# Patient Record
Sex: FEMALE | Race: WHITE | Employment: OTHER | URBAN - NONMETROPOLITAN AREA
[De-identification: names, ages, dates, MRNs, and addresses within clinical notes are randomized per-mention and may not be internally consistent; named-entity substitution may affect disease eponyms.]

---

## 2017-01-31 ENCOUNTER — RADIANT APPOINTMENT (OUTPATIENT)
Dept: GENERAL RADIOLOGY | Facility: CLINIC | Age: 67
End: 2017-01-31
Attending: NURSE PRACTITIONER
Payer: COMMERCIAL

## 2017-01-31 ENCOUNTER — OFFICE VISIT (OUTPATIENT)
Dept: FAMILY MEDICINE | Facility: CLINIC | Age: 67
End: 2017-01-31
Payer: COMMERCIAL

## 2017-01-31 VITALS
OXYGEN SATURATION: 92 % | WEIGHT: 146.8 LBS | SYSTOLIC BLOOD PRESSURE: 129 MMHG | HEART RATE: 90 BPM | BODY MASS INDEX: 28.67 KG/M2 | TEMPERATURE: 97.3 F | DIASTOLIC BLOOD PRESSURE: 72 MMHG

## 2017-01-31 DIAGNOSIS — J44.1 COPD EXACERBATION (H): ICD-10-CM

## 2017-01-31 DIAGNOSIS — R07.1 CHEST PAIN ON BREATHING: ICD-10-CM

## 2017-01-31 DIAGNOSIS — I10 HTN, GOAL BELOW 140/90: Primary | ICD-10-CM

## 2017-01-31 PROCEDURE — 99215 OFFICE O/P EST HI 40 MIN: CPT | Performed by: NURSE PRACTITIONER

## 2017-01-31 PROCEDURE — 71020 XR CHEST 2 VW: CPT

## 2017-01-31 PROCEDURE — 93000 ELECTROCARDIOGRAM COMPLETE: CPT | Performed by: NURSE PRACTITIONER

## 2017-01-31 RX ORDER — AZITHROMYCIN 500 MG/1
500 TABLET, FILM COATED ORAL DAILY
Qty: 5 TABLET | Refills: 1 | Status: SHIPPED | OUTPATIENT
Start: 2017-01-31 | End: 2017-02-05

## 2017-01-31 RX ORDER — AMLODIPINE BESYLATE 10 MG/1
10 TABLET ORAL DAILY
Qty: 90 TABLET | Refills: 4 | COMMUNITY
Start: 2017-01-31 | End: 2017-01-31

## 2017-01-31 RX ORDER — AZITHROMYCIN 250 MG/1
TABLET, FILM COATED ORAL
Qty: 6 TABLET | Refills: 0 | Status: SHIPPED | OUTPATIENT
Start: 2017-01-31 | End: 2017-01-31

## 2017-01-31 RX ORDER — AMLODIPINE BESYLATE 10 MG/1
10 TABLET ORAL DAILY
Qty: 90 TABLET | Refills: 4 | Status: SHIPPED | OUTPATIENT
Start: 2017-01-31 | End: 2018-01-30

## 2017-01-31 RX ORDER — PREDNISONE 20 MG/1
40 TABLET ORAL DAILY
Qty: 10 TABLET | Refills: 0 | Status: SHIPPED | OUTPATIENT
Start: 2017-01-31 | End: 2017-02-05

## 2017-01-31 ASSESSMENT — ANXIETY QUESTIONNAIRES
3. WORRYING TOO MUCH ABOUT DIFFERENT THINGS: SEVERAL DAYS
IF YOU CHECKED OFF ANY PROBLEMS ON THIS QUESTIONNAIRE, HOW DIFFICULT HAVE THESE PROBLEMS MADE IT FOR YOU TO DO YOUR WORK, TAKE CARE OF THINGS AT HOME, OR GET ALONG WITH OTHER PEOPLE: NOT DIFFICULT AT ALL
6. BECOMING EASILY ANNOYED OR IRRITABLE: NOT AT ALL
2. NOT BEING ABLE TO STOP OR CONTROL WORRYING: NOT AT ALL
5. BEING SO RESTLESS THAT IT IS HARD TO SIT STILL: NOT AT ALL
7. FEELING AFRAID AS IF SOMETHING AWFUL MIGHT HAPPEN: NOT AT ALL
GAD7 TOTAL SCORE: 2
1. FEELING NERVOUS, ANXIOUS, OR ON EDGE: NOT AT ALL

## 2017-01-31 ASSESSMENT — PATIENT HEALTH QUESTIONNAIRE - PHQ9: 5. POOR APPETITE OR OVEREATING: SEVERAL DAYS

## 2017-01-31 NOTE — PROGRESS NOTES
SUBJECTIVE:                                                    Aminata Gale is a 66 year old female who presents to clinic today for the following health issues:      COPD Follow-Up    Symptoms are currently: worse     Current fatigue or dyspnea with ambulation: none    Shortness of breath: slightly worsened    Increased or change in Cough/Sputum: Yes-    Fever(s): No    History   Smoking status     Current Every Day Smoker -- 0.60 packs/day for 42 years     Types: Cigarettes   Smokeless tobacco     Never Used     Comment: has not started the Chantix yet- 2016     No results found for this basename: FEV1, [id number]  Patient has a concentrator at home for oxygen. She does not use this is too noisy. Patient would like a order for a portable oxygen tank   Patient continues to smoke   Having more problems with shortness of breath this past month.   Recently in [place] to see some of her children and grandchildren   Has been ill since she got back per her report     Patient states while there in [place] she stopped her hydrochlorothiazide. She said she felt funny. She is not able to tell me today what felt funny but she stopped the medication. She had not had her follow-up blood work and this also concerned her. She does not want to go back on the diuretic. She states that she is tolerating the amlodipine fine and restarted her amlodipine     Patient states she has increased shortness of breath with exertion. Not able to even move her suitcase during her recent trip. Intermittently has left chest pain/pressure.  Seems to be worse when taking a deep breath or worse with physical activity  Increased coughing when lying down   Has been using her maintenance and rescue inhalers intermittently. No consistent use of inhalers per her report     Grieving the loss of her .  2 years ago this spring.        Amount of exercise or physical activity: None    Problems taking medications regularly: yes has been ill  in the last month     Medication side effects: none  Diet: regular (no restrictions)  URI       Duration: 1 month     Description (location/character/radiation): uri     Intensity:  moderate    Accompanying signs and symptoms: none    History (similar episodes/previous evaluation): None    Precipitating or alleviating factors: None    Therapies tried and outcome: None       -------------------------------------    Problem list and histories reviewed & adjusted, as indicated.  Additional history: as documented    Labs reviewed in [facility]  Problem list, Medication list, Allergies, and Medical/Social/Surgical histories reviewed in [facility] and updated as appropriate.    ROS:   ROS: 10 point ROS neg other than the symptoms noted above in the HPI.      OBJECTIVE:                                                    /72 mmHg  Pulse 90  Temp(Src) 97.3  F (36.3  C) (Tympanic)  Wt 146 lb 12.8 oz (66.588 kg)  SpO2 92%  Body mass index is 28.67 kg/(m^2).   GENERAL: healthy, alert, well nourished, well hydrated, no distress  HENT: ear canals- normal; TMs- normal; Nose- normal; Mouth- no ulcers, no lesions  NECK: no tenderness, no adenopathy, no asymmetry, no masses, no stiffness; thyroid- normal to palpation  RESP: lungs distant breath sounds with anterior upper expiratory  wheezes  CV: regular rates and rhythm, normal S1 S2, no S3 or S4 and no murmur, no click or rub -  ABDOMEN: soft, no tenderness, no  hepatosplenomegaly, no masses, normal bowel sounds    Diagnostic test results:  Recent Results (from the past 744 hour(s))   XR Chest 2 Views    Narrative    XR CHEST 2 VW   1/31/2017 4:40 PM     HISTORY: Chest pain on breathing, Chronic obstructive pulmonary  disease with (acute) exacerbation    COMPARISON: Film dated 6/5/2014    FINDINGS: The heart is negative.  The lungs are clear. The pulmonary  vasculature is normal.  The bones and soft tissues are unremarkable.      Impression    IMPRESSION: No acute infiltrates. No  significant change.        YEMI CARRILLO MD       Results for orders placed or performed in visit on 05/18/16   MA Screening Digital Bilateral    Narrative    SCREENING MAMMOGRAM, BILATERAL, DIGITAL w/CAD - 5/18/2016 2:25 PM.    BREAST SYMPTOMS: No current breast complaints.     COMPARISON:  02/11/2013, 10/14/2010, 10/14/2009, 10/19/2006.    BREAST DENSITY: Scattered fibroglandular densities.    COMMENTS: No findings of suspicion for malignancy.            Impression    IMPRESSION: BI-RADS CATEGORY: 1 -  NEGATIVE.    RECOMMENDED FOLLOW-UP: Annual Mammography    Exam results letter mailed to patient.    NORI STRANGE MD        ASSESSMENT/PLAN:                                                    1. HTN, goal below 140/90  Diuretic taken off her medication list  Continue  - amLODIPine (NORVASC) 10 MG tablet; Take 1 tablet (10 mg) by mouth daily For high blood pressure  Dispense: 90 tablet; Refill: 4  - EKG 12-lead complete w/read - Clinics  - CARDIOLOGY EVAL ADULT REFERRAL  - Exercise Stress Echocardiogram; Future    2. Chest pain on breathing  - EKG 12-lead complete w/read - Clinics  - CARDIOLOGY EVAL ADULT REFERRAL  - Exercise Stress Echocardiogram; Future  - PULMONARY MEDICINE REFERRAL  - XR Chest 2 Views; Future    3. COPD exacerbation (H)  Smoking cessation strongly encouraged  - Exercise Stress Echocardiogram; Future  - PULMONARY MEDICINE REFERRAL  - XR Chest 2 Views; Future  - predniSONE (DELTASONE) 20 MG tablet; Take 2 tablets (40 mg) by mouth daily for 5 days  Dispense: 10 tablet; Refill: 0  - azithromycin (ZITHROMAX) 500 MG tablet; Take 1 tablet (500 mg) by mouth daily for 5 days  Dispense: 5 tablet; Refill: 1      Follow up with Provider - Call or return to the clinic with any worsening of symptoms or no resolution. Patient/Parent verbalized understanding and is in agreement. Medication side effects reviewed.   Current Outpatient Prescriptions   Medication Sig Dispense Refill     amLODIPine (NORVASC) 10 MG  tablet Take 1 tablet (10 mg) by mouth daily For high blood pressure 90 tablet 4     predniSONE (DELTASONE) 20 MG tablet Take 2 tablets (40 mg) by mouth daily for 5 days 10 tablet 0     azithromycin (ZITHROMAX) 500 MG tablet Take 1 tablet (500 mg) by mouth daily for 5 days 5 tablet 1     FLUoxetine (PROZAC) 20 MG capsule Take 1 capsule (20 mg) by mouth daily 90 capsule 1     tiotropium (SPIRIVA RESPIMAT) 2.5 MCG/ACT inhalation aerosol Inhale 2 puffs into the lungs daily 12 g 3     omeprazole (PRILOSEC) 40 MG capsule Take 1 capsule (40 mg) by mouth daily Take 30-60 minutes before a meal. 90 capsule 1     albuterol (ALBUTEROL) 108 (90 BASE) MCG/ACT inhaler Inhale 1-2 puffs into the lungs every 4 hours To 6 hours as needed for shortness of breath 3 Inhaler 1     ipratropium - albuterol 0.5 mg/2.5 mg/3 mL (DUONEB) 0.5-2.5 (3) MG/3ML nebulization Take 1 vial (3 mLs) by nebulization every 6 hours as needed for shortness of breath / dyspnea or wheezing 30 vial 1     STATIN NOT PRESCRIBED, INTENTIONAL, 1 each daily Statin not prescribed intentionally due to Intolerance 0 each 0     Ibuprofen (IBU-200 PO) Take  by mouth.       [DISCONTINUED] amLODIPine (NORVASC) 10 MG tablet Take 1 tablet (10 mg) by mouth daily For high blood pressure 90 tablet 4     HYDROcodone-acetaminophen (NORCO) 5-325 MG per tablet Take 1 tablet by mouth every 6 hours as needed for pain 30 tablet 0     [DISCONTINUED] amLODIPine (NORVASC) 10 MG tablet Take 1 tablet (10 mg) by mouth daily For high blood pressure 90 tablet 4        See Patient Instructions    PEDRO Dominguez [facility]

## 2017-01-31 NOTE — NURSING NOTE
Initial /72 mmHg  Pulse 90  Temp(Src) 97.3  F (36.3  C) (Tympanic)  Wt 146 lb 12.8 oz (66.588 kg)  SpO2 92% Estimated body mass index is 28.67 kg/(m^2) as calculated from the following:    Height as of 7/7/15: 5' (1.524 m).    Weight as of this encounter: 146 lb 12.8 oz (66.588 kg). .

## 2017-01-31 NOTE — MR AVS SNAPSHOT
After Visit Summary   1/31/2017    Aminata Gale    MRN: [id number]           Patient Information     Date Of Birth          1950        Visit Information        Provider Department      1/31/2017 3:40 PM Sana Olivo APRN [facility]        Today's Diagnoses     HTN, goal below 140/90    -  1     Chest pain on breathing         COPD exacerbation (H)           Care Instructions      Chronic Lung Disease: Preventing Lung Infections  Chronic lung diseases include chronic obstructive pulmonary disease (COPD), which includes chronic bronchitis and emphysema. Other chronic lung diseases include pulmonary fibrosis, sarcoidosis, and other conditions. When you have chronic lung diseases, it's very important to protect yourself from respiratory infections, like colds, the flu, and lung infections. Infections may cause your lung condition to worsen. Although you can't completely avoid them, there are things you can do to lessen the chance of infections.    Take precautions  Taking the following precautions can help you avoid illness:    Remember to keep your hands away from your nose and mouth. Germs on your hands get into your respiratory system this way.    Wash your hands often. When you wash them:    Use soap and warm water.    Rub your hands together well for at least 20 seconds.    Make sure to rinse them well.    Dry your hands on clean towels or air-dry them.    Use hand  containing alcohol, if you are unable to wash your hands. Use the  after touching doorknobs, handles, and supermarket carts, for example, since lots of people touch them. Then wash your hands as soon as you can.    To help prevent the flu, get a flu vaccination every year. This may be given at your health care provider's office, a drugstore, or pharmacy, or at work. Get your flu shot as soon as the vaccines are available in your area. This is usually around September each  year.    To help prevent pneumococcal pneumonia, get pneumonia vaccinations. Talk with your health care provider about which pneumococcal vaccinations you need.    Try to stay away from people with respiratory infections, such as colds or the flu. Stay away from crowded places, like shopping centers or movie theatres during cold and flu season.    If you smoke, think about quitting. In addition to causing or worsening many lung conditions, the lung damage from smoking increases your risk of infections. Stay away from others who smoke, too. This is also harmful and increases your chance of infections.    8378-4731 [employer]. [address] [place] [address]. All rights reserved. This information is not intended as a substitute for professional medical care. Always follow your healthcare professional's instructions.        Discharge Instructions: COPD  You have been diagnosed with chronic obstructive pulmonary disease (COPD). This is a name given to a group of diseases that limit the flow of air in and out of your lungs. This makes it harder to breathe. With COPD, you are also more likely to get lung infections. COPD includes chronic bronchitis and emphysema. COPD is most often caused by heavy, long-term cigarette smoking.  Home care  Quit smoking    If you smoke, quit. It is the best thing you can do for your COPD and your overall health.    Join a stop-smoking program. There are even telephone, text message, and Internet programs to help you quit.    Ask your health care provider about medications or other methods to help you quit.    Ask family members to quit smoking as well.    Don't allow people to smoke in your home, in your car, or when they are around you.  Protect yourself from infection    Wash your hands often. Do your best to keep your hands away from your face. Most germs are spread from your hands to your mouth.    Get a flu shot every year. Also ask your provider about  pneumonia vaccines.    Avoid crowds. It's especially important to do this in the winter when more people have colds and flu.    To stay healthy, get enough sleep, exercise regularly, and eat a balanced diet. You should:    Get about 8 hours of sleep every night.    Try to exercise for at least 30 minutes on most days.    Have healthy foods including fruits and vegetables, 100% whole grains, lean meats and fish, and low-fat dairy products. Try to stay away from foods high in fats and sugar.  Take your medications  Take your medications exactly as directed. Don't skip doses.  Manage your stress  Stress can make COPD worse. Use this stress management technique:    Find a quiet place and sit or lie in a comfortable position.    Close your eyes and perform breathing exercises for several minutes. Ask your provider about the best way to breathe.  Pulmonary rehabilitation    Pulmonary rehab can help you feel better. These programs include exercise, breathing techniques, information about COPD, counseling, and help for smokers.    Ask your provider or your local hospital about programs in your area.  When to call your health care provider  Call your provider immediately if you have any of the following:    Shortness of breath, wheezing, or coughing    Increased mucus    Yellow, green, bloody, or smelly mucus    Fever or chills    Tightness in your chest that does not go away with rest or medication    An irregular heartbeat or a feeling that your heart is beating very fast    Swollen ankles     0559-8306 [employer]. [address] [place] [address]. All rights reserved. This information is not intended as a substitute for professional medical care. Always follow your healthcare professional's instructions.              Follow-ups after your visit        Additional Services     CARDIOLOGY EVAL ADULT REFERRAL       Your provider has referred you to:  [facility]: [facility] [phone]   [email or web address]    Please be aware that coverage of these services is subject to the terms and limitations of your health insurance plan.  Call member services at your health plan with any benefit or coverage questions.      Type of Referral:  New Cardiology Consult    Timeframe requested:  1 Week    Please bring the following to your appointment:  >>   Any x-rays, CTs or MRIs which have been performed.  Contact the facility where they were done to arrange for  prior to your scheduled appointment.    >>   List of current medications  >>   This referral request   >>   Any documents/labs given to you for this referral            PULMONARY MEDICINE REFERRAL       Your provider has referred you to: [facility]: [facility] [place] [phone]   [email or web address]  [facility]: [facility] - [place] [phone]   [email or web address]    Please be aware that coverage of these services is subject to the terms and limitations of your health insurance plan.  Call member services at your health plan with any benefit or coverage questions.      Please bring the following with you to your appointment:    (1) Any X-Rays, CTs or MRIs which have been performed.  Contact the facility where they were done to arrange for  prior to your scheduled appointment.    (2) List of current medications   (3) This referral request   (4) Any documents/labs given to you for this referral                  Future tests that were ordered for you today     Open Future Orders        Priority Expected Expires Ordered    Exercise Stress Echocardiogram Routine  1/31/2018 1/31/2017    XR Chest 2 Views Routine 1/31/2017 1/31/2018 1/31/2017            Who to contact     If you have questions or need follow up information about today's clinic  visit or your schedule please contact [facility] directly at [phone].  Normal or non-critical lab and imaging results will be communicated to you by MyChart, letter or phone within 4 business days after the clinic has received the results. If you do not hear from us within 7 days, please contact the clinic through [employer] or phone. If you have a critical or abnormal lab result, we will notify you by phone as soon as possible.  Submit refill requests through [employer] or call your pharmacy and they will forward the refill request to us. Please allow 3 business days for your refill to be completed.          Additional Information About Your Visit        [employer] Information     [employer] gives you secure access to your electronic health record. If you see a primary care provider, you can also send messages to your care team and make appointments. If you have questions, please call your primary care clinic.  If you do not have a primary care provider, please call [phone] and they will assist you.        Care EveryWhere ID     This is your Care EveryWhere ID. This could be used by other organizations to access your [place] medical records  [id number]        Your Vitals Were     Pulse Temperature Pulse Oximetry             90 97.3  F (36.3  C) (Tympanic) 92%          Blood Pressure from Last 3 Encounters:   01/31/17 129/72   12/02/16 128/82   05/18/16 106/68    Weight from Last 3 Encounters:   01/31/17 146 lb 12.8 oz (66.588 kg)   12/02/16 142 lb (64.411 kg)   05/18/16 146 lb (66.225 kg)              We Performed the Following     CARDIOLOGY EVAL ADULT REFERRAL     EKG 12-lead complete w/read - Clinics     PULMONARY MEDICINE REFERRAL          Today's Medication Changes          These changes are accurate as of: 1/31/17  4:16 PM.  If you have any questions, ask your nurse or doctor.               Start taking these medicines.        Dose/Directions    predniSONE 20 MG tablet   Commonly known as:   DELTASONE   Used for:  COPD exacerbation (H)   Started by:  Sana Olivo APRN CNP        Dose:  40 mg   Take 2 tablets (40 mg) by mouth daily for 5 days   Quantity:  10 tablet   Refills:  0         These medicines have changed or have updated prescriptions.        Dose/Directions    azithromycin 500 MG tablet   Commonly known as:  ZITHROMAX   This may have changed:    - medication strength  - how much to take  - how to take this  - when to take this  - additional instructions   Used for:  COPD exacerbation (H)   Changed by:  Sana Olivo APRN CNP        Dose:  500 mg   Take 1 tablet (500 mg) by mouth daily for 5 days   Quantity:  5 tablet   Refills:  1         Stop taking these medicines if you haven't already. Please contact your care team if you have questions.     cholestyramine light 4 GM powder   Commonly known as:  PREVALITE   Stopped by:  Sana Olivo APRN CNP           guaiFENesin-codeine 100-10 MG/5ML Soln solution   Commonly known as:  ROBITUSSIN AC   Stopped by:  Sana Olivo APRN CNP           hydrochlorothiazide 12.5 MG Tabs tablet   Stopped by:  Sana Olivo APRN CNP           meclizine 25 MG tablet   Commonly known as:  ANTIVERT   Stopped by:  Sana Olivo APRN CNP           varenicline 0.5 MG X 11 & 1 MG X 42 tablet   Commonly known as:  CHANTIX STARTING MONTH PAK   Stopped by:  Sana Olivo APRN CNP                Where to get your medicines      These medications were sent to [facility] [place], [place] - [address] [place] [id number]     Phone:  [phone]    - amLODIPine 10 MG tablet  - azithromycin 500 MG tablet  - predniSONE 20 MG tablet             Primary Care Provider Office Phone # Fax #    PEDRO Dominguez [phone]       [facility] [address] [facility] [address]        Thank you!     Thank you for choosing [facility]  [place]  for your care. Our goal is always to provide you with excellent care. Hearing back from our patients is one way we can continue to improve our services. Please take a few minutes to complete the written survey that you may receive in the mail after your visit with us. Thank you!             Your Updated Medication List - Protect others around you: Learn how to safely use, store and throw away your medicines at www.disposemymeds.org.          This list is accurate as of: 1/31/17  4:16 PM.  Always use your most recent med list.                   Brand Name Dispense Instructions for use    albuterol 108 (90 BASE) MCG/ACT Inhaler    albuterol    3 Inhaler    Inhale 1-2 puffs into the lungs every 4 hours To 6 hours as needed for shortness of breath       amLODIPine 10 MG tablet    NORVASC    90 tablet    Take 1 tablet (10 mg) by mouth daily For high blood pressure       azithromycin 500 MG tablet    ZITHROMAX    5 tablet    Take 1 tablet (500 mg) by mouth daily for 5 days       FLUoxetine 20 MG capsule    PROzac    90 capsule    Take 1 capsule (20 mg) by mouth daily       HYDROcodone-acetaminophen 5-325 MG per tablet    NORCO    30 tablet    Take 1 tablet by mouth every 6 hours as needed for pain       IBU-200 PO      Take  by mouth.       ipratropium - albuterol 0.5 mg/2.5 mg/3 mL 0.5-2.5 (3) MG/3ML neb solution    DUONEB    30 vial    Take 1 vial (3 mLs) by nebulization every 6 hours as needed for shortness of breath / dyspnea or wheezing       omeprazole 40 MG capsule    priLOSEC    90 capsule    Take 1 capsule (40 mg) by mouth daily Take 30-60 minutes before a meal.       predniSONE 20 MG tablet    DELTASONE    10 tablet    Take 2 tablets (40 mg) by mouth daily for 5 days       STATIN NOT PRESCRIBED (INTENTIONAL)     0 each    1 each daily Statin not prescribed intentionally due to Intolerance       tiotropium 2.5 MCG/ACT inhalation aerosol    SPIRIVA RESPIMAT    12 g    Inhale 2 puffs into the lungs daily

## 2017-01-31 NOTE — PATIENT INSTRUCTIONS
Chronic Lung Disease: Preventing Lung Infections  Chronic lung diseases include chronic obstructive pulmonary disease (COPD), which includes chronic bronchitis and emphysema. Other chronic lung diseases include pulmonary fibrosis, sarcoidosis, and other conditions. When you have chronic lung diseases, it's very important to protect yourself from respiratory infections, like colds, the flu, and lung infections. Infections may cause your lung condition to worsen. Although you can't completely avoid them, there are things you can do to lessen the chance of infections.    Take precautions  Taking the following precautions can help you avoid illness:    Remember to keep your hands away from your nose and mouth. Germs on your hands get into your respiratory system this way.    Wash your hands often. When you wash them:    Use soap and warm water.    Rub your hands together well for at least 20 seconds.    Make sure to rinse them well.    Dry your hands on clean towels or air-dry them.    Use hand  containing alcohol, if you are unable to wash your hands. Use the  after touching doorknobs, handles, and supermarket carts, for example, since lots of people touch them. Then wash your hands as soon as you can.    To help prevent the flu, get a flu vaccination every year. This may be given at your health care provider's office, a drugstore, or pharmacy, or at work. Get your flu shot as soon as the vaccines are available in your area. This is usually around September each year.    To help prevent pneumococcal pneumonia, get pneumonia vaccinations. Talk with your health care provider about which pneumococcal vaccinations you need.    Try to stay away from people with respiratory infections, such as colds or the flu. Stay away from crowded places, like shopping centers or movie theatres during cold and flu season.    If you smoke, think about quitting. In addition to causing or worsening many lung conditions,  the lung damage from smoking increases your risk of infections. Stay away from others who smoke, too. This is also harmful and increases your chance of infections.    9954-7919 [facility]. [address] [place] [address], [place] [address]. All rights reserved. This information is not intended as a substitute for professional medical care. Always follow your healthcare professional's instructions.        Discharge Instructions: COPD  You have been diagnosed with chronic obstructive pulmonary disease (COPD). This is a name given to a group of diseases that limit the flow of air in and out of your lungs. This makes it harder to breathe. With COPD, you are also more likely to get lung infections. COPD includes chronic bronchitis and emphysema. COPD is most often caused by heavy, long-term cigarette smoking.  Home care  Quit smoking    If you smoke, quit. It is the best thing you can do for your COPD and your overall health.    Join a stop-smoking program. There are even telephone, text message, and Internet programs to help you quit.    Ask your health care provider about medications or other methods to help you quit.    Ask family members to quit smoking as well.    Don't allow people to smoke in your home, in your car, or when they are around you.  Protect yourself from infection    Wash your hands often. Do your best to keep your hands away from your face. Most germs are spread from your hands to your mouth.    Get a flu shot every year. Also ask your provider about pneumonia vaccines.    Avoid crowds. It's especially important to do this in the winter when more people have colds and flu.    To stay healthy, get enough sleep, exercise regularly, and eat a balanced diet. You should:    Get about 8 hours of sleep every night.    Try to exercise for at least 30 minutes on most days.    Have healthy foods including fruits and vegetables, 100% whole grains, lean meats and fish, and low-fat dairy products. Try to  stay away from foods high in fats and sugar.  Take your medications  Take your medications exactly as directed. Don't skip doses.  Manage your stress  Stress can make COPD worse. Use this stress management technique:    Find a quiet place and sit or lie in a comfortable position.    Close your eyes and perform breathing exercises for several minutes. Ask your provider about the best way to breathe.  Pulmonary rehabilitation    Pulmonary rehab can help you feel better. These programs include exercise, breathing techniques, information about COPD, counseling, and help for smokers.    Ask your provider or your local hospital about programs in your area.  When to call your health care provider  Call your provider immediately if you have any of the following:    Shortness of breath, wheezing, or coughing    Increased mucus    Yellow, green, bloody, or smelly mucus    Fever or chills    Tightness in your chest that does not go away with rest or medication    An irregular heartbeat or a feeling that your heart is beating very fast    Swollen ankles     9081-5276 [employer]. [address] [place] [address], [place] [address]. All rights reserved. This information is not intended as a substitute for professional medical care. Always follow your healthcare professional's instructions.

## 2017-02-01 ASSESSMENT — ANXIETY QUESTIONNAIRES: GAD7 TOTAL SCORE: 2

## 2017-02-01 ASSESSMENT — PATIENT HEALTH QUESTIONNAIRE - PHQ9: SUM OF ALL RESPONSES TO PHQ QUESTIONS 1-9: 8

## 2017-02-08 ENCOUNTER — HOSPITAL ENCOUNTER (OUTPATIENT)
Dept: CT IMAGING | Facility: CLINIC | Age: 67
Discharge: HOME | End: 2017-02-08
Attending: NURSE PRACTITIONER | Admitting: NURSE PRACTITIONER
Payer: MEDICARE

## 2017-02-08 DIAGNOSIS — R07.1 CHEST PAIN ON BREATHING: ICD-10-CM

## 2017-02-08 DIAGNOSIS — J44.1 COPD EXACERBATION (H): ICD-10-CM

## 2017-02-08 PROCEDURE — 71250 CT THORAX DX C-: CPT

## 2017-02-10 ENCOUNTER — HOSPITAL ENCOUNTER (OUTPATIENT)
Dept: CARDIOLOGY | Facility: CLINIC | Age: 67
Discharge: HOME | End: 2017-02-10
Attending: NURSE PRACTITIONER | Admitting: NURSE PRACTITIONER
Payer: MEDICARE

## 2017-02-10 ENCOUNTER — TELEPHONE (OUTPATIENT)
Dept: FAMILY MEDICINE | Facility: CLINIC | Age: 67
End: 2017-02-10

## 2017-02-10 DIAGNOSIS — R06.09 DYSPNEA ON EXERTION: ICD-10-CM

## 2017-02-10 DIAGNOSIS — M32.9 SYSTEMIC LUPUS ERYTHEMATOSUS (H): ICD-10-CM

## 2017-02-10 DIAGNOSIS — I10 HTN, GOAL BELOW 140/90: ICD-10-CM

## 2017-02-10 DIAGNOSIS — J44.1 COPD EXACERBATION (H): ICD-10-CM

## 2017-02-10 DIAGNOSIS — J43.2 CENTRILOBULAR EMPHYSEMA (H): ICD-10-CM

## 2017-02-10 DIAGNOSIS — R94.39 ABNORMAL STRESS ECHO: Primary | ICD-10-CM

## 2017-02-10 DIAGNOSIS — Z72.0 TOBACCO ABUSE: ICD-10-CM

## 2017-02-10 DIAGNOSIS — D75.1 POLYCYTHEMIA, SECONDARY: ICD-10-CM

## 2017-02-10 DIAGNOSIS — R07.1 CHEST PAIN ON BREATHING: ICD-10-CM

## 2017-02-10 PROCEDURE — 93350 STRESS TTE ONLY: CPT | Mod: 26 | Performed by: INTERNAL MEDICINE

## 2017-02-10 PROCEDURE — 25500064 ZZH RX 255 OP 636: Performed by: NURSE PRACTITIONER

## 2017-02-10 PROCEDURE — 93321 DOPPLER ECHO F-UP/LMTD STD: CPT | Mod: 26 | Performed by: INTERNAL MEDICINE

## 2017-02-10 PROCEDURE — 40000264 ECHO STRESS TEST WITH LUMASON

## 2017-02-10 PROCEDURE — 93016 CV STRESS TEST SUPVJ ONLY: CPT | Performed by: FAMILY MEDICINE

## 2017-02-10 PROCEDURE — 93018 CV STRESS TEST I&R ONLY: CPT | Performed by: INTERNAL MEDICINE

## 2017-02-10 PROCEDURE — 93325 DOPPLER ECHO COLOR FLOW MAPG: CPT | Mod: 26 | Performed by: INTERNAL MEDICINE

## 2017-02-10 RX ADMIN — SULFUR HEXAFLUORIDE 5 ML: KIT at 08:06

## 2017-02-10 NOTE — TELEPHONE ENCOUNTER
Patient noted of results below. Patient told of referral placed and given  number from referral.  Patient verbalized understanding.  Kris

## 2017-02-10 NOTE — TELEPHONE ENCOUNTER
Emphysema centrilobular seen on CT.   Lymph nodes stable. No changes.   Abnormal ECHO. Cardiology consult recommended to further evaluate heart function.   Further work up needed of cardiac function.  Order for card congceire consult placed.   Thanks Sana DAVILA-BC

## 2017-03-01 NOTE — TELEPHONE ENCOUNTER
Dr Santana recommended a lexiscan nuclear stress test in [place] prior to seeing them in April.  Order placed.   Please call and let her know.  Thanks Sana Olivo FNP-BC

## 2017-03-01 NOTE — TELEPHONE ENCOUNTER
Notified of plan, she will call to schedule today. Aminata is leaving for [place] for the month of March, will try to schedule before she leaves, otherwise will do this when she gets back in April prior to seeing cardiology

## 2017-04-10 ENCOUNTER — HOSPITAL ENCOUNTER (OUTPATIENT)
Dept: NUCLEAR MEDICINE | Facility: CLINIC | Age: 67
Setting detail: NUCLEAR MEDICINE
Discharge: HOME | End: 2017-04-10
Attending: NURSE PRACTITIONER | Admitting: NURSE PRACTITIONER
Payer: MEDICARE

## 2017-04-10 DIAGNOSIS — R06.09 DYSPNEA ON EXERTION: ICD-10-CM

## 2017-04-10 DIAGNOSIS — J43.2 CENTRILOBULAR EMPHYSEMA (H): ICD-10-CM

## 2017-04-10 DIAGNOSIS — M32.9 SYSTEMIC LUPUS ERYTHEMATOSUS (H): ICD-10-CM

## 2017-04-10 DIAGNOSIS — R94.39 ABNORMAL STRESS ECHO: ICD-10-CM

## 2017-04-10 DIAGNOSIS — I10 HTN, GOAL BELOW 140/90: ICD-10-CM

## 2017-04-10 DIAGNOSIS — Z72.0 TOBACCO ABUSE: ICD-10-CM

## 2017-04-10 DIAGNOSIS — D75.1 POLYCYTHEMIA, SECONDARY: ICD-10-CM

## 2017-04-10 PROCEDURE — 78452 HT MUSCLE IMAGE SPECT MULT: CPT | Mod: 26 | Performed by: INTERNAL MEDICINE

## 2017-04-10 PROCEDURE — 93017 CV STRESS TEST TRACING ONLY: CPT

## 2017-04-10 PROCEDURE — 34300033 ZZH RX 343: Performed by: FAMILY MEDICINE

## 2017-04-10 PROCEDURE — 93018 CV STRESS TEST I&R ONLY: CPT | Performed by: INTERNAL MEDICINE

## 2017-04-10 PROCEDURE — 25000128 H RX IP 250 OP 636: Performed by: NURSE PRACTITIONER

## 2017-04-10 PROCEDURE — 78452 HT MUSCLE IMAGE SPECT MULT: CPT

## 2017-04-10 PROCEDURE — A9502 TC99M TETROFOSMIN: HCPCS | Performed by: FAMILY MEDICINE

## 2017-04-10 PROCEDURE — 93016 CV STRESS TEST SUPVJ ONLY: CPT | Performed by: FAMILY MEDICINE

## 2017-04-10 RX ORDER — REGADENOSON 0.08 MG/ML
0.4 INJECTION, SOLUTION INTRAVENOUS ONCE
Status: COMPLETED | OUTPATIENT
Start: 2017-04-10 | End: 2017-04-10

## 2017-04-10 RX ADMIN — REGADENOSON 0.4 MG: 0.08 INJECTION, SOLUTION INTRAVENOUS at 11:50

## 2017-04-10 RX ADMIN — TETROFOSMIN 25.2 MCI.: 0.23 INJECTION, POWDER, LYOPHILIZED, FOR SOLUTION INTRAVENOUS at 11:45

## 2017-04-10 RX ADMIN — TETROFOSMIN 9 MCI.: 0.23 INJECTION, POWDER, LYOPHILIZED, FOR SOLUTION INTRAVENOUS at 10:15

## 2017-04-11 ENCOUNTER — OFFICE VISIT (OUTPATIENT)
Dept: CARDIOLOGY | Facility: CLINIC | Age: 67
End: 2017-04-11
Attending: NURSE PRACTITIONER
Payer: COMMERCIAL

## 2017-04-11 VITALS
SYSTOLIC BLOOD PRESSURE: 128 MMHG | OXYGEN SATURATION: 95 % | BODY MASS INDEX: 29.02 KG/M2 | DIASTOLIC BLOOD PRESSURE: 77 MMHG | WEIGHT: 148.6 LBS | HEART RATE: 78 BPM

## 2017-04-11 DIAGNOSIS — R60.0 LOWER LEG EDEMA: ICD-10-CM

## 2017-04-11 DIAGNOSIS — R79.9 ABNORMAL FINDING OF BLOOD CHEMISTRY: ICD-10-CM

## 2017-04-11 DIAGNOSIS — R94.39 ABNORMAL CARDIOVASCULAR STRESS TEST: ICD-10-CM

## 2017-04-11 DIAGNOSIS — R07.89 ATYPICAL CHEST PAIN: ICD-10-CM

## 2017-04-11 DIAGNOSIS — E78.2 MIXED HYPERLIPIDEMIA: Primary | ICD-10-CM

## 2017-04-11 DIAGNOSIS — Z01.810 PRE-PROCEDURAL CARDIOVASCULAR EXAMINATION: ICD-10-CM

## 2017-04-11 LAB
ALBUMIN SERPL-MCNC: 3.9 G/DL (ref 3.4–5)
ALP SERPL-CCNC: 120 U/L (ref 40–150)
ALT SERPL W P-5'-P-CCNC: 59 U/L (ref 0–50)
ANION GAP SERPL CALCULATED.3IONS-SCNC: 7 MMOL/L (ref 3–14)
APTT PPP: 36 SEC (ref 22–37)
AST SERPL W P-5'-P-CCNC: 40 U/L (ref 0–45)
BILIRUB SERPL-MCNC: 0.3 MG/DL (ref 0.2–1.3)
BUN SERPL-MCNC: 16 MG/DL (ref 7–30)
CALCIUM SERPL-MCNC: 9.4 MG/DL (ref 8.5–10.1)
CHLORIDE SERPL-SCNC: 100 MMOL/L (ref 94–109)
CHOLEST SERPL-MCNC: 232 MG/DL
CO2 SERPL-SCNC: 33 MMOL/L (ref 20–32)
CREAT SERPL-MCNC: 0.7 MG/DL (ref 0.52–1.04)
ERYTHROCYTE [DISTWIDTH] IN BLOOD BY AUTOMATED COUNT: 13.1 % (ref 10–15)
GFR SERPL CREATININE-BSD FRML MDRD: 83 ML/MIN/1.7M2
GLUCOSE SERPL-MCNC: 62 MG/DL (ref 70–99)
HBA1C MFR BLD: 6.3 % (ref 4.3–6)
HCT VFR BLD AUTO: 51.9 % (ref 35–47)
HDLC SERPL-MCNC: 58 MG/DL
HGB BLD-MCNC: 17.8 G/DL (ref 11.7–15.7)
INR PPP: 0.92 (ref 0.86–1.14)
LDLC SERPL CALC-MCNC: 145 MG/DL
MCH RBC QN AUTO: 31.2 PG (ref 26.5–33)
MCHC RBC AUTO-ENTMCNC: 34.3 G/DL (ref 31.5–36.5)
MCV RBC AUTO: 91 FL (ref 78–100)
NONHDLC SERPL-MCNC: 174 MG/DL
PLATELET # BLD AUTO: 251 10E9/L (ref 150–450)
POTASSIUM SERPL-SCNC: 3.3 MMOL/L (ref 3.4–5.3)
PROT SERPL-MCNC: 7.9 G/DL (ref 6.8–8.8)
RBC # BLD AUTO: 5.71 10E12/L (ref 3.8–5.2)
SODIUM SERPL-SCNC: 140 MMOL/L (ref 133–144)
TRIGL SERPL-MCNC: 147 MG/DL
WBC # BLD AUTO: 9.6 10E9/L (ref 4–11)

## 2017-04-11 PROCEDURE — 36415 COLL VENOUS BLD VENIPUNCTURE: CPT | Performed by: INTERNAL MEDICINE

## 2017-04-11 PROCEDURE — 80053 COMPREHEN METABOLIC PANEL: CPT | Performed by: INTERNAL MEDICINE

## 2017-04-11 PROCEDURE — 85610 PROTHROMBIN TIME: CPT | Performed by: INTERNAL MEDICINE

## 2017-04-11 PROCEDURE — 99204 OFFICE O/P NEW MOD 45 MIN: CPT | Performed by: INTERNAL MEDICINE

## 2017-04-11 PROCEDURE — 80061 LIPID PANEL: CPT | Performed by: INTERNAL MEDICINE

## 2017-04-11 PROCEDURE — 83036 HEMOGLOBIN GLYCOSYLATED A1C: CPT | Performed by: INTERNAL MEDICINE

## 2017-04-11 PROCEDURE — 85730 THROMBOPLASTIN TIME PARTIAL: CPT | Performed by: INTERNAL MEDICINE

## 2017-04-11 PROCEDURE — 85027 COMPLETE CBC AUTOMATED: CPT | Performed by: INTERNAL MEDICINE

## 2017-04-11 RX ORDER — HYDROCHLOROTHIAZIDE 12.5 MG/1
12.5 CAPSULE ORAL PRN
Status: ON HOLD | COMMUNITY
End: 2017-04-13

## 2017-04-11 RX ORDER — LIDOCAINE 40 MG/G
CREAM TOPICAL
Status: CANCELLED | OUTPATIENT
Start: 2017-04-11

## 2017-04-11 RX ORDER — ASPIRIN 81 MG/1
81 TABLET, CHEWABLE ORAL DAILY
Qty: 100 TABLET | Refills: 3 | COMMUNITY
Start: 2017-04-11 | End: 2017-12-28

## 2017-04-11 RX ORDER — SODIUM CHLORIDE 9 MG/ML
INJECTION, SOLUTION INTRAVENOUS CONTINUOUS
Status: CANCELLED | OUTPATIENT
Start: 2017-04-11

## 2017-04-11 RX ORDER — EZETIMIBE 10 MG/1
10 TABLET ORAL DAILY
Qty: 30 TABLET | Refills: 2 | Status: SHIPPED | OUTPATIENT
Start: 2017-04-11 | End: 2017-06-15

## 2017-04-11 RX ORDER — ASPIRIN 81 MG/1
81 TABLET ORAL DAILY
Status: CANCELLED | OUTPATIENT
Start: 2017-04-11

## 2017-04-11 NOTE — LETTER
4/11/2017    PEDRO Dominguez CNP  [facility]   [address] [facility] [address]    RE: Aminata Gale       Dear Colleague,    I had the pleasure of seeing Aminata Gale in the [facility].    CARDIOLOGY CONSULT    REASON FOR CONSULT: abnormal stress test    PRIMARY CARE PHYSICIAN:  Sana Olivo    HISTORY OF PRESENT ILLNESS:  66-year-old female with no cardiac history is seen for evaluation of chest pain and abnormal stress test.  Her cardiac risk factors include dyslipidemia with a history of statin intolerance, tobacco abuse, and hypertension.    She has a history of COPD.  PFTs in 2013 showed FEV1 of 1.1, FVC 1.9, DLCO 47% predicted.    Over the past one year she has complained of exertional dyspnea.  This has slightly progressed in recent months.  She can walk fairly short distances, but will need to stop and rest.  In the past several months she also complains of a tightness in her chest.  This occurs on a daily basis and is fairly constant.  It doesn't worsen with exertion.  She has some mild lower extremity edema which is unchanged.  She denies any palpitations, dizziness, or syncope.    Stress echo February 2017 showed 3:30 on the Dino protocol, 83% maximum heart rate achieved, no chest pain during exercise, EKG negative for ischemia, rest echo with EF of 50-55%, stress echo shows minimal improvement of ejection fraction to 55-60%, distal anterior wall hypokinetic.     Lexiscan nuclear stress test April 2017 shows small apical infarct, no ischemia, ejection fraction 60%, apical hypokinesis.     Patient denies any bleeding history.  She has no upcoming surgeries.    PAST MEDICAL HISTORY:  Past Medical History:   Diagnosis Date     Acute pericarditis, unspecified      Chronic airway obstruction 12/13/2005    PFTs - 1/02 - mild COPD Problem list name updated by automated process. Provider to review     Dysuria      Esophageal reflux       GERD (gastroesophageal reflux disease) 9/20/2013     Hiatal hernia 5/4/2011     HTN, goal below 140/90 9/20/2013     Hyperlipidemia LDL goal <130 9/22/2010    Recent Labs  Lab Test 9/22/10 0908 10/12/06 0939    CHOL 214* 256*    HDL 31* 42*    * 186*    TRIG 149 143    CHOLHDLRATIO 7.0* 6.0*   4/8/11 - dobutamine echo showed NO infarct or ischemia LV 60-65%, normal exercise response.        Osteoporosis 10/18/2010    10/10 - severe osteopenia in femoral necks, mild osteopenia in spine  (Problem list name updated by automated process. Provider to review and confirm.)     Pure hypercholesterolemia      SECONDARY POLYCYTHEMIA 10/17/2006    Due to smoking     Smoker 6/24/2008     Systemic lupus erythematosus 12/7/2005    Diagnosed in 1980's; history of pericarditis; was previously treated with mtx; not on active treatment; no hx of renal dz from the lupus.       Tobacco use disorder      Unspecified essential hypertension        MEDICATIONS:  Current Outpatient Prescriptions   Medication     amLODIPine (NORVASC) 10 MG tablet     HYDROcodone-acetaminophen (NORCO) 5-325 MG per tablet     FLUoxetine (PROZAC) 20 MG capsule     tiotropium (SPIRIVA RESPIMAT) 2.5 MCG/ACT inhalation aerosol     albuterol (ALBUTEROL) 108 (90 BASE) MCG/ACT inhaler     ipratropium - albuterol 0.5 mg/2.5 mg/3 mL (DUONEB) 0.5-2.5 (3) MG/3ML nebulization     Ibuprofen (IBU-200 PO)     STATIN NOT PRESCRIBED, INTENTIONAL,     No current facility-administered medications for this visit.        ALLERGIES:  Allergies   Allergen Reactions     Amoxicillin Difficulty breathing and Rash     Ampicillin Difficulty breathing and Rash     Cipro [Ciprofloxacin] Difficulty breathing and Rash     Keflex [Cephalexin Monohydrate] Difficulty breathing and Rash     Penicillin [Penicillins] Difficulty breathing and Rash     Sulfa Drugs Difficulty breathing and Rash     Tetracycline Difficulty breathing and Rash     Biaxin [Clarithromycin] Rash     Ceclor  [Cefaclor] Hives and Swelling     Advair Diskus Hives     Hives on face,neck and chest     Egg White      Fish Shortness Of Breath     Mustard [Allyl Isothiocyanate]      Throat swelling       SOCIAL HISTORY:  I have reviewed this patient's social history and updated it with pertinent information if needed. Aminata Gale  reports that she has been smoking Cigarettes.  She has a 25.20 pack-year smoking history. She has never used smokeless tobacco. She reports that she drinks alcohol. She reports that she does not use illicit drugs.    FAMILY HISTORY:  I have reviewed this patient's family history and updated it with pertinent information if needed.   Family History   Problem Relation Age of Onset     DIABETES Mother      GASTROINTESTINAL DISEASE Mother      Gallbladder disease     HEART DISEASE Mother      CANCER Father      lung     Alcohol/Drug Father      Allergies Father      HEART DISEASE Father      GASTROINTESTINAL DISEASE Father      Liver disease     HEART DISEASE Maternal Grandfather      Arthritis Sister      OSTEOPOROSIS Sister      Thyroid Disease Sister      Allergies Son      Alcohol/Drug Sister      Alcohol/Drug Sister        REVIEW OF SYSTEMS:  Constitutional:  No weight loss, fever, chills, weakness or fatigue.  HEENT:  Eyes:  No visual loss, blurred vision, double vision or yellow sclerae. No hearing loss, sneezing, congestion, runny nose or sore throat.  Skin:  No rash or itching.  Cardiovascular: per HPI  Respiratory: per HPI  GI:  No anorexia, nausea, vomiting or diarrhea. No abdominal pain or blood.  :  No dysurea, hematuria  Neurologic:  No headache, dizziness, syncope, paralysis, ataxia, numbness or tingling in the extremities. No change in bowel or bladder control.  Musculoskeletal:  No muscle, back pain, joint pain or stiffness.  Hematologic:  No anemia, bleeding or bruising.  Lymphatics:  No enlarged nodes. No history of splenectomy.  Psychiatric:  No history of depression or  anxiety.  Endocrine:  No reports of sweating, cold or heat intolerance. No polyuria or polydipsia.  Allergies:  No history of asthma, hives, eczema or rhinitis.    PHYSICAL EXAM:  /77 (BP Location: Right arm, Patient Position: Chair, Cuff Size: Adult Regular)  Pulse 78  Wt 67.4 kg (148 lb 9.6 oz)  SpO2 95%  BMI 29.02 kg/m2  Constitutional: awake, alert, no distress  Eyes: PERRL, sclera nonicteric  ENT: trachea midline  Respiratory: Lungs clear  Cardiovascular: Regular rate and rhythm, no murmurs  GI: nondistended, nontender, bowel sounds present  Lymph/Hematologic: no lymphadenopathy  Skin: dry, no rash  Musculoskeletal: good muscle tone, strength 5/5 in upper and lower extremities  Neurologic: no focal deficits  Neuropsychiatric: appropriate affact    DATA:  Labs:   2015:     ASSESSMENT:  66-year-old female seen for chest tightness and abdominal stress test.  She had an anterior/apical defect on both her stress echo and nuclear stress.  Her dyspnea could be more lung related, her chest pain is fairly constant, but does have exertional worsening somewhat concerning for angina.  Overall there is an intermediate probability of obstructive coronary disease.    A coronary angiogram was recommended as the next step.  The risk and benefits of the procedure was explained in detail.  She wishes to proceed.  A drug-eluting stent could be used, she is compliant with her medications and has no bleeding history.     Unfortunately she has severe muscle cramping on multiple statins.  She is willing to try ezetimibe.  She will also start aspirin daily.  She may be a candidate for a PCSK9 inhibitor once her coronary anatomy has been defined.    RECOMMENDATIONS:  1.  Chest tightness with abnormal stress test, intermediate to intermediate-high probability of obstructive coronary disease  - Coronary angiogram in the next one to 2 weeks  - aspirin 81 mg daily  - CBC, INR, CMP, lipids, A1C    2.  Dyslipidemia  - Start  ezetimibe 10 mg daily  - May discuss PCSK9 inhibitor on follow-up    Follow-up within several weeks after angiogram.    Adriano Santana MD  Cardiology - [facility]  Pager:  [phone]  Text Page  April 11, 2017    Thank you for allowing me to participate in the care of your patient.    Sincerely,     Adriano Santana MD     [facility]

## 2017-04-11 NOTE — PATIENT INSTRUCTIONS
Thank you for your [facility] visit today. Your provider has recommended the following:  Medication Changes:  1. Start aspirin 81mg daily  2. Start ezetimibe 10mg once daily for your cholesterol  Recommendations:  1. Go downstairs to lab and have a lab draw before you leave today.  2. Coronary catheterization (angiogram) to be done at [facility] on: Thursday, April 13th. Arrive at 6:30 am. If you need to contact [facility] for any reason, please call [facility] directly at [phone].  Follow-up:  See Jessie Porter NP for cardiology follow up in [place], [place] on: Tuesday, April 25th at 1:00 pm. To discuss the result and your plan of care going forward.  We kindly ask that you call cardiology scheduling at [phone] three months prior to requested revisit date to schedule future cardiology appointments.  Reminder:  1. Please bring in your current medication list or your medication, over the counter supplements and vitamin bottles as we will review these at each office visit.               [facility]~[address]~[place], [place]~[address]  Questions about your visit today?  Call your Cardiology Clinic RN's-Kassie Lutz and/or Debra Izquierdo at [phone].  ANGIOGRAM  [facility]   [place], [place]   Contact [facility] scheduling if needed at [phone], Option#2 or [phone].      1. In preparation for your procedure, we require that you do the following:    a. Nothing to eat or drink after midnight if your procedure is before 12 noon.  b. Take your usual morning medications with a small sip of water on the day of your procedure unless you are on the following medications:    Aspirin:  o If you currently take Aspirin, continue taking your same dose.  o If you do not take Aspirin, take 325mg of Aspirin the day before and the morning of the procedure.    Coumadin or Warfarin:  o Stop Coumadin or  "Warfarin on:  __________________________    Pradaxa or Xarelto:  o Stop Pradaxa or Xarelto on:  ______________________________    Diabetic:  o If you take Glucophage (metformin) do not take the morning of the procedure or for 2 days after the procedure. Contact your Primary Care MD regarding glucose control for the days you do not take Glucophage.  o If you are on other oral diabetic medications do not take on the morning of the procedure.  o If you take Insulin contact your Primary Care MD for the recommended dosage to take the morning of the procedure.    Diuretic (Water Pill):  o If you take a diuretic (water pill), do not take the morning of the procedure.    c. If you have an allergy to dye, please contact the [facility] office 4 days before your procedure at [phone] option 2, and ask for a nurse.    2. You will be unable to drive after your procedure; please arrange to have someone drive you home the day of your procedure. You will also need to make sure that there is a responsible adult with you for 24 hours after your procedure. Your procedure will be cancelled if you do not have transportation home or someone to stay with you for 24 hrs.     3. Your procedure will be done at [facility]. Please park in the  [address] Ramp  on the west side of [facility] on [address]. Take the skyway over [facility] to the hospital. Please check in on the first floor, \"Skyway Welcome Desk\" which is one floor down from the skyway level.     4. If you have any questions about your upcoming procedure, please contact the nurse at [facility] at [phone] or the nurse at [facility] at [phone], option#2.      "

## 2017-04-11 NOTE — MR AVS SNAPSHOT
After Visit Summary   4/11/2017    Aminata Gale    MRN: [id number]           Patient Information     Date Of Birth          1950        Visit Information        Provider Department      4/11/2017 1:00 PM Adriano Santana MD [facility] PHYSICIAN HEART AT [facility]        Today's Diagnoses     Mixed hyperlipidemia    -  1    Atypical chest pain        Abnormal cardiovascular stress test        Abnormal finding of blood chemistry         Pre-procedural cardiovascular examination          Care Instructions    Thank you for your  Heart Care visit today. Your provider has recommended the following:  Medication Changes:  1. Start aspirin 81mg daily  2. Start ezetimibe 10mg once daily for your cholesterol  Recommendations:  1. Go downstairs to lab and have a lab draw before you leave today.  2. Coronary catheterization (angiogram) to be done at [facility] on: Thursday, April 13th. Arrive at 6:30 am. If you need to contact [facility] for any reason, please call [facility] directly at [phone].  Follow-up:  See Jessie Porter NP for cardiology follow up in [place], [place] on: Tuesday, April 25th at 1:00 pm. To discuss the result and your plan of care going forward.  We kindly ask that you call cardiology scheduling at [phone] three months prior to requested revisit date to schedule future cardiology appointments.  Reminder:  1. Please bring in your current medication list or your medication, over the counter supplements and vitamin bottles as we will review these at each office visit.               [facility] HEART CARE  [facility]~[address]~[place], [place]~[address]  Questions about your visit today?  Call your Cardiology Clinic RN's-Kassie Lutz and/or Debra Izquierdo at [phone].  ANGIOGRAM  [facility]   [place], [place]   Contact [facility] scheduling if needed at  [phone], Option#2 or [phone].      1. In preparation for your procedure, we require that you do the following:    a. Nothing to eat or drink after midnight if your procedure is before 12 noon.  b. Take your usual morning medications with a small sip of water on the day of your procedure unless you are on the following medications:    Aspirin:  o If you currently take Aspirin, continue taking your same dose.  o If you do not take Aspirin, take 325mg of Aspirin the day before and the morning of the procedure.    Coumadin or Warfarin:  o Stop Coumadin or Warfarin on:  __________________________    Pradaxa or Xarelto:  o Stop Pradaxa or Xarelto on:  ______________________________    Diabetic:  o If you take Glucophage (metformin) do not take the morning of the procedure or for 2 days after the procedure. Contact your Primary Care MD regarding glucose control for the days you do not take Glucophage.  o If you are on other oral diabetic medications do not take on the morning of the procedure.  o If you take Insulin contact your Primary Care MD for the recommended dosage to take the morning of the procedure.    Diuretic (Water Pill):  o If you take a diuretic (water pill), do not take the morning of the procedure.    c. If you have an allergy to dye, please contact the [facility] office 4 days before your procedure at [phone] option 2, and ask for a nurse.    2. You will be unable to drive after your procedure; please arrange to have someone drive you home the day of your procedure. You will also need to make sure that there is a responsible adult with you for 24 hours after your procedure. Your procedure will be cancelled if you do not have transportation home or someone to stay with you for 24 hrs.     3. Your procedure will be done at [facility]. Please park in the  Skyway Ramp  on the west side of [facility] on [address]. Take the skyway over [facility] to the hospital. Please  "check in on the first floor, \"Skyway Welcome Desk\" which is one floor down from the skyway level.     4. If you have any questions about your upcoming procedure, please contact the nurse at [facility] at [phone] or the nurse at [facility] at [phone], option#2.            Follow-ups after your visit        Your next 10 appointments already scheduled     Apr 13, 2017  8:30 AM CDT   Cath 90 Minute with SHCVR2   [facility]iac [facility]ter[facility] ([facility])    [address]  [place] [address] [phone]            Apr 25, 2017  1:00 PM CDT   Return Visit with PEDRO Wu CNP   [facility] PHYSICIAN HEART AT [facility] ([facility])    [address] [address] [phone]              Future tests that were ordered for you today     Open Future Orders        Priority Expected Expires Ordered    Partial thromboplastin time Routine 4/11/2017 4/11/2019 4/11/2017    INR Routine 4/11/2017 4/11/2019 4/11/2017    Hemoglobin A1c Routine 4/11/2017 4/11/2019 4/11/2017    Comprehensive metabolic panel Routine 4/11/2017 4/11/2018 4/11/2017    Lipid Profile Routine 4/11/2017 4/11/2018 4/11/2017    CBC with platelets Routine 4/11/2017 4/6/2018 4/11/2017    Cardiac Cath: Coronary Angiography Adult Order Routine 4/18/2017 4/6/2018 4/11/2017            Who to contact     If you have questions or need follow up information about today's clinic visit or your schedule please contact [facility] PHYSICIAN HEART AT [facility] directly at [phone].  Normal or non-critical lab and imaging results will be communicated to you by MyChart, letter or phone within 4 business days after the clinic has received the results. If you do not hear from us within 7 days, please contact the clinic through MyChart or phone. If you have a critical or abnormal lab result, we will notify you by phone as soon as " possible.  Submit refill requests through [employer] or call your pharmacy and they will forward the refill request to us. Please allow 3 business days for your refill to be completed.          Additional Information About Your Visit        [facility] Information     [employer] gives you secure access to your electronic health record. If you see a primary care provider, you can also send messages to your care team and make appointments. If you have questions, please call your primary care clinic.  If you do not have a primary care provider, please call [phone] and they will assist you.        Care EveryWhere ID     This is your Care EveryWhere ID. This could be used by other organizations to access your [facility] medical records  [id number]        Your Vitals Were     Pulse Pulse Oximetry BMI (Body Mass Index)             78 95% 29.02 kg/m2          Blood Pressure from Last 3 Encounters:   04/11/17 128/77   01/31/17 129/72   12/02/16 128/82    Weight from Last 3 Encounters:   04/11/17 67.4 kg (148 lb 9.6 oz)   01/31/17 66.6 kg (146 lb 12.8 oz)   12/02/16 64.4 kg (142 lb)                 Today's Medication Changes          These changes are accurate as of: 4/11/17  2:14 PM.  If you have any questions, ask your nurse or doctor.               Start taking these medicines.        Dose/Directions    ezetimibe 10 MG tablet   Commonly known as:  ZETIA   Used for:  Mixed hyperlipidemia   Started by:  Adriano Santana MD        Dose:  10 mg   Take 1 tablet (10 mg) by mouth daily   Quantity:  30 tablet   Refills:  2            Where to get your medicines      These medications were sent to [facility] [address] [address]     Phone:  [phone]     ezetimibe 10 MG tablet                Primary Care Provider Office Phone # Fax #    PEDRO Dominguez [phone]       [facility] [address] [facility]  [address]        Thank you!     Thank you for choosing [facility] PHYSICIAN HEART AT [facility]  for your care. Our goal is always to provide you with excellent care. Hearing back from our patients is one way we can continue to improve our services. Please take a few minutes to complete the written survey that you may receive in the mail after your visit with us. Thank you!             Your Updated Medication List - Protect others around you: Learn how to safely use, store and throw away your medicines at www.disposemymeds.org.          This list is accurate as of: 4/11/17  2:14 PM.  Always use your most recent med list.                   Brand Name Dispense Instructions for use    albuterol 108 (90 BASE) MCG/ACT Inhaler    albuterol    3 Inhaler    Inhale 1-2 puffs into the lungs every 4 hours To 6 hours as needed for shortness of breath       amLODIPine 10 MG tablet    NORVASC    90 tablet    Take 1 tablet (10 mg) by mouth daily For high blood pressure       ezetimibe 10 MG tablet    ZETIA    30 tablet    Take 1 tablet (10 mg) by mouth daily       FLUoxetine 20 MG capsule    PROzac    90 capsule    Take 1 capsule (20 mg) by mouth daily       HYDROcodone-acetaminophen 5-325 MG per tablet    NORCO    30 tablet    Take 1 tablet by mouth every 6 hours as needed for pain       IBU-200 PO      Take  by mouth.       ipratropium - albuterol 0.5 mg/2.5 mg/3 mL 0.5-2.5 (3) MG/3ML neb solution    DUONEB    30 vial    Take 1 vial (3 mLs) by nebulization every 6 hours as needed for shortness of breath / dyspnea or wheezing       STATIN NOT PRESCRIBED (INTENTIONAL)     0 each    1 each daily Statin not prescribed intentionally due to Intolerance       tiotropium 2.5 MCG/ACT inhalation aerosol    SPIRIVA RESPIMAT    12 g    Inhale 2 puffs into the lungs daily

## 2017-04-11 NOTE — PROGRESS NOTES
CARDIOLOGY CONSULT    REASON FOR CONSULT: abnormal stress test    PRIMARY CARE PHYSICIAN:  Sana Olivo    HISTORY OF PRESENT ILLNESS:  66-year-old female with no cardiac history is seen for evaluation of chest pain and abnormal stress test.  Her cardiac risk factors include dyslipidemia with a history of statin intolerance, tobacco abuse, and hypertension.    She has a history of COPD.  PFTs in 2013 showed FEV1 of 1.1, FVC 1.9, DLCO 47% predicted.    Over the past one year she has complained of exertional dyspnea.  This has slightly progressed in recent months.  She can walk fairly short distances, but will need to stop and rest.  In the past several months she also complains of a tightness in her chest.  This occurs on a daily basis and is fairly constant.  It doesn't worsen with exertion.  She has some mild lower extremity edema which is unchanged.  She denies any palpitations, dizziness, or syncope.    Stress echo February 2017 showed 3:30 on the Dino protocol, 83% maximum heart rate achieved, no chest pain during exercise, EKG negative for ischemia, rest echo with EF of 50-55%, stress echo shows minimal improvement of ejection fraction to 55-60%, distal anterior wall hypokinetic.     Lexiscan nuclear stress test April 2017 shows small apical infarct, no ischemia, ejection fraction 60%, apical hypokinesis.     Patient denies any bleeding history.  She has no upcoming surgeries.    PAST MEDICAL HISTORY:  Past Medical History:   Diagnosis Date     Acute pericarditis, unspecified      Chronic airway obstruction 12/13/2005    PFTs - 1/02 - mild COPD Problem list name updated by automated process. Provider to review     Dysuria      Esophageal reflux      GERD (gastroesophageal reflux disease) 9/20/2013     Hiatal hernia 5/4/2011     HTN, goal below 140/90 9/20/2013     Hyperlipidemia LDL goal <130 9/22/2010    Recent Labs  Lab Test 9/22/10 0908 10/12/06 0939    CHOL 214* 256*    HDL 31* 42*    *  186*    TRIG 149 143    CHOLHDLRATIO 7.0* 6.0*   4/8/11 - dobutamine echo showed NO infarct or ischemia LV 60-65%, normal exercise response.        Osteoporosis 10/18/2010    10/10 - severe osteopenia in femoral necks, mild osteopenia in spine  (Problem list name updated by automated process. Provider to review and confirm.)     Pure hypercholesterolemia      SECONDARY POLYCYTHEMIA 10/17/2006    Due to smoking     Smoker 6/24/2008     Systemic lupus erythematosus 12/7/2005    Diagnosed in 1980's; history of pericarditis; was previously treated with mtx; not on active treatment; no hx of renal dz from the lupus.       Tobacco use disorder      Unspecified essential hypertension        MEDICATIONS:  Current Outpatient Prescriptions   Medication     amLODIPine (NORVASC) 10 MG tablet     HYDROcodone-acetaminophen (NORCO) 5-325 MG per tablet     FLUoxetine (PROZAC) 20 MG capsule     tiotropium (SPIRIVA RESPIMAT) 2.5 MCG/ACT inhalation aerosol     albuterol (ALBUTEROL) 108 (90 BASE) MCG/ACT inhaler     ipratropium - albuterol 0.5 mg/2.5 mg/3 mL (DUONEB) 0.5-2.5 (3) MG/3ML nebulization     Ibuprofen (IBU-200 PO)     STATIN NOT PRESCRIBED, INTENTIONAL,     No current facility-administered medications for this visit.        ALLERGIES:  Allergies   Allergen Reactions     Amoxicillin Difficulty breathing and Rash     Ampicillin Difficulty breathing and Rash     Cipro [Ciprofloxacin] Difficulty breathing and Rash     Keflex [Cephalexin Monohydrate] Difficulty breathing and Rash     Penicillin [Penicillins] Difficulty breathing and Rash     Sulfa Drugs Difficulty breathing and Rash     Tetracycline Difficulty breathing and Rash     Biaxin [Clarithromycin] Rash     Ceclor [Cefaclor] Hives and Swelling     Advair Diskus Hives     Hives on face,neck and chest     Egg White      Fish Shortness Of Breath     Mustard [Allyl Isothiocyanate]      Throat swelling       SOCIAL HISTORY:  I have reviewed this patient's social history and  updated it with pertinent information if needed. Aminata Gale  reports that she has been smoking Cigarettes.  She has a 25.20 pack-year smoking history. She has never used smokeless tobacco. She reports that she drinks alcohol. She reports that she does not use illicit drugs.    FAMILY HISTORY:  I have reviewed this patient's family history and updated it with pertinent information if needed.   Family History   Problem Relation Age of Onset     DIABETES Mother      GASTROINTESTINAL DISEASE Mother      Gallbladder disease     HEART DISEASE Mother      CANCER Father      lung     Alcohol/Drug Father      Allergies Father      HEART DISEASE Father      GASTROINTESTINAL DISEASE Father      Liver disease     HEART DISEASE Maternal Grandfather      Arthritis Sister      OSTEOPOROSIS Sister      Thyroid Disease Sister      Allergies Son      Alcohol/Drug Sister      Alcohol/Drug Sister        REVIEW OF SYSTEMS:  Constitutional:  No weight loss, fever, chills, weakness or fatigue.  HEENT:  Eyes:  No visual loss, blurred vision, double vision or yellow sclerae. No hearing loss, sneezing, congestion, runny nose or sore throat.  Skin:  No rash or itching.  Cardiovascular: per HPI  Respiratory: per HPI  GI:  No anorexia, nausea, vomiting or diarrhea. No abdominal pain or blood.  :  No dysurea, hematuria  Neurologic:  No headache, dizziness, syncope, paralysis, ataxia, numbness or tingling in the extremities. No change in bowel or bladder control.  Musculoskeletal:  No muscle, back pain, joint pain or stiffness.  Hematologic:  No anemia, bleeding or bruising.  Lymphatics:  No enlarged nodes. No history of splenectomy.  Psychiatric:  No history of depression or anxiety.  Endocrine:  No reports of sweating, cold or heat intolerance. No polyuria or polydipsia.  Allergies:  No history of asthma, hives, eczema or rhinitis.    PHYSICAL EXAM:  /77 (BP Location: Right arm, Patient Position: Chair, Cuff Size: Adult Regular)   Pulse 78  Wt 67.4 kg (148 lb 9.6 oz)  SpO2 95%  BMI 29.02 kg/m2  Constitutional: awake, alert, no distress  Eyes: PERRL, sclera nonicteric  ENT: trachea midline  Respiratory: Lungs clear  Cardiovascular: Regular rate and rhythm, no murmurs  GI: nondistended, nontender, bowel sounds present  Lymph/Hematologic: no lymphadenopathy  Skin: dry, no rash  Musculoskeletal: good muscle tone, strength 5/5 in upper and lower extremities  Neurologic: no focal deficits  Neuropsychiatric: appropriate affact    DATA:  Labs:   2015:     ASSESSMENT:  66-year-old female seen for chest tightness and abdominal stress test.  She had an anterior/apical defect on both her stress echo and nuclear stress.  Her dyspnea could be more lung related, her chest pain is fairly constant, but does have exertional worsening somewhat concerning for angina.  Overall there is an intermediate probability of obstructive coronary disease.    A coronary angiogram was recommended as the next step.  The risk and benefits of the procedure was explained in detail.  She wishes to proceed.  A drug-eluting stent could be used, she is compliant with her medications and has no bleeding history.     Unfortunately she has severe muscle cramping on multiple statins.  She is willing to try ezetimibe.  She will also start aspirin daily.  She may be a candidate for a PCSK9 inhibitor once her coronary anatomy has been defined.    RECOMMENDATIONS:  1.  Chest tightness with abnormal stress test, intermediate to intermediate-high probability of obstructive coronary disease  - Coronary angiogram in the next one to 2 weeks  - aspirin 81 mg daily  - CBC, INR, CMP, lipids, A1C    2.  Dyslipidemia  - Start ezetimibe 10 mg daily  - May discuss PCSK9 inhibitor on follow-up    Follow-up within several weeks after angiogram.    Adriano Santana MD  Cardiology - [facility]  Pager:  [phone]  Text Page  April 11, 2017

## 2017-04-12 ENCOUNTER — TELEPHONE (OUTPATIENT)
Dept: CARDIOLOGY | Facility: CLINIC | Age: 67
End: 2017-04-12

## 2017-04-12 DIAGNOSIS — E87.6 HYPOKALEMIA: Primary | ICD-10-CM

## 2017-04-12 RX ORDER — POTASSIUM CHLORIDE 1500 MG/1
20 TABLET, EXTENDED RELEASE ORAL DAILY
Qty: 32 TABLET | Refills: 3 | Status: SHIPPED | OUTPATIENT
Start: 2017-04-12 | End: 2018-01-30

## 2017-04-12 NOTE — TELEPHONE ENCOUNTER
Notified pt of results and Dr Santana' recommendation to start KCl with increased dose today. Pt verbalized understanding and had further questions about her cath tomorrow and possible outcomes. States her daughter is flying in tomorrow from [place] and her son may come down but they just had a new baby. Her cousins will be with her tomorrow in the hospital.

## 2017-04-13 ENCOUNTER — APPOINTMENT (OUTPATIENT)
Dept: CARDIOLOGY | Facility: CLINIC | Age: 67
End: 2017-04-13
Attending: INTERNAL MEDICINE
Payer: MEDICARE

## 2017-04-13 ENCOUNTER — HOSPITAL ENCOUNTER (OUTPATIENT)
Facility: CLINIC | Age: 67
Discharge: HOME | End: 2017-04-13
Attending: INTERNAL MEDICINE | Admitting: INTERNAL MEDICINE
Payer: MEDICARE

## 2017-04-13 VITALS
SYSTOLIC BLOOD PRESSURE: 131 MMHG | HEIGHT: 60 IN | HEART RATE: 80 BPM | WEIGHT: 150.7 LBS | RESPIRATION RATE: 16 BRPM | DIASTOLIC BLOOD PRESSURE: 73 MMHG | TEMPERATURE: 97.8 F | OXYGEN SATURATION: 92 % | BODY MASS INDEX: 29.59 KG/M2

## 2017-04-13 DIAGNOSIS — R07.89 ATYPICAL CHEST PAIN: ICD-10-CM

## 2017-04-13 DIAGNOSIS — I10 ESSENTIAL HYPERTENSION, BENIGN: ICD-10-CM

## 2017-04-13 DIAGNOSIS — Z98.890 STATUS POST CORONARY ANGIOGRAM: Primary | ICD-10-CM

## 2017-04-13 DIAGNOSIS — E78.2 MIXED HYPERLIPIDEMIA: ICD-10-CM

## 2017-04-13 PROCEDURE — 93005 ELECTROCARDIOGRAM TRACING: CPT

## 2017-04-13 PROCEDURE — B2151ZZ FLUOROSCOPY OF LEFT HEART USING LOW OSMOLAR CONTRAST: ICD-10-PCS | Performed by: INTERNAL MEDICINE

## 2017-04-13 PROCEDURE — 27211089 ZZH KIT ACIST INJECTOR CR3

## 2017-04-13 PROCEDURE — 25000132 ZZH RX MED GY IP 250 OP 250 PS 637: Mod: GY | Performed by: INTERNAL MEDICINE

## 2017-04-13 PROCEDURE — 99153 MOD SED SAME PHYS/QHP EA: CPT

## 2017-04-13 PROCEDURE — 27210946 ZZH KIT HC TOTES DISP CR8

## 2017-04-13 PROCEDURE — 99153 MOD SED SAME PHYS/QHP EA: CPT | Performed by: INTERNAL MEDICINE

## 2017-04-13 PROCEDURE — 40000065 ZZH STATISTIC EKG NON-CHARGEABLE

## 2017-04-13 PROCEDURE — 93458 L HRT ARTERY/VENTRICLE ANGIO: CPT

## 2017-04-13 PROCEDURE — A9270 NON-COVERED ITEM OR SERVICE: HCPCS | Mod: GY | Performed by: INTERNAL MEDICINE

## 2017-04-13 PROCEDURE — B2111ZZ FLUOROSCOPY OF MULTIPLE CORONARY ARTERIES USING LOW OSMOLAR CONTRAST: ICD-10-PCS | Performed by: INTERNAL MEDICINE

## 2017-04-13 PROCEDURE — 25000125 ZZHC RX 250: Performed by: INTERNAL MEDICINE

## 2017-04-13 PROCEDURE — 99152 MOD SED SAME PHYS/QHP 5/>YRS: CPT

## 2017-04-13 PROCEDURE — 4A023N7 MEASUREMENT OF CARDIAC SAMPLING AND PRESSURE, LEFT HEART, PERCUTANEOUS APPROACH: ICD-10-PCS | Performed by: INTERNAL MEDICINE

## 2017-04-13 PROCEDURE — 99152 MOD SED SAME PHYS/QHP 5/>YRS: CPT | Performed by: INTERNAL MEDICINE

## 2017-04-13 PROCEDURE — 27210787 ZZH MANIFOLD CR2

## 2017-04-13 PROCEDURE — 40000852 ZZH STATISTIC HEART CATH LAB OR EP LAB

## 2017-04-13 PROCEDURE — 27210795 ZZH PAD DEFIB QUICK CR4

## 2017-04-13 PROCEDURE — 93458 L HRT ARTERY/VENTRICLE ANGIO: CPT | Mod: 26 | Performed by: INTERNAL MEDICINE

## 2017-04-13 PROCEDURE — 25000128 H RX IP 250 OP 636: Performed by: INTERNAL MEDICINE

## 2017-04-13 PROCEDURE — 93010 ELECTROCARDIOGRAM REPORT: CPT | Mod: 59 | Performed by: INTERNAL MEDICINE

## 2017-04-13 RX ORDER — HEPARIN SODIUM 1000 [USP'U]/ML
1000-10000 INJECTION, SOLUTION INTRAVENOUS; SUBCUTANEOUS EVERY 5 MIN PRN
Status: DISCONTINUED | OUTPATIENT
Start: 2017-04-13 | End: 2017-04-13 | Stop reason: HOSPADM

## 2017-04-13 RX ORDER — TORSEMIDE 5 MG/1
5 TABLET ORAL DAILY
Qty: 90 TABLET | Refills: 1 | Status: SHIPPED | OUTPATIENT
Start: 2017-04-13 | End: 2018-01-30

## 2017-04-13 RX ORDER — HYDRALAZINE HYDROCHLORIDE 20 MG/ML
10-20 INJECTION INTRAMUSCULAR; INTRAVENOUS
Status: DISCONTINUED | OUTPATIENT
Start: 2017-04-13 | End: 2017-04-13 | Stop reason: HOSPADM

## 2017-04-13 RX ORDER — NALOXONE HYDROCHLORIDE 0.4 MG/ML
0.4 INJECTION, SOLUTION INTRAMUSCULAR; INTRAVENOUS; SUBCUTANEOUS EVERY 5 MIN PRN
Status: DISCONTINUED | OUTPATIENT
Start: 2017-04-13 | End: 2017-04-13 | Stop reason: HOSPADM

## 2017-04-13 RX ORDER — PROTAMINE SULFATE 10 MG/ML
1-5 INJECTION, SOLUTION INTRAVENOUS
Status: DISCONTINUED | OUTPATIENT
Start: 2017-04-13 | End: 2017-04-13 | Stop reason: HOSPADM

## 2017-04-13 RX ORDER — VERAPAMIL HYDROCHLORIDE 2.5 MG/ML
1-2.5 INJECTION, SOLUTION INTRAVENOUS
Status: DISCONTINUED | OUTPATIENT
Start: 2017-04-13 | End: 2017-04-13 | Stop reason: HOSPADM

## 2017-04-13 RX ORDER — LIDOCAINE 40 MG/G
CREAM TOPICAL
Status: DISCONTINUED | OUTPATIENT
Start: 2017-04-13 | End: 2017-04-13 | Stop reason: HOSPADM

## 2017-04-13 RX ORDER — ADENOSINE 3 MG/ML
12-12000 INJECTION, SOLUTION INTRAVENOUS
Status: DISCONTINUED | OUTPATIENT
Start: 2017-04-13 | End: 2017-04-13 | Stop reason: HOSPADM

## 2017-04-13 RX ORDER — PROTAMINE SULFATE 10 MG/ML
25-100 INJECTION, SOLUTION INTRAVENOUS EVERY 5 MIN PRN
Status: DISCONTINUED | OUTPATIENT
Start: 2017-04-13 | End: 2017-04-13 | Stop reason: HOSPADM

## 2017-04-13 RX ORDER — ASPIRIN 81 MG/1
81-324 TABLET, CHEWABLE ORAL
Status: DISCONTINUED | OUTPATIENT
Start: 2017-04-13 | End: 2017-04-13 | Stop reason: HOSPADM

## 2017-04-13 RX ORDER — DOPAMINE HYDROCHLORIDE 160 MG/100ML
2-20 INJECTION, SOLUTION INTRAVENOUS CONTINUOUS PRN
Status: DISCONTINUED | OUTPATIENT
Start: 2017-04-13 | End: 2017-04-13 | Stop reason: HOSPADM

## 2017-04-13 RX ORDER — NITROGLYCERIN 5 MG/ML
100-200 VIAL (ML) INTRAVENOUS
Status: DISCONTINUED | OUTPATIENT
Start: 2017-04-13 | End: 2017-04-13 | Stop reason: HOSPADM

## 2017-04-13 RX ORDER — NICARDIPINE HYDROCHLORIDE 2.5 MG/ML
100 INJECTION INTRAVENOUS
Status: DISCONTINUED | OUTPATIENT
Start: 2017-04-13 | End: 2017-04-13 | Stop reason: HOSPADM

## 2017-04-13 RX ORDER — NITROGLYCERIN 5 MG/ML
100-500 VIAL (ML) INTRAVENOUS
Status: DISCONTINUED | OUTPATIENT
Start: 2017-04-13 | End: 2017-04-13 | Stop reason: HOSPADM

## 2017-04-13 RX ORDER — PROMETHAZINE HYDROCHLORIDE 25 MG/ML
6.25-25 INJECTION, SOLUTION INTRAMUSCULAR; INTRAVENOUS EVERY 4 HOURS PRN
Status: DISCONTINUED | OUTPATIENT
Start: 2017-04-13 | End: 2017-04-13 | Stop reason: HOSPADM

## 2017-04-13 RX ORDER — CLOPIDOGREL BISULFATE 75 MG/1
300-600 TABLET ORAL
Status: DISCONTINUED | OUTPATIENT
Start: 2017-04-13 | End: 2017-04-13 | Stop reason: HOSPADM

## 2017-04-13 RX ORDER — NIFEDIPINE 10 MG/1
10 CAPSULE ORAL
Status: DISCONTINUED | OUTPATIENT
Start: 2017-04-13 | End: 2017-04-13 | Stop reason: HOSPADM

## 2017-04-13 RX ORDER — DEXTROSE MONOHYDRATE 25 G/50ML
12.5-5 INJECTION, SOLUTION INTRAVENOUS EVERY 30 MIN PRN
Status: DISCONTINUED | OUTPATIENT
Start: 2017-04-13 | End: 2017-04-13 | Stop reason: HOSPADM

## 2017-04-13 RX ORDER — ASPIRIN 325 MG
325 TABLET ORAL
Status: DISCONTINUED | OUTPATIENT
Start: 2017-04-13 | End: 2017-04-13 | Stop reason: HOSPADM

## 2017-04-13 RX ORDER — ATROPINE SULFATE 0.1 MG/ML
.5-1 INJECTION INTRAVENOUS
Status: DISCONTINUED | OUTPATIENT
Start: 2017-04-13 | End: 2017-04-13 | Stop reason: HOSPADM

## 2017-04-13 RX ORDER — ATROPINE SULFATE 0.1 MG/ML
0.5 INJECTION INTRAVENOUS EVERY 5 MIN PRN
Status: DISCONTINUED | OUTPATIENT
Start: 2017-04-13 | End: 2017-04-13 | Stop reason: HOSPADM

## 2017-04-13 RX ORDER — SODIUM NITROPRUSSIDE 25 MG/ML
100-200 INJECTION INTRAVENOUS
Status: DISCONTINUED | OUTPATIENT
Start: 2017-04-13 | End: 2017-04-13 | Stop reason: HOSPADM

## 2017-04-13 RX ORDER — POTASSIUM CHLORIDE 1500 MG/1
40 TABLET, EXTENDED RELEASE ORAL ONCE
Status: COMPLETED | OUTPATIENT
Start: 2017-04-13 | End: 2017-04-13

## 2017-04-13 RX ORDER — CLOPIDOGREL BISULFATE 75 MG/1
75 TABLET ORAL
Status: DISCONTINUED | OUTPATIENT
Start: 2017-04-13 | End: 2017-04-13 | Stop reason: HOSPADM

## 2017-04-13 RX ORDER — LIDOCAINE HYDROCHLORIDE 10 MG/ML
1-10 INJECTION, SOLUTION EPIDURAL; INFILTRATION; INTRACAUDAL; PERINEURAL
Status: COMPLETED | OUTPATIENT
Start: 2017-04-13 | End: 2017-04-13

## 2017-04-13 RX ORDER — PRASUGREL 10 MG/1
10-60 TABLET, FILM COATED ORAL
Status: DISCONTINUED | OUTPATIENT
Start: 2017-04-13 | End: 2017-04-13 | Stop reason: HOSPADM

## 2017-04-13 RX ORDER — BUPIVACAINE HYDROCHLORIDE 2.5 MG/ML
1-10 INJECTION, SOLUTION EPIDURAL; INFILTRATION; INTRACAUDAL
Status: DISCONTINUED | OUTPATIENT
Start: 2017-04-13 | End: 2017-04-13 | Stop reason: HOSPADM

## 2017-04-13 RX ORDER — FLUMAZENIL 0.1 MG/ML
0.2 INJECTION, SOLUTION INTRAVENOUS
Status: DISCONTINUED | OUTPATIENT
Start: 2017-04-13 | End: 2017-04-13 | Stop reason: HOSPADM

## 2017-04-13 RX ORDER — METOPROLOL TARTRATE 1 MG/ML
5 INJECTION, SOLUTION INTRAVENOUS EVERY 5 MIN PRN
Status: DISCONTINUED | OUTPATIENT
Start: 2017-04-13 | End: 2017-04-13 | Stop reason: HOSPADM

## 2017-04-13 RX ORDER — IOPAMIDOL 755 MG/ML
65 INJECTION, SOLUTION INTRAVASCULAR ONCE
Status: COMPLETED | OUTPATIENT
Start: 2017-04-13 | End: 2017-04-13

## 2017-04-13 RX ORDER — LORAZEPAM 2 MG/ML
.5-2 INJECTION INTRAMUSCULAR EVERY 4 HOURS PRN
Status: DISCONTINUED | OUTPATIENT
Start: 2017-04-13 | End: 2017-04-13 | Stop reason: HOSPADM

## 2017-04-13 RX ORDER — FENTANYL CITRATE 50 UG/ML
25-50 INJECTION, SOLUTION INTRAMUSCULAR; INTRAVENOUS
Status: DISCONTINUED | OUTPATIENT
Start: 2017-04-13 | End: 2017-04-13 | Stop reason: HOSPADM

## 2017-04-13 RX ORDER — NALOXONE HYDROCHLORIDE 0.4 MG/ML
.2-.4 INJECTION, SOLUTION INTRAMUSCULAR; INTRAVENOUS; SUBCUTANEOUS
Status: DISCONTINUED | OUTPATIENT
Start: 2017-04-13 | End: 2017-04-13 | Stop reason: HOSPADM

## 2017-04-13 RX ORDER — ENALAPRILAT 1.25 MG/ML
1.25-2.5 INJECTION INTRAVENOUS
Status: DISCONTINUED | OUTPATIENT
Start: 2017-04-13 | End: 2017-04-13 | Stop reason: HOSPADM

## 2017-04-13 RX ORDER — EPTIFIBATIDE 2 MG/ML
180 INJECTION, SOLUTION INTRAVENOUS EVERY 10 MIN PRN
Status: DISCONTINUED | OUTPATIENT
Start: 2017-04-13 | End: 2017-04-13 | Stop reason: HOSPADM

## 2017-04-13 RX ORDER — POTASSIUM CHLORIDE 29.8 MG/ML
20 INJECTION INTRAVENOUS
Status: DISCONTINUED | OUTPATIENT
Start: 2017-04-13 | End: 2017-04-13 | Stop reason: HOSPADM

## 2017-04-13 RX ORDER — DOBUTAMINE HYDROCHLORIDE 200 MG/100ML
2-20 INJECTION INTRAVENOUS CONTINUOUS PRN
Status: DISCONTINUED | OUTPATIENT
Start: 2017-04-13 | End: 2017-04-13 | Stop reason: HOSPADM

## 2017-04-13 RX ORDER — ASPIRIN 81 MG/1
81 TABLET ORAL DAILY
Status: DISCONTINUED | OUTPATIENT
Start: 2017-04-13 | End: 2017-04-13 | Stop reason: HOSPADM

## 2017-04-13 RX ORDER — HYDROCODONE BITARTRATE AND ACETAMINOPHEN 5; 325 MG/1; MG/1
1-2 TABLET ORAL EVERY 4 HOURS PRN
Status: DISCONTINUED | OUTPATIENT
Start: 2017-04-13 | End: 2017-04-13 | Stop reason: HOSPADM

## 2017-04-13 RX ORDER — DIPHENHYDRAMINE HYDROCHLORIDE 50 MG/ML
25-50 INJECTION INTRAMUSCULAR; INTRAVENOUS
Status: DISCONTINUED | OUTPATIENT
Start: 2017-04-13 | End: 2017-04-13 | Stop reason: HOSPADM

## 2017-04-13 RX ORDER — NITROGLYCERIN 20 MG/100ML
.07-2 INJECTION INTRAVENOUS CONTINUOUS PRN
Status: DISCONTINUED | OUTPATIENT
Start: 2017-04-13 | End: 2017-04-13 | Stop reason: HOSPADM

## 2017-04-13 RX ORDER — LIDOCAINE HYDROCHLORIDE 10 MG/ML
30 INJECTION, SOLUTION EPIDURAL; INFILTRATION; INTRACAUDAL; PERINEURAL
Status: DISCONTINUED | OUTPATIENT
Start: 2017-04-13 | End: 2017-04-13 | Stop reason: HOSPADM

## 2017-04-13 RX ORDER — ONDANSETRON 2 MG/ML
4 INJECTION INTRAMUSCULAR; INTRAVENOUS EVERY 4 HOURS PRN
Status: DISCONTINUED | OUTPATIENT
Start: 2017-04-13 | End: 2017-04-13 | Stop reason: HOSPADM

## 2017-04-13 RX ORDER — PHENYLEPHRINE HCL IN 0.9% NACL 1 MG/10 ML
20-100 SYRINGE (ML) INTRAVENOUS
Status: DISCONTINUED | OUTPATIENT
Start: 2017-04-13 | End: 2017-04-13 | Stop reason: HOSPADM

## 2017-04-13 RX ORDER — MORPHINE SULFATE 2 MG/ML
1-2 INJECTION, SOLUTION INTRAMUSCULAR; INTRAVENOUS EVERY 5 MIN PRN
Status: DISCONTINUED | OUTPATIENT
Start: 2017-04-13 | End: 2017-04-13 | Stop reason: HOSPADM

## 2017-04-13 RX ORDER — METHYLPREDNISOLONE SODIUM SUCCINATE 125 MG/2ML
125 INJECTION, POWDER, LYOPHILIZED, FOR SOLUTION INTRAMUSCULAR; INTRAVENOUS
Status: DISCONTINUED | OUTPATIENT
Start: 2017-04-13 | End: 2017-04-13 | Stop reason: HOSPADM

## 2017-04-13 RX ORDER — ACETAMINOPHEN 325 MG/1
325-650 TABLET ORAL EVERY 4 HOURS PRN
Status: DISCONTINUED | OUTPATIENT
Start: 2017-04-13 | End: 2017-04-13 | Stop reason: HOSPADM

## 2017-04-13 RX ORDER — EPTIFIBATIDE 2 MG/ML
2 INJECTION, SOLUTION INTRAVENOUS CONTINUOUS PRN
Status: DISCONTINUED | OUTPATIENT
Start: 2017-04-13 | End: 2017-04-13 | Stop reason: HOSPADM

## 2017-04-13 RX ORDER — NITROGLYCERIN 0.4 MG/1
0.4 TABLET SUBLINGUAL EVERY 5 MIN PRN
Status: DISCONTINUED | OUTPATIENT
Start: 2017-04-13 | End: 2017-04-13 | Stop reason: HOSPADM

## 2017-04-13 RX ORDER — FUROSEMIDE 10 MG/ML
20-100 INJECTION INTRAMUSCULAR; INTRAVENOUS
Status: DISCONTINUED | OUTPATIENT
Start: 2017-04-13 | End: 2017-04-13 | Stop reason: HOSPADM

## 2017-04-13 RX ORDER — SODIUM CHLORIDE 9 MG/ML
INJECTION, SOLUTION INTRAVENOUS CONTINUOUS
Status: DISCONTINUED | OUTPATIENT
Start: 2017-04-13 | End: 2017-04-13 | Stop reason: HOSPADM

## 2017-04-13 RX ORDER — POTASSIUM CHLORIDE 7.45 MG/ML
10 INJECTION INTRAVENOUS
Status: DISCONTINUED | OUTPATIENT
Start: 2017-04-13 | End: 2017-04-13 | Stop reason: HOSPADM

## 2017-04-13 RX ORDER — NALOXONE HYDROCHLORIDE 0.4 MG/ML
.1-.4 INJECTION, SOLUTION INTRAMUSCULAR; INTRAVENOUS; SUBCUTANEOUS
Status: DISCONTINUED | OUTPATIENT
Start: 2017-04-13 | End: 2017-04-13 | Stop reason: HOSPADM

## 2017-04-13 RX ADMIN — SODIUM CHLORIDE: 9 INJECTION, SOLUTION INTRAVENOUS at 07:30

## 2017-04-13 RX ADMIN — POTASSIUM CHLORIDE 40 MEQ: 1500 TABLET, EXTENDED RELEASE ORAL at 08:11

## 2017-04-13 RX ADMIN — MIDAZOLAM HYDROCHLORIDE 1 MG: 1 INJECTION, SOLUTION INTRAMUSCULAR; INTRAVENOUS at 08:59

## 2017-04-13 RX ADMIN — FENTANYL CITRATE 50 MCG: 50 INJECTION, SOLUTION INTRAMUSCULAR; INTRAVENOUS at 08:57

## 2017-04-13 RX ADMIN — FENTANYL CITRATE 50 MCG: 50 INJECTION, SOLUTION INTRAMUSCULAR; INTRAVENOUS at 08:48

## 2017-04-13 RX ADMIN — IOPAMIDOL 65 ML: 755 INJECTION, SOLUTION INTRAVASCULAR at 09:15

## 2017-04-13 RX ADMIN — MIDAZOLAM HYDROCHLORIDE 2 MG: 1 INJECTION, SOLUTION INTRAMUSCULAR; INTRAVENOUS at 08:48

## 2017-04-13 RX ADMIN — LIDOCAINE HYDROCHLORIDE 10 ML: 10 INJECTION, SOLUTION EPIDURAL; INFILTRATION; INTRACAUDAL; PERINEURAL at 08:59

## 2017-04-13 NOTE — PROGRESS NOTES
"Returned to [facility] post heart cath at 0930. VSS, lungs clear with intermittent wheezes. Pt used own inhaler x 1 and wheezes resolved. Up out of bed at 1130, voided in BR, walked around unit without difficulty. Pt states still feels 3/10 left upper chest discomfort, as before heart cath. Good appetite and taking PO fluids well. AVS reviewed and given to pt and cousin/, along with \"Help For Smokers\" pamphlet. Very pleasant, cooperative.   "

## 2017-04-13 NOTE — IP AVS SNAPSHOT
[place] [address]    [place] [address]    Phone:  [phone]                                       After Visit Summary   4/13/2017    Aminata Gale    MRN: [id number]           After Visit Summary Signature Page     I have received my discharge instructions, and my questions have been answered. I have discussed any challenges I see with this plan with the nurse or doctor.    ..........................................................................................................................................  Patient/Patient Representative Signature      ..........................................................................................................................................  Patient Representative Print Name and Relationship to Patient    ..................................................               ................................................  Date                                            Time    ..........................................................................................................................................  Reviewed by Signature/Title    ...................................................              ..............................................  Date                                                            Time

## 2017-04-13 NOTE — DISCHARGE INSTRUCTIONS
Cardiac Angiogram Discharge Instructions - Femoral    After you go home:      Have an adult stay with you until tomorrow.    Drink extra fluids for 2 days.    You may resume your normal diet.    No smoking       For 24 hours - due to the sedation you received:    Relax and take it easy.    Do NOT make any important or legal decisions.    Do NOT drive or operate machines at home or at work.    Do NOT drink alcohol.    Care of Groin Puncture Site:      For the first 24 hrs - check the puncture site every 1-2 hours while awake.    For 2 days, when you cough, sneeze, laugh or move your bowels, hold your hand over the puncture site and press firmly.    Remove the bandaid after 24 hours. If there is minor oozing, apply another bandaid and remove it after 12 hours.    It is normal to have a small bruise or pea size lump at the site.    You may shower tomorrow. Do NOT take a bath, or use a hot tub or pool for at least 3 days. Do NOT scrub the site. Do not use lotion or powder near the puncture site.    Activity:            For 2 days:    No stooping or squatting    Do NOT do any heavy activity such as exercise, lifting, or straining.     No housework, yard work or any activity that make you sweat    Do NOT lift more than 10 pounds    Bleeding:      If you start bleeding from the site in your groin, lie down flat and press firmly on/above the site for 10 minutes.     Once bleeding stops, lay flat for 2 hours.     Call [facility] as soon as you can.       Call 911 right away if you have heavy bleeding or bleeding that does not stop.      Medicines:      If you are taking antiplatelet medications such as Plavix, Brilinta or Effient, do not stop taking it until you talk to your cardiologist.        If you are on Metformin (Glucophage), do not restart it until you have blood tests (within 2 to 3 days after discharge).  After you have your blood drawn, you may restart the Metformin.    Take your medications, including blood  thinners, unless your provider tells you not to.  If you take Coumadin (Warfarin), have your INR checked by your provider in  3-5 days. Call your clinic to schedule this.    If you have stopped any other medicines, check with your provider about when to restart them.    Follow Up Appointments:      Follow up with [facility] Nurse Practitioner at [facility] of patient preference in 7-10 days.    Call the clinic if:      You have increased pain or a large or growing hard lump around the site.    The site is red, swollen, hot or tender.    Blood or fluid is draining from the site.    You have chills or a fever greater than 101 F (38 C).    Your leg turns feels numb, cool or changes color.    You have hives, a rash or unusual itching.    New pain in the back or belly that you cannot control with Tylenol.    Any questions or concerns.          [facility] at [place]:    [phone] [facility] (7 days a week)

## 2017-04-13 NOTE — IP AVS SNAPSHOT
MRN:[id number]                      After Visit Summary   4/13/2017    Aminata Gale    MRN: [id number]           Visit Information        Department      4/13/2017  5:42 AM [facility]          Review of your medicines      START taking        Dose / Directions    torsemide 5 MG tablet   Commonly known as:  DEMADEX   Used for:  Essential hypertension, benign        Dose:  5 mg   Take 1 tablet (5 mg) by mouth daily   Quantity:  90 tablet   Refills:  1         CONTINUE these medicines which have NOT CHANGED        Dose / Directions    albuterol 108 (90 BASE) MCG/ACT Inhaler   Commonly known as:  albuterol   Used for:  COPD exacerbation (H)        Dose:  1-2 puff   Inhale 1-2 puffs into the lungs every 4 hours To 6 hours as needed for shortness of breath   Quantity:  3 Inhaler   Refills:  1       amLODIPine 10 MG tablet   Commonly known as:  NORVASC   Used for:  HTN, goal below 140/90        Dose:  10 mg   Take 1 tablet (10 mg) by mouth daily For high blood pressure   Quantity:  90 tablet   Refills:  4       aspirin 81 MG chewable tablet   Used for:  Atypical chest pain, Abnormal cardiovascular stress test        Dose:  81 mg   Take 1 tablet (81 mg) by mouth daily   Quantity:  100 tablet   Refills:  3       ezetimibe 10 MG tablet   Commonly known as:  ZETIA   Used for:  Mixed hyperlipidemia        Dose:  10 mg   Take 1 tablet (10 mg) by mouth daily   Quantity:  30 tablet   Refills:  2       FLUoxetine 20 MG capsule   Commonly known as:  PROzac   Used for:  Adjustment disorder with depressed mood, Anxiety        Dose:  20 mg   Take 1 capsule (20 mg) by mouth daily   Quantity:  90 capsule   Refills:  1       HYDROcodone-acetaminophen 5-325 MG per tablet   Commonly known as:  NORCO   Used for:  Systemic lupus erythematosus (H)        Dose:  1 tablet   Take 1 tablet by mouth every 6 hours as needed for pain   Quantity:  30 tablet   Refills:  0       IBU-200 PO        Take  by mouth.    Refills:  0       ipratropium - albuterol 0.5 mg/2.5 mg/3 mL 0.5-2.5 (3) MG/3ML neb solution   Commonly known as:  DUONEB   Used for:  Bronchial pneumonia        Dose:  1 vial   Take 1 vial (3 mLs) by nebulization every 6 hours as needed for shortness of breath / dyspnea or wheezing   Quantity:  30 vial   Refills:  1       potassium chloride SA 20 MEQ CR tablet   Commonly known as:  K-DUR/KLOR-CON M   Used for:  Hypokalemia        Dose:  20 mEq   Take 1 tablet (20 mEq) by mouth daily Take 2 tablets on day one   Quantity:  32 tablet   Refills:  3       STATIN NOT PRESCRIBED (INTENTIONAL)   Used for:  Hyperlipidemia LDL goal <130        Dose:  1 each   1 each daily Statin not prescribed intentionally due to Intolerance   Quantity:  0 each   Refills:  0       tiotropium 2.5 MCG/ACT inhalation aerosol   Commonly known as:  SPIRIVA RESPIMAT   Used for:  Moderate persistent asthma without complication, Chronic obstructive pulmonary disease, unspecified COPD type (H)        Dose:  2 puff   Inhale 2 puffs into the lungs daily   Quantity:  12 g   Refills:  3         STOP taking     hydrochlorothiazide 12.5 MG capsule   Commonly known as:  MICROZIDE                Where to get your medicines      Some of these will need a paper prescription and others can be bought over the counter. Ask your nurse if you have questions.     Bring a paper prescription for each of these medications     torsemide 5 MG tablet               Prescriptions were sent or printed at these locations (1 Prescription)                   [facility] [place], [place] - [address]   [address], [facility] [[email or web address] [address], [facility] [address]    Telephone:  [phone]   Fax:  [phone]   Hours:                  Printed at Department/Unit printer (1 of 1)         torsemide (DEMADEX) 5 MG tablet                 Protect others around you: Learn how to safely use, store and throw away your medicines at www.disposemymeds.org.         Follow-ups after your  visit        Additional Services     Follow-Up with Cardiac Advanced Practice Provider                 Your next 10 appointments already scheduled     Apr 25, 2017  1:00 PM CDT   Return Visit with PEDRO Wu CNP   [facility] PHYSICIAN HEART AT [facility] ([facility])    [address] [facility] [address]   [phone]              Future tests that were ordered for you     Basic metabolic panel           Echocardiogram       The supervising Cardiologist may change the type of echocardiogram requested to answer a specific clinical question based on existing protocols in the Echocardiography laboratory.  ASSESS APEX-USE CONTRAST IF NEEDED  Use of contrast is at the discretion of the supervising Cardiologist.                   Care Instructions        After Care Instructions     Discharge Instructions - Follow up with [facility] Heart Nurse Practitioner        Follow up with [facility] Heart Nurse Practitioner at [facility] Clinic of patient preference in 7-10 days. WITH BMP AT TIME OF OV            Discharge Instructions - IF on Metformin (Glucophage or Glucovance) or Metformin containing medications       IF on Metformin (Glucophage or Glucovance) or Metformin containing medications , schedule a Basic Metabolic Panel at [facility] Heart or Primary Clinic in 48 - 72 hours post procedure and PRIOR TO resuming the Metformin or Metformin containing medications.  Hold Metformin (Glucophage or Glucovance) or Metformin containing medications until after the Basic Metabolic Panel on the 2nd or 3rd day following the procedure.  May resume after blood draw is complete.                  Further instructions from your care team       Cardiac Angiogram Discharge Instructions - Femoral    After you go home:      Have an adult stay with you until tomorrow.    Drink extra fluids for 2 days.    You may resume your normal diet.    No smoking       For 24 hours - due to the sedation you  received:    Relax and take it easy.    Do NOT make any important or legal decisions.    Do NOT drive or operate machines at home or at work.    Do NOT drink alcohol.    Care of Groin Puncture Site:      For the first 24 hrs - check the puncture site every 1-2 hours while awake.    For 2 days, when you cough, sneeze, laugh or move your bowels, hold your hand over the puncture site and press firmly.    Remove the bandaid after 24 hours. If there is minor oozing, apply another bandaid and remove it after 12 hours.    It is normal to have a small bruise or pea size lump at the site.    You may shower tomorrow. Do NOT take a bath, or use a hot tub or pool for at least 3 days. Do NOT scrub the site. Do not use lotion or powder near the puncture site.    Activity:            For 2 days:    No stooping or squatting    Do NOT do any heavy activity such as exercise, lifting, or straining.     No housework, yard work or any activity that make you sweat    Do NOT lift more than 10 pounds    Bleeding:      If you start bleeding from the site in your groin, lie down flat and press firmly on/above the site for 10 minutes.     Once bleeding stops, lay flat for 2 hours.     Call [facility] as soon as you can.       Call 911 right away if you have heavy bleeding or bleeding that does not stop.      Medicines:      If you are taking antiplatelet medications such as Plavix, Brilinta or Effient, do not stop taking it until you talk to your cardiologist.        If you are on Metformin (Glucophage), do not restart it until you have blood tests (within 2 to 3 days after discharge).  After you have your blood drawn, you may restart the Metformin.    Take your medications, including blood thinners, unless your provider tells you not to.  If you take Coumadin (Warfarin), have your INR checked by your provider in  3-5 days. Call your clinic to schedule this.    If you have stopped any other medicines, check with your provider about when to  restart them.    Follow Up Appointments:      Follow up with [facility] Heart Nurse Practitioner at [facility] Clinic of patient preference in 7-10 days.    Call the clinic if:      You have increased pain or a large or growing hard lump around the site.    The site is red, swollen, hot or tender.    Blood or fluid is draining from the site.    You have chills or a fever greater than 101 F (38 C).    Your leg turns feels numb, cool or changes color.    You have hives, a rash or unusual itching.    New pain in the back or belly that you cannot control with Tylenol.    Any questions or concerns.          [facility] at [facility]:    [phone] [facility] (7 days a week)           Additional Information About Your Visit        emerehart Information     phorus gives you secure access to your electronic health record. If you see a primary care provider, you can also send messages to your care team and make appointments. If you have questions, please call your primary care clinic.  If you do not have a primary care provider, please call [phone] and they will assist you.        Care EveryWhere ID     This is your Care EveryWhere ID. This could be used by other organizations to access your [facility] medical records  [id number]        Your Vitals Were     Blood Pressure Pulse Temperature Respirations Height Weight    123/84 80 97.8  F (36.6  C) (Oral) 14 1.524 m (5') 68.4 kg (150 lb 11.2 oz)    Pulse Oximetry BMI (Body Mass Index)                96% 29.43 kg/m2           Primary Care Provider Office Phone # Fax #    PEDRO Dominguez [facility] [phone]      Thank you!     Thank you for choosing [facility] for your care. Our goal is always to provide you with excellent care. Hearing back from our patients is one way we can continue to improve our services. Please take a few minutes to complete the written survey that you may receive in the mail after you visit with us. Thank you!              Medication List: This is a list of all your medications and when to take them. Check marks below indicate your daily home schedule. Keep this list as a reference.      Medications           Morning Afternoon Evening Bedtime As Needed    albuterol 108 (90 BASE) MCG/ACT Inhaler   Commonly known as:  albuterol   Inhale 1-2 puffs into the lungs every 4 hours To 6 hours as needed for shortness of breath                                amLODIPine 10 MG tablet   Commonly known as:  NORVASC   Take 1 tablet (10 mg) by mouth daily For high blood pressure                                aspirin 81 MG chewable tablet   Take 1 tablet (81 mg) by mouth daily                                ezetimibe 10 MG tablet   Commonly known as:  ZETIA   Take 1 tablet (10 mg) by mouth daily                                FLUoxetine 20 MG capsule   Commonly known as:  PROzac   Take 1 capsule (20 mg) by mouth daily                                HYDROcodone-acetaminophen 5-325 MG per tablet   Commonly known as:  NORCO   Take 1 tablet by mouth every 6 hours as needed for pain                                IBU-200 PO   Take  by mouth.                                ipratropium - albuterol 0.5 mg/2.5 mg/3 mL 0.5-2.5 (3) MG/3ML neb solution   Commonly known as:  DUONEB   Take 1 vial (3 mLs) by nebulization every 6 hours as needed for shortness of breath / dyspnea or wheezing                                potassium chloride SA 20 MEQ CR tablet   Commonly known as:  K-DUR/KLOR-CON M   Take 1 tablet (20 mEq) by mouth daily Take 2 tablets on day one   Last time this was given:  40 mEq on 4/13/2017  8:11 AM                                STATIN NOT PRESCRIBED (INTENTIONAL)   1 each daily Statin not prescribed intentionally due to Intolerance                                tiotropium 2.5 MCG/ACT inhalation aerosol   Commonly known as:  SPIRIVA RESPIMAT   Inhale 2 puffs into the lungs daily                                torsemide 5 MG tablet    Commonly known as:  DEMADEX   Take 1 tablet (5 mg) by mouth daily

## 2017-04-13 NOTE — PROGRESS NOTES
New Rx filled and given to pt. Pt discharged to home per w/c with cousin driving. Right groin site remains clean, dry, soft.

## 2017-04-14 LAB — INTERPRETATION ECG - MUSE: NORMAL

## 2017-04-27 ENCOUNTER — OFFICE VISIT (OUTPATIENT)
Dept: CARDIOLOGY | Facility: CLINIC | Age: 67
End: 2017-04-27
Attending: INTERNAL MEDICINE
Payer: COMMERCIAL

## 2017-04-27 ENCOUNTER — HOSPITAL ENCOUNTER (OUTPATIENT)
Dept: CARDIOLOGY | Facility: CLINIC | Age: 67
Discharge: HOME | End: 2017-04-27
Attending: INTERNAL MEDICINE | Admitting: INTERNAL MEDICINE
Payer: MEDICARE

## 2017-04-27 VITALS
BODY MASS INDEX: 29.29 KG/M2 | SYSTOLIC BLOOD PRESSURE: 130 MMHG | WEIGHT: 150 LBS | DIASTOLIC BLOOD PRESSURE: 75 MMHG | HEART RATE: 84 BPM | OXYGEN SATURATION: 92 %

## 2017-04-27 DIAGNOSIS — R07.89 ATYPICAL CHEST PAIN: ICD-10-CM

## 2017-04-27 DIAGNOSIS — I10 ESSENTIAL HYPERTENSION, BENIGN: ICD-10-CM

## 2017-04-27 DIAGNOSIS — Z98.890 STATUS POST CORONARY ANGIOGRAM: ICD-10-CM

## 2017-04-27 LAB
ANION GAP SERPL CALCULATED.3IONS-SCNC: 7 MMOL/L (ref 3–14)
BUN SERPL-MCNC: 18 MG/DL (ref 7–30)
CALCIUM SERPL-MCNC: 9.3 MG/DL (ref 8.5–10.1)
CHLORIDE SERPL-SCNC: 101 MMOL/L (ref 94–109)
CO2 SERPL-SCNC: 31 MMOL/L (ref 20–32)
CREAT SERPL-MCNC: 0.88 MG/DL (ref 0.52–1.04)
GFR SERPL CREATININE-BSD FRML MDRD: 64 ML/MIN/1.7M2
GLUCOSE SERPL-MCNC: 111 MG/DL (ref 70–99)
POTASSIUM SERPL-SCNC: 3.4 MMOL/L (ref 3.4–5.3)
SODIUM SERPL-SCNC: 139 MMOL/L (ref 133–144)

## 2017-04-27 PROCEDURE — 93306 TTE W/DOPPLER COMPLETE: CPT | Mod: 26 | Performed by: INTERNAL MEDICINE

## 2017-04-27 PROCEDURE — 36415 COLL VENOUS BLD VENIPUNCTURE: CPT | Performed by: INTERNAL MEDICINE

## 2017-04-27 PROCEDURE — 80048 BASIC METABOLIC PNL TOTAL CA: CPT | Performed by: INTERNAL MEDICINE

## 2017-04-27 PROCEDURE — 99213 OFFICE O/P EST LOW 20 MIN: CPT | Performed by: INTERNAL MEDICINE

## 2017-04-27 PROCEDURE — 93306 TTE W/DOPPLER COMPLETE: CPT

## 2017-04-27 NOTE — PROGRESS NOTES
CARDIOLOGY VISIT    REASON FOR VISIT: f/u angiogram    SUBJECTIVE:  66-year-old female seen for follow-up after angiogram after abnormal stress test. Her cardiac risk factors include dyslipidemia with a history of statin intolerance, tobacco abuse, and hypertension.      She has a history of COPD. PFTs in 2013 showed FEV1 of 1.1, FVC 1.9, DLCO 47% predicted.      Over the past one year she has complained of exertional dyspnea. This has slightly progressed in recent months. She can walk fairly short distances, but will need to stop and rest. In the past several months she also complains of a tightness in her chest. This occurs on a daily basis and is fairly constant. It doesn't worsen with exertion. She has some mild lower extremity edema which is unchanged. She denies any palpitations, dizziness, or syncope.      Stress echo February 2017 showed 3:30 on the Dino protocol, 83% maximum heart rate achieved, no chest pain during exercise, EKG negative for ischemia, rest echo with EF of 50-55%, stress echo shows minimal improvement of ejection fraction to 55-60%, distal anterior wall hypokinetic.       Lexiscan nuclear stress test April 2017 shows small apical infarct, no ischemia, ejection fraction 60%, apical hypokinesis.       Coronary angiogram April 2017 showed mild disease of the LAD, which does not quite reach the apex, mild disease of the circumflex, dominant RCA with minimal plaque. LV gram showed ejection fraction of 55-60% with small area of apical akinesis, LVEDP 28 mmHg.      She was started on a diuretic.  She thinks she lost a few pounds and her breathing has improved.  She still has occasional episodes of the chest tightness.    Echo from today, April 27, 2017 shows ejection fraction 55%, no wall motion abnormality, mild LVH, no significant valve disease.    MEDICATIONS:  Current Outpatient Prescriptions   Medication     torsemide (DEMADEX) 5 MG tablet     potassium chloride SA (K-DUR/KLOR-CON M) 20 MEQ  CR tablet     ezetimibe (ZETIA) 10 MG tablet     aspirin 81 MG chewable tablet     amLODIPine (NORVASC) 10 MG tablet     HYDROcodone-acetaminophen (NORCO) 5-325 MG per tablet     FLUoxetine (PROZAC) 20 MG capsule     tiotropium (SPIRIVA RESPIMAT) 2.5 MCG/ACT inhalation aerosol     albuterol (ALBUTEROL) 108 (90 BASE) MCG/ACT inhaler     ipratropium - albuterol 0.5 mg/2.5 mg/3 mL (DUONEB) 0.5-2.5 (3) MG/3ML nebulization     STATIN NOT PRESCRIBED, INTENTIONAL,     Ibuprofen (IBU-200 PO)     No current facility-administered medications for this visit.        ALLERGIES:  Allergies   Allergen Reactions     Amoxicillin Difficulty breathing and Rash     Ampicillin Difficulty breathing and Rash     Cipro [Ciprofloxacin] Difficulty breathing and Rash     Keflex [Cephalexin Monohydrate] Difficulty breathing and Rash     Penicillin [Penicillins] Difficulty breathing and Rash     Sulfa Drugs Difficulty breathing and Rash     Tetracycline Difficulty breathing and Rash     Biaxin [Clarithromycin] Rash     Ceclor [Cefaclor] Hives and Swelling     Advair Diskus Hives     Hives on face,neck and chest     Egg White      Fish Shortness Of Breath     Mustard [Allyl Isothiocyanate]      Throat swelling       REVIEW OF SYSTEMS:  Constitutional:  No weight loss, fever, chills, weakness or fatigue.  HEENT:  Eyes:  No visual loss, blurred vision, double vision or yellow sclerae. No hearing loss, sneezing, congestion, runny nose or sore throat.  Skin:  No rash or itching.  Cardiovascular: per HPI  Respiratory: per HPI  GI:  No anorexia, nausea, vomiting or diarrhea. No abdominal pain or blood.  :  No dysurea, hematuria  Neurologic:  No headache, dizziness, syncope, paralysis, ataxia, numbness or tingling in the extremities. No change in bowel or bladder control.  Musculoskeletal:  No muscle, back pain, joint pain or stiffness.  Hematologic:  No anemia, bleeding or bruising.  Lymphatics:  No enlarged nodes. No history of  splenectomy.  Psychiatric:  No history of depression or anxiety.  Endocrine:  No reports of sweating, cold or heat intolerance. No polyuria or polydipsia.  Allergies:  No history of asthma, hives, eczema or rhinitis.    PHYSICAL EXAM:      BP: 130/75 Pulse: 84     SpO2: 92 %      Vital Signs with Ranges  Pulse:  [84] 84  BP: (130)/(75) 130/75  SpO2:  [92 %] 92 %  150 lbs 0 oz  Constitutional: awake, alert, no distress  Eyes: PERRL, sclera nonicteric  ENT: trachea midline  Respiratory: Lungs clear  Cardiovascular: Regular rate and rhythm, no murmur  GI: nondistended, nontender, bowel sounds present  Lymph/Hematologic: no lymphadenopathy  Skin: dry, no rash  Musculoskeletal: good muscle tone, strength 5/5 in upper and lower extremities  Neurologic: no focal deficits  Neuropsychiatric: appropriate affact    DATA:  Lab: April 2017: Cholesterol 232, triglycerides 147, HDL 58,       ASSESSMENT:  66-year-old female seen for follow-up of angiogram.  She only had minimal coronary disease.  Interestingly she had an LAD that did not quite reach the apex.  In speaking with Dr. Yeung, this is quite unusual, but would probably explain the apical defect on recent stress echo and nuclear test.  This should not explain her chest tightness.     She was found to have elevated EDP of 28 mmHg.  She seems to be better on diuretic.  She definitely has some diastolic dysfunction.  If filling pressures are high, this could explain some tightness.    RECOMMENDATIONS:  1.  Nonobstructive coronary artery disease  - Continue aggressive risk factor modification    2.  Diastolic dysfunction  - Continue chlorthalidone with potassium supplement    3.  Hypertension  - Continue current medications    Follow-up in 6 months.    Adriano Santana MD  Cardiology - [facility]  Pager:  [phone]  Text Page  April 27, 2017

## 2017-04-27 NOTE — LETTER
4/27/2017    PEDRO Dominguez CNP  [facility]   [address]  [facility] [address]    RE: Aminata Gale       Dear Colleague,    I had the pleasure of seeing Aminata Gale in the [facility].    CARDIOLOGY VISIT    REASON FOR VISIT: f/u angiogram    SUBJECTIVE:  66-year-old female seen for follow-up after angiogram after abnormal stress test. Her cardiac risk factors include dyslipidemia with a history of statin intolerance, tobacco abuse, and hypertension.      She has a history of COPD. PFTs in 2013 showed FEV1 of 1.1, FVC 1.9, DLCO 47% predicted.      Over the past one year she has complained of exertional dyspnea. This has slightly progressed in recent months. She can walk fairly short distances, but will need to stop and rest. In the past several months she also complains of a tightness in her chest. This occurs on a daily basis and is fairly constant. It doesn't worsen with exertion. She has some mild lower extremity edema which is unchanged. She denies any palpitations, dizziness, or syncope.      Stress echo February 2017 showed 3:30 on the Dino protocol, 83% maximum heart rate achieved, no chest pain during exercise, EKG negative for ischemia, rest echo with EF of 50-55%, stress echo shows minimal improvement of ejection fraction to 55-60%, distal anterior wall hypokinetic.       Lexiscan nuclear stress test April 2017 shows small apical infarct, no ischemia, ejection fraction 60%, apical hypokinesis.       Coronary angiogram April 2017 showed mild disease of the LAD, which does not quite reach the apex, mild disease of the circumflex, dominant RCA with minimal plaque. LV gram showed ejection fraction of 55-60% with small area of apical akinesis, LVEDP 28 mmHg.      She was started on a diuretic.  She thinks she lost a few pounds and her breathing has improved.  She still has occasional episodes of the chest tightness.    Echo from today, April 27,  2017 shows ejection fraction 55%, no wall motion abnormality, mild LVH, no significant valve disease.    MEDICATIONS:  Current Outpatient Prescriptions   Medication     torsemide (DEMADEX) 5 MG tablet     potassium chloride SA (K-DUR/KLOR-CON M) 20 MEQ CR tablet     ezetimibe (ZETIA) 10 MG tablet     aspirin 81 MG chewable tablet     amLODIPine (NORVASC) 10 MG tablet     HYDROcodone-acetaminophen (NORCO) 5-325 MG per tablet     FLUoxetine (PROZAC) 20 MG capsule     tiotropium (SPIRIVA RESPIMAT) 2.5 MCG/ACT inhalation aerosol     albuterol (ALBUTEROL) 108 (90 BASE) MCG/ACT inhaler     ipratropium - albuterol 0.5 mg/2.5 mg/3 mL (DUONEB) 0.5-2.5 (3) MG/3ML nebulization     STATIN NOT PRESCRIBED, INTENTIONAL,     Ibuprofen (IBU-200 PO)     No current facility-administered medications for this visit.        ALLERGIES:  Allergies   Allergen Reactions     Amoxicillin Difficulty breathing and Rash     Ampicillin Difficulty breathing and Rash     Cipro [Ciprofloxacin] Difficulty breathing and Rash     Keflex [Cephalexin Monohydrate] Difficulty breathing and Rash     Penicillin [Penicillins] Difficulty breathing and Rash     Sulfa Drugs Difficulty breathing and Rash     Tetracycline Difficulty breathing and Rash     Biaxin [Clarithromycin] Rash     Ceclor [Cefaclor] Hives and Swelling     Advair Diskus Hives     Hives on face,neck and chest     Egg White      Fish Shortness Of Breath     Mustard [Allyl Isothiocyanate]      Throat swelling       REVIEW OF SYSTEMS:  Constitutional:  No weight loss, fever, chills, weakness or fatigue.  HEENT:  Eyes:  No visual loss, blurred vision, double vision or yellow sclerae. No hearing loss, sneezing, congestion, runny nose or sore throat.  Skin:  No rash or itching.  Cardiovascular: per HPI  Respiratory: per HPI  GI:  No anorexia, nausea, vomiting or diarrhea. No abdominal pain or blood.  :  No dysurea, hematuria  Neurologic:  No headache, dizziness, syncope, paralysis, ataxia, numbness  or tingling in the extremities. No change in bowel or bladder control.  Musculoskeletal:  No muscle, back pain, joint pain or stiffness.  Hematologic:  No anemia, bleeding or bruising.  Lymphatics:  No enlarged nodes. No history of splenectomy.  Psychiatric:  No history of depression or anxiety.  Endocrine:  No reports of sweating, cold or heat intolerance. No polyuria or polydipsia.  Allergies:  No history of asthma, hives, eczema or rhinitis.    PHYSICAL EXAM:      BP: 130/75 Pulse: 84     SpO2: 92 %      Vital Signs with Ranges  Pulse:  [84] 84  BP: (130)/(75) 130/75  SpO2:  [92 %] 92 %  150 lbs 0 oz  Constitutional: awake, alert, no distress  Eyes: PERRL, sclera nonicteric  ENT: trachea midline  Respiratory: Lungs clear  Cardiovascular: Regular rate and rhythm, no murmur  GI: nondistended, nontender, bowel sounds present  Lymph/Hematologic: no lymphadenopathy  Skin: dry, no rash  Musculoskeletal: good muscle tone, strength 5/5 in upper and lower extremities  Neurologic: no focal deficits  Neuropsychiatric: appropriate affact    DATA:  Lab: April 2017: Cholesterol 232, triglycerides 147, HDL 58,       ASSESSMENT:  66-year-old female seen for follow-up of angiogram.  She only had minimal coronary disease.  Interestingly she had an LAD that did not quite reach the apex.  In speaking with Dr. Yeung, this is quite unusual, but would probably explain the apical defect on recent stress echo and nuclear test.  This should not explain her chest tightness.     She was found to have elevated EDP of 28 mmHg.  She seems to be better on diuretic.  She definitely has some diastolic dysfunction.  If filling pressures are high, this could explain some tightness.    RECOMMENDATIONS:  1.  Nonobstructive coronary artery disease  - Continue aggressive risk factor modification    2.  Diastolic dysfunction  - Continue chlorthalidone with potassium supplement    3.  Hypertension  - Continue current medications    Follow-up in  6 months.    Adriano Santana MD  Cardiology - [facility]  Pager:  [phone]  Text Page  April 27, 2017    Thank you for allowing me to participate in the care of your patient.    Sincerely,     Adriano Santana MD     [facility]

## 2017-04-27 NOTE — MR AVS SNAPSHOT
After Visit Summary   4/27/2017    Aminata Gale    MRN: [id number]           Patient Information     Date Of Birth          1950        Visit Information        Provider Department      4/27/2017 2:30 PM Adriano Santana MD [facility] PHYSICIAN HEART AT [facility]        Today's Diagnoses     Status post coronary angiogram        Essential hypertension, benign           Follow-ups after your visit        Additional Services     Follow-Up with Cardiologist                 Your next 10 appointments already scheduled     Apr 27, 2017  2:30 PM CDT   Return Visit with Adriano Santana MD   [facility] PHYSICIAN HEART AT [facility] ([facility])    [address] [facility] [address]   [phone]              Future tests that were ordered for you today     Open Future Orders        Priority Expected Expires Ordered    Follow-Up with Cardiologist Routine 10/24/2017 4/27/2018 4/27/2017            Who to contact     If you have questions or need follow up information about today's clinic visit or your schedule please contact [facility] PHYSICIAN HEART AT [facility] directly at [phone].  Normal or non-critical lab and imaging results will be communicated to you by ARPUhart, letter or phone within 4 business days after the clinic has received the results. If you do not hear from us within 7 days, please contact the clinic through ARPUhart or phone. If you have a critical or abnormal lab result, we will notify you by phone as soon as possible.  Submit refill requests through Scoutforce or call your pharmacy and they will forward the refill request to us. Please allow 3 business days for your refill to be completed.          Additional Information About Your Visit        MyChart Information     Scoutforce gives you secure access to your electronic health record. If you see a primary care provider, you can also send  messages to your care team and make appointments. If you have questions, please call your primary care clinic.  If you do not have a primary care provider, please call [phone] and they will assist you.        Care EveryWhere ID     This is your Care EveryWhere ID. This could be used by other organizations to access your [place] medical records  [id number]        Your Vitals Were     Pulse Pulse Oximetry BMI (Body Mass Index)             84 92% 29.29 kg/m2          Blood Pressure from Last 3 Encounters:   04/27/17 130/75   04/13/17 131/73   04/11/17 128/77    Weight from Last 3 Encounters:   04/27/17 68 kg (150 lb)   04/13/17 68.4 kg (150 lb 11.2 oz)   04/11/17 67.4 kg (148 lb 9.6 oz)              We Performed the Following     Follow-Up with Cardiac Advanced Practice Provider        Primary Care Provider Office Phone # Fax #    PEDRO Dominguez [facility] [phone]       [facility] [address] [place] [address]        Thank you!     Thank you for choosing [facility] PHYSICIAN HEART AT [facility]  for your care. Our goal is always to provide you with excellent care. Hearing back from our patients is one way we can continue to improve our services. Please take a few minutes to complete the written survey that you may receive in the mail after your visit with us. Thank you!             Your Updated Medication List - Protect others around you: Learn how to safely use, store and throw away your medicines at www.disposemymeds.org.          This list is accurate as of: 4/27/17  2:24 PM.  Always use your most recent med list.                   Brand Name Dispense Instructions for use    albuterol 108 (90 BASE) MCG/ACT Inhaler    albuterol    3 Inhaler    Inhale 1-2 puffs into the lungs every 4 hours To 6 hours as needed for shortness of breath       amLODIPine 10 MG tablet    NORVASC    90 tablet    Take 1 tablet (10 mg) by mouth daily For high blood pressure        aspirin 81 MG chewable tablet     100 tablet    Take 1 tablet (81 mg) by mouth daily       ezetimibe 10 MG tablet    ZETIA    30 tablet    Take 1 tablet (10 mg) by mouth daily       FLUoxetine 20 MG capsule    PROzac    90 capsule    Take 1 capsule (20 mg) by mouth daily       HYDROcodone-acetaminophen 5-325 MG per tablet    NORCO    30 tablet    Take 1 tablet by mouth every 6 hours as needed for pain       IBU-200 PO      Take  by mouth.       ipratropium - albuterol 0.5 mg/2.5 mg/3 mL 0.5-2.5 (3) MG/3ML neb solution    DUONEB    30 vial    Take 1 vial (3 mLs) by nebulization every 6 hours as needed for shortness of breath / dyspnea or wheezing       potassium chloride SA 20 MEQ CR tablet    K-DUR/KLOR-CON M    32 tablet    Take 1 tablet (20 mEq) by mouth daily Take 2 tablets on day one       STATIN NOT PRESCRIBED (INTENTIONAL)     0 each    1 each daily Statin not prescribed intentionally due to Intolerance       tiotropium 2.5 MCG/ACT inhalation aerosol    SPIRIVA RESPIMAT    12 g    Inhale 2 puffs into the lungs daily       torsemide 5 MG tablet    DEMADEX    90 tablet    Take 1 tablet (5 mg) by mouth daily

## 2017-06-14 DIAGNOSIS — F41.9 ANXIETY: ICD-10-CM

## 2017-06-14 DIAGNOSIS — F43.21 ADJUSTMENT DISORDER WITH DEPRESSED MOOD: ICD-10-CM

## 2017-06-14 NOTE — TELEPHONE ENCOUNTER
FLUoxetine (PROZAC) 20 MG capsule     Last Written Prescription Date: 10/18/2016  Last Fill Quantity: 90 capsule, # refills: 1  Last Office Visit with G primary care provider:  01/03/2017        Last PHQ-9 score on record=   PHQ-9 SCORE 1/31/2017   Total Score -   Total Score 8

## 2017-06-15 DIAGNOSIS — E78.2 MIXED HYPERLIPIDEMIA: ICD-10-CM

## 2017-06-15 RX ORDER — EZETIMIBE 10 MG/1
10 TABLET ORAL DAILY
Qty: 90 TABLET | Refills: 3 | Status: SHIPPED | OUTPATIENT
Start: 2017-06-15 | End: 2018-01-30

## 2017-06-16 ENCOUNTER — ALLIED HEALTH/NURSE VISIT (OUTPATIENT)
Dept: FAMILY MEDICINE | Facility: CLINIC | Age: 67
End: 2017-06-16
Payer: COMMERCIAL

## 2017-06-16 VITALS — HEART RATE: 80 BPM | SYSTOLIC BLOOD PRESSURE: 132 MMHG | DIASTOLIC BLOOD PRESSURE: 74 MMHG

## 2017-06-16 DIAGNOSIS — I10 HTN, GOAL BELOW 140/90: Primary | ICD-10-CM

## 2017-06-16 PROCEDURE — 99207 ZZC NO CHARGE NURSE ONLY: CPT | Performed by: NURSE PRACTITIONER

## 2017-06-16 NOTE — MR AVS SNAPSHOT
After Visit Summary   6/16/2017    Aminata Gale    MRN: [id number]           Patient Information     Date Of Birth          1950        Visit Information        Provider Department      6/16/2017 5:11 PM Sana Olivo APRN CNP [facility]        Today's Diagnoses     HTN, goal below 140/90    -  1       Follow-ups after your visit        Who to contact     If you have questions or need follow up information about today's clinic visit or your schedule please contact [facility] directly at [phone].  Normal or non-critical lab and imaging results will be communicated to you by Andelahart, letter or phone within 4 business days after the clinic has received the results. If you do not hear from us within 7 days, please contact the clinic through Globe Wirelesst or phone. If you have a critical or abnormal lab result, we will notify you by phone as soon as possible.  Submit refill requests through Phobious or call your pharmacy and they will forward the refill request to us. Please allow 3 business days for your refill to be completed.          Additional Information About Your Visit        MyChart Information     Phobious gives you secure access to your electronic health record. If you see a primary care provider, you can also send messages to your care team and make appointments. If you have questions, please call your primary care clinic.  If you do not have a primary care provider, please call [phone] and they will assist you.        Care EveryWhere ID     This is your Care EveryWhere ID. This could be used by other organizations to access your [place] medical records  [id number]        Your Vitals Were     Pulse                   80            Blood Pressure from Last 3 Encounters:   06/16/17 132/74   04/27/17 130/75   04/13/17 131/73    Weight from Last 3 Encounters:   04/27/17 150 lb (68 kg)   04/13/17 150 lb 11.2 oz (68.4 kg)   04/11/17 148 lb 9.6 oz  (67.4 kg)              Today, you had the following     No orders found for display       Primary Care Provider Office Phone # Fax #    PEDRO Dominguez [facility] [phone]       [facility] [address] [facility] [address]        Thank you!     Thank you for choosing [facility]  for your care. Our goal is always to provide you with excellent care. Hearing back from our patients is one way we can continue to improve our services. Please take a few minutes to complete the written survey that you may receive in the mail after your visit with us. Thank you!             Your Updated Medication List - Protect others around you: Learn how to safely use, store and throw away your medicines at www.disposemymeds.org.          This list is accurate as of: 6/16/17  5:13 PM.  Always use your most recent med list.                   Brand Name Dispense Instructions for use    albuterol 108 (90 BASE) MCG/ACT Inhaler    albuterol    3 Inhaler    Inhale 1-2 puffs into the lungs every 4 hours To 6 hours as needed for shortness of breath       amLODIPine 10 MG tablet    NORVASC    90 tablet    Take 1 tablet (10 mg) by mouth daily For high blood pressure       aspirin 81 MG chewable tablet     100 tablet    Take 1 tablet (81 mg) by mouth daily       ezetimibe 10 MG tablet    ZETIA    90 tablet    Take 1 tablet (10 mg) by mouth daily       FLUoxetine 20 MG capsule    PROzac    90 capsule    TAKE ONE CAPSULE BY MOUTH DAILY       HYDROcodone-acetaminophen 5-325 MG per tablet    NORCO    30 tablet    Take 1 tablet by mouth every 6 hours as needed for pain       IBU-200 PO      Take  by mouth.       ipratropium - albuterol 0.5 mg/2.5 mg/3 mL 0.5-2.5 (3) MG/3ML neb solution    DUONEB    30 vial    Take 1 vial (3 mLs) by nebulization every 6 hours as needed for shortness of breath / dyspnea or wheezing       potassium chloride SA 20 MEQ CR tablet    K-DUR/KLOR-CON M    32 tablet    Take 1  tablet (20 mEq) by mouth daily Take 2 tablets on day one       STATIN NOT PRESCRIBED (INTENTIONAL)     0 each    1 each daily Statin not prescribed intentionally due to Intolerance       tiotropium 2.5 MCG/ACT inhalation aerosol    SPIRIVA RESPIMAT    12 g    Inhale 2 puffs into the lungs daily       torsemide 5 MG tablet    DEMADEX    90 tablet    Take 1 tablet (5 mg) by mouth daily

## 2017-06-16 NOTE — NURSING NOTE
Aminata Gale is enrolled/participating in the retail pharmacy Blood Pressure Goals Achievement Program (BPGAP).  Aminata Gale was evaluated at [facility] on June 16, 2017 at which time her blood pressure was:    BP Readings from Last 3 Encounters:   06/16/17 132/74   04/27/17 130/75   04/13/17 131/73     Reviewed lifestyle modifications for blood pressure control and reduction: including making healthy food choices, managing weight, getting regular exercise, smoking cessation, reducing alcohol consumption, monitoring blood pressure regularly.     Aminata Gale is not experiencing symptoms.    Follow-Up: BP is at goal of < 140/90mmHg (patient 18+ years of age with or without diabetes).  Recommended follow-up at pharmacy in 6 months.     Recommendation to Provider: Reji Arce [facility]  [phone]      Aminata Gale was evaluated for enrollment into the PGEN study today.    Patient eligible for enrollment:  No   Patient interested in enrollment:  Unknown    Completed by: Reji Arce [facility]  [phone]

## 2017-06-30 ENCOUNTER — APPOINTMENT (OUTPATIENT)
Dept: ULTRASOUND IMAGING | Facility: CLINIC | Age: 67
End: 2017-06-30
Attending: FAMILY MEDICINE
Payer: MEDICARE

## 2017-06-30 ENCOUNTER — HOSPITAL ENCOUNTER (OUTPATIENT)
Facility: CLINIC | Age: 67
Setting detail: OBSERVATION
Discharge: HOME | End: 2017-07-02
Attending: FAMILY MEDICINE | Admitting: SURGERY
Payer: MEDICARE

## 2017-06-30 DIAGNOSIS — M32.9 SYSTEMIC LUPUS ERYTHEMATOSUS (H): ICD-10-CM

## 2017-06-30 DIAGNOSIS — K81.0 ACUTE CHOLECYSTITIS: ICD-10-CM

## 2017-06-30 PROCEDURE — 99285 EMERGENCY DEPT VISIT HI MDM: CPT | Mod: Z6 | Performed by: FAMILY MEDICINE

## 2017-06-30 PROCEDURE — 93005 ELECTROCARDIOGRAM TRACING: CPT | Performed by: FAMILY MEDICINE

## 2017-06-30 PROCEDURE — 80053 COMPREHEN METABOLIC PANEL: CPT | Performed by: FAMILY MEDICINE

## 2017-06-30 PROCEDURE — 76705 ECHO EXAM OF ABDOMEN: CPT

## 2017-06-30 PROCEDURE — 83690 ASSAY OF LIPASE: CPT | Performed by: FAMILY MEDICINE

## 2017-06-30 PROCEDURE — 85025 COMPLETE CBC W/AUTO DIFF WBC: CPT | Performed by: FAMILY MEDICINE

## 2017-06-30 PROCEDURE — 84484 ASSAY OF TROPONIN QUANT: CPT | Performed by: FAMILY MEDICINE

## 2017-06-30 PROCEDURE — 99285 EMERGENCY DEPT VISIT HI MDM: CPT | Mod: 25 | Performed by: FAMILY MEDICINE

## 2017-06-30 PROCEDURE — 86140 C-REACTIVE PROTEIN: CPT | Performed by: FAMILY MEDICINE

## 2017-06-30 RX ORDER — SODIUM CHLORIDE 9 MG/ML
1000 INJECTION, SOLUTION INTRAVENOUS CONTINUOUS
Status: DISCONTINUED | OUTPATIENT
Start: 2017-06-30 | End: 2017-07-01

## 2017-06-30 NOTE — IP AVS SNAPSHOT
[facility]    [address] [facility] [address]    Phone:  [phone]    Fax:  [phone]                                       After Visit Summary   6/30/2017    Aminata Gale    MRN: [id number]           After Visit Summary Signature Page     I have received my discharge instructions, and my questions have been answered. I have discussed any challenges I see with this plan with the nurse or doctor.    ..........................................................................................................................................  Patient/Patient Representative Signature      ..........................................................................................................................................  Patient Representative Print Name and Relationship to Patient    ..................................................               ................................................  Date                                            Time    ..........................................................................................................................................  Reviewed by Signature/Title    ...................................................              ..............................................  Date                                                            Time

## 2017-06-30 NOTE — IP AVS SNAPSHOT
MRN:[id number]                      After Visit Summary   6/30/2017    Aminata Gale    MRN: [id number]           Thank you!     Thank you for choosing [facility] for your care. Our goal is always to provide you with excellent care. Hearing back from our patients is one way we can continue to improve our services. Please take a few minutes to complete the written survey that you may receive in the mail after you visit with us. Thank you!        Patient Information     Date Of Birth          1950        About your hospital stay     You were admitted on:  July 1, 2017 You last received care in the:  [facility]    You were discharged on:  July 2, 2017       Who to Call     For medical emergencies, please call 911.  For non-urgent questions about your medical care, please call your primary care provider or clinic, [phone]  For questions related to your surgery, please call your surgery clinic        Attending Provider     Provider Bowen Lawrence MD Emergency Medicine    [facility], Nathanael Tapia MD Family Practice    Hermes Paiz MD Internal Medicine    [facility], Bowen GALLOWAY MD Family Practice       Primary Care Provider Office Phone # Fax #    PEDRO Dominguez [phone]      Your next 10 appointments already scheduled     Jul 06, 2017  9:00 AM CDT   Office Visit with PEDRO Dominguez CNP   [facility] ([facility])    [address] [facility] [address] [phone]           Bring a current list of meds and any records pertaining to this visit.  For Physicals, please bring immunization records and any forms needing to be filled out.  Please arrive 10 minutes early to complete paperwork.              Further instructions from your care team           's post op instructions:    1) Wash wound with water and antibacterial soap  2) No driving for 3 days or until off the  narcotics  3) 20 # weight restrictions for 6 weeks   4) Avoid constipation by using Colace 1 tablet twice daily   5) RETURN TO [facility] in 7-10 days  6) Use 400 mg of Ibuprofen taken along 500 mg of Tylenol orally every 6 hours for pain as an alternative to the narcotics for pain control. DO NOT EXCEED THE RECOMMENDED DAILY DOSE OF EITHER MEDICATION  7) You might experience bruising around the surgical incisions and/or abdominal wall. Do not be concerned, this will go away on its own  8) Ice packs to the surgical site every 1 hours for 15-20 min at a time  9) It is OK to go up and down the stairs and to take a bath or a shower,whichever you prefer                      Cholecystitis (Confirmed)    Your abdominal pain is due to an inflammation and possible infection in the gallbladder. This is called cholecystitis.  The gallbladder is a small sac under the liver that stores and releases bile. Bile is a fluid that helps you digest fat. Eating fatty food stimulates the gallbladder to contract, and release the bile.  A gallstone may form in this sac. Although most people do not have symptoms, when the stone moves and block the passage of bile out of the bladder, it can cause pain and even an infection. Bile sludge without a stone can also cause cholecystitis.  A number of things increase the risk of developing gallstones:    Gender. Women are more likely to get them.    Obesity    Increasing age    Losing or gaining weight quickly    High calorie diet    Pregnancy    Hormone therapy    Diabetes  The most common symptoms are:    Abdominal pain, cramping, and aching    Nausea or vomiting    Fever  Many illnesses can cause these symptoms. This pain usually starts in the upper right side of your abdomen. Sometimes it can radiate to your right shoulder, back, and arm. It usually starts suddenly, becomes more intense quickly, and then gradually decreases and disappears over a couple of hours. Older people and  people with diabetes may have difficulty showing where the pain is exactly.  Home care    Rest in bed and follow a clear liquid diet until the pain, nausea, and vomiting have gone away. Antibiotics and other medicine may be prescribed. Take this exactly as directed.    You may use ibuprofen or naproxen to control pain, unless another medicine was prescribed.    You can take acetaminophen or ibuprofen for pain, unless you were given a different pain medicine to use. Note: If you have chronic liver or kidney disease or ever had a stomach ulcer or GI bleeding or are taking blood thinner medications, talk with your healthcare provider before using these medicines.    Fat in your diet makes the gallbladder contract and may cause increased pain. So don't have any fat over the next 2 days and follow a low-fat diet after that.  Follow-up care  An infection in the gallbladder is a serious problem and must be watched carefully. See your doctor in the next 1 to 2 days, or as directed. Once cholecystitis has occurred, you usually will need to have your gallbladder removed to keep the condition from happening again. You can discuss this at your follow-up visit.  When to seek medical advice  Call your healthcare provider if any of the following occur:    Repeated vomiting    Swelling of the abdomen    Pain that lasts more than 6 hours    Fever of 100.4 F (38 C) or higher, or as directed by your healthcare provider    Weakness, dizziness, or fainting    Dark urine or light-colored stools    Yellow color of the skin or eyes    Chest, arm, back, neck, or jaw pain  Date Last Reviewed: 8/23/2015 2000-2017 [employer]. [address] [place] [address], [place] [address]. All rights reserved. This information is not intended as a substitute for professional medical care. Always follow your healthcare professional's instructions.      Rest, fluids, you may use Tylenol or ibuprofen for pain.  If pain recurs or is worse please  return to the emergency department.  Please be very careful with her diet as we have discussed for the potential to make this much worse.  Please call the number for Gen. surgery to arrange for in office consultation and to schedule surgery.    Pending Results     Date and Time Order Name Status Description    7/1/2017 0300 Urine Culture Aerobic Bacterial Preliminary             Statement of Approval     Ordered          07/02/17 0828  I have reviewed and agree with all the recommendations and orders detailed in this document.  EFFECTIVE NOW     Approved and electronically signed by:  Bowen Gagnon MD             Admission Information     Date & Time Provider Department Dept. Phone    6/30/2017 Bowen Gagnon MD [facility] [phone]      Your Vitals Were     Blood Pressure Pulse Temperature Respirations Height Weight    112/59 (BP Location: Right arm) 85 96.5  F (35.8  C) (Oral) 16 1.524 m (5') 69.5 kg (153 lb 3.5 oz)    Pulse Oximetry BMI (Body Mass Index)                91% 29.92 kg/m2          [email or web address] Information     [employer] gives you secure access to your electronic health record. If you see a primary care provider, you can also send messages to your care team and make appointments. If you have questions, please call your primary care clinic.  If you do not have a primary care provider, please call [phone] and they will assist you.        Care EveryWhere ID     This is your Care EveryWhere ID. This could be used by other organizations to access your [facility] medical records  [id number]        Equal Access to Services     CURTIS ARGUELLO : Hadii monica Liang, waaxda lusaud, qaybta kaalmanegra calvin. So [facility] [phone].    ATENCIÓN: Si habla español, tiene a marquez disposición servicios gratuitos de asistencia lingüística. Llame al [phone].    We comply with applicable federal civil rights laws and [place] laws. We do  not discriminate on the basis of race, color, national origin, age, disability sex, sexual orientation or gender identity.               Review of your medicines      CONTINUE these medicines which may have CHANGED, or have new prescriptions. If we are uncertain of the size of tablets/capsules you have at home, strength may be listed as something that might have changed.        Dose / Directions    HYDROcodone-acetaminophen 5-325 MG per tablet   Commonly known as:  NORCO   This may have changed:    - how much to take  - when to take this        Dose:  1-2 tablet   Take 1-2 tablets by mouth every 4 hours as needed for pain   Quantity:  30 tablet   Refills:  0         CONTINUE these medicines which have NOT CHANGED        Dose / Directions    albuterol 108 (90 BASE) MCG/ACT Inhaler   Commonly known as:  albuterol   Used for:  COPD exacerbation (H)        Dose:  1-2 puff   Inhale 1-2 puffs into the lungs every 4 hours To 6 hours as needed for shortness of breath   Quantity:  3 Inhaler   Refills:  1       amLODIPine 10 MG tablet   Commonly known as:  NORVASC   Used for:  HTN, goal below 140/90        Dose:  10 mg   Take 1 tablet (10 mg) by mouth daily For high blood pressure   Quantity:  90 tablet   Refills:  4       aspirin 81 MG chewable tablet   Used for:  Atypical chest pain, Abnormal cardiovascular stress test        Dose:  81 mg   Take 1 tablet (81 mg) by mouth daily   Quantity:  100 tablet   Refills:  3       ezetimibe 10 MG tablet   Commonly known as:  ZETIA   Used for:  Mixed hyperlipidemia        Dose:  10 mg   Take 1 tablet (10 mg) by mouth daily   Quantity:  90 tablet   Refills:  3       FLUoxetine 20 MG capsule   Commonly known as:  PROzac   Used for:  Adjustment disorder with depressed mood, Anxiety        TAKE ONE CAPSULE BY MOUTH DAILY   Quantity:  90 capsule   Refills:  0       IBU-200 PO        Take  by mouth.   Refills:  0       ipratropium - albuterol 0.5 mg/2.5 mg/3 mL 0.5-2.5 (3) MG/3ML neb  solution   Commonly known as:  DUONEB   Used for:  Bronchial pneumonia        Dose:  1 vial   Take 1 vial (3 mLs) by nebulization every 6 hours as needed for shortness of breath / dyspnea or wheezing   Quantity:  30 vial   Refills:  1       potassium chloride SA 20 MEQ CR tablet   Commonly known as:  K-DUR/KLOR-CON M   Used for:  Hypokalemia        Dose:  20 mEq   Take 1 tablet (20 mEq) by mouth daily Take 2 tablets on day one   Quantity:  32 tablet   Refills:  3       STATIN NOT PRESCRIBED (INTENTIONAL)   Used for:  Hyperlipidemia LDL goal <130        Dose:  1 each   1 each daily Statin not prescribed intentionally due to Intolerance   Quantity:  0 each   Refills:  0       tiotropium 2.5 MCG/ACT inhalation aerosol   Commonly known as:  SPIRIVA RESPIMAT   Used for:  Moderate persistent asthma without complication, Chronic obstructive pulmonary disease, unspecified COPD type (H)        Dose:  2 puff   Inhale 2 puffs into the lungs daily   Quantity:  12 g   Refills:  3       torsemide 5 MG tablet   Commonly known as:  DEMADEX   Used for:  Essential hypertension, benign        Dose:  5 mg   Take 1 tablet (5 mg) by mouth daily   Quantity:  90 tablet   Refills:  1            Where to get your medicines      Some of these will need a paper prescription and others can be bought over the counter. Ask your nurse if you have questions.     Bring a paper prescription for each of these medications     HYDROcodone-acetaminophen 5-325 MG per tablet                Protect others around you: Learn how to safely use, store and throw away your medicines at www.disposemymeds.org.             Medication List: This is a list of all your medications and when to take them. Check marks below indicate your daily home schedule. Keep this list as a reference.      Medications           Morning Afternoon Evening Bedtime As Needed    albuterol 108 (90 BASE) MCG/ACT Inhaler   Commonly known as:  albuterol   Inhale 1-2 puffs into the lungs every  4 hours To 6 hours as needed for shortness of breath   Last time this was given:  2 puffs on 7/2/2017  6:32 AM                                amLODIPine 10 MG tablet   Commonly known as:  NORVASC   Take 1 tablet (10 mg) by mouth daily For high blood pressure   Last time this was given:  10 mg on 7/2/2017  7:48 AM                                   aspirin 81 MG chewable tablet   Take 1 tablet (81 mg) by mouth daily   Last time this was given:  81 mg on 7/2/2017  7:48 AM                                   ezetimibe 10 MG tablet   Commonly known as:  ZETIA   Take 1 tablet (10 mg) by mouth daily   Last time this was given:  10 mg on 7/2/2017  7:48 AM                                   FLUoxetine 20 MG capsule   Commonly known as:  PROzac   TAKE ONE CAPSULE BY MOUTH DAILY   Last time this was given:  20 mg on 7/2/2017  7:49 AM                                   HYDROcodone-acetaminophen 5-325 MG per tablet   Commonly known as:  NORCO   Take 1-2 tablets by mouth every 4 hours as needed for pain   Last time this was given:  2 tablets on 7/2/2017  6:33 AM                            May take anytime after 10:30 am       IBU-200 PO   Take  by mouth.                                   ipratropium - albuterol 0.5 mg/2.5 mg/3 mL 0.5-2.5 (3) MG/3ML neb solution   Commonly known as:  DUONEB   Take 1 vial (3 mLs) by nebulization every 6 hours as needed for shortness of breath / dyspnea or wheezing                                   potassium chloride SA 20 MEQ CR tablet   Commonly known as:  K-DUR/KLOR-CON M   Take 1 tablet (20 mEq) by mouth daily Take 2 tablets on day one   Last time this was given:  20 mEq on 7/2/2017  7:48 AM                                   STATIN NOT PRESCRIBED (INTENTIONAL)   1 each daily Statin not prescribed intentionally due to Intolerance                                tiotropium 2.5 MCG/ACT inhalation aerosol   Commonly known as:  SPIRIVA RESPIMAT   Inhale 2 puffs into the lungs daily   Last time this was  given:  2 puffs on 7/2/2017  7:48 AM                                   torsemide 5 MG tablet   Commonly known as:  DEMADEX   Take 1 tablet (5 mg) by mouth daily   Last time this was given:  5 mg on 7/2/2017  7:48 AM

## 2017-07-01 ENCOUNTER — SURGERY (OUTPATIENT)
Age: 67
End: 2017-07-01

## 2017-07-01 ENCOUNTER — APPOINTMENT (OUTPATIENT)
Dept: GENERAL RADIOLOGY | Facility: CLINIC | Age: 67
End: 2017-07-01
Attending: FAMILY MEDICINE
Payer: MEDICARE

## 2017-07-01 ENCOUNTER — ANESTHESIA EVENT (OUTPATIENT)
Dept: SURGERY | Facility: CLINIC | Age: 67
End: 2017-07-01
Payer: MEDICARE

## 2017-07-01 ENCOUNTER — ANESTHESIA (OUTPATIENT)
Dept: SURGERY | Facility: CLINIC | Age: 67
End: 2017-07-01
Payer: MEDICARE

## 2017-07-01 PROBLEM — K81.0 ACUTE CHOLECYSTITIS: Status: ACTIVE | Noted: 2017-07-01

## 2017-07-01 LAB
ALBUMIN SERPL-MCNC: 3.2 G/DL (ref 3.4–5)
ALBUMIN SERPL-MCNC: 3.7 G/DL (ref 3.4–5)
ALBUMIN UR-MCNC: NEGATIVE MG/DL
ALP SERPL-CCNC: 131 U/L (ref 40–150)
ALP SERPL-CCNC: 144 U/L (ref 40–150)
ALT SERPL W P-5'-P-CCNC: 141 U/L (ref 0–50)
ALT SERPL W P-5'-P-CCNC: 180 U/L (ref 0–50)
ANION GAP SERPL CALCULATED.3IONS-SCNC: 5 MMOL/L (ref 3–14)
ANION GAP SERPL CALCULATED.3IONS-SCNC: 7 MMOL/L (ref 3–14)
APPEARANCE UR: CLEAR
AST SERPL W P-5'-P-CCNC: 121 U/L (ref 0–45)
AST SERPL W P-5'-P-CCNC: 74 U/L (ref 0–45)
BACTERIA #/AREA URNS HPF: ABNORMAL /HPF
BASOPHILS # BLD AUTO: 0.1 10E9/L (ref 0–0.2)
BASOPHILS # BLD AUTO: 0.1 10E9/L (ref 0–0.2)
BASOPHILS NFR BLD AUTO: 0.4 %
BASOPHILS NFR BLD AUTO: 0.5 %
BILIRUB SERPL-MCNC: 0.6 MG/DL (ref 0.2–1.3)
BILIRUB SERPL-MCNC: 0.6 MG/DL (ref 0.2–1.3)
BILIRUB UR QL STRIP: NEGATIVE
BUN SERPL-MCNC: 10 MG/DL (ref 7–30)
BUN SERPL-MCNC: 15 MG/DL (ref 7–30)
CALCIUM SERPL-MCNC: 7.8 MG/DL (ref 8.5–10.1)
CALCIUM SERPL-MCNC: 8.7 MG/DL (ref 8.5–10.1)
CHLORIDE SERPL-SCNC: 104 MMOL/L (ref 94–109)
CHLORIDE SERPL-SCNC: 106 MMOL/L (ref 94–109)
CO2 SERPL-SCNC: 28 MMOL/L (ref 20–32)
CO2 SERPL-SCNC: 30 MMOL/L (ref 20–32)
COLOR UR AUTO: YELLOW
CREAT SERPL-MCNC: 0.68 MG/DL (ref 0.52–1.04)
CREAT SERPL-MCNC: 0.73 MG/DL (ref 0.52–1.04)
CRP SERPL-MCNC: 6.2 MG/L (ref 0–8)
DIFFERENTIAL METHOD BLD: ABNORMAL
DIFFERENTIAL METHOD BLD: NORMAL
EOSINOPHIL # BLD AUTO: 0.4 10E9/L (ref 0–0.7)
EOSINOPHIL # BLD AUTO: 0.4 10E9/L (ref 0–0.7)
EOSINOPHIL NFR BLD AUTO: 2.7 %
EOSINOPHIL NFR BLD AUTO: 3.3 %
ERYTHROCYTE [DISTWIDTH] IN BLOOD BY AUTOMATED COUNT: 14.2 % (ref 10–15)
ERYTHROCYTE [DISTWIDTH] IN BLOOD BY AUTOMATED COUNT: 14.2 % (ref 10–15)
GFR SERPL CREATININE-BSD FRML MDRD: 80 ML/MIN/1.7M2
GFR SERPL CREATININE-BSD FRML MDRD: 86 ML/MIN/1.7M2
GLUCOSE SERPL-MCNC: 129 MG/DL (ref 70–99)
GLUCOSE SERPL-MCNC: 97 MG/DL (ref 70–99)
GLUCOSE UR STRIP-MCNC: NEGATIVE MG/DL
HCT VFR BLD AUTO: 46.5 % (ref 35–47)
HCT VFR BLD AUTO: 49.5 % (ref 35–47)
HGB BLD-MCNC: 15.3 G/DL (ref 11.7–15.7)
HGB BLD-MCNC: 16.5 G/DL (ref 11.7–15.7)
HGB UR QL STRIP: NEGATIVE
IMM GRANULOCYTES # BLD: 0 10E9/L (ref 0–0.4)
IMM GRANULOCYTES # BLD: 0 10E9/L (ref 0–0.4)
IMM GRANULOCYTES NFR BLD: 0.3 %
IMM GRANULOCYTES NFR BLD: 0.4 %
KETONES UR STRIP-MCNC: 10 MG/DL
LEUKOCYTE ESTERASE UR QL STRIP: NEGATIVE
LIPASE SERPL-CCNC: 152 U/L (ref 73–393)
LIPASE SERPL-CCNC: 164 U/L (ref 73–393)
LYMPHOCYTES # BLD AUTO: 4.1 10E9/L (ref 0.8–5.3)
LYMPHOCYTES # BLD AUTO: 4.4 10E9/L (ref 0.8–5.3)
LYMPHOCYTES NFR BLD AUTO: 31.6 %
LYMPHOCYTES NFR BLD AUTO: 39.6 %
MCH RBC QN AUTO: 29.5 PG (ref 26.5–33)
MCH RBC QN AUTO: 29.7 PG (ref 26.5–33)
MCHC RBC AUTO-ENTMCNC: 32.9 G/DL (ref 31.5–36.5)
MCHC RBC AUTO-ENTMCNC: 33.3 G/DL (ref 31.5–36.5)
MCV RBC AUTO: 89 FL (ref 78–100)
MCV RBC AUTO: 90 FL (ref 78–100)
MONOCYTES # BLD AUTO: 1 10E9/L (ref 0–1.3)
MONOCYTES # BLD AUTO: 1.2 10E9/L (ref 0–1.3)
MONOCYTES NFR BLD AUTO: 8.9 %
MONOCYTES NFR BLD AUTO: 9.3 %
MUCOUS THREADS #/AREA URNS LPF: PRESENT /LPF
NEUTROPHILS # BLD AUTO: 5.2 10E9/L (ref 1.6–8.3)
NEUTROPHILS # BLD AUTO: 7.3 10E9/L (ref 1.6–8.3)
NEUTROPHILS NFR BLD AUTO: 47.3 %
NEUTROPHILS NFR BLD AUTO: 55.7 %
NITRATE UR QL: POSITIVE
PH UR STRIP: 7 PH (ref 5–7)
PLATELET # BLD AUTO: 254 10E9/L (ref 150–450)
PLATELET # BLD AUTO: 265 10E9/L (ref 150–450)
POTASSIUM SERPL-SCNC: 3.5 MMOL/L (ref 3.4–5.3)
POTASSIUM SERPL-SCNC: 3.7 MMOL/L (ref 3.4–5.3)
PROT SERPL-MCNC: 6.7 G/DL (ref 6.8–8.8)
PROT SERPL-MCNC: 7.5 G/DL (ref 6.8–8.8)
RBC # BLD AUTO: 5.19 10E12/L (ref 3.8–5.2)
RBC # BLD AUTO: 5.55 10E12/L (ref 3.8–5.2)
RBC #/AREA URNS AUTO: 1 /HPF (ref 0–2)
SODIUM SERPL-SCNC: 139 MMOL/L (ref 133–144)
SODIUM SERPL-SCNC: 141 MMOL/L (ref 133–144)
SP GR UR STRIP: 1.01 (ref 1–1.03)
SQUAMOUS #/AREA URNS AUTO: <1 /HPF (ref 0–1)
TROPONIN I SERPL-MCNC: NORMAL UG/L (ref 0–0.04)
URN SPEC COLLECT METH UR: ABNORMAL
UROBILINOGEN UR STRIP-MCNC: NORMAL MG/DL (ref 0–2)
WBC # BLD AUTO: 11 10E9/L (ref 4–11)
WBC # BLD AUTO: 13.1 10E9/L (ref 4–11)
WBC #/AREA URNS AUTO: 2 /HPF (ref 0–2)

## 2017-07-01 PROCEDURE — 96360 HYDRATION IV INFUSION INIT: CPT

## 2017-07-01 PROCEDURE — 27210794 ZZH OR GENERAL SUPPLY STERILE: Performed by: SURGERY

## 2017-07-01 PROCEDURE — 36000058 ZZH SURGERY LEVEL 3 EA 15 ADDTL MIN: Performed by: SURGERY

## 2017-07-01 PROCEDURE — G0378 HOSPITAL OBSERVATION PER HR: HCPCS

## 2017-07-01 PROCEDURE — 25000125 ZZHC RX 250: Performed by: NURSE ANESTHETIST, CERTIFIED REGISTERED

## 2017-07-01 PROCEDURE — 87186 SC STD MICRODIL/AGAR DIL: CPT | Performed by: FAMILY MEDICINE

## 2017-07-01 PROCEDURE — 37000009 ZZH ANESTHESIA TECHNICAL FEE, EACH ADDTL 15 MIN: Performed by: SURGERY

## 2017-07-01 PROCEDURE — 83690 ASSAY OF LIPASE: CPT | Performed by: FAMILY MEDICINE

## 2017-07-01 PROCEDURE — 71000014 ZZH RECOVERY PHASE 1 LEVEL 2 FIRST HR: Performed by: SURGERY

## 2017-07-01 PROCEDURE — 36000056 ZZH SURGERY LEVEL 3 1ST 30 MIN: Performed by: SURGERY

## 2017-07-01 PROCEDURE — 25000128 H RX IP 250 OP 636: Performed by: NURSE ANESTHETIST, CERTIFIED REGISTERED

## 2017-07-01 PROCEDURE — 25000128 H RX IP 250 OP 636: Performed by: SURGERY

## 2017-07-01 PROCEDURE — 25000128 H RX IP 250 OP 636: Performed by: FAMILY MEDICINE

## 2017-07-01 PROCEDURE — 96361 HYDRATE IV INFUSION ADD-ON: CPT

## 2017-07-01 PROCEDURE — 36415 COLL VENOUS BLD VENIPUNCTURE: CPT | Performed by: FAMILY MEDICINE

## 2017-07-01 PROCEDURE — 88304 TISSUE EXAM BY PATHOLOGIST: CPT | Mod: 26 | Performed by: SURGERY

## 2017-07-01 PROCEDURE — 85025 COMPLETE CBC W/AUTO DIFF WBC: CPT | Performed by: FAMILY MEDICINE

## 2017-07-01 PROCEDURE — 87086 URINE CULTURE/COLONY COUNT: CPT | Performed by: FAMILY MEDICINE

## 2017-07-01 PROCEDURE — 25000566 ZZH SEVOFLURANE, EA 15 MIN: Performed by: SURGERY

## 2017-07-01 PROCEDURE — 99220 ZZC INITIAL OBSERVATION CARE,LEVL III: CPT | Performed by: FAMILY MEDICINE

## 2017-07-01 PROCEDURE — 99207 ZZC CDG-CODE CATEGORY CHANGED: CPT | Performed by: FAMILY MEDICINE

## 2017-07-01 PROCEDURE — 81001 URINALYSIS AUTO W/SCOPE: CPT | Performed by: FAMILY MEDICINE

## 2017-07-01 PROCEDURE — A9270 NON-COVERED ITEM OR SERVICE: HCPCS | Mod: GY | Performed by: FAMILY MEDICINE

## 2017-07-01 PROCEDURE — 88304 TISSUE EXAM BY PATHOLOGIST: CPT | Performed by: SURGERY

## 2017-07-01 PROCEDURE — 96374 THER/PROPH/DIAG INJ IV PUSH: CPT

## 2017-07-01 PROCEDURE — 80053 COMPREHEN METABOLIC PANEL: CPT | Performed by: FAMILY MEDICINE

## 2017-07-01 PROCEDURE — 25000132 ZZH RX MED GY IP 250 OP 250 PS 637: Mod: GY | Performed by: SURGERY

## 2017-07-01 PROCEDURE — 71000015 ZZH RECOVERY PHASE 1 LEVEL 2 EA ADDTL HR: Performed by: SURGERY

## 2017-07-01 PROCEDURE — 37000008 ZZH ANESTHESIA TECHNICAL FEE, 1ST 30 MIN: Performed by: SURGERY

## 2017-07-01 PROCEDURE — A9270 NON-COVERED ITEM OR SERVICE: HCPCS | Mod: GY | Performed by: SURGERY

## 2017-07-01 PROCEDURE — 25000132 ZZH RX MED GY IP 250 OP 250 PS 637: Mod: GY | Performed by: FAMILY MEDICINE

## 2017-07-01 PROCEDURE — 96375 TX/PRO/DX INJ NEW DRUG ADDON: CPT

## 2017-07-01 PROCEDURE — 25800025 ZZH RX 258: Performed by: FAMILY MEDICINE

## 2017-07-01 PROCEDURE — 87088 URINE BACTERIA CULTURE: CPT | Performed by: FAMILY MEDICINE

## 2017-07-01 PROCEDURE — 71020 XR CHEST 2 VW: CPT

## 2017-07-01 PROCEDURE — 27110028 ZZH OR GENERAL SUPPLY NON-STERILE: Performed by: SURGERY

## 2017-07-01 RX ORDER — ONDANSETRON 2 MG/ML
INJECTION INTRAMUSCULAR; INTRAVENOUS PRN
Status: DISCONTINUED | OUTPATIENT
Start: 2017-07-01 | End: 2017-07-01

## 2017-07-01 RX ORDER — PROPOFOL 10 MG/ML
INJECTION, EMULSION INTRAVENOUS PRN
Status: DISCONTINUED | OUTPATIENT
Start: 2017-07-01 | End: 2017-07-01

## 2017-07-01 RX ORDER — HYDROCODONE BITARTRATE AND ACETAMINOPHEN 5; 325 MG/1; MG/1
1 TABLET ORAL EVERY 6 HOURS PRN
Status: DISCONTINUED | OUTPATIENT
Start: 2017-07-01 | End: 2017-07-01

## 2017-07-01 RX ORDER — ERTAPENEM 1 G/1
1 INJECTION, POWDER, LYOPHILIZED, FOR SOLUTION INTRAMUSCULAR; INTRAVENOUS ONCE
Status: COMPLETED | OUTPATIENT
Start: 2017-07-01 | End: 2017-07-01

## 2017-07-01 RX ORDER — ASPIRIN 81 MG/1
81 TABLET, CHEWABLE ORAL DAILY
Status: DISCONTINUED | OUTPATIENT
Start: 2017-07-01 | End: 2017-07-02 | Stop reason: HOSPADM

## 2017-07-01 RX ORDER — AMLODIPINE BESYLATE 10 MG/1
10 TABLET ORAL DAILY
Status: DISCONTINUED | OUTPATIENT
Start: 2017-07-01 | End: 2017-07-02 | Stop reason: HOSPADM

## 2017-07-01 RX ORDER — HYDROMORPHONE HYDROCHLORIDE 1 MG/ML
.3-.5 INJECTION, SOLUTION INTRAMUSCULAR; INTRAVENOUS; SUBCUTANEOUS EVERY 5 MIN PRN
Status: DISCONTINUED | OUTPATIENT
Start: 2017-07-01 | End: 2017-07-01 | Stop reason: HOSPADM

## 2017-07-01 RX ORDER — SODIUM CHLORIDE, SODIUM LACTATE, POTASSIUM CHLORIDE, CALCIUM CHLORIDE 600; 310; 30; 20 MG/100ML; MG/100ML; MG/100ML; MG/100ML
INJECTION, SOLUTION INTRAVENOUS CONTINUOUS PRN
Status: DISCONTINUED | OUTPATIENT
Start: 2017-07-01 | End: 2017-07-01

## 2017-07-01 RX ORDER — EZETIMIBE 10 MG/1
10 TABLET ORAL DAILY
Status: DISCONTINUED | OUTPATIENT
Start: 2017-07-01 | End: 2017-07-02 | Stop reason: HOSPADM

## 2017-07-01 RX ORDER — IPRATROPIUM BROMIDE AND ALBUTEROL SULFATE 2.5; .5 MG/3ML; MG/3ML
1 SOLUTION RESPIRATORY (INHALATION) EVERY 6 HOURS PRN
Status: DISCONTINUED | OUTPATIENT
Start: 2017-07-01 | End: 2017-07-02 | Stop reason: HOSPADM

## 2017-07-01 RX ORDER — POTASSIUM CHLORIDE 1500 MG/1
20 TABLET, EXTENDED RELEASE ORAL DAILY
Status: DISCONTINUED | OUTPATIENT
Start: 2017-07-01 | End: 2017-07-02 | Stop reason: HOSPADM

## 2017-07-01 RX ORDER — HYDROCODONE BITARTRATE AND ACETAMINOPHEN 5; 325 MG/1; MG/1
1-2 TABLET ORAL EVERY 4 HOURS PRN
Status: DISCONTINUED | OUTPATIENT
Start: 2017-07-01 | End: 2017-07-02 | Stop reason: HOSPADM

## 2017-07-01 RX ORDER — FENTANYL CITRATE 50 UG/ML
25-50 INJECTION, SOLUTION INTRAMUSCULAR; INTRAVENOUS
Status: DISCONTINUED | OUTPATIENT
Start: 2017-07-01 | End: 2017-07-01 | Stop reason: HOSPADM

## 2017-07-01 RX ORDER — ONDANSETRON 4 MG/1
4 TABLET, ORALLY DISINTEGRATING ORAL EVERY 30 MIN PRN
Status: DISCONTINUED | OUTPATIENT
Start: 2017-07-01 | End: 2017-07-01 | Stop reason: HOSPADM

## 2017-07-01 RX ORDER — TORSEMIDE 5 MG/1
5 TABLET ORAL DAILY
Status: DISCONTINUED | OUTPATIENT
Start: 2017-07-01 | End: 2017-07-02 | Stop reason: HOSPADM

## 2017-07-01 RX ORDER — NALOXONE HYDROCHLORIDE 0.4 MG/ML
.1-.4 INJECTION, SOLUTION INTRAMUSCULAR; INTRAVENOUS; SUBCUTANEOUS
Status: DISCONTINUED | OUTPATIENT
Start: 2017-07-01 | End: 2017-07-02 | Stop reason: HOSPADM

## 2017-07-01 RX ORDER — ONDANSETRON 2 MG/ML
4 INJECTION INTRAMUSCULAR; INTRAVENOUS EVERY 6 HOURS PRN
Status: DISCONTINUED | OUTPATIENT
Start: 2017-07-01 | End: 2017-07-02 | Stop reason: HOSPADM

## 2017-07-01 RX ORDER — ONDANSETRON 4 MG/1
4 TABLET, ORALLY DISINTEGRATING ORAL EVERY 6 HOURS PRN
Status: DISCONTINUED | OUTPATIENT
Start: 2017-07-01 | End: 2017-07-02 | Stop reason: HOSPADM

## 2017-07-01 RX ORDER — KETOROLAC TROMETHAMINE 30 MG/ML
15 INJECTION, SOLUTION INTRAMUSCULAR; INTRAVENOUS
Status: DISCONTINUED | OUTPATIENT
Start: 2017-07-01 | End: 2017-07-01 | Stop reason: HOSPADM

## 2017-07-01 RX ORDER — ALBUTEROL SULFATE 90 UG/1
1-2 AEROSOL, METERED RESPIRATORY (INHALATION) EVERY 4 HOURS
Status: DISCONTINUED | OUTPATIENT
Start: 2017-07-01 | End: 2017-07-02 | Stop reason: HOSPADM

## 2017-07-01 RX ORDER — LIDOCAINE HYDROCHLORIDE 10 MG/ML
INJECTION, SOLUTION INFILTRATION; PERINEURAL PRN
Status: DISCONTINUED | OUTPATIENT
Start: 2017-07-01 | End: 2017-07-01

## 2017-07-01 RX ORDER — MEPERIDINE HYDROCHLORIDE 25 MG/ML
12.5 INJECTION INTRAMUSCULAR; INTRAVENOUS; SUBCUTANEOUS EVERY 5 MIN PRN
Status: DISCONTINUED | OUTPATIENT
Start: 2017-07-01 | End: 2017-07-01 | Stop reason: HOSPADM

## 2017-07-01 RX ORDER — SODIUM CHLORIDE, SODIUM LACTATE, POTASSIUM CHLORIDE, CALCIUM CHLORIDE 600; 310; 30; 20 MG/100ML; MG/100ML; MG/100ML; MG/100ML
INJECTION, SOLUTION INTRAVENOUS CONTINUOUS
Status: DISCONTINUED | OUTPATIENT
Start: 2017-07-01 | End: 2017-07-01 | Stop reason: HOSPADM

## 2017-07-01 RX ORDER — HYDROCODONE BITARTRATE AND ACETAMINOPHEN 5; 325 MG/1; MG/1
1-2 TABLET ORAL EVERY 4 HOURS PRN
Qty: 30 TABLET | Refills: 0 | Status: SHIPPED | OUTPATIENT
Start: 2017-07-01 | End: 2017-07-06

## 2017-07-01 RX ORDER — DIPHENHYDRAMINE HYDROCHLORIDE 50 MG/ML
25 INJECTION INTRAMUSCULAR; INTRAVENOUS ONCE
Status: COMPLETED | OUTPATIENT
Start: 2017-07-01 | End: 2017-07-01

## 2017-07-01 RX ORDER — ONDANSETRON 2 MG/ML
4 INJECTION INTRAMUSCULAR; INTRAVENOUS EVERY 30 MIN PRN
Status: DISCONTINUED | OUTPATIENT
Start: 2017-07-01 | End: 2017-07-01 | Stop reason: HOSPADM

## 2017-07-01 RX ORDER — ROPIVACAINE HYDROCHLORIDE 5 MG/ML
INJECTION, SOLUTION EPIDURAL; INFILTRATION; PERINEURAL PRN
Status: DISCONTINUED | OUTPATIENT
Start: 2017-07-01 | End: 2017-07-01 | Stop reason: HOSPADM

## 2017-07-01 RX ORDER — EPHEDRINE SULFATE 50 MG/ML
INJECTION, SOLUTION INTRAVENOUS PRN
Status: DISCONTINUED | OUTPATIENT
Start: 2017-07-01 | End: 2017-07-01

## 2017-07-01 RX ORDER — DEXAMETHASONE SODIUM PHOSPHATE 4 MG/ML
INJECTION, SOLUTION INTRA-ARTICULAR; INTRALESIONAL; INTRAMUSCULAR; INTRAVENOUS; SOFT TISSUE PRN
Status: DISCONTINUED | OUTPATIENT
Start: 2017-07-01 | End: 2017-07-01

## 2017-07-01 RX ORDER — DEXTROSE MONOHYDRATE, SODIUM CHLORIDE, AND POTASSIUM CHLORIDE 50; 1.49; 4.5 G/1000ML; G/1000ML; G/1000ML
INJECTION, SOLUTION INTRAVENOUS CONTINUOUS
Status: DISCONTINUED | OUTPATIENT
Start: 2017-07-01 | End: 2017-07-01

## 2017-07-01 RX ORDER — FENTANYL CITRATE 50 UG/ML
INJECTION, SOLUTION INTRAMUSCULAR; INTRAVENOUS PRN
Status: DISCONTINUED | OUTPATIENT
Start: 2017-07-01 | End: 2017-07-01

## 2017-07-01 RX ADMIN — POTASSIUM CHLORIDE, DEXTROSE MONOHYDRATE AND SODIUM CHLORIDE: 150; 5; 450 INJECTION, SOLUTION INTRAVENOUS at 02:40

## 2017-07-01 RX ADMIN — SODIUM CHLORIDE, POTASSIUM CHLORIDE, SODIUM LACTATE AND CALCIUM CHLORIDE: 600; 310; 30; 20 INJECTION, SOLUTION INTRAVENOUS at 12:55

## 2017-07-01 RX ADMIN — EPHEDRINE SULFATE 10 MG: 50 INJECTION, SOLUTION INTRAVENOUS at 11:52

## 2017-07-01 RX ADMIN — ASPIRIN 81 MG 81 MG: 81 TABLET ORAL at 09:35

## 2017-07-01 RX ADMIN — SODIUM CHLORIDE 1000 ML: 9 INJECTION, SOLUTION INTRAVENOUS at 00:09

## 2017-07-01 RX ADMIN — PROPOFOL 100 MG: 10 INJECTION, EMULSION INTRAVENOUS at 11:25

## 2017-07-01 RX ADMIN — HYDROCODONE BITARTRATE AND ACETAMINOPHEN 2 TABLET: 5; 325 TABLET ORAL at 19:16

## 2017-07-01 RX ADMIN — FENTANYL CITRATE 100 MCG: 50 INJECTION, SOLUTION INTRAMUSCULAR; INTRAVENOUS at 11:24

## 2017-07-01 RX ADMIN — ROCURONIUM BROMIDE 30 MG: 10 INJECTION INTRAVENOUS at 11:25

## 2017-07-01 RX ADMIN — LIDOCAINE HYDROCHLORIDE 50 MG: 10 INJECTION, SOLUTION INFILTRATION; PERINEURAL at 12:49

## 2017-07-01 RX ADMIN — DIPHENHYDRAMINE HYDROCHLORIDE 25 MG: 50 INJECTION, SOLUTION INTRAMUSCULAR; INTRAVENOUS at 09:35

## 2017-07-01 RX ADMIN — EPHEDRINE SULFATE 10 MG: 50 INJECTION, SOLUTION INTRAVENOUS at 11:39

## 2017-07-01 RX ADMIN — AMLODIPINE BESYLATE 10 MG: 10 TABLET ORAL at 09:35

## 2017-07-01 RX ADMIN — LIDOCAINE HYDROCHLORIDE 50 MG: 10 INJECTION, SOLUTION INFILTRATION; PERINEURAL at 11:25

## 2017-07-01 RX ADMIN — FLUOXETINE 20 MG: 20 CAPSULE ORAL at 09:35

## 2017-07-01 RX ADMIN — ALBUTEROL SULFATE 2 PUFF: 90 AEROSOL, METERED RESPIRATORY (INHALATION) at 16:05

## 2017-07-01 RX ADMIN — FENTANYL CITRATE 100 MCG: 50 INJECTION, SOLUTION INTRAMUSCULAR; INTRAVENOUS at 12:00

## 2017-07-01 RX ADMIN — TORSEMIDE 5 MG: 5 TABLET ORAL at 09:34

## 2017-07-01 RX ADMIN — MIDAZOLAM HYDROCHLORIDE 2 MG: 1 INJECTION, SOLUTION INTRAMUSCULAR; INTRAVENOUS at 11:18

## 2017-07-01 RX ADMIN — EPHEDRINE SULFATE 10 MG: 50 INJECTION, SOLUTION INTRAVENOUS at 11:36

## 2017-07-01 RX ADMIN — ALBUTEROL SULFATE 2 PUFF: 90 AEROSOL, METERED RESPIRATORY (INHALATION) at 09:34

## 2017-07-01 RX ADMIN — EZETIMIBE 10 MG: 10 TABLET ORAL at 09:36

## 2017-07-01 RX ADMIN — ONDANSETRON 4 MG: 2 INJECTION INTRAMUSCULAR; INTRAVENOUS at 12:42

## 2017-07-01 RX ADMIN — POTASSIUM CHLORIDE 20 MEQ: 1500 TABLET, EXTENDED RELEASE ORAL at 09:35

## 2017-07-01 RX ADMIN — FENTANYL CITRATE 150 MCG: 50 INJECTION, SOLUTION INTRAMUSCULAR; INTRAVENOUS at 11:45

## 2017-07-01 RX ADMIN — DEXAMETHASONE SODIUM PHOSPHATE 4 MG: 4 INJECTION, SOLUTION INTRA-ARTICULAR; INTRALESIONAL; INTRAMUSCULAR; INTRAVENOUS; SOFT TISSUE at 11:25

## 2017-07-01 RX ADMIN — SODIUM CHLORIDE, POTASSIUM CHLORIDE, SODIUM LACTATE AND CALCIUM CHLORIDE: 600; 310; 30; 20 INJECTION, SOLUTION INTRAVENOUS at 11:18

## 2017-07-01 RX ADMIN — HYDROCODONE BITARTRATE AND ACETAMINOPHEN 1 TABLET: 5; 325 TABLET ORAL at 16:03

## 2017-07-01 RX ADMIN — ALBUTEROL SULFATE 2 PUFF: 90 AEROSOL, METERED RESPIRATORY (INHALATION) at 20:34

## 2017-07-01 RX ADMIN — ERTAPENEM SODIUM 1 G: 1 INJECTION, POWDER, LYOPHILIZED, FOR SOLUTION INTRAMUSCULAR; INTRAVENOUS at 09:36

## 2017-07-01 RX ADMIN — ROPIVACAINE HYDROCHLORIDE 30 ML: 5 INJECTION, SOLUTION EPIDURAL; INFILTRATION; PERINEURAL at 12:38

## 2017-07-01 RX ADMIN — HYDROCODONE BITARTRATE AND ACETAMINOPHEN 1 TABLET: 5; 325 TABLET ORAL at 15:36

## 2017-07-01 ASSESSMENT — COPD QUESTIONNAIRES
CAT_SEVERITY: MODERATE
COPD: 1

## 2017-07-01 ASSESSMENT — LIFESTYLE VARIABLES: TOBACCO_USE: 1

## 2017-07-01 NOTE — ANESTHESIA CARE TRANSFER NOTE
Patient: Aminata Gale    Procedure(s):  Laparoscopic Cholecystectomy - Wound Class: II-Clean Contaminated    Diagnosis: abd pain  Diagnosis Additional Information: No value filed.    Anesthesia Type:   General, ETT     Note:  Airway :Face Mask and Oral Airway  Patient transferred to:PACU  Comments: Patient's VSS. Spontaneous respirations. IV patent. Report to RASTA Linn.      Vitals: (Last set prior to Anesthesia Care Transfer)    CRNA VITALS  7/1/2017 1247 - 7/1/2017 1329      7/1/2017             Pulse: 86    SpO2: 97 %    Resp Rate (observed): (!)  4                Electronically Signed By: PEDRO Alva CRNA  July 1, 2017  1:29 PM

## 2017-07-01 NOTE — DISCHARGE INSTRUCTIONS
's post op instructions:    1) Wash wound with water and antibacterial soap  2) No driving for 3 days or until off the narcotics  3) 20 # weight restrictions for 6 weeks   4) Avoid constipation by using Colace 1 tablet twice daily   5) RETURN TO [facility] in 7-10 days  6) Use 400 mg of Ibuprofen taken along 500 mg of Tylenol orally every 6 hours for pain as an alternative to the narcotics for pain control. DO NOT EXCEED THE RECOMMENDED DAILY DOSE OF EITHER MEDICATION  7) You might experience bruising around the surgical incisions and/or abdominal wall. Do not be concerned, this will go away on its own  8) Ice packs to the surgical site every 1 hours for 15-20 min at a time  9) It is OK to go up and down the stairs and to take a bath or a shower,whichever you prefer                      Cholecystitis (Confirmed)    Your abdominal pain is due to an inflammation and possible infection in the gallbladder. This is called cholecystitis.  The gallbladder is a small sac under the liver that stores and releases bile. Bile is a fluid that helps you digest fat. Eating fatty food stimulates the gallbladder to contract, and release the bile.  A gallstone may form in this sac. Although most people do not have symptoms, when the stone moves and block the passage of bile out of the bladder, it can cause pain and even an infection. Bile sludge without a stone can also cause cholecystitis.  A number of things increase the risk of developing gallstones:    Gender. Women are more likely to get them.    Obesity    Increasing age    Losing or gaining weight quickly    High calorie diet    Pregnancy    Hormone therapy    Diabetes  The most common symptoms are:    Abdominal pain, cramping, and aching    Nausea or vomiting    Fever  Many illnesses can cause these symptoms. This pain usually starts in the upper right side of your abdomen. Sometimes it can radiate to your right shoulder, back, and arm. It usually  starts suddenly, becomes more intense quickly, and then gradually decreases and disappears over a couple of hours. Older people and people with diabetes may have difficulty showing where the pain is exactly.  Home care    Rest in bed and follow a clear liquid diet until the pain, nausea, and vomiting have gone away. Antibiotics and other medicine may be prescribed. Take this exactly as directed.    You may use ibuprofen or naproxen to control pain, unless another medicine was prescribed.    You can take acetaminophen or ibuprofen for pain, unless you were given a different pain medicine to use. Note: If you have chronic liver or kidney disease or ever had a stomach ulcer or GI bleeding or are taking blood thinner medications, talk with your healthcare provider before using these medicines.    Fat in your diet makes the gallbladder contract and may cause increased pain. So don't have any fat over the next 2 days and follow a low-fat diet after that.  Follow-up care  An infection in the gallbladder is a serious problem and must be watched carefully. See your doctor in the next 1 to 2 days, or as directed. Once cholecystitis has occurred, you usually will need to have your gallbladder removed to keep the condition from happening again. You can discuss this at your follow-up visit.  When to seek medical advice  Call your healthcare provider if any of the following occur:    Repeated vomiting    Swelling of the abdomen    Pain that lasts more than 6 hours    Fever of 100.4 F (38 C) or higher, or as directed by your healthcare provider    Weakness, dizziness, or fainting    Dark urine or light-colored stools    Yellow color of the skin or eyes    Chest, arm, back, neck, or jaw pain  Date Last Reviewed: 8/23/2015 2000-2017 [employer]. [address] [place] [address], [place] [address]. All rights reserved. This information is not intended as a substitute for professional medical care. Always follow your healthcare  professional's instructions.      Rest, fluids, you may use Tylenol or ibuprofen for pain.  If pain recurs or is worse please return to the emergency department.  Please be very careful with her diet as we have discussed for the potential to make this much worse.  Please call the number for Gen. surgery to arrange for in office consultation and to schedule surgery.

## 2017-07-01 NOTE — BRIEF OP NOTE
Pre operative diagnosis : cholecystits with cholelithiasis    Post operative diagnosis : same    Procedure : laparoscopic cholecystectomy    Surgeon : Cheng                                                Assistant : Marilyn     Specimen: gallbladder and its contents    Drains: none    Anesthesia: GENERAL ENDOTRACHEAL ANESTHESIA     EBL : 10 ml    Classification : CLEAN-CONTAMINATED     Patient tolerated the procedure well, no intraoperative complications.

## 2017-07-01 NOTE — ANESTHESIA PREPROCEDURE EVALUATION
Anesthesia Evaluation     . Pt has had prior anesthetic. Type: General and MAC    No history of anesthetic complications          ROS/MED HX    ENT/Pulmonary:     (+)allergic rhinitis, tobacco use, Current use 5/day packs/day  Moderate Persistent asthma Treatment: Inhaler prn and Inhaler daily,  moderate COPD, , . .    Neurologic: Comment: 10/16/13 Brain MRI for hx SLE and memory loss:  Periventricular white matter changes that are stable from the comparison study.  2. Otherwise, normal brain MRI.      Cardiovascular: Comment: Hx pericarditis    (+) Dyslipidemia, hypertension----. : . . . :. . Previous cardiac testing Echodate:4/27/17results:Interpretation Summary     Left ventricular systolic function is normal. The visual ejection fraction is  estimated at 60-65%. There is mild concentric left ventricular hypertrophy.  There is borderline right ventricular hypertrophy but the right ventricular  systolic function is normal. Right ventricular systolic pressure could not be  approximated due to inadequate tricuspid regurgitation.  The study was technically limited.  _____________________________________________________________________________  __        Left Ventricle  The left ventricle is normal in size. There is mild concentric left  ventricular hypertrophy. Left ventricular systolic function is normal. The  visual ejection fraction is estimated at 60-65%. E by E prime ratio is between  8 and 15, which is indeterminate for assessment of left ventricular filling  pressures. No regional wall motion abnormalities noted.     Right Ventricle  The right ventricle is grossly normal size. There is borderline right  ventricular hypertrophy. The right ventricular systolic function is normal.     Atria  Normal left atrial size. Right atrial size is normal. There is no atrial shunt  seen. Interatrial septal thickening is noted.     Mitral Valve  There is moderate mitral annular calcification. There is trace  mitral  regurgitation.        Tricuspid Valve  There is trace tricuspid regurgitation. Right ventricular systolic pressure  could not be approximated due to inadequate tricuspid regurgitation. Normal  IVC (1.5-2.5cm) with >50% respiratory collapse; right atrial pressure is  estimated at 5-10mmHg.     Aortic Valve  No aortic regurgitation is present. No hemodynamically significant valvular  aortic stenosis.     Pulmonic Valve  There is no pulmonic valvular stenosis.     Vessels  The aortic root is normal size.     Pericardium  The pericardium appears normal.        Rhythm  The rhythm was normal sinus.date: results:ECG reviewed date:6/30/17 results:Sinus  Rhythm   -Right atrial enlargementCath date: 4/13/17 results:LEFT HEART CATHETERIZATION      HISTORY: This is a 66-year-old woman who has known significant COPD.  Pulmonary function tests four years ago did show FEV1 of 1.1 L and a  DLCO 47%. Unfortunately the patient has continued to smoke cigarettes  since that time. Patient presents with atypical chest pain and  increasing shortness of breath with exertion. Stress tests have been  equivocal; they showed possible apical infarct with or without  possible ischemia. Heart catheterization is requested.     RESULTS:    Left main artery: The left coronary is normal.     Left anterior descending: The LAD is an essentially normal vessel. The  mid LAD has at most 10-20% narrowing. 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    The first diagonal vessel is a small vessel with mild narrowing near  its origin of 35% or less. The other diagonals are quite small.    Left circumflex: The left circumflex is a large but nondominant  system. It gives off a very high OM1 which could also be called a  ramus. That OM1 is a large vessel; it is normal, and interestingly it  does approach the apex. The remainder of the left circumflex system is  normal.    Right coronary artery: The right coronary artery is a dominant  vessel.  It is a large caliber vessel with essentially no plaque. The PDA  vessel and one of the posterolateral branches does approach the apex  of the heart. There is no significant plaque in any portion of this  right coronary artery.    Left ventriculogram: Interesting the left ventriculogram does show  that the apex of the heart is hypokinetic with a very small area of  akinesis. The overall ejection fraction is still normal at 55-60%.  There is no mitral valve insufficiency. The ascending aorta is  borderline effaced. The left ventricular end-diastolic pressure is  elevated at 28.     CONCLUSION:   1. There is no significant coronary artery disease.   2. Interesting the apex of the heart, indeed, is hypokinetic as  identified by the previous test. There is no actual lesion anywhere,  but as I alluded to, the coronary anatomy only barely reaches the apex  from the LAD, the ramus and the PDA.  3. The ejection fraction is normal, but the LVEDP is elevated. The  patient is presumptively taking hydrochlorothiazide. The chart is a  little unclear on what her last dose was. Given the LVEDP is elevated  and there is some apical wall motion abnormality and she is short of  breath, I am going to start her on Demadex 5 mg a day plus potassium  10 mEq a day. That dose can be escalated as needed, and she can be  followed clinically, perhaps with a BMP and a BNP. The patient was  encouraged to discontinue tobacco products.            METS/Exercise Tolerance:  4 - Raking leaves, gardening   Hematologic:  - neg hematologic  ROS       Musculoskeletal: Comment: SLE vs fibromyalgia        GI/Hepatic:     (+) GERD Symptomatic, hiatal hernia,       Renal/Genitourinary:  - ROS Renal section negative       Endo:  - neg endo ROS       Psychiatric:     (+) psychiatric history anxiety      Infectious Disease:  - neg infectious disease ROS       Malignancy:      - no malignancy   Other:    (+) H/O Chronic Pain,                   Physical  Exam  Normal systems: cardiovascular and pulmonary    Airway   Mallampati: II  TM distance: >3 FB  Neck ROM: full    Dental   (+) upper dentures and missing    Cardiovascular       Pulmonary                     Anesthesia Plan      History & Physical Review  History and physical reviewed and following examination; no interval change.    ASA Status:  3 emergent.    NPO Status:  > 8 hours    Plan for General and ETT with Intravenous and Propofol induction. Maintenance will be Balanced.    PONV prophylaxis:  Ondansetron (or other 5HT-3) and Dexamethasone or Solumedrol       Postoperative Care  Postoperative pain management:  IV analgesics and Oral pain medications.      Consents  Anesthetic plan, risks, benefits and alternatives discussed with:  Patient..                          .

## 2017-07-01 NOTE — CONSULTS
65 y/o female admitted by the hospitalist service due to severe abdominal pain developed after eating meat loaf last night. The pain was associated with nausea, chest pain, and right shoulder pain. Ultrasound done revealed cholelithiasis with thickened GB wall. The patient wants to address this on this hospitalization, as she has a planned trip to [place] next week.    PAST MEDICAL HISTORY, PAST SURGICAL HISTORY, MEDICATIONS and ALLERGIES : all reviewed on EPIC chart    PHYSICAL EXAM :   Temp:  [97.5  F (36.4  C)-98.4  F (36.9  C)] 97.5  F (36.4  C)  Pulse:  [86] 86  Heart Rate:  [83-90] 87  Resp:  [16-20] 18  BP: (129-153)/(61-85) 135/61  SpO2:  [91 %-94 %] 93 %     ABDOMEN : RUQ tenderness (+). Dean's sign is (+). She has a small incisional hernia near the umbilicus. She also has a right paramedian, a midline infraumbilical, and a low c/s scar.  EYES: no jaundice    ASSESSMENT / PLAN  : today we discussed her diagnosis as well as what a laparoscopic cholecystectomy entails including potential risks and complications including but not limited to infection, bile leak, damage to the intestines and / or liver. We also discussed post op restrictions and expectations. The patient will be sent home later on today if she does well.The patient verbalized understanding the information given and she wishes to proceed for which she voluntarily signed the informed consent. PLAN: Laparoscopic cholecystectomy today.

## 2017-07-01 NOTE — OP NOTE
Pre op diagnosis : Cholecystitis with cholelithiasis    Post op diagnosis : same     Procedure : Laparoscopic cholecystectomy    Surgeon: Tami Alexander MD, F.A.C.S.       ! Assistant: Landon Vargas    Findings: 1.Intact biliary tree,liver and bowel                  2. No bile spillage                  3.The assistance of  was requested in view of his expertise with the camera work and for wound closures      Description of procedure :    Under general endotracheal anesthesia and usual prep and scrub, and after having placed the patient on the sequential compressive devices for DVT and administering Invanz 1 g iv as pre op SSI prophylaxis, we proceeded to make an incision at the upper aspect of the umbilicus. The subcutaneous tissue was transected with electrocautery until the anterior abdominal wall fascia was identified and carefully opened sharply. A 10 mm Orbert trocar was introduced under direct vision and pneumoperitoneum was instilled to 15 mm of Hg.. The camera was introduced with the above findings noted. The patient was placed in the reverse Trendelenberg with the left side down, after which we placed an 11 mm trocar in the sub xyphoid area under direct vision, followed by two 5 mm trocars on the right side of the abdomen, also under direct vision. The gallbladder was readily seen and it was held with 2 torcars for retraction. Blunt dissection was done at Calot's triangle until the cystic artery and the cystic duct were both identified and bluntly skeletonized. The cystic artery was doubly ligated with Endoclips and transected. The cystic duct was doubly ligated with Endoclips and transected. The gallbladder was removed from the liver bed using electrocautery and from the abdominal cavity in an Endocatch. The surgical site was inspected and no active bleeding or bile leak was identifed. Documentation pictures were taken.  At this time we proceeded to dessuflate the abdomen and to  approximate the umbilical and sub xyphoid fascia with Vicryl 0 figure of 8 stitches. Eric's fascia was approximated using Vicryl 3-0 interrupted sutures. The skin incisions were approximated with Dermabond.    EBL : 10 ml    Classification : CLEAN-CONTAMINATED     The patient tolerated the procedure well , no immediate intraoperative complications

## 2017-07-01 NOTE — PROGRESS NOTES
[facility] TRANSPORT NOTE  Data:   Reason for Transport:  Gallbladder surgery    Aminata Gale was transported to [facility] via cart at 1115.  Patient was accompanied by Registered Nurse. Equipment used for transport: None. Family was aware of reason for transport: yes    Action:  Report: given to SDS RN    Response:  Patient's condition when transferred off unit was stable.    Teresa Bonilla

## 2017-07-01 NOTE — DISCHARGE SUMMARY
Physician Discharge Summary     Patient ID:  Aminata Gale  [id number]  66 year old  1950    Admit date: 6/30/2017    Discharge date and time: 7/1/2017     Admitting Physician: Nathanael Alexandre MD     Discharge Physician: Tami Alexander MD, F.A.C.S.    Admission Diagnoses: Acute cholecystitis [K81.0]    Discharge Diagnoses: 1. Cholecystitis with cholelithiasis                                          2. COPD                                         3. Asthma                                          4. Emphysema                                          5. Hypertension                                          6. History of S.L.E.  Admission Condition: good    Discharged Condition: improved    Indication for Admission: pain management and remove the gallbladder    Hospital Course: uncomplicated    Consults: none    Significant Diagnostic Studies: radiology: CXR:  and cardiac graphics: ECG:     Treatments: IV hydration, antibiotics: Invanz, analgesia: Dilaudid, respiratory therapy: albuterol/atropine nebulizer and surgery: laparoscopic cholecystectomy    Discharge Exam:  Abdomen is soft and depressible with no distension. Afebrile, vital signs stable , adequate respiratory effort.    Disposition: home    Patient Instructions:   Current Discharge Medication List      CONTINUE these medications which have CHANGED    Details   HYDROcodone-acetaminophen (NORCO) 5-325 MG per tablet Take 1-2 tablets by mouth every 4 hours as needed for pain  Qty: 30 tablet, Refills: 0    Associated Diagnoses: Acute cholecystitis         CONTINUE these medications which have NOT CHANGED    Details   amLODIPine (NORVASC) 10 MG tablet Take 1 tablet (10 mg) by mouth daily For high blood pressure  Qty: 90 tablet, Refills: 4    Associated Diagnoses: HTN, goal below 140/90      tiotropium (SPIRIVA RESPIMAT) 2.5 MCG/ACT inhalation aerosol Inhale 2 puffs into the lungs daily  Qty: 12 g, Refills: 3    Associated Diagnoses:  Moderate persistent asthma without complication; Chronic obstructive pulmonary disease, unspecified COPD type (H)      albuterol (ALBUTEROL) 108 (90 BASE) MCG/ACT inhaler Inhale 1-2 puffs into the lungs every 4 hours To 6 hours as needed for shortness of breath  Qty: 3 Inhaler, Refills: 1    Associated Diagnoses: COPD exacerbation (H)      Ibuprofen (IBU-200 PO) Take  by mouth.      ezetimibe (ZETIA) 10 MG tablet Take 1 tablet (10 mg) by mouth daily  Qty: 90 tablet, Refills: 3    Associated Diagnoses: Mixed hyperlipidemia      FLUoxetine (PROZAC) 20 MG capsule TAKE ONE CAPSULE BY MOUTH DAILY  Qty: 90 capsule, Refills: 0    Associated Diagnoses: Adjustment disorder with depressed mood; Anxiety      torsemide (DEMADEX) 5 MG tablet Take 1 tablet (5 mg) by mouth daily  Qty: 90 tablet, Refills: 1    Associated Diagnoses: Essential hypertension, benign      potassium chloride SA (K-DUR/KLOR-CON M) 20 MEQ CR tablet Take 1 tablet (20 mEq) by mouth daily Take 2 tablets on day one  Qty: 32 tablet, Refills: 3    Comments: New prescription  Associated Diagnoses: Hypokalemia      aspirin 81 MG chewable tablet Take 1 tablet (81 mg) by mouth daily  Qty: 100 tablet, Refills: 3    Associated Diagnoses: Atypical chest pain; Abnormal cardiovascular stress test      ipratropium - albuterol 0.5 mg/2.5 mg/3 mL (DUONEB) 0.5-2.5 (3) MG/3ML nebulization Take 1 vial (3 mLs) by nebulization every 6 hours as needed for shortness of breath / dyspnea or wheezing  Qty: 30 vial, Refills: 1    Associated Diagnoses: Bronchial pneumonia      STATIN NOT PRESCRIBED, INTENTIONAL, 1 each daily Statin not prescribed intentionally due to Intolerance  Qty: 0 each, Refills: 0    Associated Diagnoses: Hyperlipidemia LDL goal <130           Activity: activity as tolerated  Diet: regular diet  Wound Care: as directed    [facility]  in 7-10 days.    Signed:  Tami Alexander  7/1/2017  1:12 PM

## 2017-07-01 NOTE — ED NOTES
Pt presents to the ED with complaints of upper central abdominal pain for a week now, worse last night. Pt has had pain like this before. Pain is worse (on palpation and worse with movement). Pt has been nauseated and has vomited, not currently nauseated. Pt tried aspirin with(out) any relief. Last BM this am, normal. Was off for about 3 days prior, color change and consistency changes. Pt reports increased urinary frequency. NO blood in urine or stool or vomit.

## 2017-07-01 NOTE — ED PROVIDER NOTES
History     Chief Complaint   Patient presents with     Abdominal Pain     HPI  Aminata Gale is a 66 year old female, past medical history significant for SLE, GERD, hypertension, allergic rhinitis, polycythemia, nicotine abuse, hiatal hernia, chronic pain, hypertension, anxiety, COPD, presents the emergency department with concerns of abdominal pain at the insistence of family members.  History is obtained from the patient states that she began with epigastric and right upper quadrant abdominal pain one week prior to presentation.  Fluctuating symptoms but present every day over the past week in conjunction with Alexandr appearing stools 3 days prior to presentation 3 days in a row.  No nausea or vomiting.  Definite heartburn but different than her usual heartburn up into her chest.  Not associated with shortness of breath and lightheadedness palpitations.  Diarrhea this morning ×2 episodes of watery with undigested food.  The patient states that she has had episodes like this at least once or twice per week over the past 2 years and has not seen her primary care provider or have medical evaluation for it.  In the past she has taken aspirin for this when it has occurred and did so last night but it did not help.  The patient states adamantly that she does not drink alcohol.    Active Ambulatory Problems     Diagnosis Date Noted     Systemic lupus erythematosus (H) 12/07/2005     Esophageal reflux 04/03/2006     Essential hypertension, benign 10/17/2006     Allergic rhinitis 10/17/2006     Polycythemia, secondary 10/17/2006     Female stress incontinence 10/17/2006     Other nonspecific abnormal result of function study of brain and central nervous system 11/14/2006     Smoker 06/24/2008     Hyperlipidemia LDL goal <130 09/22/2010     Osteoporosis 10/18/2010     Hiatal hernia 05/04/2011     Advanced directives, counseling/discussion 10/13/2011     Acne 02/25/2013     SK (seborrheic keratosis) 02/25/2013     Lentigo  2013     Angioma 2013     Moderate persistent asthma 2013     Chronic pain 2013     HTN, goal below 140/90 2013     Anxiety 2015     Refused influenza vaccine 2016     Grief reaction 2016     Centrilobular emphysema (H) 02/10/2017     Resolved Ambulatory Problems     Diagnosis Date Noted     Chronic airway obstruction (H) 2005     Hyperlipidemia 10/12/2006     GERD (gastroesophageal reflux disease) 2013     Past Medical History:   Diagnosis Date     Acute pericarditis, unspecified      Chronic airway obstruction 2005     Dysuria      Esophageal reflux      GERD (gastroesophageal reflux disease) 2013     Hiatal hernia 2011     HTN, goal below 140/90 2013     Hyperlipidemia LDL goal <130 2010     Lupus (H)      Osteoporosis 10/18/2010     Pure hypercholesterolemia      SECONDARY POLYCYTHEMIA 10/17/2006     Smoker 2008     Systemic lupus erythematosus 2005     Tobacco use disorder      Unspecified essential hypertension      Past Surgical History:   Procedure Laterality Date     C APPENDECTOMY       C REPAIR GUM      Cathy Dontal surgery     C/SECTION, LOW TRANSVERSE      , Low Transverse     COLONOSCOPY  2009     D & C      X 5     HC RECONSTR NOSE+DAVE SEPTAL REPAIR       HYSTERECTOMY, BRI      Hx of dysplasia     SINUS SURGERY      Reconstruction     Social History     Social History     Marital status:      Spouse name: Javier Gale     Number of children: 2     Years of education: 12     Occupational History      Disabled     Social History Main Topics     Smoking status: Current Every Day Smoker     Packs/day: 0.25     Years: 42.00     Types: Cigarettes     Smokeless tobacco: Never Used      Comment: has not started the Chantix yet- 2016     Alcohol use 0.0 oz/week     0 Standard drinks or equivalent per week      Comment: Occ. wine     Drug use: No     Sexual activity: Yes     Partners: Male      Birth control/ protection: Surgical      Comment: Hyst      Other Topics Concern     Parent/Sibling W/ Cabg, Mi Or Angioplasty Before 65f 55m? No     Social History Narrative    The patient has a 50+ pk yr tobacco hx.  She has ongoing active use of 1/2 ppd.  Alcohol use is <1 alcoholic drinks per week.  She denies use of recreational drugs.          She is on disability.  Has been on disability since approximately 1989.  Previously worked in [employer].         The patient is .  Has 2 children.        Hot Tube Exposure: NO    Recent Travel: NO     Hx of incarceration:  NO    Bird Exposure:   NO    Animal Exposure:  NO    Inhalation Exposure:  Possible, father worked with asbestos             Family History   Problem Relation Age of Onset     DIABETES Mother      GASTROINTESTINAL DISEASE Mother      Gallbladder disease     HEART DISEASE Mother      CANCER Father      lung     Alcohol/Drug Father      Allergies Father      HEART DISEASE Father      GASTROINTESTINAL DISEASE Father      Liver disease     HEART DISEASE Maternal Grandfather      Arthritis Sister      OSTEOPOROSIS Sister      Thyroid Disease Sister      Allergies Son      Alcohol/Drug Sister      Alcohol/Drug Sister      Current Facility-Administered Medications   Medication     0.9% sodium chloride BOLUS    Followed by     0.9% sodium chloride infusion     Current Outpatient Prescriptions   Medication     ezetimibe (ZETIA) 10 MG tablet     FLUoxetine (PROZAC) 20 MG capsule     torsemide (DEMADEX) 5 MG tablet     potassium chloride SA (K-DUR/KLOR-CON M) 20 MEQ CR tablet     aspirin 81 MG chewable tablet     amLODIPine (NORVASC) 10 MG tablet     HYDROcodone-acetaminophen (NORCO) 5-325 MG per tablet     tiotropium (SPIRIVA RESPIMAT) 2.5 MCG/ACT inhalation aerosol     albuterol (ALBUTEROL) 108 (90 BASE) MCG/ACT inhaler     ipratropium - albuterol 0.5 mg/2.5 mg/3 mL (DUONEB) 0.5-2.5 (3) MG/3ML nebulization     STATIN NOT PRESCRIBED,  INTENTIONAL,     Ibuprofen (IBU-200 PO)        Allergies   Allergen Reactions     Amoxicillin Difficulty breathing and Rash     Ampicillin Difficulty breathing and Rash     Cipro [Ciprofloxacin] Difficulty breathing and Rash     Keflex [Cephalexin Monohydrate] Difficulty breathing and Rash     Penicillin [Penicillins] Difficulty breathing and Rash     Sulfa Drugs Difficulty breathing and Rash     Tetracycline Difficulty breathing and Rash     Biaxin [Clarithromycin] Rash     Ceclor [Cefaclor] Hives and Swelling     Advair Diskus Hives     Hives on face,neck and chest     Egg White      Fish Shortness Of Breath     Mustard [Allyl Isothiocyanate]      Throat swelling       I have reviewed the Medications, Allergies, Past Medical and Surgical History, and Social History in the Epic system.         Review of Systems   All other systems reviewed and are negative.      Physical Exam   BP: 153/83  Heart Rate: 90  Temp: 98.4  F (36.9  C)  Resp: 16  Height: 152.4 cm (5')  Weight: 66.2 kg (146 lb)  SpO2: 94 %  Physical Exam   Constitutional: She is oriented to person, place, and time. She appears well-developed and well-nourished.   Alert, cooperative, does not appear ill or toxic.  Appears comfortable.   HENT:   Head: Normocephalic and atraumatic.   Right Ear: External ear normal.   Left Ear: External ear normal.   Nose: Nose normal.   Mouth/Throat: Oropharynx is clear and moist.   Eyes: Conjunctivae and EOM are normal. Pupils are equal, round, and reactive to light.   Neck: Normal range of motion. Neck supple.   Cardiovascular: Normal rate, regular rhythm, normal heart sounds and intact distal pulses.    Pulmonary/Chest: Effort normal and breath sounds normal.   Abdominal: Soft. Bowel sounds are normal.   Musculoskeletal: Normal range of motion.   Neurological: She is alert and oriented to person, place, and time.   Skin: Skin is warm and dry.   Psychiatric: She has a normal mood and affect. Her behavior is normal.    Nursing note and vitals reviewed.      ED Course     ED Course     Procedures           Results for orders placed or performed during the hospital encounter of 06/30/17   US Abdomen Limited    Narrative    US ABDOMEN LIMITED  7/1/2017 12:26 AM     HISTORY: Epigastric and right upper quadrant pain.    COMPARISON: CT of the abdomen and pelvis performed 10/14/2009.    FINDINGS: The liver is unremarkable. No evidence for fatty  infiltration. No focal hepatic lesions. A large shadowing nonmobile  gallstone is noted within the gallbladder and measures 2.1 cm. The  gallbladder wall is borderline thickened at 0.3 cm. Positive  sonographic Dean's sign. Findings are suspicious for acute  cholecystitis. No pericholecystic fluid is identified. Scattered  echogenic foci in the gallbladder wall suggests adenomyomatosis. No  intra or extrahepatic bile duct dilatation. Common hepatic duct is  normal in diameter. The common duct was not visualized in its  entirety. Limited evaluation of the right kidney demonstrates a  nonobstructing stone in the lower pole measuring 0.6 cm. Pancreas is  partially obscured by overlying bowel gas, but appears normal where  seen. The abdominal aorta and IVC are of normal caliber where  visualized.      Impression    IMPRESSION:  1. Findings are suspicious for acute cholecystitis.  2. Nonobstructing stone in the lower pole of the right kidney measures  0.6 cm.         Critical Care time:  none               Labs Ordered and Resulted from Time of ED Arrival Up to the Time of Departure from the ED   CBC WITH PLATELETS DIFFERENTIAL - Abnormal; Notable for the following:        Result Value    WBC 13.1 (*)     RBC Count 5.55 (*)     Hemoglobin 16.5 (*)     Hematocrit 49.5 (*)     All other components within normal limits   COMPREHENSIVE METABOLIC PANEL - Abnormal; Notable for the following:      (*)      (*)     All other components within normal limits   CRP INFLAMMATION   LIPASE    TROPONIN I   UA MACROSCOPIC WITH REFLEX TO MICRO AND CULTURE   URINE CULTURE AEROBIC BACTERIAL     1:03 AM  The patient reports her pain is quite mild and she declines pain medication.  We reviewed her ultrasound and lab diagnostic.  I recommended admission to hospital for nothing by mouth status, consultation and hospitalist with general surgery and probable laparoscopic cholecystectomy while admitted.  I think at a much less desirable option to consider home disposition.  The patient does not want to be admitted to hospital.  She states that she is tolerated symptoms for 2 years, admittedly this is the worst episode in 2 years, but she wants to go home.  We discussed at length appropriate diet and the complete avoidance of animal protein in particular.  All questions were answered and the patient is dispositioned to home at her request.  Contact information for Gen. surgery was given.  All questions were answered.    Assessments & Plan (with Medical Decision Making)   Assessment and plan with medical decision making at the time stamp above.      Disclaimer: This note consists of symbols derived from keyboarding, dictation and/or voice recognition software. As a result, there may be errors in the script that have gone undetected. Please consider this when interpreting information found in this chart.    Addendum:  Approximately 5 minutes after indicating that the patient did not want to stay in hospital she changed her mind and would like to be admitted.  I will speak with the hospitalist Dr. Nathanael Alexandre time she will be admitted and discuss her with Dr. Tami Evans for general surgery.  1:18 AM  Patient was reviewed for admission to the hospitalist service with Dr. Nathanael Alexandre who agrees to accept care of the patient upon admission.  I'm still waiting for Dr. Evans to call back.  1:26 AM  Patient was discussed with  who will see the patient in the morning.      I have reviewed the nursing notes.    I  have reviewed the findings, diagnosis, plan and need for follow up with the patient.       New Prescriptions    No medications on file       Final diagnoses:   Acute cholecystitis       6/30/2017   [facility] EMERGENCY DEPARTMENT     Bowen Melgoza MD  07/01/17 0105       Bowen Melgoza MD  07/01/17 0107       Bowen Melgoza MD  07/01/17 0126

## 2017-07-01 NOTE — PROGRESS NOTES
This nurse talk to pt regarding going home this evening. Pt states she wants to stay over night d/t pain is at a 5/10 right now.

## 2017-07-01 NOTE — ED NOTES
"Patient has  [place] to Observation  order. Patient has been given the Observation brochure -  What does Observation mean to me.\"  Patient has been given the opportunity to ask questions about observation status and their plan of care.      BLAS ORTIZ    "

## 2017-07-01 NOTE — H&P
"[facility] History and Physical and Discharge Summary     Aminata Gale MRN# [id number]   Age: 66 year old YOB: 1950     Date of Admission:  6/30/2017      Primary care provider: Sana Olivo          Assessment and Plan:   Right upper quadrant abdominal pain due to acute cholecystitis  7/1/2017 -- seen by surgery today  - plan is for cholecystectomy and then discharge home if doing well after procedure.       Preoperative evaluation   Patient has a history of dyspnea with exertion present this past year - this was worked up by cardiology at [facility] in the past few months, including with an angiogram about 2 months ago (symptoms haven't changed since that time) which showed non-obstructive coronary disease but an LAD which did not reach the apex which per cardiology is likely why her stress test had looked abnormal.  She was thought to have some diastolic dysfunction which appears stable.  She has had multiple surgeries in the past with out any trouble with anesthesia, no personal history of trouble with anesthesia.  Still mows the lawn and is reasonably active although no more strenuous activities than mowing or going up a couple of flights of stairs but she is able to do these without difficulty.      Extensive medication allergies   7/1/2017 -- discussed perioperative antibiotics with surgery and pharmacy - surgery would like her to have a single dose of these with coverage for skin and cholecystitis, but allergies significantly limit options.  Discussed with pharmacy who agreed that ertapenem would be best option in this case - will give a dose of IV benadryl prior for additional caution.        Systemic lupus erythematosus (H) vs fibromyalgia by her report with history of Chronic pain  7/1/2017 -- has norco which she takes occasionally for this, usually \"when the weather changes\".  Doing well and hasn't needed this recently.       Essential hypertension, benign  7/1/2017 -- " "continue prior to admission torsemide and norvasc.        Smoker  7/1/2017 -- working on cutting back, down to 5/day, encouraged to continue goal of quitting - did not want further help.        Moderate persistent asthma vs   Centrilobular emphysema (H)  7/1/2017 -- carries both diagnosis.  Regardless appears stable with no current symptoms and stable dyspnea with exertion over the past year as above.  No clear wheezing on exam.  Will check chest x-ray prior to surgery, but doesn't appear to have any acute pulmonary issues.   Continue prior to admission spiriva and duonebs/albuterol as needed.          Anxiety unspecified  7/1/2017 -- continue prior to admission prozac.  Stable.       Prophylaxis  Low risk, reassess if unable to discharge.      Lines  PIV    Disposition  Stable - anticipate surgery today and then discharge home this afternoon if doing well post-operatively.                Chief Complaint:   Right upper quadrant abdominal pain   History is obtained from the patient          History of Present Illness:   This patient is a 66 year old  female with a significant past medical history of SLE vs fibromyalgia with chronic pain, tobacco abuse, hypertension and anxiety who was recently evaluated for dyspnea with exertion and intermittent chest pain who presents with right upper quadrant abdominal pain.  She has had these symptoms for the past couple of years, but initially they were infrequent and intermittent.  Now they've become increasingly frequent over the past few months and over the past week have been present daily.  Precipitated by eating, initially only certain things but this past week \"almost anything\".  Not by liquids.  Does have some nausea and has vomited a few times, including 2 days ago.  Never any bloody or black vomitus or stools, but did have some more liliane colored stools recently.  Usually takes an aspirin and says she can \"burp and get it to go away\" after the aspirin, but " "wasn't able to yesterday.  Symptoms have been worsening in intensity as well as frequently.  This AM has mild right upper quadrant abdominal discomfort with some radiation across upper abdomen still, but \"a lot\" better than yesterday.  No nausea or vomiting this AM.  No fever or chills.  No urinary symptoms. Abdominal pain is certainly not exertional.      No trouble with prior surgeries or anesthesia.      She had a recent evaluation for dyspnea with exertion and intermittent chest pain by cardiology including an angiogram which showed non-occlusive coronary disease and they did not think that there was a cardiac component to her symptoms.  She has not had any change in her symptoms over the past few months other than the upper abdominal pain as above.      No recent changes in medications.      Smokes still 5 cigarettes per day, working on quitting.  Rare alcohol, never more than 1 glass of wine, no illicit drug use.                 Past Medical History:   I have reviewed this patient's past medical history.  Patient Active Problem List    Diagnosis Date Noted     Acute cholecystitis 07/01/2017     Priority: Medium     Centrilobular emphysema (H) 02/10/2017     Priority: Medium     Grief reaction 12/02/2016     Priority: Medium     Refused influenza vaccine 01/08/2016     Priority: Medium     Anxiety 05/12/2015     Priority: Medium     Moderate persistent asthma 09/20/2013     Priority: Medium     Reports she was diagnosed with PFT's.         Chronic pain 09/20/2013     Priority: Medium     Pain Eval- Muscle pain related to Lupus.  Location of Pain: Forearms and legs  Duration of Pain: started age 36  Rating of Pain: 8-9/10  Pain is better with: Pain meds and warm water help.   Pain is worse with:Cold, change in the weather   Treatment so far consists of: Ibuprofen and Warmth.     Followed by   Sana Olivo [facility]  [phone]  [address] [address]    [address] lasts " about a year       :         HTN, goal below 140/90 09/20/2013     Priority: Medium     Acne 02/25/2013     Priority: Medium     SK (seborrheic keratosis) 02/25/2013     Priority: Medium     Lentigo 02/25/2013     Priority: Medium     Angioma 02/25/2013     Priority: Medium     Advanced directives, counseling/discussion 10/13/2011     Priority: Medium     Advance Directive Problem List Overview:   Name Relationship Phone    Primary Health Care Agent            Alternative Health Care Agent          Discussed advance care planning with patient; information given to patient to review. 10/13/2011     Anjelica Fitzgerald CMA, HC Facilitator           Hiatal hernia 05/04/2011     Priority: Medium     Osteoporosis 10/18/2010     Priority: Medium     10/10 - severe osteopenia in femoral necks, mild osteopenia in spine  (Problem list name updated by automated process. Provider to review and confirm.)       Hyperlipidemia LDL goal <130 09/22/2010     Priority: Medium     Recent Labs   Lab Test 9/22/10 0908 10/12/06 0939     CHOL 214* 256*     HDL 31* 42*     * 186*     TRIG 149 143     CHOLHDLRATIO 7.0* 6.0*     4/8/11 - dobutamine echo showed NO infarct or ischemia LV 60-65%, normal exercise response.          Smoker 06/24/2008     Priority: Medium     Other nonspecific abnormal result of function study of brain and central nervous system 11/14/2006     Priority: Medium     Essential hypertension, benign 10/17/2006     Priority: Medium     Allergic rhinitis 10/17/2006     Priority: Medium     Problem list name updated by automated process. Provider to review       Polycythemia, secondary 10/17/2006     Priority: Medium     Due to smoking       Female stress incontinence 10/17/2006     Priority: Medium     Esophageal reflux 04/03/2006     Priority: Medium     Referred to: Date of Consult:  12/11/2006  MD MAXIMINO [facility]~[employer]  [phone]    Reccomendations:  Upper GI endoscopy. Possible canidate  for antireflux surgery.       Systemic lupus erythematosus (H) 2005     Priority: Medium     Diagnosed in ; history of pericarditis; was previously treated with mtx; not on active treatment; no hx of renal dz from the lupus.                 Past Surgical History:   I have reviewed this patient's past surgical history   Past Surgical History:   Procedure Laterality Date     C APPENDECTOMY       C REPAIR GUM      Cathy Dontal surgery     C/SECTION, LOW TRANSVERSE      , Low Transverse     COLONOSCOPY  2009     D & C      X 5     HC RECONSTR NOSE+DAVE SEPTAL REPAIR       HYSTERECTOMY, BRI      Hx of dysplasia     SINUS SURGERY      Reconstruction             Social History:   I have reviewed this patient's social history   Social History   Substance Use Topics     Smoking status: Current Every Day Smoker     Packs/day: 0.25     Years: 42.00     Types: Cigarettes     Smokeless tobacco: Never Used      Comment: has not started the Chantix yet- 2016     Alcohol use 0.0 oz/week     0 Standard drinks or equivalent per week      Comment: Occ. wine             Family History:   I have reviewed this patient's family history  Family History   Problem Relation Age of Onset     DIABETES Mother      GASTROINTESTINAL DISEASE Mother      Gallbladder disease     HEART DISEASE Mother      CANCER Father      lung     Alcohol/Drug Father      Allergies Father      HEART DISEASE Father      GASTROINTESTINAL DISEASE Father      Liver disease     HEART DISEASE Maternal Grandfather      Arthritis Sister      OSTEOPOROSIS Sister      Thyroid Disease Sister      Allergies Son      Alcohol/Drug Sister      Alcohol/Drug Sister              Immunizations:   Immunizations are current          Allergies:     Allergies   Allergen Reactions     Amoxicillin Difficulty breathing and Rash     Ampicillin Difficulty breathing and Rash     Cipro [Ciprofloxacin] Difficulty breathing and Rash     Keflex [Cephalexin Monohydrate]  Difficulty breathing and Rash     Penicillin [Penicillins] Difficulty breathing and Rash     Sulfa Drugs Difficulty breathing and Rash     Tetracycline Difficulty breathing and Rash     Biaxin [Clarithromycin] Rash     Ceclor [Cefaclor] Hives and Swelling     Advair Diskus Hives     Hives on face,neck and chest     Egg White      Fish Shortness Of Breath     Mustard [Allyl Isothiocyanate]      Throat swelling             Medications:     Prescriptions Prior to Admission   Medication Sig Dispense Refill Last Dose     amLODIPine (NORVASC) 10 MG tablet Take 1 tablet (10 mg) by mouth daily For high blood pressure 90 tablet 4 6/29/2017 at Unknown time     tiotropium (SPIRIVA RESPIMAT) 2.5 MCG/ACT inhalation aerosol Inhale 2 puffs into the lungs daily 12 g 3 Past Week at Unknown time     albuterol (ALBUTEROL) 108 (90 BASE) MCG/ACT inhaler Inhale 1-2 puffs into the lungs every 4 hours To 6 hours as needed for shortness of breath 3 Inhaler 1 6/30/2017 at 1630     Ibuprofen (IBU-200 PO) Take  by mouth.   Past Week at Unknown time     ezetimibe (ZETIA) 10 MG tablet Take 1 tablet (10 mg) by mouth daily 90 tablet 3 6/29/2017     FLUoxetine (PROZAC) 20 MG capsule TAKE ONE CAPSULE BY MOUTH DAILY 90 capsule 0 6/29/2017     torsemide (DEMADEX) 5 MG tablet Take 1 tablet (5 mg) by mouth daily 90 tablet 1 6/29/2017     potassium chloride SA (K-DUR/KLOR-CON M) 20 MEQ CR tablet Take 1 tablet (20 mEq) by mouth daily Take 2 tablets on day one 32 tablet 3 More than a month at Unknown time     aspirin 81 MG chewable tablet Take 1 tablet (81 mg) by mouth daily 100 tablet 3 6/29/2017     HYDROcodone-acetaminophen (NORCO) 5-325 MG per tablet Take 1 tablet by mouth every 6 hours as needed for pain (Patient not taking: Reported on 4/27/2017) 30 tablet 0 More than a month at Unknown time     ipratropium - albuterol 0.5 mg/2.5 mg/3 mL (DUONEB) 0.5-2.5 (3) MG/3ML nebulization Take 1 vial (3 mLs) by nebulization every 6 hours as needed for shortness  of breath / dyspnea or wheezing 30 vial 1 More than a month at Unknown time     STATIN NOT PRESCRIBED, INTENTIONAL, 1 each daily Statin not prescribed intentionally due to Intolerance 0 each 0 Taking             Review of Systems:    ROS: 10 point ROS neg other than the symptoms noted above in the HPI.             Physical Exam:   Blood pressure 148/65, temperature 98  F (36.7  C), temperature source Oral, resp. rate 20, height 1.524 m (5'), weight 69.5 kg (153 lb 3.5 oz), SpO2 93 %, not currently breastfeeding.  Temperatures:  Current - Temp: 98  F (36.7  C); Max - Temp  Av.2  F (36.8  C)  Min: 98  F (36.7  C)  Max: 98.4  F (36.9  C)  Respiration range: Resp  Av  Min: 16  Max: 20  Pulse range: No Data Recorded  Blood pressure range: Systolic (24hrs), Av , Min:129 , Max:153   ; Diastolic (24hrs), Av, Min:65, Max:85    Pulse oximetry range: SpO2  Av.6 %  Min: 91 %  Max: 94 %    Intake/Output Summary (Last 24 hours) at 17 0743  Last data filed at 17 0608   Gross per 24 hour   Intake                0 ml   Output              400 ml   Net             -400 ml     EXAM:  General: awake and alert, NAD, oriented x 3  Head: normocephalic  Neck: unremarkable, no lymphadenopathy   HEENT: oropharynx pink and moist    Heart: Regular rate and rhythm, no murmurs, rubs, or gallops  Lungs: clear to auscultation bilaterally with good air movement throughout  Abdomen: soft, tender across upper abdomen mostly in right upper quadrant with positive Dean's sign, no masses or organomegaly, non-distended, bowel sounds present, no rebound or guarding.    Extremities: no edema in lower extremities   Skin unremarkable.             Data:     Results for orders placed or performed during the hospital encounter of 17 (from the past 24 hour(s))   CBC with platelets differential   Result Value Ref Range    WBC 13.1 (H) 4.0 - 11.0 10e9/L    RBC Count 5.55 (H) 3.8 - 5.2 10e12/L    Hemoglobin 16.5 (H) 11.7 -  15.7 g/dL    Hematocrit 49.5 (H) 35.0 - 47.0 %    MCV 89 78 - 100 fl    MCH 29.7 26.5 - 33.0 pg    MCHC 33.3 31.5 - 36.5 g/dL    RDW 14.2 10.0 - 15.0 %    Platelet Count 265 150 - 450 10e9/L    Diff Method Automated Method     % Neutrophils 55.7 %    % Lymphocytes 31.6 %    % Monocytes 9.3 %    % Eosinophils 2.7 %    % Basophils 0.4 %    % Immature Granulocytes 0.3 %    Absolute Neutrophil 7.3 1.6 - 8.3 10e9/L    Absolute Lymphocytes 4.1 0.8 - 5.3 10e9/L    Absolute Monocytes 1.2 0.0 - 1.3 10e9/L    Absolute Eosinophils 0.4 0.0 - 0.7 10e9/L    Absolute Basophils 0.1 0.0 - 0.2 10e9/L    Abs Immature Granulocytes 0.0 0 - 0.4 10e9/L   CRP inflammation   Result Value Ref Range    CRP Inflammation 6.2 0.0 - 8.0 mg/L   Comprehensive metabolic panel   Result Value Ref Range    Sodium 139 133 - 144 mmol/L    Potassium 3.5 3.4 - 5.3 mmol/L    Chloride 104 94 - 109 mmol/L    Carbon Dioxide 28 20 - 32 mmol/L    Anion Gap 7 3 - 14 mmol/L    Glucose 97 70 - 99 mg/dL    Urea Nitrogen 15 7 - 30 mg/dL    Creatinine 0.73 0.52 - 1.04 mg/dL    GFR Estimate 80 >60 mL/min/1.7m2    GFR Estimate If Black >90   GFR Calc   >60 mL/min/1.7m2    Calcium 8.7 8.5 - 10.1 mg/dL    Bilirubin Total 0.6 0.2 - 1.3 mg/dL    Albumin 3.7 3.4 - 5.0 g/dL    Protein Total 7.5 6.8 - 8.8 g/dL    Alkaline Phosphatase 144 40 - 150 U/L     (H) 0 - 50 U/L     (H) 0 - 45 U/L   Lipase   Result Value Ref Range    Lipase 152 73 - 393 U/L   Troponin I   Result Value Ref Range    Troponin I ES  0.000 - 0.045 ug/L     <0.015  The 99th percentile for upper reference range is 0.045 ug/L.  Troponin values in   the range of 0.045 - 0.120 ug/L may be associated with risks of adverse   clinical events.     US Abdomen Limited    Narrative    US ABDOMEN LIMITED  7/1/2017 12:26 AM     HISTORY: Epigastric and right upper quadrant pain.    COMPARISON: CT of the abdomen and pelvis performed 10/14/2009.    FINDINGS: The liver is unremarkable. No  evidence for fatty  infiltration. No focal hepatic lesions. A large shadowing nonmobile  gallstone is noted within the gallbladder and measures 2.1 cm. The  gallbladder wall is borderline thickened at 0.3 cm. Positive  sonographic Dean's sign. Findings are suspicious for acute  cholecystitis. No pericholecystic fluid is identified. Scattered  echogenic foci in the gallbladder wall suggests adenomyomatosis. No  intra or extrahepatic bile duct dilatation. Common hepatic duct is  normal in diameter. The common duct was not visualized in its  entirety. Limited evaluation of the right kidney demonstrates a  nonobstructing stone in the lower pole measuring 0.6 cm. Pancreas is  partially obscured by overlying bowel gas, but appears normal where  seen. The abdominal aorta and IVC are of normal caliber where  visualized.      Impression    IMPRESSION:  1. Findings are suspicious for acute cholecystitis.  2. Nonobstructing stone in the lower pole of the right kidney measures  0.6 cm.    MOUNA GUERRA MD   UA reflex to Microscopic and Culture   Result Value Ref Range    Color Urine Yellow     Appearance Urine Clear     Glucose Urine Negative NEG mg/dL    Bilirubin Urine Negative NEG    Ketones Urine 10 (A) NEG mg/dL    Specific Gravity Urine 1.009 1.003 - 1.035    Blood Urine Negative NEG    pH Urine 7.0 5.0 - 7.0 pH    Protein Albumin Urine Negative NEG mg/dL    Urobilinogen mg/dL Normal 0.0 - 2.0 mg/dL    Nitrite Urine Positive (A) NEG    Leukocyte Esterase Urine Negative NEG    Source Midstream Urine     RBC Urine 1 0 - 2 /HPF    WBC Urine 2 0 - 2 /HPF    Bacteria Urine Few (A) NEG /HPF    Squamous Epithelial /HPF Urine <1 0 - 1 /HPF    Mucous Urine Present (A) NEG /LPF     All cardiac studies reviewed by me.  All imaging studies reviewed by me.    Attestation:  I have reviewed today's vital signs, notes, medications, labs and imaging.  Amount of time performed on this history and physical and discharge : 75  minutes.        Bowen Gagnon MD

## 2017-07-01 NOTE — PROGRESS NOTES
[facility] TRANSPORT NOTE  Data:   Reason for Transport:  Back from surgery    Aminata Gale was transported from [facility] via cart at 1500.  Patient was accompanied by Registered Nurse. Equipment used for transport: Oxygen  Nasal Cannula. Family was aware of reason for transport: yes    Action:  Report: received from Karen MAGDALENO    Response:  Patient's condition upon return was stable.    Teresa Bonilla

## 2017-07-01 NOTE — PROGRESS NOTES
[facility] [facility] [place]    Patient admitted to room 2302 at approximately 0230 via ambulation from emergency room. Patient was accompanied by transport tech.     Verbal SBAR report received from Janna MAGDALENO prior to patient arrival.     Patient ambulated to bed independently. Patient alert and oriented X 3. Pain is controlled without any medications.  . Admission vital signs: Blood pressure 148/65, temperature 98  F (36.7  C), temperature source Oral, resp. rate 20, height 1.524 m (5'), weight 69.5 kg (153 lb 3.5 oz), SpO2 93 %, not currently breastfeeding. Patient was oriented to plan of care, call light, bed controls, tv, telephone, bathroom and visiting hours.     The following safety risks were identified during admission: none. Yellow risk band applied: DIONISIO Robbins

## 2017-07-02 VITALS
BODY MASS INDEX: 30.08 KG/M2 | HEIGHT: 60 IN | HEART RATE: 85 BPM | DIASTOLIC BLOOD PRESSURE: 59 MMHG | RESPIRATION RATE: 16 BRPM | SYSTOLIC BLOOD PRESSURE: 112 MMHG | WEIGHT: 153.22 LBS | OXYGEN SATURATION: 91 % | TEMPERATURE: 96.5 F

## 2017-07-02 PROCEDURE — A9270 NON-COVERED ITEM OR SERVICE: HCPCS | Mod: GY | Performed by: FAMILY MEDICINE

## 2017-07-02 PROCEDURE — G0378 HOSPITAL OBSERVATION PER HR: HCPCS

## 2017-07-02 PROCEDURE — 25000132 ZZH RX MED GY IP 250 OP 250 PS 637: Mod: GY | Performed by: SURGERY

## 2017-07-02 PROCEDURE — A9270 NON-COVERED ITEM OR SERVICE: HCPCS | Mod: GY | Performed by: SURGERY

## 2017-07-02 PROCEDURE — 99217 ZZC OBSERVATION CARE DISCHARGE: CPT | Performed by: FAMILY MEDICINE

## 2017-07-02 PROCEDURE — 25000132 ZZH RX MED GY IP 250 OP 250 PS 637: Mod: GY | Performed by: FAMILY MEDICINE

## 2017-07-02 RX ADMIN — ASPIRIN 81 MG 81 MG: 81 TABLET ORAL at 07:48

## 2017-07-02 RX ADMIN — FLUOXETINE 20 MG: 20 CAPSULE ORAL at 07:49

## 2017-07-02 RX ADMIN — TORSEMIDE 5 MG: 5 TABLET ORAL at 07:48

## 2017-07-02 RX ADMIN — HYDROCODONE BITARTRATE AND ACETAMINOPHEN 2 TABLET: 5; 325 TABLET ORAL at 01:21

## 2017-07-02 RX ADMIN — POTASSIUM CHLORIDE 20 MEQ: 1500 TABLET, EXTENDED RELEASE ORAL at 07:48

## 2017-07-02 RX ADMIN — ALBUTEROL SULFATE 2 PUFF: 90 AEROSOL, METERED RESPIRATORY (INHALATION) at 06:32

## 2017-07-02 RX ADMIN — EZETIMIBE 10 MG: 10 TABLET ORAL at 07:48

## 2017-07-02 RX ADMIN — HYDROCODONE BITARTRATE AND ACETAMINOPHEN 2 TABLET: 5; 325 TABLET ORAL at 06:33

## 2017-07-02 RX ADMIN — AMLODIPINE BESYLATE 10 MG: 10 TABLET ORAL at 07:48

## 2017-07-02 NOTE — PLAN OF CARE
Problem: Pain, Acute (Adult)  Goal: Acceptable Pain Control/Comfort Level  Patient will demonstrate the desired outcomes by discharge/transition of care.   Outcome: Improving  Pt requesting [place] @ 6 hrs apart. Has been up ambulating in halls x2 during noc.

## 2017-07-02 NOTE — PHARMACY - DISCHARGE MEDICATION RECONCILIATION
Discharge medication review for this patient is complete. Pharmacist assisted with medication reconciliation of discharge medications with prior to admission medications.     The following changes were made to the discharge medication list based on pharmacist review:  Added:  vicodin  Discontinued: NA  Changed: NA      Patient's Discharge Medication List  - medications as listed on After Visit Summary (AVS)     Review of your medicines      CONTINUE these medicines which may have CHANGED, or have new prescriptions. If we are uncertain of the size of tablets/capsules you have at home, strength may be listed as something that might have changed.       Dose / Directions    HYDROcodone-acetaminophen 5-325 MG per tablet   Commonly known as:  NORCO   This may have changed:    - how much to take  - when to take this        Dose:  1-2 tablet   Take 1-2 tablets by mouth every 4 hours as needed for pain   Quantity:  30 tablet   Refills:  0         CONTINUE these medicines which have NOT CHANGED       Dose / Directions    albuterol 108 (90 BASE) MCG/ACT Inhaler   Commonly known as:  albuterol   Used for:  COPD exacerbation (H)        Dose:  1-2 puff   Inhale 1-2 puffs into the lungs every 4 hours To 6 hours as needed for shortness of breath   Quantity:  3 Inhaler   Refills:  1       amLODIPine 10 MG tablet   Commonly known as:  NORVASC   Used for:  HTN, goal below 140/90        Dose:  10 mg   Take 1 tablet (10 mg) by mouth daily For high blood pressure   Quantity:  90 tablet   Refills:  4       aspirin 81 MG chewable tablet   Used for:  Atypical chest pain, Abnormal cardiovascular stress test        Dose:  81 mg   Take 1 tablet (81 mg) by mouth daily   Quantity:  100 tablet   Refills:  3       ezetimibe 10 MG tablet   Commonly known as:  ZETIA   Used for:  Mixed hyperlipidemia        Dose:  10 mg   Take 1 tablet (10 mg) by mouth daily   Quantity:  90 tablet   Refills:  3       FLUoxetine 20 MG capsule   Commonly known as:   PROzac   Used for:  Adjustment disorder with depressed mood, Anxiety        TAKE ONE CAPSULE BY MOUTH DAILY   Quantity:  90 capsule   Refills:  0       IBU-200 PO        Take  by mouth.   Refills:  0       ipratropium - albuterol 0.5 mg/2.5 mg/3 mL 0.5-2.5 (3) MG/3ML neb solution   Commonly known as:  DUONEB   Used for:  Bronchial pneumonia        Dose:  1 vial   Take 1 vial (3 mLs) by nebulization every 6 hours as needed for shortness of breath / dyspnea or wheezing   Quantity:  30 vial   Refills:  1       potassium chloride SA 20 MEQ CR tablet   Commonly known as:  K-DUR/KLOR-CON M   Used for:  Hypokalemia        Dose:  20 mEq   Take 1 tablet (20 mEq) by mouth daily Take 2 tablets on day one   Quantity:  32 tablet   Refills:  3       STATIN NOT PRESCRIBED (INTENTIONAL)   Used for:  Hyperlipidemia LDL goal <130        Dose:  1 each   1 each daily Statin not prescribed intentionally due to Intolerance   Quantity:  0 each   Refills:  0       tiotropium 2.5 MCG/ACT inhalation aerosol   Commonly known as:  SPIRIVA RESPIMAT   Used for:  Moderate persistent asthma without complication, Chronic obstructive pulmonary disease, unspecified COPD type (H)        Dose:  2 puff   Inhale 2 puffs into the lungs daily   Quantity:  12 g   Refills:  3       torsemide 5 MG tablet   Commonly known as:  DEMADEX   Used for:  Essential hypertension, benign        Dose:  5 mg   Take 1 tablet (5 mg) by mouth daily   Quantity:  90 tablet   Refills:  1            Where to get your medicines      Some of these will need a paper prescription and others can be bought over the counter. Ask your nurse if you have questions.     Bring a paper prescription for each of these medications      HYDROcodone-acetaminophen 5-325 MG per tablet

## 2017-07-02 NOTE — ANESTHESIA POSTPROCEDURE EVALUATION
Patient: Aminata Gale    Procedure(s):  Laparoscopic Cholecystectomy - Wound Class: II-Clean Contaminated    Diagnosis:abd pain  Diagnosis Additional Information: No value filed.    Anesthesia Type:  General, ETT    Note:  Anesthesia Post Evaluation    Patient location during evaluation: Bedside  Patient participation: Able to fully participate in evaluation  Level of consciousness: awake and alert  Pain management: adequate  Airway patency: patent  Cardiovascular status: acceptable  Respiratory status: acceptable  Hydration status: acceptable  PONV: none     Anesthetic complications: None          Last vitals:  Vitals:    07/01/17 1515 07/01/17 1541 07/01/17 1612   BP: 118/58 120/76 122/62   Pulse: 88 87 85   Resp:  16 16   Temp:  37.1  C (98.7  F)    SpO2: 92% 90% 93%         Electronically Signed By: PEDRO Alva CRNA  July 1, 2017  7:35 PM

## 2017-07-02 NOTE — DISCHARGE SUMMARY
[facility] Discharge Summary    Aminata Gale MRN# [id number]   Age: 66 year old YOB: 1950     Date of Admission:  6/30/2017  Date of Discharge::  7/2/2017  Admitting Physician:  Nathanael Alexandre MD  Discharge Physician:  Bowen Gagnon MD, MD             Admission Diagnoses:   Acute cholecystitis [K81.0]          Principle Discharge Diagnosis:     Acute cholecystitis     See hospital course for further active diagnoses addressed during this admission.            Procedures:   Cholecystectomy by general surgery 7/1/17          Medications Prior to Admission:     Prescriptions Prior to Admission   Medication Sig Dispense Refill Last Dose     amLODIPine (NORVASC) 10 MG tablet Take 1 tablet (10 mg) by mouth daily For high blood pressure 90 tablet 4 6/29/2017 at Unknown time     tiotropium (SPIRIVA RESPIMAT) 2.5 MCG/ACT inhalation aerosol Inhale 2 puffs into the lungs daily 12 g 3 Past Week at Unknown time     albuterol (ALBUTEROL) 108 (90 BASE) MCG/ACT inhaler Inhale 1-2 puffs into the lungs every 4 hours To 6 hours as needed for shortness of breath 3 Inhaler 1 6/30/2017 at 1630     Ibuprofen (IBU-200 PO) Take  by mouth.   Past Week at Unknown time     ezetimibe (ZETIA) 10 MG tablet Take 1 tablet (10 mg) by mouth daily 90 tablet 3 6/29/2017     FLUoxetine (PROZAC) 20 MG capsule TAKE ONE CAPSULE BY MOUTH DAILY 90 capsule 0 6/29/2017     torsemide (DEMADEX) 5 MG tablet Take 1 tablet (5 mg) by mouth daily 90 tablet 1 6/29/2017     potassium chloride SA (K-DUR/KLOR-CON M) 20 MEQ CR tablet Take 1 tablet (20 mEq) by mouth daily Take 2 tablets on day one 32 tablet 3 More than a month at Unknown time     aspirin 81 MG chewable tablet Take 1 tablet (81 mg) by mouth daily 100 tablet 3 6/29/2017     ipratropium - albuterol 0.5 mg/2.5 mg/3 mL (DUONEB) 0.5-2.5 (3) MG/3ML nebulization Take 1 vial (3 mLs) by nebulization every 6 hours as needed for shortness of breath / dyspnea or wheezing 30 vial 1 More  than a month at Unknown time     STATIN NOT PRESCRIBED, INTENTIONAL, 1 each daily Statin not prescribed intentionally due to Intolerance 0 each 0 Taking             Discharge Medications:     Current Discharge Medication List      CONTINUE these medications which have CHANGED    Details   HYDROcodone-acetaminophen (NORCO) 5-325 MG per tablet Take 1-2 tablets by mouth every 4 hours as needed for pain  Qty: 30 tablet, Refills: 0    Associated Diagnoses: Acute cholecystitis         CONTINUE these medications which have NOT CHANGED    Details   amLODIPine (NORVASC) 10 MG tablet Take 1 tablet (10 mg) by mouth daily For high blood pressure  Qty: 90 tablet, Refills: 4    Associated Diagnoses: HTN, goal below 140/90      tiotropium (SPIRIVA RESPIMAT) 2.5 MCG/ACT inhalation aerosol Inhale 2 puffs into the lungs daily  Qty: 12 g, Refills: 3    Associated Diagnoses: Moderate persistent asthma without complication; Chronic obstructive pulmonary disease, unspecified COPD type (H)      albuterol (ALBUTEROL) 108 (90 BASE) MCG/ACT inhaler Inhale 1-2 puffs into the lungs every 4 hours To 6 hours as needed for shortness of breath  Qty: 3 Inhaler, Refills: 1    Associated Diagnoses: COPD exacerbation (H)      Ibuprofen (IBU-200 PO) Take  by mouth.      ezetimibe (ZETIA) 10 MG tablet Take 1 tablet (10 mg) by mouth daily  Qty: 90 tablet, Refills: 3    Associated Diagnoses: Mixed hyperlipidemia      FLUoxetine (PROZAC) 20 MG capsule TAKE ONE CAPSULE BY MOUTH DAILY  Qty: 90 capsule, Refills: 0    Associated Diagnoses: Adjustment disorder with depressed mood; Anxiety      torsemide (DEMADEX) 5 MG tablet Take 1 tablet (5 mg) by mouth daily  Qty: 90 tablet, Refills: 1    Associated Diagnoses: Essential hypertension, benign      potassium chloride SA (K-DUR/KLOR-CON M) 20 MEQ CR tablet Take 1 tablet (20 mEq) by mouth daily Take 2 tablets on day one  Qty: 32 tablet, Refills: 3    Comments: New prescription  Associated Diagnoses: Hypokalemia     "  aspirin 81 MG chewable tablet Take 1 tablet (81 mg) by mouth daily  Qty: 100 tablet, Refills: 3    Associated Diagnoses: Atypical chest pain; Abnormal cardiovascular stress test      ipratropium - albuterol 0.5 mg/2.5 mg/3 mL (DUONEB) 0.5-2.5 (3) MG/3ML nebulization Take 1 vial (3 mLs) by nebulization every 6 hours as needed for shortness of breath / dyspnea or wheezing  Qty: 30 vial, Refills: 1    Associated Diagnoses: Bronchial pneumonia      STATIN NOT PRESCRIBED, INTENTIONAL, 1 each daily Statin not prescribed intentionally due to Intolerance  Qty: 0 each, Refills: 0    Associated Diagnoses: Hyperlipidemia LDL goal <130                   Consultations:   Consultation during this admission received from surgery          Brief History of Illness:     From Admission H+P:   This patient is a 66 year old  female with a significant past medical history of SLE vs fibromyalgia with chronic pain, tobacco abuse, hypertension and anxiety who was recently evaluated for dyspnea with exertion and intermittent chest pain who presents with right upper quadrant abdominal pain.  She has had these symptoms for the past couple of years, but initially they were infrequent and intermittent.  Now they've become increasingly frequent over the past few months and over the past week have been present daily.  Precipitated by eating, initially only certain things but this past week \"almost anything\".  Not by liquids.  Does have some nausea and has vomited a few times, including 2 days ago.  Never any bloody or black vomitus or stools, but did have some more liliane colored stools recently.  Usually takes an aspirin and says she can \"burp and get it to go away\" after the aspirin, but wasn't able to yesterday.  Symptoms have been worsening in intensity as well as frequently.  This AM has mild right upper quadrant abdominal discomfort with some radiation across upper abdomen still, but \"a lot\" better than yesterday.  No nausea or " vomiting this AM.  No fever or chills.  No urinary symptoms. Abdominal pain is certainly not exertional.       No trouble with prior surgeries or anesthesia.       She had a recent evaluation for dyspnea with exertion and intermittent chest pain by cardiology including an angiogram which showed non-occlusive coronary disease and they did not think that there was a cardiac component to her symptoms.  She has not had any change in her symptoms over the past few months other than the upper abdominal pain as above.       No recent changes in medications.       Smokes still 5 cigarettes per day, working on quitting.  Rare alcohol, never more than 1 glass of wine, no illicit drug use.                   TODAY:     Subjective:  Doing well.  Last night had trouble with pain control and as a result was unable to discharge as hoped for, but much improved overnight and this AM, now doing well with norco and has been up and about and doing well, taking orals well and feels ready for discharge.  No nausea or vomiting.  No bowel movement yet since surgery but passing gas.      ROS:   ROS: 10 point ROS neg other than the symptoms noted above in the HPI.     /59 (BP Location: Right arm)  Pulse 85  Temp 96.5  F (35.8  C) (Oral)  Resp 16  Ht 1.524 m (5')  Wt 69.5 kg (153 lb 3.5 oz)  SpO2 91%  BMI 29.92 kg/m2   EXAM:  General: awake and alert, NAD, oriented x 3  Head: normocephalic  Neck: unremarkable, no lymphadenopathy   HEENT: oropharynx pink and moist    Heart: Regular rate and rhythm, no murmurs, rubs, or gallops  Lungs: clear to auscultation bilaterally with good air movement throughout  Abdomen: soft, minimal discomfort with palpation but overall non-tender throughout with bowel sounds present, no guarding or rebound, no masses or organomegaly, non-distended - much improved and nearly normal exam today.  Extremities: no edema in lower extremities   Skin unremarkable.            Hospital Course:     Right upper  "quadrant abdominal pain due to acute cholecystitis  7/1/2017 -- seen by surgery today  - plan is for cholecystectomy and then discharge home if doing well after procedure.     7/2/2017 -- kept overnight for pain control post-operatively, now doing well, ok for discharge today with new prescription for norco as needed for pain as per surgery.      ? Positive urine culture without UTI  7/2/2017 -- patient completely asymptomatic and UA was essentially normal with 2 WBCs, negative leukocyte esterase, just a few bacteria and only positive nitrate.  Is growing gram negative rods, but with no symptoms and reassuring UA in patient with extensive medication allergies I would not recommend treatment at this time.  Scheduling follow-up with primary care provider or other in the next 4 days - can follow-up on final urine culture results at that time and discuss if any treatment is necessary, but again would avoid further antibiotic exposure here if able.      Extensive medication allergies   7/1/2017 -- discussed perioperative antibiotics with surgery and pharmacy - surgery would like her to have a single dose of these with coverage for skin and cholecystitis, but allergies significantly limit options.  Discussed with pharmacy who agreed that ertapenem would be best option in this case - will give a dose of IV benadryl prior for additional caution.         Systemic lupus erythematosus (H) vs fibromyalgia by her report with history of Chronic pain  7/1/2017 -- has norco which she takes occasionally for this, usually \"when the weather changes\".  Doing well and hasn't needed this recently.   7/2/2017 -- no change.       Essential hypertension, benign  7/2/2017 --  continued prior to admission torsemide and norvasc.         Smoker  7/1/2017 -- working on cutting back, down to 5/day, encouraged to continue goal of quitting - did not want further help.         Moderate persistent asthma vs Centrilobular emphysema (H)  7/1/2017 -- " carries both diagnosis.  Regardless appears stable with no current symptoms and stable dyspnea with exertion over the past year as above.  No clear wheezing on exam.  Will check chest x-ray prior to surgery, but doesn't appear to have any acute pulmonary issues.   Continue prior to admission spiriva and duonebs/albuterol as needed.    7/2/2017 -- suspect this is the cause of her worsened dyspnea over the past year, although patient thinks her symptoms are improved with the cholecystectomy.  If symptoms persisting, recommend discussing increasing her controlled medications (likely inhaled steroid) with her primary care provider and perhaps repeating pulmonary function testing.          Anxiety unspecified  7/1/2017 -- continue prior to admission prozac.  Stable.    7/2/2017 -- no change.            Discharge Instructions and Follow-Up:   Discharge diet: Regular   Discharge activity: Activity as per surgery recommendations    Discharge follow-up: Follow up with primary care provider within 4 days - follow-up on urine culture results and discuss plan for emphysema/asthma as above at that time             Discharge Disposition:   Discharged to home      Attestation:  I have reviewed today's vital signs, notes, medications, labs and imaging.  Amount of time performed on this discharge summary: 40 minutes.    Bowen Gagnon MD, MD

## 2017-07-02 NOTE — PROGRESS NOTES
[facility] DISCHARGE NOTE    Patient discharged to home at 9:34 AM via wheel chair. Accompanied by staff. Discharge instructions reviewed with patient, opportunity offered to ask questions. Prescriptions sent to patients preferred pharmacy. All belongings sent with patient.    Teresa Bonilla

## 2017-07-03 LAB
BACTERIA SPEC CULT: ABNORMAL
MICRO REPORT STATUS: ABNORMAL
MICROORGANISM SPEC CULT: ABNORMAL
SPECIMEN SOURCE: ABNORMAL

## 2017-07-05 ENCOUNTER — TELEPHONE (OUTPATIENT)
Dept: FAMILY MEDICINE | Facility: CLINIC | Age: 67
End: 2017-07-05

## 2017-07-05 NOTE — TELEPHONE ENCOUNTER
"Hospital/TCU/ED for chronic condition Discharge Protocol    \"Hi, my name is Jo Horn, a registered nurse, and I am calling from [facility].  I am calling to follow up and see how things are going for you after your recent emergency visit/hospital/TCU stay.\"    Tell me how you are doing now that you are home?\" doing well      Discharge Instructions    \"Let's review your discharge instructions.  What is/are the follow-up recommendations?  Pt. Response: see PCP    \"Has an appointment with your primary care provider been scheduled?\"   Yes. (confirm)    \"When you see the provider, I would recommend that you bring your medications with you.\"    Medications    \"Tell me what changed about your medicines when you discharged?\"    Changes to chronic meds?    0-1    \"What questions do you have about your medications?\"    None     New diagnoses of heart failure, COPD, diabetes, or MI?    No              Medication reconciliation completed? Yes  Was MTM referral placed (*Make sure to put transitions as reason for referral)?   No    Call Summary    \"What questions or concerns do you have about your recent visit and your follow-up care?\"     none    \"If you have questions or things don't continue to improve, we encourage you contact us through the main clinic number (give number).  Even if the clinic is not open, triage nurses are available 24/7 to help you.     We would like you to know that our clinic has extended hours (provide information).  We also have urgent care (provide details on closest location and hours/contact info)\"      \"Thank you for your time and take care!\"             "

## 2017-07-05 NOTE — TELEPHONE ENCOUNTER
ED/UC/IP follow up phone call: 07.02.2017  IP    RN please call to follow up. Acute Choleystitis    Number of ED visits in past 12 months = 0    Has appointment 07.06.2017 with HATTIE Lambert CSS

## 2017-07-06 ENCOUNTER — OFFICE VISIT (OUTPATIENT)
Dept: FAMILY MEDICINE | Facility: CLINIC | Age: 67
End: 2017-07-06
Payer: COMMERCIAL

## 2017-07-06 VITALS
HEART RATE: 60 BPM | SYSTOLIC BLOOD PRESSURE: 122 MMHG | OXYGEN SATURATION: 99 % | DIASTOLIC BLOOD PRESSURE: 60 MMHG | TEMPERATURE: 97.3 F | WEIGHT: 148 LBS | BODY MASS INDEX: 28.9 KG/M2

## 2017-07-06 DIAGNOSIS — K81.0 ACUTE CHOLECYSTITIS: ICD-10-CM

## 2017-07-06 DIAGNOSIS — J43.2 CENTRILOBULAR EMPHYSEMA (H): Primary | ICD-10-CM

## 2017-07-06 LAB — COPATH REPORT: NORMAL

## 2017-07-06 PROCEDURE — 99214 OFFICE O/P EST MOD 30 MIN: CPT | Performed by: NURSE PRACTITIONER

## 2017-07-06 NOTE — NURSING NOTE
Chief Complaint   Patient presents with     Hospital F/U       Initial There were no vitals taken for this visit. Estimated body mass index is 29.92 kg/(m^2) as calculated from the following:    Height as of 7/1/17: 5' (1.524 m).    Weight as of 7/1/17: 153 lb 3.5 oz (69.5 kg).  Medication Reconciliation: complete    Health Maintenance that is potentially due pending provider review:  ACT    Possibly completing today per provider review.

## 2017-07-06 NOTE — PROGRESS NOTES
SUBJECTIVE:                                                    Aminata Gale is a 66 year old female who presents to clinic today for the following health issues:          Hospital Follow-up Visit:    Hospital/Nursing Home/IP Rehab Facility: [facility]  Date of Admission: 06/30/2017  Date of Discharge: 07/02/2017  Reason(s) for Admission: Acute cholecystitis s/p cholecystectomy   COPD. Continues to smoke.             Problems taking medications regularly:  None       Medication changes since discharge: None       Problems adhering to non-medication therapy:  None    BP Readings from Last 3 Encounters:   07/06/17 122/60   07/02/17 112/59   06/16/17 132/74     Summary of hospitalization:  [facility] discharge summary reviewed  Diagnostic Tests/Treatments reviewed.  Follow up needed:   Other Healthcare Providers Involved in Patient s Care:         None  Update since discharge: improved.     Post Discharge Medication Reconciliation: discharge medications reconciled and changed, per note/orders (see AVS).  Plan of care communicated with patient     Coding guidelines for this visit:  Type of Medical   Decision Making Face-to-Face Visit       within 7 Days of discharge Face-to-Face Visit        within 14 days of discharge   Moderate Complexity [id number]   High Complexity [id number]           has a past medical history of Acute pericarditis, unspecified; Chronic airway obstruction (12/13/2005); Dysuria; Esophageal reflux; GERD (gastroesophageal reflux disease) (9/20/2013); Hiatal hernia (5/4/2011); HTN, goal below 140/90 (9/20/2013); Hyperlipidemia LDL goal <130 (9/22/2010); Lupus (H); Osteoporosis (10/18/2010); Pure hypercholesterolemia; SECONDARY POLYCYTHEMIA (10/17/2006); Smoker (6/24/2008); Systemic lupus erythematosus (12/7/2005); Tobacco use disorder; and Unspecified essential hypertension.    -------------------------------------    Problem list and histories reviewed & adjusted, as  indicated.  Additional history: as documented    BP Readings from Last 3 Encounters:   07/06/17 122/60   07/02/17 112/59   06/16/17 132/74    Wt Readings from Last 3 Encounters:   07/06/17 148 lb (67.1 kg)   07/01/17 153 lb 3.5 oz (69.5 kg)   04/27/17 150 lb (68 kg)                  Labs reviewed in EPIC      Reviewed and updated as needed this visit by clinical staffAllergies       Reviewed and updated as needed this visit by Provider         ROS:   ROS: 10 point ROS neg other than the symptoms noted above in the HPI.      OBJECTIVE:                                                    /60  Pulse 60  Temp 97.3  F (36.3  C) (Tympanic)  Wt 148 lb (67.1 kg)  SpO2 99%  BMI 28.9 kg/m2  Body mass index is 28.9 kg/(m^2).   GENERAL: healthy, alert, well nourished, well hydrated, no distress  HENT: ear canals- normal; TMs- normal; Nose- normal; Mouth- no ulcers, no lesions  NECK: no tenderness, no adenopathy, no asymmetry, no masses, no stiffness; thyroid- normal to palpation  RESP: lungs diminished throughout. No wheeze today.   CV: regular rates and rhythm, normal S1 S2, no S3 or S4 and no murmur, no click or rub -  ABDOMEN: soft, mild diffuse tenderness, no  hepatosplenomegaly, no masses, normal bowel sounds  Incision clean and dry with no drainage.   Diagnostic test results:  Results for orders placed or performed during the hospital encounter of 06/30/17   US Abdomen Limited    Narrative    US ABDOMEN LIMITED  7/1/2017 12:26 AM     HISTORY: Epigastric and right upper quadrant pain.    COMPARISON: CT of the abdomen and pelvis performed 10/14/2009.    FINDINGS: The liver is unremarkable. No evidence for fatty  infiltration. No focal hepatic lesions. A large shadowing nonmobile  gallstone is noted within the gallbladder and measures 2.1 cm. The  gallbladder wall is borderline thickened at 0.3 cm. Positive  sonographic Dean's sign. Findings are suspicious for acute  cholecystitis. No pericholecystic fluid is  identified. Scattered  echogenic foci in the gallbladder wall suggests adenomyomatosis. No  intra or extrahepatic bile duct dilatation. Common hepatic duct is  normal in diameter. The common duct was not visualized in its  entirety. Limited evaluation of the right kidney demonstrates a  nonobstructing stone in the lower pole measuring 0.6 cm. Pancreas is  partially obscured by overlying bowel gas, but appears normal where  seen. The abdominal aorta and IVC are of normal caliber where  visualized.      Impression    IMPRESSION:  1. Findings are suspicious for acute cholecystitis.  2. Nonobstructing stone in the lower pole of the right kidney measures  0.6 cm.    MOUNA GUERRA MD   Chest 2 Views*    Narrative    CHEST TWO VIEWS   7/1/2017 9:22 AM     HISTORY: Preop evaluation. History of COPD/emphysema.    COMPARISON: 1/31/2017.      Impression    IMPRESSION: No acute cardiopulmonary disease.    BEN BRO MD   CBC with platelets differential   Result Value Ref Range    WBC 13.1 (H) 4.0 - 11.0 10e9/L    RBC Count 5.55 (H) 3.8 - 5.2 10e12/L    Hemoglobin 16.5 (H) 11.7 - 15.7 g/dL    Hematocrit 49.5 (H) 35.0 - 47.0 %    MCV 89 78 - 100 fl    MCH 29.7 26.5 - 33.0 pg    MCHC 33.3 31.5 - 36.5 g/dL    RDW 14.2 10.0 - 15.0 %    Platelet Count 265 150 - 450 10e9/L    Diff Method Automated Method     % Neutrophils 55.7 %    % Lymphocytes 31.6 %    % Monocytes 9.3 %    % Eosinophils 2.7 %    % Basophils 0.4 %    % Immature Granulocytes 0.3 %    Absolute Neutrophil 7.3 1.6 - 8.3 10e9/L    Absolute Lymphocytes 4.1 0.8 - 5.3 10e9/L    Absolute Monocytes 1.2 0.0 - 1.3 10e9/L    Absolute Eosinophils 0.4 0.0 - 0.7 10e9/L    Absolute Basophils 0.1 0.0 - 0.2 10e9/L    Abs Immature Granulocytes 0.0 0 - 0.4 10e9/L   CRP inflammation   Result Value Ref Range    CRP Inflammation 6.2 0.0 - 8.0 mg/L   Comprehensive metabolic panel   Result Value Ref Range    Sodium 139 133 - 144 mmol/L    Potassium 3.5 3.4 - 5.3 mmol/L    Chloride 104 94  - 109 mmol/L    Carbon Dioxide 28 20 - 32 mmol/L    Anion Gap 7 3 - 14 mmol/L    Glucose 97 70 - 99 mg/dL    Urea Nitrogen 15 7 - 30 mg/dL    Creatinine 0.73 0.52 - 1.04 mg/dL    GFR Estimate 80 >60 mL/min/1.7m2    GFR Estimate If Black >90   GFR Calc   >60 mL/min/1.7m2    Calcium 8.7 8.5 - 10.1 mg/dL    Bilirubin Total 0.6 0.2 - 1.3 mg/dL    Albumin 3.7 3.4 - 5.0 g/dL    Protein Total 7.5 6.8 - 8.8 g/dL    Alkaline Phosphatase 144 40 - 150 U/L     (H) 0 - 50 U/L     (H) 0 - 45 U/L   Lipase   Result Value Ref Range    Lipase 152 73 - 393 U/L   Troponin I   Result Value Ref Range    Troponin I ES  0.000 - 0.045 ug/L     <0.015  The 99th percentile for upper reference range is 0.045 ug/L.  Troponin values in   the range of 0.045 - 0.120 ug/L may be associated with risks of adverse   clinical events.     UA reflex to Microscopic and Culture   Result Value Ref Range    Color Urine Yellow     Appearance Urine Clear     Glucose Urine Negative NEG mg/dL    Bilirubin Urine Negative NEG    Ketones Urine 10 (A) NEG mg/dL    Specific Gravity Urine 1.009 1.003 - 1.035    Blood Urine Negative NEG    pH Urine 7.0 5.0 - 7.0 pH    Protein Albumin Urine Negative NEG mg/dL    Urobilinogen mg/dL Normal 0.0 - 2.0 mg/dL    Nitrite Urine Positive (A) NEG    Leukocyte Esterase Urine Negative NEG    Source Midstream Urine     RBC Urine 1 0 - 2 /HPF    WBC Urine 2 0 - 2 /HPF    Bacteria Urine Few (A) NEG /HPF    Squamous Epithelial /HPF Urine <1 0 - 1 /HPF    Mucous Urine Present (A) NEG /LPF   CBC with platelets differential   Result Value Ref Range    WBC 11.0 4.0 - 11.0 10e9/L    RBC Count 5.19 3.8 - 5.2 10e12/L    Hemoglobin 15.3 11.7 - 15.7 g/dL    Hematocrit 46.5 35.0 - 47.0 %    MCV 90 78 - 100 fl    MCH 29.5 26.5 - 33.0 pg    MCHC 32.9 31.5 - 36.5 g/dL    RDW 14.2 10.0 - 15.0 %    Platelet Count 254 150 - 450 10e9/L    Diff Method Automated Method     % Neutrophils 47.3 %    % Lymphocytes 39.6 %    %  "Monocytes 8.9 %    % Eosinophils 3.3 %    % Basophils 0.5 %    % Immature Granulocytes 0.4 %    Absolute Neutrophil 5.2 1.6 - 8.3 10e9/L    Absolute Lymphocytes 4.4 0.8 - 5.3 10e9/L    Absolute Monocytes 1.0 0.0 - 1.3 10e9/L    Absolute Eosinophils 0.4 0.0 - 0.7 10e9/L    Absolute Basophils 0.1 0.0 - 0.2 10e9/L    Abs Immature Granulocytes 0.0 0 - 0.4 10e9/L   Comprehensive metabolic panel   Result Value Ref Range    Sodium 141 133 - 144 mmol/L    Potassium 3.7 3.4 - 5.3 mmol/L    Chloride 106 94 - 109 mmol/L    Carbon Dioxide 30 20 - 32 mmol/L    Anion Gap 5 3 - 14 mmol/L    Glucose 129 (H) 70 - 99 mg/dL    Urea Nitrogen 10 7 - 30 mg/dL    Creatinine 0.68 0.52 - 1.04 mg/dL    GFR Estimate 86 >60 mL/min/1.7m2    GFR Estimate If Black >90   GFR Calc   >60 mL/min/1.7m2    Calcium 7.8 (L) 8.5 - 10.1 mg/dL    Bilirubin Total 0.6 0.2 - 1.3 mg/dL    Albumin 3.2 (L) 3.4 - 5.0 g/dL    Protein Total 6.7 (L) 6.8 - 8.8 g/dL    Alkaline Phosphatase 131 40 - 150 U/L     (H) 0 - 50 U/L    AST 74 (H) 0 - 45 U/L   Lipase   Result Value Ref Range    Lipase 164 73 - 393 U/L   Surgical pathology exam   Result Value Ref Range    Copath Report       Patient Name: RONY KOENIG  MR#: [id number]  Specimen #: [id number]  Collected: 7/1/2017  Received: 7/3/2017  Reported: 7/6/2017 18:21  Ordering Phy(s): ROXANNE DAMON    For improved result formatting, select 'View Enhanced Report Format'  under Linked Documents section.    SPECIMEN(S):  Gallbladder and contents    FINAL DIAGNOSIS:  Gallbladder, laparoscopic cholecystectomy:  - Chronic cholecystitis.  - Cholelithiasis.    Electronically signed out by:    Cherise Sandoval M.D.    CLINICAL HISTORY:  66 year old female with cholecystitis with cholelithiasis.    GROSS:  The specimen is received in formalin, labeled with the patient's name  and date of birth, and designated \"Gallbladder and contents\".  It  consists of an intact gallbladder, measuring 7.5 cm in " length by 2.0-3.0  cm in diameter.  The serosa is green tan and glistening.  The cystic  duct is received with a metallic clamp at the proximal margin, and is  grossly patent, measuring 0.5 cm in diameter.  The specimen i s opened to  reveal dark green, viscous bile, and one green tan, ovoid, focally  bosselated, crystalline calculus, measuring 2.0 cm in greatest  dimension.  The mucosa is green and mildly trabeculated.  The wall is  0.2 cm in thickness.  No discrete lesions are identified.  Representative sections including the cystic duct margin and  representative fundus, body, and neck are submitted in one cassette.  (Dictated by: Michelle LAKE 7/3/2017 01:13 PM)    MICROSCOPIC:  Microscopic examination is performed.    CPT Codes:  A: 06725-JP4    TESTING LAB LOCATION:  [address] [id number] [phone]    COLLECTION SITE:  Client: [facility]  Location: [facility] ()     Surgery General IP Consult: Patient to be seen: Routine - within 24 hours; acute cholecystitis; Consultant may enter orders: Yes    Narrative    Tami Barney MD     7/1/2017 10:45 AM  65 y/o female admitted by the hospitalist service due to severe   abdominal pain developed after eating meat loaf last night. The   pain was associated with nausea, chest pain, and right shoulder   pain. Ultrasound done revealed cholelithiasis with thickened GB   wall. The patient wants to address this on this hospitalization,   as she has a planned trip to [place] next week.    PAST MEDICAL HISTORY, PAST SURGICAL HISTORY, MEDICATIONS and   ALLERGIES : all reviewed on EPIC chart    PHYSICAL EXAM :   Temp:  [97.5  F (36.4  C)-98.4  F (36.9  C)] 97.5  F (36.4  C)  Pulse:  [86] 86  Heart Rate:  [83-90] 87  Resp:  [16-20] 18  BP: (129-153)/(61-85) 135/61  SpO2:  [91 %-94 %] 93 %     ABDOMEN : RUQ tenderness (+). Dean's sign is (+). She has a    small incisional hernia near the umbilicus. She also has a right   paramedian, a midline infraumbilical, and a low c/s scar.  EYES: no jaundice    ASSESSMENT / PLAN  : today we discussed her diagnosis as well as   what a laparoscopic cholecystectomy entails including potential   risks and complications including but not limited to infection,   bile leak, damage to the intestines and / or liver. We also   discussed post op restrictions and expectations. The patient will   be sent home later on today if she does well.The patient   verbalized understanding the information given and she wishes to   proceed for which she voluntarily signed the informed consent.   PLAN: Laparoscopic cholecystectomy today.   Urine Culture Aerobic Bacterial   Result Value Ref Range    Specimen Description Midstream Urine     Culture Micro >100,000 colonies/mL Citrobacter koseri (A)     Micro Report Status FINAL 07/03/2017     Organism: >100,000 colonies/mL Citrobacter koseri        Susceptibility    >100,000 colonies/ml citrobacter koseri (matt) -  (no method available)     CEFOXITIN <=4 Susceptible  ug/mL     CEFTAZIDIME <=1 Susceptible  ug/mL     CEFTRIAXONE <=1 Susceptible  ug/mL     CIPROFLOXACIN <=0.25 Susceptible  ug/mL     GENTAMICIN <=1 Susceptible  ug/mL     LEVOFLOXACIN <=0.12 Susceptible  ug/mL     NITROFURANTOIN <=16 Susceptible  ug/mL     TOBRAMYCIN <=1 Susceptible  ug/mL     Piperacillin/Tazo <=4 Susceptible  ug/mL     CEFEPIME <=1 Susceptible  ug/mL          ASSESSMENT/PLAN:                                                    1. Centrilobular emphysema (H)  Stop smoking  Due for PFT  - General PFT Lab (Please always keep checked); Future  - Pulmonary Function Test; Future  - 6 minute walk test; Future  Continue albuterol, duoneb and spiriva     2. Acute cholecystitis  Resolved s/p cholecystectomy.   Keep incision clean and dry. Watch for any infection.  Follow up with surgeon as planned.       Follow up with Provider - Call  or return to the clinic with any worsening of symptoms or no resolution. Patient/Parent verbalized understanding and is in agreement. Medication side effects reviewed.   Current Outpatient Prescriptions   Medication Sig Dispense Refill     ezetimibe (ZETIA) 10 MG tablet Take 1 tablet (10 mg) by mouth daily 90 tablet 3     FLUoxetine (PROZAC) 20 MG capsule TAKE ONE CAPSULE BY MOUTH DAILY 90 capsule 0     torsemide (DEMADEX) 5 MG tablet Take 1 tablet (5 mg) by mouth daily 90 tablet 1     potassium chloride SA (K-DUR/KLOR-CON M) 20 MEQ CR tablet Take 1 tablet (20 mEq) by mouth daily Take 2 tablets on day one 32 tablet 3     aspirin 81 MG chewable tablet Take 1 tablet (81 mg) by mouth daily 100 tablet 3     amLODIPine (NORVASC) 10 MG tablet Take 1 tablet (10 mg) by mouth daily For high blood pressure 90 tablet 4     tiotropium (SPIRIVA RESPIMAT) 2.5 MCG/ACT inhalation aerosol Inhale 2 puffs into the lungs daily 12 g 3     albuterol (ALBUTEROL) 108 (90 BASE) MCG/ACT inhaler Inhale 1-2 puffs into the lungs every 4 hours To 6 hours as needed for shortness of breath 3 Inhaler 1     ipratropium - albuterol 0.5 mg/2.5 mg/3 mL (DUONEB) 0.5-2.5 (3) MG/3ML nebulization Take 1 vial (3 mLs) by nebulization every 6 hours as needed for shortness of breath / dyspnea or wheezing 30 vial 1     STATIN NOT PRESCRIBED, INTENTIONAL, 1 each daily Statin not prescribed intentionally due to Intolerance 0 each 0     Ibuprofen (IBU-200 PO) Take  by mouth.          See Patient Instructions    [facility] Dominguez [facility] on Saturday. If need to contact her after that call the cell phone.

## 2017-07-06 NOTE — MR AVS SNAPSHOT
After Visit Summary   7/6/2017    Aminata Gale    MRN: [id number]           Patient Information     Date Of Birth          1950        Visit Information        Provider Department      7/6/2017 9:00 AM Sana Olivo APRN [facility]        Today's Diagnoses     Centrilobular emphysema (H)    -  1      Care Instructions      Resources for Chronic Lung Disease  There are many chronic lung diseases, such as chronic obstructive lung disease (COPD), which includes chronic bronchitis and emphysema. There are other like pulmonary fibrosis or sarcoidosis. These resources can help you learn more about chronic lung disease, pulmonary rehabilitation, and what you can do to breathe better. They can also help you find support groups in your area.  [facility]  190.315.6427  www.alpha-1foundation.org  American Academy of Allergy, Asthma & Immunology  530.645.9279  www.aaaai.org  American Association of Cardiovascular and Pulmonary Rehabilitation   426.555.2812  www.aacvpr.org  American Cancer Society  394.729.6063  www.cancer.org  American Lung Association  683.519.2235  www.lung.org  American Thoracic Society  509.731.9033  www.thoracic.org   Asthma and Allergy Foundation of [place]  855.887.5670  www.aafa.org  Coalition for Pulmonary Fibrosis  949.582.9471  www.coalitionforpf.org  Cystic Fibrosis Foundation  794.210.7815  www.cff.org  Emphysema Foundation for Our Right to Survive (EFFORTS)  www.emphysema.net  National Heart, Lung, and Blood [place]  621.727.1169  www.nhlbi.nih.gov  National Home Oxygen Patients Association  www.homeoxygen.org  [facility]  639.162.2550  www.nationaljewish.org  National Lung Health Education Program  www.nlhep.org  Pulmonary Education and Research Foundation (PERF)  www.eqjk0pyidhz.org  Pulmonary Hypertension Association  119.653.9131  www.phassociation.org  The Pulmonary Paper  775.430.1391   www.pulmonarypaper.org  [facility] Lung Transplant Association, Inc.  460.388.2321  www.2ndwind.org  Smokefree.gov  361.241.1494  www.smokefree.gov  Well Spouse Association (for family members and other caregivers)  985.110.1016  www.wellspouse.org  YourLungHealth.org  849.621.6048  www.yourluLawBitehealth.org  Date Last Reviewed: 5/1/2016 2000-2017 The [employer]. [address] [place] [address]. All rights reserved. This information is not intended as a substitute for professional medical care. Always follow your healthcare professional's instructions.        Chronic Lung Disease: My Travel Checklist  Chronic lung disease shouldn t stop you from traveling, visiting family and friends, and enjoying yourself--even if you use oxygen. You just need to be prepared. Changes in altitude and climate can affect breathing. This may require changes to your treatment, so talk to your healthcare provider about your plans.    Before traveling  Be prepared before taking a trip:     Get your prescriptions filled. Bring enough medicine for your entire trip.    Get copies of your prescriptions. Ask your healthcare provider if you might need prescriptions for any other medicines while traveling.    Bring a list of your medicines.    Ask your healthcare provider what to do in case of infection. Your healthcare provider may prescribe emergency medicines just in case.    Call your insurance company. Make sure you ll have coverage where you re going.    Get a portable nebulizer (if needed).    If traveling with oxygen, plan ahead:    Call your healthcare provider to get copies of your oxygen prescription.    You may need a letter from your provider stating that you must use oxygen.    You may need to arrange for oxygen to be delivered to your destination. Before you travel, call a medical supply company in the area where you will be staying to make sure it is delivered before your arrival.    Call the airline,  bus, boat, or train company you will be traveling on to find out the requirements for traveling with oxygen.    Give yourself several weeks to make these arrangements.  While traveling  Tips to make traveling easier:     Wear a medical ID bracelet. This should list your medical conditions and any medicines you re allergic to.    Use a hand  often. This helps kill germs and prevent infection.    Keep your medicines in your carry-on bags. This way you ll have them if you get  from your checked luggage.    Use your rescue inhaler before you get up to move around (if one has been prescribed). For example, use it before getting off the plane.    Stretch your arms and legs if you re sitting for long periods of time. This helps keep your blood moving. Try using your ankles and feet to spell out each letter of the alphabet.  Date Last Reviewed: 5/1/2016 2000-2017 The [employer]. [address] [place] [address]. All rights reserved. This information is not intended as a substitute for professional medical care. Always follow your healthcare professional's instructions.        Discharge Instructions: Caring for Your Abdominal Incision  You are going home with stitches (sutures), surgical staples, special strips of tape, or surgical skin glue. One of these items was used to close your incision, help stop bleeding, and speed healing. Follow the tips on this sheet to help your incision heal.   Home care    Clean your work area:    Put pets in another room.    Use soap and water to clean the surface you ll be working on.    Spread a clean cloth or paper towel over the surface.    Move away from the clean surface if you need to cough or sneeze.    Gather your supplies:    Packaged dressing for your wound    Irrigation solutions (if using these)    Pair of scissors (cleaned with soap and water)    Medical tape    Disposable gloves (2 pairs)    Clean plastic trash bag (open it before you  wash your hands)    Wash your hands:    Use liquid soap.    Work up a good lather and scrub for 1 to 2 minutes.    Be sure to scrub between your fingers and under your nails.    Rinse with warm water, keeping your fingers pointed down.    Use a clean paper towel to dry your hands and turn off the faucet.    Prepare your dressing supplies:    Peel back the edges of the dressing packages. Pour any irrigation solutions into solution cups.    Cut each piece of tape 4 inches longer than the dressing.    Remove the old dressing:    Put on disposable gloves.    Loosen the tape on the dressing by pulling gently toward the incision. Remove the dressing one layer at a time. Put it in the plastic bag immediately.    Remove your gloves and put them in the plastic bag. Wash your hands.    Put on a new pair of gloves.    Clean and dress the incision:    Clean the incision and apply a new dressing as directed.    Do not remove the special strips of tape even if they are starting to loosen.     Put all used supplies in the plastic bag. Remove your gloves last and put them in the plastic bag. Seal the bag and put it in the trash.    Be sure to wash your hands again.  Care for specific closures  Follow these guidelines unless your healthcare provider tells you otherwise:    Sutures or staples. Once you no longer need to keep these dry, clean the wound daily, using the instructions listed above. First remove the bandage using clean hands. Then wash the area gently with soap and warm water. Use a wet cotton swab to loosen and remove any blood or crust that forms. After cleaning, put a thin layer of antibiotic ointment on. Then put on a new bandage.    Skin glue. Don t put liquid, ointment, or cream on your wound while the glue is in place. Avoid activities that cause heavy sweating. Protect the wound from sunlight. Do not scratch, rub, or pick at the glue. Do not put tape directly over the glue. The glue should peel off within 5 to 10  days.    Surgical tape. Keep the area dry. If it gets wet, blot the area dry with a clean towel. Surgical tape usually falls off within 7 to 10 days. If it has not fallen off after 10 days, contact your healthcare provider before taking it off yourself. If you are told to remove the tape, put mineral oil or petroleum jelly on a cotton ball. Gently rub the tape until it is removed.  Follow-up care  Follow up with your healthcare provider to ask how long sutures or staples should be left in place. Be sure to return for suture or staple removal as directed.  If tape closures were used, remove them yourself when your provider recommends if they have not fallen off on their own. If skin glue was used, the glue will wear off by itself.     When to call your healthcare provider  Call your healthcare provider right away if you have any of the following:    More pain, bleeding, redness, swelling, or foul-smelling discharge around the incision area    Fever of 100.4 F (38 C) or higher, or as directed by your healthcare provider    Shaking chills    Vomiting or nausea that doesn t go away    Numbness, coldness, or tingling around the incision area, or changes in skin color    Opening of sutures or wound    Stitches or staples come apart or fall out or surgical tape falls off before 7 days, or as directed by your provider   Date Last Reviewed: 8/1/2016 2000-2017 The [employer]. [address] [place] [address]. All rights reserved. This information is not intended as a substitute for professional medical care. Always follow your healthcare professional's instructions.                Follow-ups after your visit        Future tests that were ordered for you today     Open Future Orders        Priority Expected Expires Ordered    6 minute walk test Routine  7/6/2018 7/6/2017    General PFT Lab (Please always keep checked) Routine  7/6/2018 7/6/2017    Pulmonary Function Test Routine  7/6/2018 7/6/2017             Who to contact     If you have questions or need follow up information about today's clinic visit or your schedule please contact [facility] directly at [phone].  Normal or non-critical lab and imaging results will be communicated to you by [facility], letter or phone within 4 business days after the clinic has received the results. If you do not hear from us within 7 days, please contact the clinic through [facility] or phone. If you have a critical or abnormal lab result, we will notify you by phone as soon as possible.  Submit refill requests through [employer] or call your pharmacy and they will forward the refill request to us. Please allow 3 business days for your refill to be completed.          Additional Information About Your Visit        [employer] Information     [employer] gives you secure access to your electronic health record. If you see a primary care provider, you can also send messages to your care team and make appointments. If you have questions, please call your primary care clinic.  If you do not have a primary care provider, please call [phone] and they will assist you.        Care EveryWhere ID     This is your Care EveryWhere ID. This could be used by other organizations to access your [facility] medical records  [id number]        Your Vitals Were     Pulse Temperature Pulse Oximetry BMI (Body Mass Index)          60 97.3  F (36.3  C) (Tympanic) 99% 28.9 kg/m2         Blood Pressure from Last 3 Encounters:   07/06/17 122/60   07/02/17 112/59   06/16/17 132/74    Weight from Last 3 Encounters:   07/06/17 148 lb (67.1 kg)   07/01/17 153 lb 3.5 oz (69.5 kg)   04/27/17 150 lb (68 kg)                 Today's Medication Changes          These changes are accurate as of: 7/6/17  9:25 AM.  If you have any questions, ask your nurse or doctor.               Stop taking these medicines if you haven't already. Please contact your care team if you have questions.      HYDROcodone-acetaminophen 5-325 MG per tablet   Commonly known as:  NORCO   Stopped by:  Sana Olivo APRN CNP                    Primary Care Provider Office Phone # Fax #    PEDRO Dominguez [phone]       [facility] [address] [facility] [address]        Equal Access to Services     CURTIS ARGUELLO : Hadii aad ku hadasho Soomaali, waaxda luqadaha, qaybta kaalmada adeegyada, waxay idiin hayaan adeeg kharash la'aan . So [facility] [place] [phone].    ATENCIÓN: Si habla español, tiene a marquez disposición servicios gratuitos de asistencia lingüística. Lizabeth al [phone].    We comply with applicable federal civil rights laws and [place] laws. We do not discriminate on the basis of race, color, national origin, age, disability sex, sexual orientation or gender identity.            Thank you!     Thank you for choosing [facility]  for your care. Our goal is always to provide you with excellent care. Hearing back from our patients is one way we can continue to improve our services. Please take a few minutes to complete the written survey that you may receive in the mail after your visit with us. Thank you!             Your Updated Medication List - Protect others around you: Learn how to safely use, store and throw away your medicines at www.disposemymeds.org.          This list is accurate as of: 7/6/17  9:25 AM.  Always use your most recent med list.                   Brand Name Dispense Instructions for use Diagnosis    albuterol 108 (90 BASE) MCG/ACT Inhaler    albuterol    3 Inhaler    Inhale 1-2 puffs into the lungs every 4 hours To 6 hours as needed for shortness of breath    COPD exacerbation (H)       amLODIPine 10 MG tablet    NORVASC    90 tablet    Take 1 tablet (10 mg) by mouth daily For high blood pressure    HTN, goal below 140/90       aspirin 81 MG chewable tablet     100 tablet    Take 1 tablet (81 mg) by mouth daily    Atypical chest  pain, Abnormal cardiovascular stress test       ezetimibe 10 MG tablet    ZETIA    90 tablet    Take 1 tablet (10 mg) by mouth daily    Mixed hyperlipidemia       FLUoxetine 20 MG capsule    PROzac    90 capsule    TAKE ONE CAPSULE BY MOUTH DAILY    Adjustment disorder with depressed mood, Anxiety       IBU-200 PO      Take  by mouth.        ipratropium - albuterol 0.5 mg/2.5 mg/3 mL 0.5-2.5 (3) MG/3ML neb solution    DUONEB    30 vial    Take 1 vial (3 mLs) by nebulization every 6 hours as needed for shortness of breath / dyspnea or wheezing    Bronchial pneumonia       potassium chloride SA 20 MEQ CR tablet    K-DUR/KLOR-CON M    32 tablet    Take 1 tablet (20 mEq) by mouth daily Take 2 tablets on day one    Hypokalemia       STATIN NOT PRESCRIBED (INTENTIONAL)     0 each    1 each daily Statin not prescribed intentionally due to Intolerance    Hyperlipidemia LDL goal <130       tiotropium 2.5 MCG/ACT inhalation aerosol    SPIRIVA RESPIMAT    12 g    Inhale 2 puffs into the lungs daily    Moderate persistent asthma without complication, Chronic obstructive pulmonary disease, unspecified COPD type (H)       torsemide 5 MG tablet    DEMADEX    90 tablet    Take 1 tablet (5 mg) by mouth daily    Essential hypertension, benign

## 2017-07-06 NOTE — PATIENT INSTRUCTIONS
Resources for Chronic Lung Disease  There are many chronic lung diseases, such as chronic obstructive lung disease (COPD), which includes chronic bronchitis and emphysema. There are other like pulmonary fibrosis or sarcoidosis. These resources can help you learn more about chronic lung disease, pulmonary rehabilitation, and what you can do to breathe better. They can also help you find support groups in your area.  [facility]  [phone]  www.[address].org  American Academy of Allergy, Asthma & Immunology  611.394.1543  www.aaaai.org  American Association of Cardiovascular and Pulmonary Rehabilitation   502.613.2088  www.aacvpr.org  American Cancer Society  913.710.9292  www.cancer.org  American Lung Association  396.835.1251  www.lung.org  American Thoracic Society  784.874.4141  www.thoracic.org   Asthma and Allergy Foundation of [place]  [phone]  www.aafa.org  Coalition for Pulmonary Fibrosis  561.843.8793  www.coalitionforpf.org  Cystic Fibrosis Foundation  616.271.7940  www.cff.org  Emphysema Foundation for Our Right to Survive (EFFORTS)  www.emphysema.net  National Heart, Lung, and Blood [place]  900.697.6912  www.nhlbi.nih.gov  National Home Oxygen Patients Association  www.homeoxygen.org  [facility]  307.640.2066  www.nationaljewish.org  National Lung Health Education Program  www.nlhep.org  Pulmonary Education and Research Foundation (PERF)  www.wtfd2jymvmu.org  Pulmonary Hypertension Association  432.984.9887  www.phassociation.org  The Pulmonary Paper  546.495.5284  www.pulmonarypaper.org  [facility] Lung Transplant Association, Inc.  955.915.4926  www.2ndwind.org  Smokefree.gov  488.536.4952  www.smokefree.gov  Well Spouse Association (for family members and other caregivers)  373.113.3664  www.wellspouse.org  YourLungHealth.org  526.827.6586  www.yourlunghealth.org  Date Last Reviewed: 5/1/2016 2000-2017 The [employer]. [address] [place] [address]  [facility], [place] [address]. All rights reserved. This information is not intended as a substitute for professional medical care. Always follow your healthcare professional's instructions.        Chronic Lung Disease: My Travel Checklist  Chronic lung disease shouldn t stop you from traveling, visiting family and friends, and enjoying yourself even if you use oxygen. You just need to be prepared. Changes in altitude and climate can affect breathing. This may require changes to your treatment, so talk to your healthcare provider about your plans.    Before traveling  Be prepared before taking a trip:     Get your prescriptions filled. Bring enough medicine for your entire trip.    Get copies of your prescriptions. Ask your healthcare provider if you might need prescriptions for any other medicines while traveling.    Bring a list of your medicines.    Ask your healthcare provider what to do in case of infection. Your healthcare provider may prescribe emergency medicines just in case.    Call your insurance company. Make sure you ll have coverage where you re going.    Get a portable nebulizer (if needed).    If traveling with oxygen, plan ahead:    Call your healthcare provider to get copies of your oxygen prescription.    You may need a letter from your provider stating that you must use oxygen.    You may need to arrange for oxygen to be delivered to your destination. Before you travel, call a medical supply company in the area where you will be staying to make sure it is delivered before your arrival.    Call the airline, bus, boat, or train company you will be traveling on to find out the requirements for traveling with oxygen.    Give yourself several weeks to make these arrangements.  While traveling  Tips to make traveling easier:     Wear a medical ID bracelet. This should list your medical conditions and any medicines you re allergic to.    Use a hand  often. This helps kill germs and prevent  infection.    Keep your medicines in your carry-on bags. This way you ll have them if you get  from your checked luggage.    Use your rescue inhaler before you get up to move around (if one has been prescribed). For example, use it before getting off the plane.    Stretch your arms and legs if you re sitting for long periods of time. This helps keep your blood moving. Try using your ankles and feet to spell out each letter of the alphabet.  Date Last Reviewed: 5/1/2016 2000-2017 [employer]. [address] [place] [address]. All rights reserved. This information is not intended as a substitute for professional medical care. Always follow your healthcare professional's instructions.        Discharge Instructions: Caring for Your Abdominal Incision  You are going home with stitches (sutures), surgical staples, special strips of tape, or surgical skin glue. One of these items was used to close your incision, help stop bleeding, and speed healing. Follow the tips on this sheet to help your incision heal.   Home care    Clean your work area:    Put pets in another room.    Use soap and water to clean the surface you ll be working on.    Spread a clean cloth or paper towel over the surface.    Move away from the clean surface if you need to cough or sneeze.    Gather your supplies:    Packaged dressing for your wound    Irrigation solutions (if using these)    Pair of scissors (cleaned with soap and water)    Medical tape    Disposable gloves (2 pairs)    Clean plastic trash bag (open it before you wash your hands)    Wash your hands:    Use liquid soap.    Work up a good lather and scrub for 1 to 2 minutes.    Be sure to scrub between your fingers and under your nails.    Rinse with warm water, keeping your fingers pointed down.    Use a clean paper towel to dry your hands and turn off the faucet.    Prepare your dressing supplies:    Peel back the edges of the dressing packages. Pour  any irrigation solutions into solution cups.    Cut each piece of tape 4 inches longer than the dressing.    Remove the old dressing:    Put on disposable gloves.    Loosen the tape on the dressing by pulling gently toward the incision. Remove the dressing one layer at a time. Put it in the plastic bag immediately.    Remove your gloves and put them in the plastic bag. Wash your hands.    Put on a new pair of gloves.    Clean and dress the incision:    Clean the incision and apply a new dressing as directed.    Do not remove the special strips of tape even if they are starting to loosen.     Put all used supplies in the plastic bag. Remove your gloves last and put them in the plastic bag. Seal the bag and put it in the trash.    Be sure to wash your hands again.  Care for specific closures  Follow these guidelines unless your healthcare provider tells you otherwise:    Sutures or staples. Once you no longer need to keep these dry, clean the wound daily, using the instructions listed above. First remove the bandage using clean hands. Then wash the area gently with soap and warm water. Use a wet cotton swab to loosen and remove any blood or crust that forms. After cleaning, put a thin layer of antibiotic ointment on. Then put on a new bandage.    Skin glue. Don t put liquid, ointment, or cream on your wound while the glue is in place. Avoid activities that cause heavy sweating. Protect the wound from sunlight. Do not scratch, rub, or pick at the glue. Do not put tape directly over the glue. The glue should peel off within 5 to 10 days.    Surgical tape. Keep the area dry. If it gets wet, blot the area dry with a clean towel. Surgical tape usually falls off within 7 to 10 days. If it has not fallen off after 10 days, contact your healthcare provider before taking it off yourself. If you are told to remove the tape, put mineral oil or petroleum jelly on a cotton ball. Gently rub the tape until it is removed.  Follow-up  care  Follow up with your healthcare provider to ask how long sutures or staples should be left in place. Be sure to return for suture or staple removal as directed.  If tape closures were used, remove them yourself when your provider recommends if they have not fallen off on their own. If skin glue was used, the glue will wear off by itself.     When to call your healthcare provider  Call your healthcare provider right away if you have any of the following:    More pain, bleeding, redness, swelling, or foul-smelling discharge around the incision area    Fever of 100.4 F (38 C) or higher, or as directed by your healthcare provider    Shaking chills    Vomiting or nausea that doesn t go away    Numbness, coldness, or tingling around the incision area, or changes in skin color    Opening of sutures or wound    Stitches or staples come apart or fall out or surgical tape falls off before 7 days, or as directed by your provider   Date Last Reviewed: 8/1/2016 2000-2017 [employer]. [address] [place] [address], [place] [address]. All rights reserved. This information is not intended as a substitute for professional medical care. Always follow your healthcare professional's instructions.

## 2017-07-07 ASSESSMENT — ASTHMA QUESTIONNAIRES: ACT_TOTALSCORE: 20

## 2017-12-12 DIAGNOSIS — F43.21 ADJUSTMENT DISORDER WITH DEPRESSED MOOD: ICD-10-CM

## 2017-12-12 DIAGNOSIS — F41.9 ANXIETY: ICD-10-CM

## 2017-12-28 ENCOUNTER — OFFICE VISIT (OUTPATIENT)
Dept: CARDIOLOGY | Facility: CLINIC | Age: 67
End: 2017-12-28
Attending: INTERNAL MEDICINE
Payer: COMMERCIAL

## 2017-12-28 VITALS
HEART RATE: 85 BPM | DIASTOLIC BLOOD PRESSURE: 72 MMHG | BODY MASS INDEX: 29.69 KG/M2 | OXYGEN SATURATION: 92 % | SYSTOLIC BLOOD PRESSURE: 124 MMHG | WEIGHT: 152 LBS

## 2017-12-28 DIAGNOSIS — I51.89 DIASTOLIC DYSFUNCTION: Primary | ICD-10-CM

## 2017-12-28 DIAGNOSIS — I25.10 NONOCCLUSIVE CORONARY ATHEROSCLEROSIS OF NATIVE CORONARY ARTERY: ICD-10-CM

## 2017-12-28 DIAGNOSIS — Z98.890 STATUS POST CORONARY ANGIOGRAM: ICD-10-CM

## 2017-12-28 DIAGNOSIS — I10 ESSENTIAL HYPERTENSION, BENIGN: ICD-10-CM

## 2017-12-28 PROCEDURE — 99214 OFFICE O/P EST MOD 30 MIN: CPT | Performed by: INTERNAL MEDICINE

## 2017-12-28 NOTE — PROGRESS NOTES
CARDIOLOGY VISIT    REASON FOR VISIT: f/u nonocclusive CAD, diastolic dysfunction    SUBJECTIVE:  66-year-old female seen for follow-up after angiogram after abnormal stress test. Her cardiac risk factors include dyslipidemia with a history of statin intolerance, tobacco abuse, hypertension, and lupus.    She has a history of COPD. PFTs in 2013 showed FEV1 of 1.1, FVC 1.9, DLCO 47% predicted.       Stress echo February 2017 showed 3:30 on the Dino protocol, 83% maximum heart rate achieved, no chest pain during exercise, EKG negative for ischemia, rest echo with EF of 50-55%, stress echo shows minimal improvement of ejection fraction to 55-60%, distal anterior wall hypokinetic.        Lexiscan nuclear stress test April 2017 shows small apical infarct, no ischemia, ejection fraction 60%, apical hypokinesis.        Coronary angiogram April 2017 showed mild disease of the LAD, which does not quite reach the apex, mild disease of the circumflex, dominant RCA with minimal plaque. LV gram showed ejection fraction of 55-60% with small area of apical akinesis, LVEDP 28 mmHg.       Echo April 27, 2017 shows ejection fraction 55%, no wall motion abnormality, mild LVH, no significant valve disease.     She's been feeling about the same in the past 6 months.  She continues to have an intermittent ache in her chest that is nonexertional.  She has some mild lower extremity edema.  November she stopped her torsemide because she started taking some ibuprofen for low back pain.    MEDICATIONS:  Current Outpatient Prescriptions   Medication     FLUoxetine (PROZAC) 20 MG capsule     ezetimibe (ZETIA) 10 MG tablet     amLODIPine (NORVASC) 10 MG tablet     tiotropium (SPIRIVA RESPIMAT) 2.5 MCG/ACT inhalation aerosol     albuterol (ALBUTEROL) 108 (90 BASE) MCG/ACT inhaler     ipratropium - albuterol 0.5 mg/2.5 mg/3 mL (DUONEB) 0.5-2.5 (3) MG/3ML nebulization     STATIN NOT PRESCRIBED, INTENTIONAL,     Ibuprofen (IBU-200 PO)     torsemide  (DEMADEX) 5 MG tablet     potassium chloride SA (K-DUR/KLOR-CON M) 20 MEQ CR tablet     No current facility-administered medications for this visit.        ALLERGIES:  Allergies   Allergen Reactions     Amoxicillin Difficulty breathing and Rash     Ampicillin Difficulty breathing and Rash     Cipro [Ciprofloxacin] Difficulty breathing and Rash     Keflex [Cephalexin Monohydrate] Difficulty breathing and Rash     Penicillin [Penicillins] Difficulty breathing and Rash     Sulfa Drugs Difficulty breathing and Rash     Tetracycline Difficulty breathing and Rash     Biaxin [Clarithromycin] Rash     Ceclor [Cefaclor] Hives and Swelling     Advair Diskus Hives     Hives on face,neck and chest     Egg White      Fish Shortness Of Breath     Mustard [Allyl Isothiocyanate]      Throat swelling       REVIEW OF SYSTEMS:  Constitutional:  No weight loss, fever, chills, weakness or fatigue.  HEENT:  Eyes:  No visual loss, blurred vision, double vision or yellow sclerae. No hearing loss, sneezing, congestion, runny nose or sore throat.  Skin:  No rash or itching.  Cardiovascular: per HPI  Respiratory: per HPI  GI:  No anorexia, nausea, vomiting or diarrhea. No abdominal pain or blood.  :  No dysurea, hematuria  Neurologic:  No headache, dizziness, syncope, paralysis, ataxia, numbness or tingling in the extremities. No change in bowel or bladder control.  Musculoskeletal:  No muscle, back pain, joint pain or stiffness.  Hematologic:  No anemia, bleeding or bruising.  Lymphatics:  No enlarged nodes. No history of splenectomy.  Psychiatric:  No history of depression or anxiety.  Endocrine:  No reports of sweating, cold or heat intolerance. No polyuria or polydipsia.  Allergies:  No history of asthma, hives, eczema or rhinitis.    PHYSICAL EXAM:  /72  Pulse 85  Wt 68.9 kg (152 lb)  SpO2 92%  BMI 29.69 kg/m2  Constitutional: awake, alert, no distress  Eyes: PERRL, sclera nonicteric  ENT: trachea midline  Respiratory: Mildly  coarse breath sounds, no crackles  Cardiovascular: Regular rate and rhythm, no murmurs  GI: nondistended, nontender, bowel sounds present  Lymph/Hematologic: no lymphadenopathy  Skin: dry, no rash  Musculoskeletal: good muscle tone, strength 5/5 in upper and lower extremities  Neurologic: no focal deficits  Neuropsychiatric: appropriate affact    ASSESSMENT:  66-year-old female seen for follow-up of nonobstructive coronary disease and diastolic dysfunction.  Clinically she is quite stable.  She has some mild fluid retention, she was encouraged to go back on her torsemide.  Her chest ache is likely noncardiac as there is no cough or lesion on angiogram earlier this year.  Blood pressure seems reasonably well-controlled.    RECOMMENDATIONS:  1.  Non-obstructive coronary artery disease  - Continue medical therapy  - She is intolerant to statins, continue Zetia    2.  Diastolic dysfunction  - Restart torsemide  - Strict blood pressure control    Follow-up in 1 year.    Adriano Santana MD  Cardiology - [facility] [place] [facility]  Pager:  [phone]  Text Page  December 28, 2017

## 2017-12-28 NOTE — LETTER
12/28/2017    Sana Olivo, PEDRO CNP  [address] [facility] [address]    RE: Aminata Gale       Dear Colleague,    I had the pleasure of seeing Aminata Gale in the [facility].    CARDIOLOGY VISIT    REASON FOR VISIT: f/u nonocclusive CAD, diastolic dysfunction    SUBJECTIVE:  66-year-old female seen for follow-up after angiogram after abnormal stress test. Her cardiac risk factors include dyslipidemia with a history of statin intolerance, tobacco abuse, hypertension, and lupus.    She has a history of COPD. PFTs in 2013 showed FEV1 of 1.1, FVC 1.9, DLCO 47% predicted.       Stress echo February 2017 showed 3:30 on the Dino protocol, 83% maximum heart rate achieved, no chest pain during exercise, EKG negative for ischemia, rest echo with EF of 50-55%, stress echo shows minimal improvement of ejection fraction to 55-60%, distal anterior wall hypokinetic.        Lexiscan nuclear stress test April 2017 shows small apical infarct, no ischemia, ejection fraction 60%, apical hypokinesis.        Coronary angiogram April 2017 showed mild disease of the LAD, which does not quite reach the apex, mild disease of the circumflex, dominant RCA with minimal plaque. LV gram showed ejection fraction of 55-60% with small area of apical akinesis, LVEDP 28 mmHg.       Echo April 27, 2017 shows ejection fraction 55%, no wall motion abnormality, mild LVH, no significant valve disease.     She's been feeling about the same in the past 6 months.  She continues to have an intermittent ache in her chest that is nonexertional.  She has some mild lower extremity edema.  November she stopped her torsemide because she started taking some ibuprofen for low back pain.    MEDICATIONS:  Current Outpatient Prescriptions   Medication     FLUoxetine (PROZAC) 20 MG capsule     ezetimibe (ZETIA) 10 MG tablet     amLODIPine (NORVASC) 10 MG tablet     tiotropium (SPIRIVA RESPIMAT) 2.5 MCG/ACT inhalation  aerosol     albuterol (ALBUTEROL) 108 (90 BASE) MCG/ACT inhaler     ipratropium - albuterol 0.5 mg/2.5 mg/3 mL (DUONEB) 0.5-2.5 (3) MG/3ML nebulization     STATIN NOT PRESCRIBED, INTENTIONAL,     Ibuprofen (IBU-200 PO)     torsemide (DEMADEX) 5 MG tablet     potassium chloride SA (K-DUR/KLOR-CON M) 20 MEQ CR tablet     No current facility-administered medications for this visit.        ALLERGIES:  Allergies   Allergen Reactions     Amoxicillin Difficulty breathing and Rash     Ampicillin Difficulty breathing and Rash     Cipro [Ciprofloxacin] Difficulty breathing and Rash     Keflex [Cephalexin Monohydrate] Difficulty breathing and Rash     Penicillin [Penicillins] Difficulty breathing and Rash     Sulfa Drugs Difficulty breathing and Rash     Tetracycline Difficulty breathing and Rash     Biaxin [Clarithromycin] Rash     Ceclor [Cefaclor] Hives and Swelling     Advair Diskus Hives     Hives on face,neck and chest     Egg White      Fish Shortness Of Breath     Mustard [Allyl Isothiocyanate]      Throat swelling       REVIEW OF SYSTEMS:  Constitutional:  No weight loss, fever, chills, weakness or fatigue.  HEENT:  Eyes:  No visual loss, blurred vision, double vision or yellow sclerae. No hearing loss, sneezing, congestion, runny nose or sore throat.  Skin:  No rash or itching.  Cardiovascular: per HPI  Respiratory: per HPI  GI:  No anorexia, nausea, vomiting or diarrhea. No abdominal pain or blood.  :  No dysurea, hematuria  Neurologic:  No headache, dizziness, syncope, paralysis, ataxia, numbness or tingling in the extremities. No change in bowel or bladder control.  Musculoskeletal:  No muscle, back pain, joint pain or stiffness.  Hematologic:  No anemia, bleeding or bruising.  Lymphatics:  No enlarged nodes. No history of splenectomy.  Psychiatric:  No history of depression or anxiety.  Endocrine:  No reports of sweating, cold or heat intolerance. No polyuria or polydipsia.  Allergies:  No history of asthma,  hives, eczema or rhinitis.    PHYSICAL EXAM:  /72  Pulse 85  Wt 68.9 kg (152 lb)  SpO2 92%  BMI 29.69 kg/m2  Constitutional: awake, alert, no distress  Eyes: PERRL, sclera nonicteric  ENT: trachea midline  Respiratory: Mildly coarse breath sounds, no crackles  Cardiovascular: Regular rate and rhythm, no murmurs  GI: nondistended, nontender, bowel sounds present  Lymph/Hematologic: no lymphadenopathy  Skin: dry, no rash  Musculoskeletal: good muscle tone, strength 5/5 in upper and lower extremities  Neurologic: no focal deficits  Neuropsychiatric: appropriate affact    ASSESSMENT:  66-year-old female seen for follow-up of nonobstructive coronary disease and diastolic dysfunction.  Clinically she is quite stable.  She has some mild fluid retention, she was encouraged to go back on her torsemide.  Her chest ache is likely noncardiac as there is no cough or lesion on angiogram earlier this year.  Blood pressure seems reasonably well-controlled.    RECOMMENDATIONS:  1.  Non-obstructive coronary artery disease  - Continue medical therapy  - She is intolerant to statins, continue Zetia    2.  Diastolic dysfunction  - Restart torsemide  - Strict blood pressure control    Follow-up in 1 year.    Adriano Santana MD  Cardiology - [facility]  Pager:  [phone]  Text Page  December 28, 2017    Thank you for allowing me to participate in the care of your patient.    Sincerely,     Adriano Santana MD     [facility]

## 2017-12-28 NOTE — MR AVS SNAPSHOT
After Visit Summary   12/28/2017    Aminata Gale    MRN: [id number]           Patient Information     Date Of Birth          1950        Visit Information        Provider Department      12/28/2017 2:30 PM Adriano Santana MD [facility]        Today's Diagnoses     Diastolic dysfunction    -  1    Status post coronary angiogram        Essential hypertension, benign        Nonocclusive coronary atherosclerosis of native coronary artery           Follow-ups after your visit        Additional Services     Follow-Up with Cardiac Advanced Practice Provider                 Future tests that were ordered for you today     Open Future Orders        Priority Expected Expires Ordered    Follow-Up with Cardiac Advanced Practice Provider Routine 12/28/2018 12/29/2018 12/28/2017            Who to contact     If you have questions or need follow up information about today's clinic visit or your schedule please contact [facility] directly at [phone].  Normal or non-critical lab and imaging results will be communicated to you by MyChart, letter or phone within 4 business days after the clinic has received the results. If you do not hear from us within 7 days, please contact the clinic through [employer] or phone. If you have a critical or abnormal lab result, we will notify you by phone as soon as possible.  Submit refill requests through [employer] or call your pharmacy and they will forward the refill request to us. Please allow 3 business days for your refill to be completed.          Additional Information About Your Visit        MyChart Information     [employer] gives you secure access to your electronic health record. If you see a primary care provider, you can also send messages to your care team and make appointments. If you have questions, please call your primary care clinic.  If you do not have a primary care  provider, please call [phone] and they will assist you.        Care EveryWhere ID     This is your Care EveryWhere ID. This could be used by other organizations to access your [facility] medical records  [id number]        Your Vitals Were     Pulse Pulse Oximetry BMI (Body Mass Index)             85 92% 29.69 kg/m2          Blood Pressure from Last 3 Encounters:   12/28/17 124/72   07/06/17 122/60   07/02/17 112/59    Weight from Last 3 Encounters:   12/28/17 68.9 kg (152 lb)   07/06/17 67.1 kg (148 lb)   07/01/17 69.5 kg (153 lb 3.5 oz)              We Performed the Following     Follow-Up with Cardiologist          Today's Medication Changes          These changes are accurate as of: 12/28/17  2:55 PM.  If you have any questions, ask your nurse or doctor.               Stop taking these medicines if you haven't already. Please contact your care team if you have questions.     aspirin 81 MG chewable tablet   Stopped by:  Adriano Santana MD                    Primary Care Provider Office Phone # Fax #    Sana Lamar Geovani, APRN [facility] [phone]       [address] [place] [address]        Equal Access to Services     CURTIS ARGUELLO : Hadii aad ku hadasho Soomaali, waaxda luqadaha, qaybta kaalmada adeegyada, negra acevedo. So [facility] [phone].    ATENCIÓN: Si habla español, tiene a marquez disposición servicios gratuitos de asistencia lingüística. Llame al [phone].    We comply with applicable federal civil rights laws and [place] laws. We do not discriminate on the basis of race, color, national origin, age, disability, sex, sexual orientation, or gender identity.            Thank you!     Thank you for choosing [facility]  for your care. Our goal is always to provide you with excellent care. Hearing back from our patients is one way we can continue to improve our services. Please take a few minutes to complete  the written survey that you may receive in the mail after your visit with us. Thank you!             Your Updated Medication List - Protect others around you: Learn how to safely use, store and throw away your medicines at [email or web address].          This list is accurate as of: 12/28/17  2:55 PM.  Always use your most recent med list.                   Brand Name Dispense Instructions for use Diagnosis    albuterol 108 (90 BASE) MCG/ACT Inhaler    PROAIR HFA    3 Inhaler    Inhale 1-2 puffs into the lungs every 4 hours To 6 hours as needed for shortness of breath    COPD exacerbation (H)       amLODIPine 10 MG tablet    NORVASC    90 tablet    Take 1 tablet (10 mg) by mouth daily For high blood pressure    HTN, goal below 140/90       ezetimibe 10 MG tablet    ZETIA    90 tablet    Take 1 tablet (10 mg) by mouth daily    Mixed hyperlipidemia       FLUoxetine 20 MG capsule    PROzac    90 capsule    TAKE ONE CAPSULE BY MOUTH DAILY    Adjustment disorder with depressed mood, Anxiety       IBU-200 PO      Take  by mouth.        ipratropium - albuterol 0.5 mg/2.5 mg/3 mL 0.5-2.5 (3) MG/3ML neb solution    DUONEB    30 vial    Take 1 vial (3 mLs) by nebulization every 6 hours as needed for shortness of breath / dyspnea or wheezing    Bronchial pneumonia       potassium chloride SA 20 MEQ CR tablet    K-DUR/KLOR-CON M    32 tablet    Take 1 tablet (20 mEq) by mouth daily Take 2 tablets on day one    Hypokalemia       STATIN NOT PRESCRIBED (INTENTIONAL)     0 each    1 each daily Statin not prescribed intentionally due to Intolerance    Hyperlipidemia LDL goal <130       tiotropium 2.5 MCG/ACT inhalation aerosol    SPIRIVA RESPIMAT    12 g    Inhale 2 puffs into the lungs daily    Moderate persistent asthma without complication, Chronic obstructive pulmonary disease, unspecified COPD type (H)       torsemide 5 MG tablet    DEMADEX    90 tablet    Take 1 tablet (5 mg) by mouth daily    Essential hypertension, benign

## 2018-01-30 ENCOUNTER — OFFICE VISIT (OUTPATIENT)
Dept: FAMILY MEDICINE | Facility: CLINIC | Age: 68
End: 2018-01-30
Payer: COMMERCIAL

## 2018-01-30 ENCOUNTER — RADIANT APPOINTMENT (OUTPATIENT)
Dept: GENERAL RADIOLOGY | Facility: CLINIC | Age: 68
End: 2018-01-30
Attending: NURSE PRACTITIONER
Payer: COMMERCIAL

## 2018-01-30 VITALS
SYSTOLIC BLOOD PRESSURE: 110 MMHG | BODY MASS INDEX: 28.9 KG/M2 | TEMPERATURE: 97.3 F | OXYGEN SATURATION: 99 % | RESPIRATION RATE: 14 BRPM | DIASTOLIC BLOOD PRESSURE: 62 MMHG | WEIGHT: 148 LBS | HEART RATE: 62 BPM

## 2018-01-30 DIAGNOSIS — J44.9 CHRONIC OBSTRUCTIVE PULMONARY DISEASE, UNSPECIFIED COPD TYPE (H): ICD-10-CM

## 2018-01-30 DIAGNOSIS — J44.1 COPD EXACERBATION (H): ICD-10-CM

## 2018-01-30 DIAGNOSIS — M25.551 BILATERAL HIP PAIN: ICD-10-CM

## 2018-01-30 DIAGNOSIS — M26.609 TMJ (TEMPOROMANDIBULAR JOINT SYNDROME): ICD-10-CM

## 2018-01-30 DIAGNOSIS — I10 HTN, GOAL BELOW 140/90: ICD-10-CM

## 2018-01-30 DIAGNOSIS — M25.552 BILATERAL HIP PAIN: ICD-10-CM

## 2018-01-30 DIAGNOSIS — J45.40 MODERATE PERSISTENT ASTHMA WITHOUT COMPLICATION: ICD-10-CM

## 2018-01-30 DIAGNOSIS — F43.21 ADJUSTMENT DISORDER WITH DEPRESSED MOOD: ICD-10-CM

## 2018-01-30 DIAGNOSIS — M32.9 SYSTEMIC LUPUS ERYTHEMATOSUS, UNSPECIFIED SLE TYPE, UNSPECIFIED ORGAN INVOLVEMENT STATUS (H): ICD-10-CM

## 2018-01-30 DIAGNOSIS — M25.551 BILATERAL HIP PAIN: Primary | ICD-10-CM

## 2018-01-30 DIAGNOSIS — F41.9 ANXIETY: ICD-10-CM

## 2018-01-30 DIAGNOSIS — M25.552 BILATERAL HIP PAIN: Primary | ICD-10-CM

## 2018-01-30 DIAGNOSIS — E78.2 MIXED HYPERLIPIDEMIA: ICD-10-CM

## 2018-01-30 DIAGNOSIS — I10 ESSENTIAL HYPERTENSION, BENIGN: ICD-10-CM

## 2018-01-30 DIAGNOSIS — E87.6 HYPOKALEMIA: ICD-10-CM

## 2018-01-30 PROCEDURE — 99215 OFFICE O/P EST HI 40 MIN: CPT | Performed by: NURSE PRACTITIONER

## 2018-01-30 PROCEDURE — 73523 X-RAY EXAM HIPS BI 5/> VIEWS: CPT | Mod: FY

## 2018-01-30 RX ORDER — FLUOXETINE 40 MG/1
40 CAPSULE ORAL DAILY
Qty: 90 CAPSULE | Refills: 1 | Status: SHIPPED | OUTPATIENT
Start: 2018-01-30 | End: 2018-02-13 | Stop reason: DRUGHIGH

## 2018-01-30 RX ORDER — EZETIMIBE 10 MG/1
10 TABLET ORAL DAILY
Qty: 90 TABLET | Refills: 3 | Status: SHIPPED | OUTPATIENT
Start: 2018-01-30 | End: 2019-04-12

## 2018-01-30 RX ORDER — TORSEMIDE 5 MG/1
5 TABLET ORAL DAILY
Qty: 90 TABLET | Refills: 1 | Status: SHIPPED | OUTPATIENT
Start: 2018-01-30 | End: 2019-04-12

## 2018-01-30 RX ORDER — AMLODIPINE BESYLATE 10 MG/1
10 TABLET ORAL DAILY
Qty: 90 TABLET | Refills: 3 | Status: SHIPPED | OUTPATIENT
Start: 2018-01-30 | End: 2018-05-15

## 2018-01-30 RX ORDER — MELOXICAM 15 MG/1
15 TABLET ORAL DAILY
Qty: 90 TABLET | Refills: 1 | Status: SHIPPED | OUTPATIENT
Start: 2018-01-30 | End: 2019-04-12

## 2018-01-30 RX ORDER — ALBUTEROL SULFATE 90 UG/1
1-2 AEROSOL, METERED RESPIRATORY (INHALATION) EVERY 4 HOURS
Qty: 3 INHALER | Refills: 1 | Status: SHIPPED | OUTPATIENT
Start: 2018-01-30 | End: 2019-05-09

## 2018-01-30 RX ORDER — POTASSIUM CHLORIDE 1500 MG/1
20 TABLET, EXTENDED RELEASE ORAL DAILY
Qty: 32 TABLET | Refills: 3 | Status: SHIPPED | OUTPATIENT
Start: 2018-01-30 | End: 2019-04-12

## 2018-01-30 ASSESSMENT — ANXIETY QUESTIONNAIRES
1. FEELING NERVOUS, ANXIOUS, OR ON EDGE: SEVERAL DAYS
7. FEELING AFRAID AS IF SOMETHING AWFUL MIGHT HAPPEN: SEVERAL DAYS
IF YOU CHECKED OFF ANY PROBLEMS ON THIS QUESTIONNAIRE, HOW DIFFICULT HAVE THESE PROBLEMS MADE IT FOR YOU TO DO YOUR WORK, TAKE CARE OF THINGS AT HOME, OR GET ALONG WITH OTHER PEOPLE: NOT DIFFICULT AT ALL
5. BEING SO RESTLESS THAT IT IS HARD TO SIT STILL: SEVERAL DAYS
GAD7 TOTAL SCORE: 11
3. WORRYING TOO MUCH ABOUT DIFFERENT THINGS: MORE THAN HALF THE DAYS
2. NOT BEING ABLE TO STOP OR CONTROL WORRYING: MORE THAN HALF THE DAYS
6. BECOMING EASILY ANNOYED OR IRRITABLE: SEVERAL DAYS

## 2018-01-30 ASSESSMENT — PATIENT HEALTH QUESTIONNAIRE - PHQ9: 5. POOR APPETITE OR OVEREATING: NEARLY EVERY DAY

## 2018-01-30 NOTE — LETTER
[facility]  [address] [place] [address]  [phone]        June 4, 2018    Aminata Gale  [address]  [place] [address]              Dear Aminata Gale    This is to remind you that your Lipid profile is due.    You may call our office at [phone] to schedule an appointment.    Please disregard this notice if you have already had your labs drawn or made an appointment.        Sincerely,        Sana Olivo CNP

## 2018-01-30 NOTE — MR AVS SNAPSHOT
After Visit Summary   1/30/2018    Aminata Gale    MRN: [id number]           Patient Information     Date Of Birth          1950        Visit Information        Provider Department      1/30/2018 2:00 PM Sana Olivo APRN [facility]        Today's Diagnoses     Bilateral hip pain    -  1    HTN, goal below 140/90        Hypokalemia        Essential hypertension, benign        Mixed hyperlipidemia        Adjustment disorder with depressed mood        Anxiety        Moderate persistent asthma without complication        Chronic obstructive pulmonary disease, unspecified COPD type (H)        COPD exacerbation (H)        Systemic lupus erythematosus, unspecified SLE type, unspecified organ involvement status (H)        TMJ (temporomandibular joint syndrome)          Care Instructions    Treating TMJ Disorders with Heat and Cold  Heat therapy  Use heat for comfort during exercise. Heat increases blood flow and helps the tissues to relax. This makes movement easier. Moving the muscles and joints will restore them to full function.  You may stretch during heat therapy if it does not increase your pain, or if your therapist asks you to. After heat therapy, exercise as directed.  To apply heat, do one of the following:    Use a Bed Buddy Hot and Cold Pack over the jaw muscles. You may buy this from the drug store. Follow the heating directions on the package.    Use a damp hand towel.  1. Wring out the towel and place it in a microwave for 20 to 30 seconds.  2. Gently shake out any hot spots before placing it over the jaw muscles. Be sure to include the muscles above and below the jaw joints.  3. Reheat as needed for a total heating time of 15 to 20 minutes.  Cold therapy  Cold should be your first treatment for the first 24 to 48 hours after an injury or surgery.  You can also use cold to relieve inflammation (when your jaw feels swollen, tender and warmer than normal).  It helps with pain and discomfort caused by exercise or severe muscle spasms. If you use cold before doing gentle stretches, it may help prevent pain.  To apply cold, try one of the following:    Buy a cold gel-pack from the drug store. Wrap it in a damp towel before placing it on the skin.    Use a plastic bag filled with ice.  1. Fill a Ziplock bag with ice chips and wrap it in damp towel or washcloth.  2. Place the ice pack over the jaw muscles. Be sure to include the muscles above and below the jaw joints.  3. Hold it in place 5 to 10 minutes or until the skin feels numb.    Use ice massage.  1. Freeze water in a small paper or Styrofoam cup.  2. Remove the paper from the rim of the cup.  3. Stroke the ice over the muscles and joints. Stop when the area feels numb.  When using cold therapy, you will notice that the skin will first feel cold, then painful. The feeling will progress to a deep ache and finally to numbness.   When you are numb, remove the cold.  Contrast packs  The use of contrast packs--switching off between heat and cold--helps to reduce severe muscle spasm.  1. First use heat for 4 minutes, then cold for 1 minute.  2. Keep changing from hot to cold for 20 to 30 minutes.  3. End with the use of heat for 10 minutes.  For informational purposes only. Not to replace the advice of your health care provider.   Copyright   2007 [facility]. All rights reserved. [employer] [id number] - REV 06/15.    Self-Care for Temporomandibular Disorders (TMD)  You have temporomandibular disorder (TMD). This term describes a group of problems related to the temporomandibular joint (TMJ) and nearby muscles. The TMJ is located where the upper and lower jaws meet. Treatment will get your jaw back to normal function. But your care doesn t end there. Once you ve had TMD, it s important to avoid reinjury. Get in the habit of doing self-checks. This can make you aware of any symptoms that begin to return, so you can  take action right away.    Doing self-checks  Make it a habit to assess your body a few times each day. Try writing yourself a reminder. Or set an alarm on your watch or computer. When doing a self-check, ask yourself:    Do I feel stressed?    Are my muscles tense?    Am I grinding or clenching my teeth?    Is my posture healthy for my body?    Is there anything I can do to make myself more comfortable?  If you answer  yes  to any of the questions above, you need to take action. Adjusting your posture or taking a short break can help prevent or relieve TMD symptoms.  Listening to your body  Many people get used to ignoring pain. But pain is a signal that your body needs care. To maintain your TMJ health:    Avoid hard or chewy foods. Even if you feel fine, eating such foods can trigger symptoms again.    Be aware of your body. Don t ignore TMD symptoms. The nagging pain in your neck or jaw may be a sign that you need care.    Be sure to keep follow-up appointments with your health care team.  Managing stress  Stress is a key factor in TMD. Stress can cause you to clench your muscles or grind your teeth. It can also affect your sleep, reducing your body s ability to heal. Here are a few tips to manage stress:    Learn ways to relax. Try listening to music or gently stretching. Take a few slow deep breaths. Or, close your eyes and imagine a place or object that is calming.    Get plenty of rest and sleep.    Set goals you know you can attain.    Make time for people and things you enjoy.    Ask for help if you need it. Friends and family can run errands and cook meals for you.   Staying active  Activity helps the body in many ways. You stay looser and more relaxed. It also helps keep muscles and tissues conditioned. That way you can heal faster and make reinjury less likely. Here are some tips to get you started:    Talk to your health care provider before starting an exercise program.    Always warm up and stretch  before each activity. This helps prevent injury.    Try walking or swimming. These activities are easy on your joints. They also benefit your heart and lungs.    Try yoga or tino chi. These are relaxing activities known for reducing stress.  Date Last Reviewed: 7/13/2015 2000-2017 The "[employer]". [address] [place] [address]. All rights reserved. This information is not intended as a substitute for professional medical care. Always follow your healthcare professional's instructions.                Follow-ups after your visit        Additional Services     RHEUMATOLOGY REFERRAL       Your provider has referred you to: [facility]: [place] [facility] [phone]   [email or web address]  [facility]: [place] [facility] [place] [phone]   [email or web address]    Please be aware that coverage of these services is subject to the terms and limitations of your health insurance plan.  Call member services at your health plan with any benefit or coverage questions.      Please bring the following with you to your appointment:    (1) Any X-Rays, CTs or MRIs which have been performed.  Contact the facility where they were done to arrange for  prior to your scheduled appointment.    (2) List of current medications   (3) This referral request   (4) Any documents/labs given to you for this referral                  Future tests that were ordered for you today     Open Future Orders        Priority Expected Expires Ordered    XR TMJ Open/Closed Bilateral Routine 1/30/2018 1/30/2019 1/30/2018    XR Pelvis and Hip Bilateral 2 Views Routine 1/30/2018 1/30/2019 1/30/2018    Lipid panel reflex to direct LDL Fasting Routine 3/31/2018 5/30/2018 1/30/2018            Who to contact     If you have questions or need follow up information about today's clinic visit or your schedule please contact [facility] directly at [phone].  Normal or  non-critical lab and imaging results will be communicated to you by MyChart, letter or phone within 4 business days after the clinic has received the results. If you do not hear from us within 7 days, please contact the clinic through [employer] or phone. If you have a critical or abnormal lab result, we will notify you by phone as soon as possible.  Submit refill requests through Puddle or call your pharmacy and they will forward the refill request to us. Please allow 3 business days for your refill to be completed.          Additional Information About Your Visit        Puddle Information     [employer]uddle gives you secure access to your electronic health record. If you see a primary care provider, you can also send messages to your care team and make appointments. If you have questions, please call your primary care clinic.  If you do not have a primary care provider, please call [phone] and they will assist you.        Care EveryWhere ID     This is your Care EveryWhere ID. This could be used by other organizations to access your [place] medical records  [id number]        Your Vitals Were     Pulse Temperature Respirations Pulse Oximetry BMI (Body Mass Index)       62 97.3  F (36.3  C) (Tympanic) 14 99% 28.9 kg/m2        Blood Pressure from Last 3 Encounters:   01/30/18 110/62   12/28/17 124/72   07/06/17 122/60    Weight from Last 3 Encounters:   01/30/18 148 lb (67.1 kg)   12/28/17 152 lb (68.9 kg)   07/06/17 148 lb (67.1 kg)              We Performed the Following     Asthma Action Plan (AAP)     DEPRESSION ACTION PLAN (DAP)     RHEUMATOLOGY REFERRAL          Today's Medication Changes          These changes are accurate as of 1/30/18  2:59 PM.  If you have any questions, ask your nurse or doctor.               Start taking these medicines.        Dose/Directions    meloxicam 15 MG tablet   Commonly known as:  MOBIC   Used for:  TMJ (temporomandibular joint syndrome)   Started by:  Sana Olivo,  PEDRO CNP        Dose:  15 mg   Take 1 tablet (15 mg) by mouth daily   Quantity:  90 tablet   Refills:  1         These medicines have changed or have updated prescriptions.        Dose/Directions    * FLUoxetine 20 MG capsule   Commonly known as:  PROzac   This may have changed:  See the new instructions.   Used for:  Adjustment disorder with depressed mood, Anxiety        TAKE ONE CAPSULE BY MOUTH DAILY   Quantity:  90 capsule   Refills:  0       * FLUoxetine 40 MG capsule   Commonly known as:  PROzac   This may have changed:  You were already taking a medication with the same name, and this prescription was added. Make sure you understand how and when to take each.   Used for:  Moderate persistent asthma without complication, Anxiety   Changed by:  Sana Olivo APRN CNP        Dose:  40 mg   Take 1 capsule (40 mg) by mouth daily   Quantity:  90 capsule   Refills:  1       * Notice:  This list has 2 medication(s) that are the same as other medications prescribed for you. Read the directions carefully, and ask your doctor or other care provider to review them with you.         Where to get your medicines      These medications were sent to [facility] [address] [address]     Phone:  [phone]     albuterol 108 (90 BASE) MCG/ACT Inhaler    amLODIPine 10 MG tablet    ezetimibe 10 MG tablet    FLUoxetine 40 MG capsule    meloxicam 15 MG tablet    potassium chloride SA 20 MEQ CR tablet    tiotropium 2.5 MCG/ACT inhalation aerosol    torsemide 5 MG tablet                Primary Care Provider Office Phone # Fax #    PEDRO Dominguez [phone]       [address] [place] [id number]        Equal Access to Services     [facility] AH: Hadii monica ku hadasho Sogeorgeali, waaxda luqadaha, qaybta kaalmada adeegyada, waxay dyan acevedo. So [facility] [phone].    ATENCIÓN: Si gunner mcclure marquez  disposición servicios gratuitos de asistencia lingüística. Lizabeth bryan [phone].    We comply with applicable federal civil rights laws and [place] laws. We do not discriminate on the basis of race, color, national origin, age, disability, sex, sexual orientation, or gender identity.            Thank you!     Thank you for choosing [facility]  for your care. Our goal is always to provide you with excellent care. Hearing back from our patients is one way we can continue to improve our services. Please take a few minutes to complete the written survey that you may receive in the mail after your visit with us. Thank you!             Your Updated Medication List - Protect others around you: Learn how to safely use, store and throw away your medicines at www.disposemymeds.org.          This list is accurate as of 1/30/18  2:59 PM.  Always use your most recent med list.                   Brand Name Dispense Instructions for use Diagnosis    albuterol 108 (90 BASE) MCG/ACT Inhaler    PROAIR HFA    3 Inhaler    Inhale 1-2 puffs into the lungs every 4 hours To 6 hours as needed for shortness of breath    COPD exacerbation (H)       amLODIPine 10 MG tablet    NORVASC    90 tablet    Take 1 tablet (10 mg) by mouth daily For high blood pressure    HTN, goal below 140/90       ezetimibe 10 MG tablet    ZETIA    90 tablet    Take 1 tablet (10 mg) by mouth daily    Mixed hyperlipidemia       * FLUoxetine 20 MG capsule    PROzac    90 capsule    TAKE ONE CAPSULE BY MOUTH DAILY    Adjustment disorder with depressed mood, Anxiety       * FLUoxetine 40 MG capsule    PROzac    90 capsule    Take 1 capsule (40 mg) by mouth daily    Moderate persistent asthma without complication, Anxiety       IBU-200 PO      Take  by mouth.        ipratropium - albuterol 0.5 mg/2.5 mg/3 mL 0.5-2.5 (3) MG/3ML neb solution    DUONEB    30 vial    Take 1 vial (3 mLs) by nebulization every 6 hours as needed for shortness of breath /  dyspnea or wheezing    Bronchial pneumonia       meloxicam 15 MG tablet    MOBIC    90 tablet    Take 1 tablet (15 mg) by mouth daily    TMJ (temporomandibular joint syndrome)       potassium chloride SA 20 MEQ CR tablet    K-DUR/KLOR-CON M    32 tablet    Take 1 tablet (20 mEq) by mouth daily Take 2 tablets on day one    Hypokalemia       STATIN NOT PRESCRIBED (INTENTIONAL)     0 each    1 each daily Statin not prescribed intentionally due to Intolerance    Hyperlipidemia LDL goal <130       tiotropium 2.5 MCG/ACT inhalation aerosol    SPIRIVA RESPIMAT    12 g    Inhale 2 puffs into the lungs daily    Moderate persistent asthma without complication, Chronic obstructive pulmonary disease, unspecified COPD type (H)       torsemide 5 MG tablet    DEMADEX    90 tablet    Take 1 tablet (5 mg) by mouth daily    Essential hypertension, benign       * Notice:  This list has 2 medication(s) that are the same as other medications prescribed for you. Read the directions carefully, and ask your doctor or other care provider to review them with you.

## 2018-01-30 NOTE — NURSING NOTE
No chief complaint on file.      Initial There were no vitals taken for this visit. Estimated body mass index is 29.69 kg/(m^2) as calculated from the following:    Height as of 7/1/17: 5' (1.524 m).    Weight as of 12/28/17: 152 lb (68.9 kg).  Medication Reconciliation: complete    Health Maintenance that is potentially due pending provider review:  NONE    n/a    Is there anyone who you would like to be able to receive your results? No  If yes have patient fill out AMINATA

## 2018-01-30 NOTE — PATIENT INSTRUCTIONS
Treating TMJ Disorders with Heat and Cold  Heat therapy  Use heat for comfort during exercise. Heat increases blood flow and helps the tissues to relax. This makes movement easier. Moving the muscles and joints will restore them to full function.  You may stretch during heat therapy if it does not increase your pain, or if your therapist asks you to. After heat therapy, exercise as directed.  To apply heat, do one of the following:    Use a Bed Buddy Hot and Cold Pack over the jaw muscles. You may buy this from the drug store. Follow the heating directions on the package.    Use a damp hand towel.  1. Wring out the towel and place it in a microwave for 20 to 30 seconds.  2. Gently shake out any hot spots before placing it over the jaw muscles. Be sure to include the muscles above and below the jaw joints.  3. Reheat as needed for a total heating time of 15 to 20 minutes.  Cold therapy  Cold should be your first treatment for the first 24 to 48 hours after an injury or surgery.  You can also use cold to relieve inflammation (when your jaw feels swollen, tender and warmer than normal). It helps with pain and discomfort caused by exercise or severe muscle spasms. If you use cold before doing gentle stretches, it may help prevent pain.  To apply cold, try one of the following:    Buy a cold gel-pack from the drug store. Wrap it in a damp towel before placing it on the skin.    Use a plastic bag filled with ice.  1. Fill a Ziplock bag with ice chips and wrap it in damp towel or washcloth.  2. Place the ice pack over the jaw muscles. Be sure to include the muscles above and below the jaw joints.  3. Hold it in place 5 to 10 minutes or until the skin feels numb.    Use ice massage.  1. Freeze water in a small paper or Styrofoam cup.  2. Remove the paper from the rim of the cup.  3. Stroke the ice over the muscles and joints. Stop when the area feels numb.  When using cold therapy, you will notice that the skin will first  feel cold, then painful. The feeling will progress to a deep ache and finally to numbness.   When you are numb, remove the cold.  Contrast packs  The use of contrast packs--switching off between heat and cold--helps to reduce severe muscle spasm.  1. First use heat for 4 minutes, then cold for 1 minute.  2. Keep changing from hot to cold for 20 to 30 minutes.  3. End with the use of heat for 10 minutes.  For informational purposes only. Not to replace the advice of your health care provider.   Copyright   2007 [facility]. All rights reserved. [employer] [id number] - REV 06/15.    Self-Care for Temporomandibular Disorders (TMD)  You have temporomandibular disorder (TMD). This term describes a group of problems related to the temporomandibular joint (TMJ) and nearby muscles. The TMJ is located where the upper and lower jaws meet. Treatment will get your jaw back to normal function. But your care doesn t end there. Once you ve had TMD, it s important to avoid reinjury. Get in the habit of doing self-checks. This can make you aware of any symptoms that begin to return, so you can take action right away.    Doing self-checks  Make it a habit to assess your body a few times each day. Try writing yourself a reminder. Or set an alarm on your watch or computer. When doing a self-check, ask yourself:    Do I feel stressed?    Are my muscles tense?    Am I grinding or clenching my teeth?    Is my posture healthy for my body?    Is there anything I can do to make myself more comfortable?  If you answer  yes  to any of the questions above, you need to take action. Adjusting your posture or taking a short break can help prevent or relieve TMD symptoms.  Listening to your body  Many people get used to ignoring pain. But pain is a signal that your body needs care. To maintain your TMJ health:    Avoid hard or chewy foods. Even if you feel fine, eating such foods can trigger symptoms again.    Be aware of your body. Don t  ignore TMD symptoms. The nagging pain in your neck or jaw may be a sign that you need care.    Be sure to keep follow-up appointments with your health care team.  Managing stress  Stress is a key factor in TMD. Stress can cause you to clench your muscles or grind your teeth. It can also affect your sleep, reducing your body s ability to heal. Here are a few tips to manage stress:    Learn ways to relax. Try listening to music or gently stretching. Take a few slow deep breaths. Or, close your eyes and imagine a place or object that is calming.    Get plenty of rest and sleep.    Set goals you know you can attain.    Make time for people and things you enjoy.    Ask for help if you need it. Friends and family can run errands and cook meals for you.   Staying active  Activity helps the body in many ways. You stay looser and more relaxed. It also helps keep muscles and tissues conditioned. That way you can heal faster and make reinjury less likely. Here are some tips to get you started:    Talk to your health care provider before starting an exercise program.    Always warm up and stretch before each activity. This helps prevent injury.    Try walking or swimming. These activities are easy on your joints. They also benefit your heart and lungs.    Try yoga or tino chi. These are relaxing activities known for reducing stress.  Date Last Reviewed: 7/13/2015 2000-2017 [employer]. [address] [place] [address], [place] [address]. All rights reserved. This information is not intended as a substitute for professional medical care. Always follow your healthcare professional's instructions.

## 2018-01-31 ASSESSMENT — ASTHMA QUESTIONNAIRES: ACT_TOTALSCORE: 17

## 2018-01-31 ASSESSMENT — PATIENT HEALTH QUESTIONNAIRE - PHQ9: SUM OF ALL RESPONSES TO PHQ QUESTIONS 1-9: 11

## 2018-01-31 ASSESSMENT — ANXIETY QUESTIONNAIRES: GAD7 TOTAL SCORE: 11

## 2018-02-02 ENCOUNTER — RADIANT APPOINTMENT (OUTPATIENT)
Dept: GENERAL RADIOLOGY | Facility: CLINIC | Age: 68
End: 2018-02-02
Attending: NURSE PRACTITIONER
Payer: COMMERCIAL

## 2018-02-02 DIAGNOSIS — M26.609 TMJ (TEMPOROMANDIBULAR JOINT SYNDROME): ICD-10-CM

## 2018-02-02 PROCEDURE — 70330 X-RAY EXAM OF JAW JOINTS: CPT | Mod: FY

## 2018-02-13 ENCOUNTER — TELEPHONE (OUTPATIENT)
Dept: FAMILY MEDICINE | Facility: CLINIC | Age: 68
End: 2018-02-13

## 2018-02-13 DIAGNOSIS — F41.9 ANXIETY: ICD-10-CM

## 2018-02-13 DIAGNOSIS — F43.21 ADJUSTMENT DISORDER WITH DEPRESSED MOOD: ICD-10-CM

## 2018-02-13 RX ORDER — FLUOXETINE 10 MG/1
10 CAPSULE ORAL DAILY
Qty: 90 CAPSULE | Refills: 1 | Status: SHIPPED | OUTPATIENT
Start: 2018-02-13 | End: 2018-02-13

## 2018-02-13 RX ORDER — FLUOXETINE 10 MG/1
10 CAPSULE ORAL DAILY
Qty: 90 CAPSULE | Refills: 1 | Status: SHIPPED | OUTPATIENT
Start: 2018-02-13 | End: 2018-09-21

## 2018-02-13 NOTE — TELEPHONE ENCOUNTER
PHQ-9 SCORE 1/31/2017 1/30/2018 2/13/2018   Total Score - - -   Total Score 8 11 3       Pt states she is currently taking 40 mg of Prozac and her mouth is very dry. States the new med for inflammation is working very well. Pt now taking 20 mg of Prozac and thinks it is better. Should she be on 20 day, 30 ? Or every other 40 mg? Please advice. Janna Mccauley RN

## 2018-02-13 NOTE — TELEPHONE ENCOUNTER
Can do 30 mg daily  Please send RX for  20 mg and will need to have 10 mg added to daily prescription for a total of 30 mg..It does not come in a 30 mg dose.   Ok to refill both x 6 months.   Thanks Sana Olivo P-BC

## 2018-02-14 ASSESSMENT — PATIENT HEALTH QUESTIONNAIRE - PHQ9: SUM OF ALL RESPONSES TO PHQ QUESTIONS 1-9: 3

## 2018-05-15 ENCOUNTER — OFFICE VISIT (OUTPATIENT)
Dept: FAMILY MEDICINE | Facility: CLINIC | Age: 68
End: 2018-05-15
Payer: COMMERCIAL

## 2018-05-15 VITALS
OXYGEN SATURATION: 99 % | SYSTOLIC BLOOD PRESSURE: 180 MMHG | TEMPERATURE: 97.3 F | RESPIRATION RATE: 14 BRPM | HEART RATE: 90 BPM | BODY MASS INDEX: 28.71 KG/M2 | DIASTOLIC BLOOD PRESSURE: 92 MMHG | WEIGHT: 147 LBS

## 2018-05-15 DIAGNOSIS — M70.61 TROCHANTERIC BURSITIS OF BOTH HIPS: Primary | ICD-10-CM

## 2018-05-15 DIAGNOSIS — I10 HTN, GOAL BELOW 140/90: ICD-10-CM

## 2018-05-15 DIAGNOSIS — M70.62 TROCHANTERIC BURSITIS OF BOTH HIPS: Primary | ICD-10-CM

## 2018-05-15 DIAGNOSIS — M53.3 PAIN IN THE COCCYX: ICD-10-CM

## 2018-05-15 PROCEDURE — 99214 OFFICE O/P EST MOD 30 MIN: CPT | Performed by: NURSE PRACTITIONER

## 2018-05-15 RX ORDER — AMLODIPINE BESYLATE 10 MG/1
10 TABLET ORAL DAILY
Qty: 90 TABLET | Refills: 3 | Status: SHIPPED | OUTPATIENT
Start: 2018-05-15 | End: 2019-04-12

## 2018-05-15 ASSESSMENT — PAIN SCALES - GENERAL: PAINLEVEL: EXTREME PAIN (9)

## 2018-05-15 NOTE — MR AVS SNAPSHOT
After Visit Summary   5/15/2018    Aminata Gale    MRN: [id number]           Patient Information     Date Of Birth          1950        Visit Information        Provider Department      5/15/2018 3:40 PM Sana Olivo APRN [facility]        Today's Diagnoses     Trochanteric bursitis of both hips    -  1    HTN, goal below 140/90        Pain in the coccyx          Care Instructions      Understanding Trochanteric Bursitis    A bursa is a thin, slippery, sac-like film. It contains a small amount of fluid. This structure is found between bones and soft tissues in and around joints. A bursa cushions and protects a joint. It keeps parts of a joint from rubbing against each other. If a bursa becomes inflamed and irritated, it is known as bursitis.  The trochanteric bursa is found on the hip joint. It lies on top of the bump at the top of the thighbone called the greater trochanter. Inflammation of this bursa is called trochanteric bursitis.     How to say it  [id number]   Causes of trochanteric bursitis  Causes may include:    Overuse of the hip during running or other sports, dance, or work    Falling on or irritation to the side of the hip  This condition may occur along with other problems, such as osteoarthritis of the hip or knee, or low back problems. In rare cases, it may occur after hip surgery.  Symptoms of trochanteric bursitis    Pain or aching on the side of the hip. The pain may travel down the leg.    Swelling, tenderness, or warmth on the side of the hip at the bony bump at the top of the thigh  Treatment for trochanteric bursitis  These may include:    Resting the hip. This allows the bursa to heal.    Prescription or over-the-counter pain medicines. These help reduce inflammation, swelling, and pain.    Cold packs and heat packs. These help reduce pain and swelling.    Stretching and strengthening exercises. These improve flexibility and strength  around the hip.    Physical therapy. This includes exercises or other treatments.    Injections of medicine into the bursa. This may help reduce inflammation and relieve symptoms.  Possible complications  If you don t give your hip time to heal, the problem may not go away, may return, or may get worse. Rest and treat your hip as directed.     When to call your healthcare provider  Call your healthcare provider right away if you have any of these:    Fever of 100.4 F (38 C) or higher, or as directed    Redness, swelling, or warmth that gets worse    Symptoms that don t get better with prescribed medicines, or get worse    New symptoms   Date Last Reviewed: 3/29/2016    1612-0292 [employer]. [address] [place] [address]. All rights reserved. This information is not intended as a substitute for professional medical care. Always follow your healthcare professional's instructions.        Side Lying Hip Abduction (Strength)    1. Lie down on the floor on your side. Rest your head on your arm. Bend your legs at the knees.  2. Keep your feet together and lift your top leg up so that your knees are . Keep your hips steady.     3. Slowly lower your leg back down.  4. Repeat 10 times, or as instructed.  5. Switch sides if instructed.     Challenge yourself  Put an elastic band or tubing around your thighs. Raise and lower your top leg slowly and steadily.      Date Last Reviewed: 3/29/2016    5716-8244 [employer]. [address] [place] [address]. All rights reserved. This information is not intended as a substitute for professional medical care. Always follow your healthcare professional's instructions.        Iliotibial Band Stretch (Flexibility)    1. Stand next to a chair. Hold onto the chair with your right hand for support. Cross your right leg behind your left leg.  2. Lean your right hip toward the right. Feel the stretch at the outside of your hip.  3. Hold  for 30 to 60 seconds. Then relax.  4. Repeat 2 times, or as instructed.  5. Switch sides and repeat.  6. Do this 3 times a day, or as instructed.     Tip: Don t bend forward or twist at the waist.   Date Last Reviewed: 3/29/2016    3289-2121 [employer]. [address] [place] [address]. All rights reserved. This information is not intended as a substitute for professional medical care. Always follow your healthcare professional's instructions.                Follow-ups after your visit        Additional Services     ORTHO  REFERRAL       [facility] is referring you to the Orthopedic  Services at [facility].       The  Representative will assist you in the coordination of your Orthopedic and Musculoskeletal Care as prescribed by your physician.    The  Representative will call you within 1 business day to help schedule your appointment, or you may contact the  Representative at:    All areas ~ [phone]     Type of Referral : Non Surgical       Timeframe requested: Within 2 weeks    Coverage of these services is subject to the terms and limitations of your health insurance plan.  Please call member services at your health plan with any benefit or coverage questions.      If X-rays, CT or MRI's have been performed, please contact the facility where they were done to arrange for , prior to your scheduled appointment.  Please bring this referral request to your appointment and present it to your specialist.            PHYSICAL THERAPY REFERRAL       *This therapy referral will be filtered to a centralized scheduling office at [facility] and the patient will receive a call to schedule an appointment at a [facility] location most convenient for them. *     [facility] provides Physical Therapy evaluation and treatment and many specialty services across the  "[facility] system.  If requesting a specialty program, please choose from the list below.    If you have not heard from the scheduling office within 2 business days, please call [phone] for all locations, with the exception of [place], please call [phone] and [place], please call [phone]  Treatment: Evaluation & Treatment  Special Instructions/Modalities:   Special Programs: None    Please be aware that coverage of these services is subject to the terms and limitations of your health insurance plan.  Call member services at your health plan with any benefit or coverage questions.      **Note to Provider:  If you are referring outside of [facility] for the therapy appointment, please list the name of the location in the \"special instructions\" above, print the referral and give to the patient to schedule the appointment.                  Who to contact     If you have questions or need follow up information about today's clinic visit or your schedule please contact [facility] directly at [phone].  Normal or non-critical lab and imaging results will be communicated to you by [employer], letter or phone within 4 business days after the clinic has received the results. If you do not hear from us within 7 days, please contact the clinic through [employer] or phone. If you have a critical or abnormal lab result, we will notify you by phone as soon as possible.  Submit refill requests through [employer] Get [employer] For or call your pharmacy and they will forward the refill request to us. Please allow 3 business days for your refill to be completed.          Additional Information About Your Visit        [employer] Information     [employer] gives you secure access to your electronic health record. If you see a primary care provider, you can also send messages to your care team and make appointments. If you have questions, please call your primary care clinic.  If you do not have a primary care provider, please call " [phone] and they will assist you.        Care EveryWhere ID     This is your Care EveryWhere ID. This could be used by other organizations to access your [facility] medical records  [id number]        Your Vitals Were     Pulse Temperature Respirations Pulse Oximetry BMI (Body Mass Index)       90 97.3  F (36.3  C) (Tympanic) 14 99% 28.71 kg/m2        Blood Pressure from Last 3 Encounters:   05/15/18 (!) 180/92   01/30/18 110/62   12/28/17 124/72    Weight from Last 3 Encounters:   05/15/18 147 lb (66.7 kg)   01/30/18 148 lb (67.1 kg)   12/28/17 152 lb (68.9 kg)              We Performed the Following     ORTHO  REFERRAL     PHYSICAL THERAPY REFERRAL          Where to get your medicines      These medications were sent to [facility] - [address] [address]     Phone:  [phone]     amLODIPine 10 MG tablet          Primary Care Provider Office Phone # Fax #    Sana Oliov, PEDRO [facility] [phone]       [address] [place] [address]        Equal Access to Services     CURTIS ARGUELLO AH: Hadii monica ku hadasho Sogeorgeali, waaxda luqadaha, qaybta kaalmada adeegyada, negra acevedo. So [facility] [phone].    ATENCIÓN: Si habla español, tiene a maqruez disposición servicios gratuitos de asistencia lingüística. Lizabeth al [phone].    We comply with applicable federal civil rights laws and [place] laws. We do not discriminate on the basis of race, color, national origin, age, disability, sex, sexual orientation, or gender identity.            Thank you!     Thank you for choosing [facility]  for your care. Our goal is always to provide you with excellent care. Hearing back from our patients is one way we can continue to improve our services. Please take a few minutes to complete the written survey that you may receive in the mail after your visit with us. Thank you!             Your  Updated Medication List - Protect others around you: Learn how to safely use, store and throw away your medicines at www.disposemymeds.org.          This list is accurate as of 5/15/18  4:14 PM.  Always use your most recent med list.                   Brand Name Dispense Instructions for use Diagnosis    albuterol 108 (90 Base) MCG/ACT Inhaler    PROAIR HFA    3 Inhaler    Inhale 1-2 puffs into the lungs every 4 hours To 6 hours as needed for shortness of breath    COPD exacerbation (H)       amLODIPine 10 MG tablet    NORVASC    90 tablet    Take 1 tablet (10 mg) by mouth daily For high blood pressure    HTN, goal below 140/90       ezetimibe 10 MG tablet    ZETIA    90 tablet    Take 1 tablet (10 mg) by mouth daily    Mixed hyperlipidemia       * FLUoxetine 20 MG capsule    PROzac    90 capsule    Take 1 capsule (20 mg) by mouth daily Take with 10 mg for total dose of 30 mg    Adjustment disorder with depressed mood, Anxiety       * FLUoxetine 10 MG capsule    PROzac    90 capsule    Take 1 capsule (10 mg) by mouth daily Take with 20 mg for total dose of 30 mg    Adjustment disorder with depressed mood, Anxiety       IBU-200 PO      Take  by mouth.        ipratropium - albuterol 0.5 mg/2.5 mg/3 mL 0.5-2.5 (3) MG/3ML neb solution    DUONEB    30 vial    Take 1 vial (3 mLs) by nebulization every 6 hours as needed for shortness of breath / dyspnea or wheezing    Bronchial pneumonia       meloxicam 15 MG tablet    MOBIC    90 tablet    Take 1 tablet (15 mg) by mouth daily    TMJ (temporomandibular joint syndrome)       potassium chloride SA 20 MEQ CR tablet    K-DUR/KLOR-CON M    32 tablet    Take 1 tablet (20 mEq) by mouth daily Take 2 tablets on day one    Hypokalemia       STATIN NOT PRESCRIBED (INTENTIONAL)     0 each    1 each daily Statin not prescribed intentionally due to Intolerance    Hyperlipidemia LDL goal <130       tiotropium 2.5 MCG/ACT inhalation aerosol    SPIRIVA RESPIMAT    12 g    Inhale 2 puffs  into the lungs daily    Moderate persistent asthma without complication, Chronic obstructive pulmonary disease, unspecified COPD type (H)       torsemide 5 MG tablet    DEMADEX    90 tablet    Take 1 tablet (5 mg) by mouth daily    Essential hypertension, benign       * Notice:  This list has 2 medication(s) that are the same as other medications prescribed for you. Read the directions carefully, and ask your doctor or other care provider to review them with you.

## 2018-05-15 NOTE — PATIENT INSTRUCTIONS
Understanding Trochanteric Bursitis    A bursa is a thin, slippery, sac-like film. It contains a small amount of fluid. This structure is found between bones and soft tissues in and around joints. A bursa cushions and protects a joint. It keeps parts of a joint from rubbing against each other. If a bursa becomes inflamed and irritated, it is known as bursitis.  The trochanteric bursa is found on the hip joint. It lies on top of the bump at the top of the thighbone called the greater trochanter. Inflammation of this bursa is called trochanteric bursitis.     How to say it  [id number]   Causes of trochanteric bursitis  Causes may include:    Overuse of the hip during running or other sports, dance, or work    Falling on or irritation to the side of the hip  This condition may occur along with other problems, such as osteoarthritis of the hip or knee, or low back problems. In rare cases, it may occur after hip surgery.  Symptoms of trochanteric bursitis    Pain or aching on the side of the hip. The pain may travel down the leg.    Swelling, tenderness, or warmth on the side of the hip at the bony bump at the top of the thigh  Treatment for trochanteric bursitis  These may include:    Resting the hip. This allows the bursa to heal.    Prescription or over-the-counter pain medicines. These help reduce inflammation, swelling, and pain.    Cold packs and heat packs. These help reduce pain and swelling.    Stretching and strengthening exercises. These improve flexibility and strength around the hip.    Physical therapy. This includes exercises or other treatments.    Injections of medicine into the bursa. This may help reduce inflammation and relieve symptoms.  Possible complications  If you don t give your hip time to heal, the problem may not go away, may return, or may get worse. Rest and treat your hip as directed.     When to call your healthcare provider  Call your healthcare provider right away if you have  any of these:    Fever of 100.4 F (38 C) or higher, or as directed    Redness, swelling, or warmth that gets worse    Symptoms that don t get better with prescribed medicines, or get worse    New symptoms   Date Last Reviewed: 3/29/2016    3129-9442 [employer]. [address] [place] [address]. All rights reserved. This information is not intended as a substitute for professional medical care. Always follow your healthcare professional's instructions.        Side Lying Hip Abduction (Strength)    1. Lie down on the floor on your side. Rest your head on your arm. Bend your legs at the knees.  2. Keep your feet together and lift your top leg up so that your knees are . Keep your hips steady.     3. Slowly lower your leg back down.  4. Repeat 10 times, or as instructed.  5. Switch sides if instructed.     Challenge yourself  Put an elastic band or tubing around your thighs. Raise and lower your top leg slowly and steadily.      Date Last Reviewed: 3/29/2016    2053-9888 [employer]. [address] [place] [address]. All rights reserved. This information is not intended as a substitute for professional medical care. Always follow your healthcare professional's instructions.        Iliotibial Band Stretch (Flexibility)    1. Stand next to a chair. Hold onto the chair with your right hand for support. Cross your right leg behind your left leg.  2. Lean your right hip toward the right. Feel the stretch at the outside of your hip.  3. Hold for 30 to 60 seconds. Then relax.  4. Repeat 2 times, or as instructed.  5. Switch sides and repeat.  6. Do this 3 times a day, or as instructed.     Tip: Don t bend forward or twist at the waist.   Date Last Reviewed: 3/29/2016    3587-0585 [employer]. [address] [place] [address]. All rights reserved. This information is not intended as a substitute for professional medical care. Always follow your  healthcare professional's instructions.

## 2018-05-15 NOTE — PROGRESS NOTES
SUBJECTIVE:   Aminata Gale is a 67 year old female who presents to clinic today for the following health issues:      Musculoskeletal problem/pain      Duration: 9 months     Description  Location: bilateral hip pain     Intensity:  moderate    Accompanying signs and symptoms: radiation of pain to thighs     History  Previous similar problem: no   Previous evaluation:  x-ray    BP Readings from Last 3 Encounters:   05/15/18 (!) 180/92   01/30/18 110/62   12/28/17 124/72     No meds x 1 week.  HTN    She fell twice this last month.  Symptoms are worse with lying or  Pain In her tailbone region.  PELVIS WITH BILATERAL HIP 1/30/2018 3:39 PM       HISTORY: Bilateral hip pain.      COMPARISON: 11/12/2008      FINDINGS:  Minimal loss of joint space bilaterally similar to  previous. There is no acute fracture. No dislocation. There are no  worrisome bony lesions.          IMPRESSION:  No acute osseous abnormality demonstrated.  -------------------------------------    Problem list and histories reviewed & adjusted, as indicated.  Additional history: as documented    Labs reviewed in EPIC    Reviewed and updated as needed this visit by clinical staff  Allergies  Meds       Reviewed and updated as needed this visit by Provider         ROS:  Review Of Systems   ROS: 10 point ROS neg other than the symptoms noted above in the HPI.      OBJECTIVE:                                                    BP (!) 180/92  Pulse 90  Temp 97.3  F (36.3  C) (Tympanic)  Resp 14  Wt 147 lb (66.7 kg)  SpO2 99%  BMI 28.71 kg/m2  Body mass index is 28.71 kg/(m^2).   GENERAL: healthy, alert, well nourished, well hydrated, no distress  HENT: ear canals- normal; TMs- normal; Nose- normal; Mouth- no ulcers, no lesions  NECK: no tenderness, no adenopathy, no asymmetry, no masses, no stiffness; thyroid- normal to palpation  RESP: lungs clear to auscultation - no rales, no rhonchi, no wheezes  CV: regular rates and rhythm, normal S1 S2,  no S3 or S4 and no murmur, no click or rub -  ABDOMEN: soft, no tenderness, no  hepatosplenomegaly, no masses, normal bowel sounds  MS: extremities- no gross deformities noted, no edema  Limited range of motion to the knee with internal rotation pain over the trochanter bilaterally.  Pain in the left SI joint.    Diagnostic test results:         ASSESSMENT/PLAN:                                                    1. HTN, goal below 140/90  Poorly controlled.  Restart  - amLODIPine (NORVASC) 10 MG tablet; Take 1 tablet (10 mg) by mouth daily For high blood pressure  Dispense: 90 tablet; Refill: 3    2. Trochanteric bursitis of both hips  Begin physical therapy.  See Orth O nonsurgical  - ORTHO  REFERRAL  - PHYSICAL THERAPY REFERRAL    3. Pain in the coccyx  - PHYSICAL THERAPY REFERRAL      Follow up with Provider - Call or return to the clinic with any worsening of symptoms or no resolution. Patient/Parent verbalized understanding and is in agreement. Medication side effects reviewed.   Current Outpatient Prescriptions   Medication Sig Dispense Refill     albuterol (PROAIR HFA) 108 (90 BASE) MCG/ACT Inhaler Inhale 1-2 puffs into the lungs every 4 hours To 6 hours as needed for shortness of breath 3 Inhaler 1     amLODIPine (NORVASC) 10 MG tablet Take 1 tablet (10 mg) by mouth daily For high blood pressure 90 tablet 3     ezetimibe (ZETIA) 10 MG tablet Take 1 tablet (10 mg) by mouth daily 90 tablet 3     FLUoxetine (PROZAC) 10 MG capsule Take 1 capsule (10 mg) by mouth daily Take with 20 mg for total dose of 30 mg 90 capsule 1     FLUoxetine (PROZAC) 20 MG capsule Take 1 capsule (20 mg) by mouth daily Take with 10 mg for total dose of 30 mg 90 capsule 1     Ibuprofen (IBU-200 PO) Take  by mouth.       ipratropium - albuterol 0.5 mg/2.5 mg/3 mL (DUONEB) 0.5-2.5 (3) MG/3ML nebulization Take 1 vial (3 mLs) by nebulization every 6 hours as needed for shortness of breath / dyspnea or wheezing 30 vial 1      meloxicam (MOBIC) 15 MG tablet Take 1 tablet (15 mg) by mouth daily 90 tablet 1     potassium chloride SA (K-DUR/KLOR-CON M) 20 MEQ CR tablet Take 1 tablet (20 mEq) by mouth daily Take 2 tablets on day one 32 tablet 3     STATIN NOT PRESCRIBED, INTENTIONAL, 1 each daily Statin not prescribed intentionally due to Intolerance 0 each 0     tiotropium (SPIRIVA RESPIMAT) 2.5 MCG/ACT inhalation aerosol Inhale 2 puffs into the lungs daily 12 g 3     torsemide (DEMADEX) 5 MG tablet Take 1 tablet (5 mg) by mouth daily 90 tablet 1        See Patient Instructions    PEDRO Dominguez CNP  [facility]

## 2018-05-24 ENCOUNTER — OFFICE VISIT (OUTPATIENT)
Dept: ORTHOPEDICS | Facility: CLINIC | Age: 68
End: 2018-05-24
Attending: NURSE PRACTITIONER
Payer: COMMERCIAL

## 2018-05-24 VITALS
WEIGHT: 148 LBS | BODY MASS INDEX: 29.06 KG/M2 | DIASTOLIC BLOOD PRESSURE: 84 MMHG | HEIGHT: 60 IN | SYSTOLIC BLOOD PRESSURE: 132 MMHG

## 2018-05-24 DIAGNOSIS — M25.551 BILATERAL HIP PAIN: Primary | ICD-10-CM

## 2018-05-24 DIAGNOSIS — M70.61 TROCHANTERIC BURSITIS OF BOTH HIPS: ICD-10-CM

## 2018-05-24 DIAGNOSIS — M25.551 PAIN, JOINT, HIP, RIGHT: ICD-10-CM

## 2018-05-24 DIAGNOSIS — M70.62 TROCHANTERIC BURSITIS OF BOTH HIPS: ICD-10-CM

## 2018-05-24 DIAGNOSIS — M25.552 BILATERAL HIP PAIN: Primary | ICD-10-CM

## 2018-05-24 PROCEDURE — 20610 DRAIN/INJ JOINT/BURSA W/O US: CPT | Mod: 50 | Performed by: FAMILY MEDICINE

## 2018-05-24 PROCEDURE — 99203 OFFICE O/P NEW LOW 30 MIN: CPT | Mod: 25 | Performed by: FAMILY MEDICINE

## 2018-05-24 NOTE — PROGRESS NOTES
Aminata Gale  :  1950  DOS: 2018  MRN: [id number]    Sports Medicine Clinic Visit    PCP: Sana Olivoradha Gale is a 67 year old female who is seen in consultation at the request of  Sana Olivo C.N.P. presenting with chronic bilateral hip pain.    Injury: Gradual onset of bilateral hip, buttocks pain over the last ~ 9 months.  Pain significantly worse over last ~ 6 weeks after falling while on vacation and injuring her coccyx.  Pain located over bilateral deep lateral hip, left SI joint, radiating to bilateral lateral, anterior thigh.  Occasional groin pain on right.  Reports intermittent radiating, pain to bilateral thighs.  Additional Features:  Positive: catching and weakness.  Symptoms are better with Other medications: Norco and Rest.  Symptoms are worse with: lying on either hip, prolonged walking/sitting, going from sit to stand.  Other evaluation and/or treatments so far consists of: Ibuprofen, Other medications: Norco, Rest and donut pillow, PCP.  Recent imaging completed: X-rays completed 18.  Prior History of related problems: none    Social History: retired - cares for grandchild    Review of Systems  Musculoskeletal: as above  Remainder of review of systems is negative including constitutional, CV, pulmonary, GI, Skin and Neurologic except as noted in HPI or medical history.    Past Medical History:   Diagnosis Date     Acute pericarditis, unspecified      Chronic airway obstruction 2005    PFTs -  - mild COPD Problem list name updated by automated process. Provider to review     Dysuria      Esophageal reflux      GERD (gastroesophageal reflux disease) 2013     Hiatal hernia 2011     HTN, goal below 140/90 2013     Hyperlipidemia LDL goal <130 2010    Recent Labs  Lab Test 9/22/10 0908 10/12/06 0939    CHOL 214* 256*    HDL 31* 42*    * 186*    TRIG 149 143    CHOLHDLRATIO 7.0* 6.0*   11 - dobutamine echo  showed NO infarct or ischemia LV 60-65%, normal exercise response.        Lupus      Osteoporosis 10/18/2010    10/10 - severe osteopenia in femoral necks, mild osteopenia in spine  (Problem list name updated by automated process. Provider to review and confirm.)     Pure hypercholesterolemia      SECONDARY POLYCYTHEMIA 10/17/2006    Due to smoking     Smoker 2008     Systemic lupus erythematosus 2005    Diagnosed in ; history of pericarditis; was previously treated with mtx; not on active treatment; no hx of renal dz from the lupus.       Tobacco use disorder      Unspecified essential hypertension      Past Surgical History:   Procedure Laterality Date     C APPENDECTOMY       C REPAIR GUM      Cathy Dontal surgery     C/SECTION, LOW TRANSVERSE      , Low Transverse     COLONOSCOPY  2009     D & C      X 5     HC RECONSTR NOSE+DAVE SEPTAL REPAIR       HYSTERECTOMY, BRI      Hx of dysplasia     LAPAROSCOPIC CHOLECYSTECTOMY N/A 2017    Procedure: LAPAROSCOPIC CHOLECYSTECTOMY;  Laparoscopic Cholecystectomy;  Surgeon: Tami Barney MD;  Location: [facility] OR     SINUS SURGERY      Reconstruction       Objective  /84  Ht 5' (1.524 m)  Wt 148 lb (67.1 kg)  BMI 28.9 kg/m2    General: healthy, alert and in no distress    HEENT: no scleral icterus or conjunctival erythema   Skin: no suspicious lesions or rash. No jaundice.   CV: regular rhythm by palpation, 2+ distal pulses, no pedal edema    Resp: normal respiratory effort without conversational dyspnea   Psych: normal mood and affect    Gait: nonantalgic, appropriate coordination and balance   Neuro: normal light touch sensory exam of the extremities. Motor strength as noted below       Bilateral hip exam    Inspection:        no edema or ecchymosis in hip area    ROM:       Full active and passive ROM       Pain with active adduction over lateral hip and active ER    Strength:        flexion 5/5       extension 5/5        abduction 5/5       adduction 5/5    Tender:        greater trochanter       SI joint mild       Piriformis, external rotators mildly    Non Tender:        remainder of hip area       illiac crest       ASIS       pubis    Sensation:        grossly intact in hip and thigh    Skin:       well perfused       capillary refill brisk    Special Tests:        neg (-) MARIA       neg (-) FADIR       neg (-) scour       positive (+) Ramonita    Procedure  After risks, benefits and complications of steroid injection were discussed with the patient, a consent form was signed and the patient elected to proceed.  Using sterile technique, the area was first prepped with betadyne and an alcohol swab.  A 25 gauge  needle was used to inject a mixture of 80 mg Kenalog 2 ml of 0.5% marcaine and 2 ml of 1% lidocaine into the greater trochanteric bursa on the right and left.  The patient tolerated the procedure well without complications.    Radiology  PELVIS WITH BILATERAL HIP 1/30/2018 3:39 PM      HISTORY: Bilateral hip pain.     COMPARISON: 11/12/2008     FINDINGS:  Minimal loss of joint space bilaterally similar to  previous. There is no acute fracture. No dislocation. There are no  worrisome bony lesions.         IMPRESSION:  No acute osseous abnormality demonstrated.    Assessment:  1. Bilateral hip pain    2. Pain, joint, hip, right    3. Trochanteric bursitis of both hips        Plan:  Discussed the assessment with the patient.  Follow up: prn based on clinical progress  B/l CSI today for pain relief and potentiate activity  Activity options and strategies reviewed in detail  XR images independently visualized and reviewed with patient today in clinic  No clear intraarticular hip pain or LBP  Expectations and goals of CSI reviewed  Often 2-3 days for steroid effect, and can take up to two weeks for maximum effect  We discussed modified progressive pain-free activity as tolerated  Do not overuse in first two weeks if feeling better  due to concern for vulnerability while steroid is working  Supportive care reviewed  All questions were answered today  Contact us with additional questions or concerns  Signs and sx of concern reviewed    Thanks very much for sending this nice lady to us, I will keep you updated with her progress      Jonh Gracia DO, CAQ  Primary Care Sports Medicine  [facility]             Disclaimer: This note consists of symbols derived from keyboarding, dictation and/or voice recognition software. As a result, there may be errors in the script that have gone undetected. Please consider this when interpreting information found in this chart.

## 2018-05-24 NOTE — LETTER
2018         RE: Aminata Gale  [address]   [place] [facility] [address]        Dear Colleague,    Thank you for referring your patient, Aminata Gale, to the [facility]. Please see a copy of my visit note below.    Aminata Gale  :  1950  DOS: 2018  MRN: [id number]    Sports Medicine Clinic Visit    PCP: Sana Olivo    Aminata Gale is a 67 year old female who is seen in consultation at the request of  Sana Olivo C.N.P. presenting with chronic bilateral hip pain.    Injury: Gradual onset of bilateral hip, buttocks pain over the last ~ 9 months.  Pain significantly worse over last ~ 6 weeks after falling while on vacation and injuring her coccyx.  Pain located over bilateral deep lateral hip, left SI joint, radiating to bilateral lateral, anterior thigh.  Occasional groin pain on right.  Reports intermittent radiating, pain to bilateral thighs.  Additional Features:  Positive: catching and weakness.  Symptoms are better with Other medications: Norco and Rest.  Symptoms are worse with: lying on either hip, prolonged walking/sitting, going from sit to stand.  Other evaluation and/or treatments so far consists of: Ibuprofen, Other medications: Norco, Rest and donut pillow, PCP.  Recent imaging completed: X-rays completed 18.  Prior History of related problems: none    Social History: retired - cares for grandchild    Review of Systems  Musculoskeletal: as above  Remainder of review of systems is negative including constitutional, CV, pulmonary, GI, Skin and Neurologic except as noted in HPI or medical history.    Past Medical History:   Diagnosis Date     Acute pericarditis, unspecified      Chronic airway obstruction 2005    PFTs -  - mild COPD Problem list name updated by automated process. Provider to review     Dysuria      Esophageal reflux      GERD (gastroesophageal reflux disease) 2013     Hiatal hernia  2011     HTN, goal below 140/90 2013     Hyperlipidemia LDL goal <130 2010    Recent Labs  Lab Test 9/22/10 0908 10/12/06 0939    CHOL 214* 256*    HDL 31* 42*    * 186*    TRIG 149 143    CHOLHDLRATIO 7.0* 6.0*   11 - dobutamine echo showed NO infarct or ischemia LV 60-65%, normal exercise response.        Lupus      Osteoporosis 10/18/2010    10/10 - severe osteopenia in femoral necks, mild osteopenia in spine  (Problem list name updated by automated process. Provider to review and confirm.)     Pure hypercholesterolemia      SECONDARY POLYCYTHEMIA 10/17/2006    Due to smoking     Smoker 2008     Systemic lupus erythematosus 2005    Diagnosed in ; history of pericarditis; was previously treated with mtx; not on active treatment; no hx of renal dz from the lupus.       Tobacco use disorder      Unspecified essential hypertension      Past Surgical History:   Procedure Laterality Date     C APPENDECTOMY       C REPAIR GUM      Cathy Dontal surgery     C/SECTION, LOW TRANSVERSE      , Low Transverse     COLONOSCOPY  2009     D & C      X 5     HC RECONSTR NOSE+DAVE SEPTAL REPAIR       HYSTERECTOMY, BRI      Hx of dysplasia     LAPAROSCOPIC CHOLECYSTECTOMY N/A 2017    Procedure: LAPAROSCOPIC CHOLECYSTECTOMY;  Laparoscopic Cholecystectomy;  Surgeon: Tami Barney MD;  Location: [facility]     SINUS SURGERY      Reconstruction       Objective  /84  Ht 5' (1.524 m)  Wt 148 lb (67.1 kg)  BMI 28.9 kg/m2    General: healthy, alert and in no distress    HEENT: no scleral icterus or conjunctival erythema   Skin: no suspicious lesions or rash. No jaundice.   CV: regular rhythm by palpation, 2+ distal pulses, no pedal edema    Resp: normal respiratory effort without conversational dyspnea   Psych: normal mood and affect    Gait: nonantalgic, appropriate coordination and balance   Neuro: normal light touch sensory exam of the extremities. Motor strength  as noted below       Bilateral hip exam    Inspection:        no edema or ecchymosis in hip area    ROM:       Full active and passive ROM       Pain with active adduction over lateral hip and active ER    Strength:        flexion 5/5       extension 5/5       abduction 5/5       adduction 5/5    Tender:        greater trochanter       SI joint mild       Piriformis, external rotators mildly    Non Tender:        remainder of hip area       illiac crest       ASIS       pubis    Sensation:        grossly intact in hip and thigh    Skin:       well perfused       capillary refill brisk    Special Tests:        neg (-) MARIA       neg (-) FADIR       neg (-) scour       positive (+) Ramonita    Procedure  After risks, benefits and complications of steroid injection were discussed with the patient, a consent form was signed and the patient elected to proceed.  Using sterile technique, the area was first prepped with betadyne and an alcohol swab.  A 25 gauge  needle was used to inject a mixture of 80 mg Kenalog 2 ml of 0.5% marcaine and 2 ml of 1% lidocaine into the greater trochanteric bursa on the right and left.  The patient tolerated the procedure well without complications.    Radiology  PELVIS WITH BILATERAL HIP 1/30/2018 3:39 PM      HISTORY: Bilateral hip pain.     COMPARISON: 11/12/2008     FINDINGS:  Minimal loss of joint space bilaterally similar to  previous. There is no acute fracture. No dislocation. There are no  worrisome bony lesions.         IMPRESSION:  No acute osseous abnormality demonstrated.    Assessment:  1. Bilateral hip pain    2. Pain, joint, hip, right    3. Trochanteric bursitis of both hips        Plan:  Discussed the assessment with the patient.  Follow up: prn based on clinical progress  B/l CSI today for pain relief and potentiate activity  Activity options and strategies reviewed in detail  XR images independently visualized and reviewed with patient today in clinic  No clear  intraarticular hip pain or LBP  Expectations and goals of CSI reviewed  Often 2-3 days for steroid effect, and can take up to two weeks for maximum effect  We discussed modified progressive pain-free activity as tolerated  Do not overuse in first two weeks if feeling better due to concern for vulnerability while steroid is working  Supportive care reviewed  All questions were answered today  Contact us with additional questions or concerns  Signs and sx of concern reviewed    Thanks very much for sending this nice lady to us, I will keep you updated with her progress      Jonh Gracia DO, CAQ  Primary Care Sports Medicine  [facility]             Disclaimer: This note consists of symbols derived from keyboarding, dictation and/or voice recognition software. As a result, there may be errors in the script that have gone undetected. Please consider this when interpreting information found in this chart.    Again, thank you for allowing me to participate in the care of your patient.        Sincerely,        Jonh Gracia DO

## 2018-05-24 NOTE — MR AVS SNAPSHOT
"              After Visit Summary   5/24/2018    Aminata Gale    MRN: [id number]           Patient Information     Date Of Birth          1950        Visit Information        Provider Department      5/24/2018 9:40 AM Jonh Gracia, DO [facility]        Today's Diagnoses     Bilateral hip pain    -  1    Pain, joint, hip, right        Trochanteric bursitis of both hips           Follow-ups after your visit        Additional Services     PHYSICAL THERAPY REFERRAL       *This therapy referral will be filtered to a centralized scheduling office at [facility] and the patient will receive a call to schedule an appointment at a [place] location most convenient for them. *     [facility] provides Physical Therapy evaluation and treatment and many specialty services across the [place] system.  If requesting a specialty program, please choose from the list below.    If you have not heard from the scheduling office within 2 business days, please call [phone] for all locations, with the exception of [place], please call [phone] and [facility], please call [phone]  Treatment: Evaluation & Treatment  Special Instructions/Modalities: per therapist evaluation  Special Programs: None - schedule with Rivas Gunderson in [place]     Please be aware that coverage of these services is subject to the terms and limitations of your health insurance plan.  Call member services at your health plan with any benefit or coverage questions.      **Note to Provider:  If you are referring outside of [place] for the therapy appointment, please list the name of the location in the \"special instructions\" above, print the referral and give to the patient to schedule the appointment.                  Who to contact     If you have questions or need follow up information about today's clinic visit or your schedule please contact [facility] directly at [phone].  Normal or non-critical lab and imaging results will be communicated to you by MyChart, letter or phone within 4 business days after the clinic has received the results. If you do not hear from us within 7 days, please contact the clinic through [employer] or phone. If you have a critical or abnormal lab result, we will notify you by phone as soon as possible.  Submit refill requests through Anywhere to Go or call your pharmacy and they will forward the refill request to us. Please allow 3 business days for your refill to be completed.          Additional Information About Your Visit        [employer] Information     Anywhere to Go gives you secure access to your electronic health record. If you see a primary care provider, you can also send messages to your care team and make appointments. If you have questions, please call your primary care clinic.  If you do not have a primary care provider, please call [phone] and they will assist you.        Care EveryWhere ID     This is your Care EveryWhere ID. This could be used by other organizations to access your [facility] medical records  [id number]        Your Vitals Were     Height BMI (Body Mass Index)                5' (1.524 m) 28.9 kg/m2           Blood Pressure from Last 3 Encounters:   05/24/18 132/84   05/15/18 (!) 180/92   01/30/18 110/62    Weight from Last 3 Encounters:   05/24/18 148 lb (67.1 kg)   05/15/18 147 lb (66.7 kg)   01/30/18 148 lb (67.1 kg)              We Performed the Following     DRAIN/INJECT LARGE JOINT/BURSA     PHYSICAL THERAPY REFERRAL     TRIAMCINOLONE ACET INJ NOS          Today's Medication Changes          These changes are accurate as of 5/24/18 11:59 PM.  If you have any questions, ask your nurse or doctor.               Start taking these medicines.        Dose/Directions    triamcinolone acetonide 40 MG/ML injection   Commonly known as:  KENALOG-40   Used for:  Bilateral hip pain,  Trochanteric bursitis of both hips   Started by:  Jonh Gracia DO        Dose:  80 mg   2 mLs (80 mg) by INTRA-ARTICULAR route once for 1 dose   Quantity:  2 mL   Refills:  0            Where to get your medicines      Some of these will need a paper prescription and others can be bought over the counter.  Ask your nurse if you have questions.     You don't need a prescription for these medications     triamcinolone acetonide 40 MG/ML injection                Primary Care Provider Office Phone # Fax #    Sana Olivo, APRMICHA [facility] [phone]       [address] [facility] [address]        Equal Access to Services     [facility] : Hadii monica lucia hadasho Sotalat, waaxda luqadaha, qaybta kaalmada adetawannayada, negra mitchell . So [facility] [phone].    ATENCIÓN: Si habla español, tiene a marquez disposición servicios gratuitos de asistencia lingüística. Llame al [phone].    We comply with applicable federal civil rights laws and [place] laws. We do not discriminate on the basis of race, color, national origin, age, disability, sex, sexual orientation, or gender identity.            Thank you!     Thank you for choosing [facility]  for your care. Our goal is always to provide you with excellent care. Hearing back from our patients is one way we can continue to improve our services. Please take a few minutes to complete the written survey that you may receive in the mail after your visit with us. Thank you!             Your Updated Medication List - Protect others around you: Learn how to safely use, store and throw away your medicines at www.disposemymeds.org.          This list is accurate as of 5/24/18 11:59 PM.  Always use your most recent med list.                   Brand Name Dispense Instructions for use Diagnosis    albuterol 108 (90 Base) MCG/ACT Inhaler    PROAIR HFA    3 Inhaler    Inhale 1-2 puffs into the lungs every 4  hours To 6 hours as needed for shortness of breath    COPD exacerbation (H)       amLODIPine 10 MG tablet    NORVASC    90 tablet    Take 1 tablet (10 mg) by mouth daily For high blood pressure    HTN, goal below 140/90       ezetimibe 10 MG tablet    ZETIA    90 tablet    Take 1 tablet (10 mg) by mouth daily    Mixed hyperlipidemia       * FLUoxetine 20 MG capsule    PROzac    90 capsule    Take 1 capsule (20 mg) by mouth daily Take with 10 mg for total dose of 30 mg    Adjustment disorder with depressed mood, Anxiety       * FLUoxetine 10 MG capsule    PROzac    90 capsule    Take 1 capsule (10 mg) by mouth daily Take with 20 mg for total dose of 30 mg    Adjustment disorder with depressed mood, Anxiety       IBU-200 PO      Take  by mouth.        ipratropium - albuterol 0.5 mg/2.5 mg/3 mL 0.5-2.5 (3) MG/3ML neb solution    DUONEB    30 vial    Take 1 vial (3 mLs) by nebulization every 6 hours as needed for shortness of breath / dyspnea or wheezing    Bronchial pneumonia       meloxicam 15 MG tablet    MOBIC    90 tablet    Take 1 tablet (15 mg) by mouth daily    TMJ (temporomandibular joint syndrome)       potassium chloride SA 20 MEQ CR tablet    K-DUR/KLOR-CON M    32 tablet    Take 1 tablet (20 mEq) by mouth daily Take 2 tablets on day one    Hypokalemia       STATIN NOT PRESCRIBED (INTENTIONAL)     0 each    1 each daily Statin not prescribed intentionally due to Intolerance    Hyperlipidemia LDL goal <130       tiotropium 2.5 MCG/ACT inhalation aerosol    SPIRIVA RESPIMAT    12 g    Inhale 2 puffs into the lungs daily    Moderate persistent asthma without complication, Chronic obstructive pulmonary disease, unspecified COPD type (H)       torsemide 5 MG tablet    DEMADEX    90 tablet    Take 1 tablet (5 mg) by mouth daily    Essential hypertension, benign       triamcinolone acetonide 40 MG/ML injection    KENALOG-40    2 mL    2 mLs (80 mg) by INTRA-ARTICULAR route once for 1 dose    Bilateral hip pain,  Trochanteric bursitis of both hips       * Notice:  This list has 2 medication(s) that are the same as other medications prescribed for you. Read the directions carefully, and ask your doctor or other care provider to review them with you.

## 2018-05-31 ENCOUNTER — HOSPITAL ENCOUNTER (OUTPATIENT)
Dept: PHYSICAL THERAPY | Facility: CLINIC | Age: 68
Setting detail: THERAPIES SERIES
End: 2018-05-31
Attending: FAMILY MEDICINE
Payer: MEDICARE

## 2018-05-31 PROCEDURE — 40000718 ZZHC STATISTIC PT DEPARTMENT ORTHO VISIT: Performed by: PHYSICAL THERAPIST

## 2018-05-31 PROCEDURE — G8978 MOBILITY CURRENT STATUS: HCPCS | Mod: GP,CK | Performed by: PHYSICAL THERAPIST

## 2018-05-31 PROCEDURE — G8979 MOBILITY GOAL STATUS: HCPCS | Mod: GP,CJ | Performed by: PHYSICAL THERAPIST

## 2018-05-31 PROCEDURE — 97162 PT EVAL MOD COMPLEX 30 MIN: CPT | Mod: GP | Performed by: PHYSICAL THERAPIST

## 2018-05-31 PROCEDURE — 97110 THERAPEUTIC EXERCISES: CPT | Mod: GP | Performed by: PHYSICAL THERAPIST

## 2018-05-31 NOTE — PROGRESS NOTES
[facility]          OUTPATIENT PHYSICAL THERAPY ORTHOPEDIC EVALUATION  PLAN OF TREATMENT FOR OUTPATIENT REHABILITATION  (COMPLETE FOR INITIAL CLAIMS ONLY)  Patient's Last Name, First Name, M.I.  YOB: 1950  Aminata Gale    Provider s Name:  [facility]   Medical Record No.  [id number]   Start of Care Date:  05/31/18   Onset Date:  05/24/18   Type:     _X__PT   ___OT   ___SLP Medical Diagnosis:  hip pain bilat.     PT Diagnosis:  janessa hip pain   Visits from SOC:  1      _________________________________________________________________________________  Plan of Treatment/Functional Goals:  ROM, strengthening, stretching, manual therapy, joint mobilization, neuromuscular re-education           Goals  Goal Identifier: 1  Goal Description: pt will be able to walk 100' without hip pain  Target Date: 06/30/18    Goal Identifier: 2  Goal Description: pt will be able to ascend and descend stairs without hip pain  Target Date: 07/31/18    Goal Identifier: 3  Goal Description: pt will be able to garden without hip pain  Target Date: 07/31/18          Therapy Frequency:  1 time/week  Predicted Duration of Therapy Intervention:  8wk    Efren Gunderson, PT                 I CERTIFY THE NEED FOR THESE SERVICES FURNISHED UNDER        THIS PLAN OF TREATMENT AND WHILE UNDER MY CARE     (Physician co-signature of this document indicates review and certification of the therapy plan).                       Certification Date From:  05/31/18   Certification Date To:  08/31/18    Referring Provider:  Dr Gracia    Initial Assessment        See Epic Evaluation Start of Care Date: 05/31/18

## 2018-05-31 NOTE — PROGRESS NOTES
05/31/18 1100   General Information   Type of Visit Initial OP Ortho PT Evaluation   Start of Care Date 05/31/18   Referring Physician Dr Gracia   Patient/Family Goals Statement decrease hip pain   Orders Evaluate and Treat   Date of Order 05/24/18   Insurance Type Medicare   Medical Diagnosis janessa hip pain   Body Part(s)   Body Part(s) Hip   Presentation and Etiology   Pertinent history of current problem (include personal factors and/or comorbidities that impact the POC) hx of hip pain for a year.  janessa hip injections.  L is better.  R up and down since injection.  feels more like the hip jt.  hx of COPD, fxs, fibro, smoking, stroke.  lupus   Impairments A. Pain;B. Decreased WB tolerance;C. Swelling;D. Decreased ROM;H. Impaired gait   Functional Limitations perform activities of daily living;perform desired leisure / sports activities   How/Where did it occur From insidious onset;With repetition/overuse   Onset date of current episode/exacerbation 05/24/18   Chronicity Chronic   Pain rating (0-10 point scale) Best (/10);Worst (/10)   Best (/10) 6   Worst (/10) 3   Pain quality C. Aching;A. Sharp;B. Dull   Frequency of pain/symptoms A. Constant   Pain/symptoms exacerbated by B. Walking;A. Sitting;C. Lifting;D. Carrying   Pain/symptoms eased by E. Changing positions;C. Rest   Progression of symptoms since onset: Improved  (since injections)   Fall Risk Screen   Have you fallen 2 or more times in the past year? No   Have you fallen and had an injury in the past year? No   Is patient a fall risk? No   Hip Objective Findings   Side (if bilateral, select both right and left) Right;Left   Observation genu varum   Posture forward lean   Gait/Locomotion slow, a little antalgic   Balance/Proprioception (Single Leg Stance) UA   Lumbar ROM ankles, ERP, stiff L SB   Pelvic Screen stiff R sacral base   Hip/Knee Strength Comments L2-S1 myotomes =bilat, - patellar and achiles reflexes   Femoral Nerve Stretch Test +R   Straight Leg  Raise Test R tension with dorsi in calf at 0 and 45 deg of SLR   Scour Test -   Accessory Motion/Joint Mobility stiff LS jct   Right Hip Flexion PROM 120   Right Hip ER PROM 70   Right Hip IR PROM 30   Right Hip Ext PROM 5   Obers/ITB Flexibility +janessa   Right Hamstring Flexibility L 90 R 65   Right Piriformis Flexibility R>L tight   Right Prone Quad Flexibility L 125, R 90 with thigh stretch   Left Hip Flexion PROM 120   Left Hip ER PROM 50   Left Hip IR PROM 30   Left Hip Ext PROM 20   Planned Therapy Interventions   Planned Therapy Interventions ROM;strengthening;stretching;manual therapy;joint mobilization;neuromuscular re-education   Clinical Impression   Criteria for Skilled Therapeutic Interventions Met yes, treatment indicated   PT Diagnosis janessa hip pain   Influenced by the following impairments stiffness, weakness, pain, poor balance   Functional limitations due to impairments adls and mobility   Clinical Presentation Evolving/Changing   Clinical Presentation Rationale worsening pain, COPD, fxs, fibro, smoking, stroke.  lupus   Clinical Decision Making (Complexity) Moderate complexity   Therapy Frequency 1 time/week   Predicted Duration of Therapy Intervention (days/wks) 8wk   Risk & Benefits of therapy have been explained Yes   Patient, Family & other staff in agreement with plan of care Yes   Clinical Impression Comments chonic hip pain resulting in weakness, stiffness, decreased endurance and pain   Education Assessment   Preferred Learning Style Listening   Barriers to Learning No barriers   Ortho Goal 1   Goal Identifier 1   Goal Description pt will be able to walk 100' without hip pain   Target Date 06/30/18   Ortho Goal 2   Goal Identifier 2   Goal Description pt will be able to ascend and descend stairs without hip pain   Target Date 07/31/18   Ortho Goal 3   Goal Identifier 3   Goal Description pt will be able to garden without hip pain   Target Date 07/31/18   Total Evaluation Time   Total  Evaluation Time 30   Therapy Certification   Certification date from 05/31/18   Certification date to 08/31/18   Medical Diagnosis hip pain bilat.

## 2018-06-04 RX ORDER — TRIAMCINOLONE ACETONIDE 40 MG/ML
80 INJECTION, SUSPENSION INTRA-ARTICULAR; INTRAMUSCULAR ONCE
Qty: 2 ML | Refills: 0 | OUTPATIENT
Start: 2018-06-04 | End: 2018-06-04

## 2018-06-08 ENCOUNTER — ALLIED HEALTH/NURSE VISIT (OUTPATIENT)
Dept: FAMILY MEDICINE | Facility: CLINIC | Age: 68
End: 2018-06-08
Payer: COMMERCIAL

## 2018-06-08 VITALS — HEART RATE: 80 BPM | DIASTOLIC BLOOD PRESSURE: 84 MMHG | SYSTOLIC BLOOD PRESSURE: 132 MMHG

## 2018-06-08 DIAGNOSIS — I10 ESSENTIAL HYPERTENSION, BENIGN: Primary | ICD-10-CM

## 2018-06-08 PROCEDURE — 99207 ZZC NO CHARGE NURSE ONLY: CPT | Performed by: NURSE PRACTITIONER

## 2018-06-08 NOTE — MR AVS SNAPSHOT
After Visit Summary   6/8/2018    Aminata Gale    MRN: [id number]           Patient Information     Date Of Birth          1950        Visit Information        Provider Department      6/8/2018 2:50 PM Sana Olivo APRN CNP [facility]        Today's Diagnoses     Essential hypertension, benign    -  1       Follow-ups after your visit        Who to contact     If you have questions or need follow up information about today's clinic visit or your schedule please contact [facility] directly at [phone].  Normal or non-critical lab and imaging results will be communicated to you by Gigzonhart, letter or phone within 4 business days after the clinic has received the results. If you do not hear from us within 7 days, please contact the clinic through Dreamforget or phone. If you have a critical or abnormal lab result, we will notify you by phone as soon as possible.  Submit refill requests through MerryMarry or call your pharmacy and they will forward the refill request to us. Please allow 3 business days for your refill to be completed.          Additional Information About Your Visit        MyChart Information     MerryMarry gives you secure access to your electronic health record. If you see a primary care provider, you can also send messages to your care team and make appointments. If you have questions, please call your primary care clinic.  If you do not have a primary care provider, please call [phone] and they will assist you.        Care EveryWhere ID     This is your Care EveryWhere ID. This could be used by other organizations to access your [facility] medical records  [id number]        Your Vitals Were     Pulse                   80            Blood Pressure from Last 3 Encounters:   06/08/18 132/84   05/24/18 132/84   05/15/18 (!) 180/92    Weight from Last 3 Encounters:   05/24/18 148 lb (67.1 kg)   05/15/18 147 lb (66.7 kg)   01/30/18 148  lb (67.1 kg)              Today, you had the following     No orders found for display       Primary Care Provider Office Phone # Fax #    PEDRO Dominguez [facility] [phone]       [address] [place] [address]        Equal Access to Services     CURTIS ARGUELLO : Hadii aad ku hadkarinao Soomaali, waaxda luqadaha, qaybta kaalmada adeegyada, waxay idiin hayaan adeeg khmarikash stephen acevedo. So [facility] [phone].    ATENCIÓN: Si habla español, tiene a marquez disposición servicios gratuitos de asistencia lingüística. Llame al [phone].    We comply with applicable federal civil rights laws and [place] laws. We do not discriminate on the basis of race, color, national origin, age, disability, sex, sexual orientation, or gender identity.            Thank you!     Thank you for choosing [facility]  for your care. Our goal is always to provide you with excellent care. Hearing back from our patients is one way we can continue to improve our services. Please take a few minutes to complete the written survey that you may receive in the mail after your visit with us. Thank you!             Your Updated Medication List - Protect others around you: Learn how to safely use, store and throw away your medicines at www.disposemymeds.org.          This list is accurate as of 6/8/18  2:52 PM.  Always use your most recent med list.                   Brand Name Dispense Instructions for use Diagnosis    albuterol 108 (90 Base) MCG/ACT Inhaler    PROAIR HFA    3 Inhaler    Inhale 1-2 puffs into the lungs every 4 hours To 6 hours as needed for shortness of breath    COPD exacerbation (H)       amLODIPine 10 MG tablet    NORVASC    90 tablet    Take 1 tablet (10 mg) by mouth daily For high blood pressure    HTN, goal below 140/90       ezetimibe 10 MG tablet    ZETIA    90 tablet    Take 1 tablet (10 mg) by mouth daily    Mixed hyperlipidemia       * FLUoxetine 20 MG capsule    PROzac    90 capsule    Take  1 capsule (20 mg) by mouth daily Take with 10 mg for total dose of 30 mg    Adjustment disorder with depressed mood, Anxiety       * FLUoxetine 10 MG capsule    PROzac    90 capsule    Take 1 capsule (10 mg) by mouth daily Take with 20 mg for total dose of 30 mg    Adjustment disorder with depressed mood, Anxiety       IBU-200 PO      Take  by mouth.        ipratropium - albuterol 0.5 mg/2.5 mg/3 mL 0.5-2.5 (3) MG/3ML neb solution    DUONEB    30 vial    Take 1 vial (3 mLs) by nebulization every 6 hours as needed for shortness of breath / dyspnea or wheezing    Bronchial pneumonia       meloxicam 15 MG tablet    MOBIC    90 tablet    Take 1 tablet (15 mg) by mouth daily    TMJ (temporomandibular joint syndrome)       potassium chloride SA 20 MEQ CR tablet    K-DUR/KLOR-CON M    32 tablet    Take 1 tablet (20 mEq) by mouth daily Take 2 tablets on day one    Hypokalemia       STATIN NOT PRESCRIBED (INTENTIONAL)     0 each    1 each daily Statin not prescribed intentionally due to Intolerance    Hyperlipidemia LDL goal <130       tiotropium 2.5 MCG/ACT inhalation aerosol    SPIRIVA RESPIMAT    12 g    Inhale 2 puffs into the lungs daily    Moderate persistent asthma without complication, Chronic obstructive pulmonary disease, unspecified COPD type (H)       torsemide 5 MG tablet    DEMADEX    90 tablet    Take 1 tablet (5 mg) by mouth daily    Essential hypertension, benign       * Notice:  This list has 2 medication(s) that are the same as other medications prescribed for you. Read the directions carefully, and ask your doctor or other care provider to review them with you.

## 2018-06-28 ENCOUNTER — TELEPHONE (OUTPATIENT)
Dept: ORTHOPEDICS | Facility: CLINIC | Age: 68
End: 2018-06-28

## 2018-06-28 NOTE — TELEPHONE ENCOUNTER
"Reason for Call:  Other call back    Detailed comments: Pt was supposed to go in for PT - bursitis and was given a shot in both hips for that.  Hasn't been able to go to PT because she has had a really bad cold.  Having a hard time walking - shots have helped and cleared up the bursitis she thinks.  Right hip has some detrition so wasn't sure if shot would help - pt thinks she needs to get another shot for this.  Will be leaving to go out of state for 1 1/2 months - requesting shot before she goes \"because it is so painful\"    Phone Number Patient can be reached at: Home number on file [phone] (home)    Best Time:     Can we leave a detailed message on this number? NO    Call taken on 6/28/2018 at 1:31 PM by Sarahi Fields      "

## 2018-06-28 NOTE — TELEPHONE ENCOUNTER
Please advise- Aminata stated the injection for Bursitis has improved her pain, but she is feel deeper joint pain in her right hip. She is requesting another intra-articular injection for the deeper joint pain. She leave for [place] on the 7/6/18 for 1.5 months. She has been unable to complete PT due to illness    Brett Patel ATC

## 2018-06-29 NOTE — TELEPHONE ENCOUNTER
Patient stopped by [facility] , before I had chance to return patient's phone call.   staff apologized for the delayed response.  Would be able to offer patient work in appointment this afternoon at [facility], otherwise at 10:40am on Tueday 7/3 in [facility] for reassessment, cannot guarantee that would perform injection unless advisable.  Patient scheduled for appointment on 7/3.    Leonardo Ashraf ATC

## 2018-07-03 ENCOUNTER — OFFICE VISIT (OUTPATIENT)
Dept: ORTHOPEDICS | Facility: CLINIC | Age: 68
End: 2018-07-03
Payer: COMMERCIAL

## 2018-07-03 DIAGNOSIS — M25.551 PAIN, JOINT, HIP, RIGHT: Primary | ICD-10-CM

## 2018-07-03 PROCEDURE — 20611 DRAIN/INJ JOINT/BURSA W/US: CPT | Mod: RT | Performed by: FAMILY MEDICINE

## 2018-07-03 RX ADMIN — TRIAMCINOLONE ACETONIDE 40 MG: 40 INJECTION, SUSPENSION INTRA-ARTICULAR; INTRAMUSCULAR at 12:09

## 2018-07-03 RX ADMIN — ROPIVACAINE HYDROCHLORIDE 3 ML: 5 INJECTION, SOLUTION EPIDURAL; INFILTRATION; PERINEURAL at 12:09

## 2018-07-03 NOTE — MR AVS SNAPSHOT
After Visit Summary   7/3/2018    Aminata Gale    MRN: [id number]           Patient Information     Date Of Birth          1950        Visit Information        Provider Department      7/3/2018 10:40 AM Jonh Gracia,  [facility]        Today's Diagnoses     Pain, joint, hip, right    -  1       Follow-ups after your visit        Who to contact     If you have questions or need follow up information about today's clinic visit or your schedule please contact [facility] directly at [phone].  Normal or non-critical lab and imaging results will be communicated to you by weave energyhart, letter or phone within 4 business days after the clinic has received the results. If you do not hear from us within 7 days, please contact the clinic through weave energyhart or phone. If you have a critical or abnormal lab result, we will notify you by phone as soon as possible.  Submit refill requests through Bizmore or call your pharmacy and they will forward the refill request to us. Please allow 3 business days for your refill to be completed.          Additional Information About Your Visit        MyChart Information     Bizmore gives you secure access to your electronic health record. If you see a primary care provider, you can also send messages to your care team and make appointments. If you have questions, please call your primary care clinic.  If you do not have a primary care provider, please call [phone] and they will assist you.        Care EveryWhere ID     This is your Care EveryWhere ID. This could be used by other organizations to access your [facility] medical records  [id number]         Blood Pressure from Last 3 Encounters:   07/03/18 (P) 131/74   06/08/18 132/84   05/24/18 132/84    Weight from Last 3 Encounters:   07/03/18 (P) 147 lb (66.7 kg)   05/24/18 148 lb (67.1 kg)   05/15/18 147 lb (66.7 kg)              We  Performed the Following     Large Joint Injection/Arthocentesis        Primary Care Provider Office Phone # Fax #    Sana Olivo, P[facility] [phone]       [address] [place] [address]        Equal Access to Services     CURTIS ARGUELLO : Hadalondra aad ku hadkarinao Sotalat, waaxda luqadaha, qaybta kaalmada adeegyada, waxaston daon fabian prakash stpehen acevedo. So [facility] [phone].    ATENCIÓN: Si habla español, tiene a marquez disposición servicios gratuitos de asistencia lingüística. Llame al [phone].    We comply with applicable federal civil rights laws and [place] laws. We do not discriminate on the basis of race, color, national origin, age, disability, sex, sexual orientation, or gender identity.            Thank you!     Thank you for choosing [facility]  for your care. Our goal is always to provide you with excellent care. Hearing back from our patients is one way we can continue to improve our services. Please take a few minutes to complete the written survey that you may receive in the mail after your visit with us. Thank you!             Your Updated Medication List - Protect others around you: Learn how to safely use, store and throw away your medicines at www.disposemymeds.org.          This list is accurate as of 7/3/18 11:59 PM.  Always use your most recent med list.                   Brand Name Dispense Instructions for use Diagnosis    albuterol 108 (90 Base) MCG/ACT Inhaler    PROAIR HFA    3 Inhaler    Inhale 1-2 puffs into the lungs every 4 hours To 6 hours as needed for shortness of breath    COPD exacerbation (H)       amLODIPine 10 MG tablet    NORVASC    90 tablet    Take 1 tablet (10 mg) by mouth daily For high blood pressure    HTN, goal below 140/90       ezetimibe 10 MG tablet    ZETIA    90 tablet    Take 1 tablet (10 mg) by mouth daily    Mixed hyperlipidemia       * FLUoxetine 20 MG capsule    PROzac    90 capsule    Take 1  capsule (20 mg) by mouth daily Take with 10 mg for total dose of 30 mg    Adjustment disorder with depressed mood, Anxiety       * FLUoxetine 10 MG capsule    PROzac    90 capsule    Take 1 capsule (10 mg) by mouth daily Take with 20 mg for total dose of 30 mg    Adjustment disorder with depressed mood, Anxiety       IBU-200 PO      Take  by mouth.        ipratropium - albuterol 0.5 mg/2.5 mg/3 mL 0.5-2.5 (3) MG/3ML neb solution    DUONEB    30 vial    Take 1 vial (3 mLs) by nebulization every 6 hours as needed for shortness of breath / dyspnea or wheezing    Bronchial pneumonia       meloxicam 15 MG tablet    MOBIC    90 tablet    Take 1 tablet (15 mg) by mouth daily    TMJ (temporomandibular joint syndrome)       potassium chloride SA 20 MEQ CR tablet    K-DUR/KLOR-CON M    32 tablet    Take 1 tablet (20 mEq) by mouth daily Take 2 tablets on day one    Hypokalemia       STATIN NOT PRESCRIBED (INTENTIONAL)     0 each    1 each daily Statin not prescribed intentionally due to Intolerance    Hyperlipidemia LDL goal <130       tiotropium 2.5 MCG/ACT inhalation aerosol    SPIRIVA RESPIMAT    12 g    Inhale 2 puffs into the lungs daily    Moderate persistent asthma without complication, Chronic obstructive pulmonary disease, unspecified COPD type (H)       torsemide 5 MG tablet    DEMADEX    90 tablet    Take 1 tablet (5 mg) by mouth daily    Essential hypertension, benign       * Notice:  This list has 2 medication(s) that are the same as other medications prescribed for you. Read the directions carefully, and ask your doctor or other care provider to review them with you.

## 2018-07-03 NOTE — PROGRESS NOTES
Aminata Gale  :  1950  DOS: 7/3/2018  MRN: [id number]    Sports Medicine Clinic Visit    PCP: Sana Olivo    Aminataradha Gale is a 67 year old female who is seen in consultation at the request of  Sana Olivo C.N.P. presenting with chronic bilateral hip pain.    Injury: Gradual onset of bilateral hip, buttocks pain over the last ~ 9 months.  Pain significantly worse over last ~ 6 weeks after falling while on vacation and injuring her coccyx.  Pain located over bilateral deep lateral hip, left SI joint, radiating to bilateral lateral, anterior thigh.  Occasional groin pain on right.  Reports intermittent radiating, pain to bilateral thighs.  Additional Features:  Positive: catching and weakness.  Symptoms are better with Other medications: Norco and Rest.  Symptoms are worse with: lying on either hip, prolonged walking/sitting, going from sit to stand.  Other evaluation and/or treatments so far consists of: Ibuprofen, Other medications: Norco, Rest and donut pillow, PCP.  Recent imaging completed: X-rays completed 18.  Prior History of related problems: none    Social History: retired - cares for grandchild    Interim History - July 3, 2018  Since last visit on 18 patient has moderate right deep hip pain.  Bilateral hip trochanteric injection completed on  provided good relief of lateral hip pain.  Continues to have discomfort in deep anterior hip.  Attended one session of physical therapy, has not returned d/t illness.  Traveling to [place] in ~ 1 week.  No new injury in the interim.    Review of Systems  Musculoskeletal: as above  Remainder of review of systems is negative including constitutional, CV, pulmonary, GI, Skin and Neurologic except as noted in HPI or medical history.    Past Medical History:   Diagnosis Date     Acute pericarditis, unspecified      Chronic airway obstruction 2005    PFTs -  - mild COPD Problem list name updated by automated process.  Provider to review     Dysuria      Esophageal reflux      GERD (gastroesophageal reflux disease) 2013     Hiatal hernia 2011     HTN, goal below 140/90 2013     Hyperlipidemia LDL goal <130 2010    Recent Labs  Lab Test 9/22/10 0908 10/12/06 0939    CHOL 214* 256*    HDL 31* 42*    * 186*    TRIG 149 143    CHOLHDLRATIO 7.0* 6.0*   11 - dobutamine echo showed NO infarct or ischemia LV 60-65%, normal exercise response.        Lupus      Osteoporosis 10/18/2010    10/10 - severe osteopenia in femoral necks, mild osteopenia in spine  (Problem list name updated by automated process. Provider to review and confirm.)     Pure hypercholesterolemia      SECONDARY POLYCYTHEMIA 10/17/2006    Due to smoking     Smoker 2008     Systemic lupus erythematosus 2005    Diagnosed in ; history of pericarditis; was previously treated with mtx; not on active treatment; no hx of renal dz from the lupus.       Tobacco use disorder      Unspecified essential hypertension      Past Surgical History:   Procedure Laterality Date     C APPENDECTOMY       C REPAIR GUM      Cathy Dontal surgery     C/SECTION, LOW TRANSVERSE      , Low Transverse     COLONOSCOPY  2009     D & C      X 5     HC RECONSTR NOSE+DAVE SEPTAL REPAIR       HYSTERECTOMY, BRI      Hx of dysplasia     LAPAROSCOPIC CHOLECYSTECTOMY N/A 2017    Procedure: LAPAROSCOPIC CHOLECYSTECTOMY;  Laparoscopic Cholecystectomy;  Surgeon: Tami Barney MD;  Location: [facility] OR     SINUS SURGERY      Reconstruction       Objective  BP (P) 131/74  Ht (P) 5' (1.524 m)  Wt (P) 147 lb (66.7 kg)  BMI (P) 28.71 kg/m2    General: healthy, alert and in no distress    HEENT: no scleral icterus or conjunctival erythema   Skin: no suspicious lesions or rash. No jaundice.   CV: regular rhythm by palpation, 2+ distal pulses, no pedal edema    Resp: normal respiratory effort without conversational dyspnea   Psych: normal mood and  affect    Gait: nonantalgic, appropriate coordination and balance   Neuro: normal light touch sensory exam of the extremities. Motor strength as noted below       Bilateral hip exam    Inspection:        no edema or ecchymosis in hip area    ROM:       Full active and passive ROM, pain with terminal flexion>ER>IR      Pain with active adduction over lateral hip and active ER    Strength:        flexion 5/5       extension 5/5       abduction 5/5       adduction 5/5    Tender:        greater trochanter       SI joint mild       Piriformis, external rotators mildly    Non Tender:        remainder of hip area       illiac crest       ASIS       pubis    Sensation:        grossly intact in hip and thigh    Skin:       well perfused       capillary refill brisk    Special Tests:        neg (-) MARIA       + FADIR       neg (-) scour       Improved Ramonita    Large Joint Injection/Arthocentesis  Date/Time: 7/3/2018 12:09 PM  Performed by: FLORINDA JARRELL  Authorized by: FLORINDA JARRELL     Indications:  Pain and diagnostic evaluation  Needle Size:  22 G  Guidance: ultrasound    Approach:  Anterior  Location:  Hip  Site:  R hip joint  Medications:  40 mg triamcinolone acetonide 40 MG/ML; 3 mL ropivacaine 5 MG/ML  Outcome:  Tolerated well, no immediate complications  Procedure discussed: discussed risks, benefits, and alternatives    Consent Given by:  Patient  Prep: patient was prepped and draped in usual sterile fashion     4 ml's of 1% lidocaine was used as local anesthetic prior to injection            Radiology  PELVIS WITH BILATERAL HIP 1/30/2018 3:39 PM      HISTORY: Bilateral hip pain.     COMPARISON: 11/12/2008     FINDINGS:  Minimal loss of joint space bilaterally similar to  previous. There is no acute fracture. No dislocation. There are no  worrisome bony lesions.         IMPRESSION:  No acute osseous abnormality demonstrated.    Assessment:  1. Pain, joint, hip, right        Plan:  Discussed the  assessment with the patient.  Follow up: prn based on clinical progress  B/l troch bursa CSI for pain relief was helpful  Activity options and strategies reviewed in detail  XR images independently visualized and reviewed with patient today in clinic  More suggestive of intraarticular hip pain today, injection will have diagnostic value  Expectations and goals of CSI reviewed, US guided IA hip injection today  Often 2-3 days for steroid effect, and can take up to two weeks for maximum effect  We discussed modified progressive pain-free activity as tolerated  Do not overuse in first two weeks if feeling better due to concern for vulnerability while steroid is working  Supportive care reviewed  All questions were answered today  Contact us with additional questions or concerns  Signs and sx of concern reviewed      Jonh Gracia DO, CADANNY  Primary Care Sports Medicine  [facility]             Disclaimer: This note consists of symbols derived from keyboarding, dictation and/or voice recognition software. As a result, there may be errors in the script that have gone undetected. Please consider this when interpreting information found in this chart.

## 2018-07-03 NOTE — LETTER
7/3/2018         RE: Aminata Gale  [address]   [facility] [address]        Dear Colleague,    Thank you for referring your patient, Aminata Gale, to the [facility]. Please see a copy of my visit note below.    Aminata Gale  :  1950  DOS: 7/3/2018  MRN: [id number]    Sports Medicine Clinic Visit    PCP: Sana Olivo    Aminata Gale is a 67 year old female who is seen in consultation at the request of  Sana Olivo C.N.P. presenting with chronic bilateral hip pain.    Injury: Gradual onset of bilateral hip, buttocks pain over the last ~ 9 months.  Pain significantly worse over last ~ 6 weeks after falling while on vacation and injuring her coccyx.  Pain located over bilateral deep lateral hip, left SI joint, radiating to bilateral lateral, anterior thigh.  Occasional groin pain on right.  Reports intermittent radiating, pain to bilateral thighs.  Additional Features:  Positive: catching and weakness.  Symptoms are better with Other medications: Norco and Rest.  Symptoms are worse with: lying on either hip, prolonged walking/sitting, going from sit to stand.  Other evaluation and/or treatments so far consists of: Ibuprofen, Other medications: Norco, Rest and donut pillow, PCP.  Recent imaging completed: X-rays completed 18.  Prior History of related problems: none    Social History: retired - cares for grandchild    Interim History - July 3, 2018  Since last visit on 18 patient has moderate right deep hip pain.  Bilateral hip trochanteric injection completed on  provided good relief of lateral hip pain.  Continues to have discomfort in deep anterior hip.  Attended one session of physical therapy, has not returned d/t illness.  Traveling to [place] in ~ 1 week.  No new injury in the interim.    Review of Systems  Musculoskeletal: as above  Remainder of review of systems is negative including constitutional, CV, pulmonary,  GI, Skin and Neurologic except as noted in HPI or medical history.    Past Medical History:   Diagnosis Date     Acute pericarditis, unspecified      Chronic airway obstruction 2005    PFTs -  - mild COPD Problem list name updated by automated process. Provider to review     Dysuria      Esophageal reflux      GERD (gastroesophageal reflux disease) 2013     Hiatal hernia 2011     HTN, goal below 140/90 2013     Hyperlipidemia LDL goal <130 2010    Recent Labs  Lab Test 9/22/10 0908 10/12/06 0939    CHOL 214* 256*    HDL 31* 42*    * 186*    TRIG 149 143    CHOLHDLRATIO 7.0* 6.0*   11 - dobutamine echo showed NO infarct or ischemia LV 60-65%, normal exercise response.        Lupus      Osteoporosis 10/18/2010    10/10 - severe osteopenia in femoral necks, mild osteopenia in spine  (Problem list name updated by automated process. Provider to review and confirm.)     Pure hypercholesterolemia      SECONDARY POLYCYTHEMIA 10/17/2006    Due to smoking     Smoker 2008     Systemic lupus erythematosus 2005    Diagnosed in ; history of pericarditis; was previously treated with mtx; not on active treatment; no hx of renal dz from the lupus.       Tobacco use disorder      Unspecified essential hypertension      Past Surgical History:   Procedure Laterality Date     C APPENDECTOMY       C REPAIR GUM      Cathy Dontal surgery     C/SECTION, LOW TRANSVERSE      , Low Transverse     COLONOSCOPY  2009     D & C      X 5     HC RECONSTR NOSE+DAVE SEPTAL REPAIR       HYSTERECTOMY, BRI      Hx of dysplasia     LAPAROSCOPIC CHOLECYSTECTOMY N/A 2017    Procedure: LAPAROSCOPIC CHOLECYSTECTOMY;  Laparoscopic Cholecystectomy;  Surgeon: Tami Barney MD;  Location: [facility]     SINUS SURGERY      Reconstruction       Objective  BP (P) 131/74  Ht (P) 5' (1.524 m)  Wt (P) 147 lb (66.7 kg)  BMI (P) 28.71 kg/m2    General: healthy, alert and in no distress     HEENT: no scleral icterus or conjunctival erythema   Skin: no suspicious lesions or rash. No jaundice.   CV: regular rhythm by palpation, 2+ distal pulses, no pedal edema    Resp: normal respiratory effort without conversational dyspnea   Psych: normal mood and affect    Gait: nonantalgic, appropriate coordination and balance   Neuro: normal light touch sensory exam of the extremities. Motor strength as noted below       Bilateral hip exam    Inspection:        no edema or ecchymosis in hip area    ROM:       Full active and passive ROM, pain with terminal flexion>ER>IR      Pain with active adduction over lateral hip and active ER    Strength:        flexion 5/5       extension 5/5       abduction 5/5       adduction 5/5    Tender:        greater trochanter       SI joint mild       Piriformis, external rotators mildly    Non Tender:        remainder of hip area       illiac crest       ASIS       pubis    Sensation:        grossly intact in hip and thigh    Skin:       well perfused       capillary refill brisk    Special Tests:        neg (-) MARIA       + FADIR       neg (-) scour       Improved Ramonita    Large Joint Injection/Arthocentesis  Date/Time: 7/3/2018 12:09 PM  Performed by: FLORINDA JARRELL  Authorized by: FLORINDA JARRELL     Indications:  Pain and diagnostic evaluation  Needle Size:  22 G  Guidance: ultrasound    Approach:  Anterior  Location:  Hip  Site:  R hip joint  Medications:  40 mg triamcinolone acetonide 40 MG/ML; 3 mL ropivacaine 5 MG/ML  Outcome:  Tolerated well, no immediate complications  Procedure discussed: discussed risks, benefits, and alternatives    Consent Given by:  Patient  Prep: patient was prepped and draped in usual sterile fashion     4 ml's of 1% lidocaine was used as local anesthetic prior to injection            Radiology  PELVIS WITH BILATERAL HIP 1/30/2018 3:39 PM      HISTORY: Bilateral hip pain.     COMPARISON: 11/12/2008     FINDINGS:  Minimal loss of  joint space bilaterally similar to  previous. There is no acute fracture. No dislocation. There are no  worrisome bony lesions.         IMPRESSION:  No acute osseous abnormality demonstrated.    Assessment:  1. Pain, joint, hip, right        Plan:  Discussed the assessment with the patient.  Follow up: prn based on clinical progress  B/l troch bursa CSI for pain relief was helpful  Activity options and strategies reviewed in detail  XR images independently visualized and reviewed with patient today in clinic  More suggestive of intraarticular hip pain today, injection will have diagnostic value  Expectations and goals of CSI reviewed, US guided IA hip injection today  Often 2-3 days for steroid effect, and can take up to two weeks for maximum effect  We discussed modified progressive pain-free activity as tolerated  Do not overuse in first two weeks if feeling better due to concern for vulnerability while steroid is working  Supportive care reviewed  All questions were answered today  Contact us with additional questions or concerns  Signs and sx of concern reviewed      Jonh Gracia DO, CAQ  Primary Care Sports Medicine  [facility]             Disclaimer: This note consists of symbols derived from keyboarding, dictation and/or voice recognition software. As a result, there may be errors in the script that have gone undetected. Please consider this when interpreting information found in this chart.    Again, thank you for allowing me to participate in the care of your patient.        Sincerely,        Jonh Gracia DO

## 2018-07-09 RX ORDER — ROPIVACAINE HYDROCHLORIDE 5 MG/ML
3 INJECTION, SOLUTION EPIDURAL; INFILTRATION; PERINEURAL
Status: DISCONTINUED | OUTPATIENT
Start: 2018-07-03 | End: 2019-06-18 | Stop reason: ALTCHOICE

## 2018-07-09 RX ORDER — TRIAMCINOLONE ACETONIDE 40 MG/ML
40 INJECTION, SUSPENSION INTRA-ARTICULAR; INTRAMUSCULAR
Status: DISCONTINUED | OUTPATIENT
Start: 2018-07-03 | End: 2019-06-18 | Stop reason: ALTCHOICE

## 2018-07-25 ENCOUNTER — TELEPHONE (OUTPATIENT)
Dept: FAMILY MEDICINE | Facility: CLINIC | Age: 68
End: 2018-07-25

## 2018-07-25 NOTE — TELEPHONE ENCOUNTER
Patient was told to return for fasting labs, reminder letter was sent to patient on 06/04/2018, patient has still not scheduled an appointment.

## 2018-09-21 DIAGNOSIS — F43.21 ADJUSTMENT DISORDER WITH DEPRESSED MOOD: ICD-10-CM

## 2018-09-21 DIAGNOSIS — F41.9 ANXIETY: ICD-10-CM

## 2018-09-21 RX ORDER — FLUOXETINE 10 MG/1
CAPSULE ORAL
Qty: 90 CAPSULE | Refills: 1 | Status: SHIPPED | OUTPATIENT
Start: 2018-09-21 | End: 2019-04-12

## 2018-09-21 NOTE — TELEPHONE ENCOUNTER
Prescription approved per [facility]ill Protocol.  Adriana CHRISTIAN RN

## 2018-09-21 NOTE — TELEPHONE ENCOUNTER
"Requested Prescriptions   Pending Prescriptions Disp Refills     FLUoxetine (PROZAC) 10 MG capsule [Pharmacy Med Name: FLUOXETINE HCL 10MG CAPS] 90 capsule 1     Sig: TAKE ONE CAPSULE BY MOUTH EVERY DAY (TAKE WITH 20MG CAPSULE FOR TOTAL DAILY DOSE OF 30MG)    SSRIs Protocol Passed    9/21/2018  9:49 AM       Passed - Recent (12 mo) or future (30 days) visit within the authorizing provider's specialty    Patient had office visit in the last 12 months or has a visit in the next 30 days with authorizing provider or within the authorizing provider's specialty.  See \"Patient Info\" tab in inbasket, or \"Choose Columns\" in Meds & Orders section of the refill encounter.           Passed - Patient is age 18 or older       Passed - No active pregnancy on record       Passed - No positive pregnancy test in last 12 months        FLUoxetine (PROZAC) 10 MG capsule  Last Written Prescription Date:  02/13/2018  Last Fill Quantity: 90 capsule,  # refills: 1   Last office visit: 5/15/2018 with prescribing provider:  RANJITH Olivo   Future Office Visit:      PHQ-9 SCORE 1/31/2017 1/30/2018 2/13/2018   Total Score - - -   Total Score 8 11 3     JOHNNY-7 SCORE 1/31/2017 1/30/2018   Total Score 2 11       Genny COOPER (R) (M)    "

## 2018-11-21 DIAGNOSIS — F43.21 ADJUSTMENT DISORDER WITH DEPRESSED MOOD: ICD-10-CM

## 2018-11-21 DIAGNOSIS — F41.9 ANXIETY: ICD-10-CM

## 2018-11-21 NOTE — TELEPHONE ENCOUNTER
"Requested Prescriptions   Pending Prescriptions Disp Refills     FLUoxetine (PROZAC) 20 MG capsule [Pharmacy Med Name: FLUOXETINE HCL 20MG CAPS] 90 capsule 1     Sig: TAKE ONE CAPSULE BY MOUTH EVERY DAY (TAKE ALONG WITH 10MG CAPSULE FOR A TOTAL DAILY DOSE OF 30MG)    SSRIs Protocol Passed    11/21/2018  3:18 PM       Passed - Recent (12 mo) or future (30 days) visit within the authorizing provider's specialty    Patient had office visit in the last 12 months or has a visit in the next 30 days with authorizing provider or within the authorizing provider's specialty.  See \"Patient Info\" tab in inLeoet, or \"Choose Columns\" in Meds & Orders section of the refill encounter.      Last Written Prescription Date:  9/21/18  Last Fill Quantity: 90,  # refills: 1   Last office visit: 5/15/2018 with prescribing provider:     Future Office Visit:               Passed - Patient is age 18 or older       Passed - No active pregnancy on record       Passed - No positive pregnancy test in last 12 months          "

## 2018-11-21 NOTE — TELEPHONE ENCOUNTER
Prescription approved per [facility] Refill Protocol.  Adriana CHRISTIAN RN

## 2019-02-22 ENCOUNTER — OFFICE VISIT (OUTPATIENT)
Dept: FAMILY MEDICINE | Facility: CLINIC | Age: 69
End: 2019-02-22
Payer: COMMERCIAL

## 2019-02-22 ENCOUNTER — TELEPHONE (OUTPATIENT)
Dept: PODIATRY | Facility: CLINIC | Age: 69
End: 2019-02-22

## 2019-02-22 VITALS
OXYGEN SATURATION: 93 % | RESPIRATION RATE: 15 BRPM | SYSTOLIC BLOOD PRESSURE: 130 MMHG | HEIGHT: 60 IN | TEMPERATURE: 97.8 F | BODY MASS INDEX: 30.82 KG/M2 | DIASTOLIC BLOOD PRESSURE: 72 MMHG | WEIGHT: 157 LBS | HEART RATE: 93 BPM

## 2019-02-22 DIAGNOSIS — E78.5 HYPERLIPIDEMIA, UNSPECIFIED HYPERLIPIDEMIA TYPE: ICD-10-CM

## 2019-02-22 DIAGNOSIS — E66.89 OTHER OBESITY: ICD-10-CM

## 2019-02-22 DIAGNOSIS — M25.551 HIP PAIN, RIGHT: ICD-10-CM

## 2019-02-22 DIAGNOSIS — R73.01 ELEVATED FASTING GLUCOSE: ICD-10-CM

## 2019-02-22 DIAGNOSIS — M81.0 AGE-RELATED OSTEOPOROSIS WITHOUT CURRENT PATHOLOGICAL FRACTURE: ICD-10-CM

## 2019-02-22 DIAGNOSIS — J44.1 COPD EXACERBATION (H): ICD-10-CM

## 2019-02-22 DIAGNOSIS — Z11.59 NEED FOR HEPATITIS C SCREENING TEST: ICD-10-CM

## 2019-02-22 DIAGNOSIS — J45.40 MODERATE PERSISTENT ASTHMA WITHOUT COMPLICATION: ICD-10-CM

## 2019-02-22 DIAGNOSIS — Z00.00 ENCOUNTER FOR INITIAL PREVENTIVE PHYSICAL EXAMINATION COVERED BY MEDICARE: Primary | ICD-10-CM

## 2019-02-22 DIAGNOSIS — Z12.31 VISIT FOR SCREENING MAMMOGRAM: ICD-10-CM

## 2019-02-22 DIAGNOSIS — I10 HTN, GOAL BELOW 140/90: ICD-10-CM

## 2019-02-22 DIAGNOSIS — J44.9 CHRONIC OBSTRUCTIVE PULMONARY DISEASE, UNSPECIFIED COPD TYPE (H): ICD-10-CM

## 2019-02-22 DIAGNOSIS — Z12.11 SCREEN FOR COLON CANCER: ICD-10-CM

## 2019-02-22 DIAGNOSIS — J18.0 BRONCHIAL PNEUMONIA: ICD-10-CM

## 2019-02-22 LAB
ALBUMIN SERPL-MCNC: 3.5 G/DL (ref 3.4–5)
ALP SERPL-CCNC: 118 U/L (ref 40–150)
ALT SERPL W P-5'-P-CCNC: 70 U/L (ref 0–50)
ANION GAP SERPL CALCULATED.3IONS-SCNC: 5 MMOL/L (ref 3–14)
AST SERPL W P-5'-P-CCNC: 34 U/L (ref 0–45)
BASOPHILS # BLD AUTO: 0.1 10E9/L (ref 0–0.2)
BASOPHILS NFR BLD AUTO: 0.6 %
BILIRUB SERPL-MCNC: 0.6 MG/DL (ref 0.2–1.3)
BUN SERPL-MCNC: 19 MG/DL (ref 7–30)
CALCIUM SERPL-MCNC: 8.5 MG/DL (ref 8.5–10.1)
CHLORIDE SERPL-SCNC: 102 MMOL/L (ref 94–109)
CHOLEST SERPL-MCNC: 216 MG/DL
CO2 SERPL-SCNC: 33 MMOL/L (ref 20–32)
CREAT SERPL-MCNC: 0.72 MG/DL (ref 0.52–1.04)
DIFFERENTIAL METHOD BLD: ABNORMAL
EOSINOPHIL # BLD AUTO: 0.2 10E9/L (ref 0–0.7)
EOSINOPHIL NFR BLD AUTO: 1.1 %
ERYTHROCYTE [DISTWIDTH] IN BLOOD BY AUTOMATED COUNT: 15.9 % (ref 10–15)
GFR SERPL CREATININE-BSD FRML MDRD: 85 ML/MIN/{1.73_M2}
GLUCOSE SERPL-MCNC: 103 MG/DL (ref 70–99)
HCT VFR BLD AUTO: 48.5 % (ref 35–47)
HDLC SERPL-MCNC: 60 MG/DL
HGB BLD-MCNC: 16.5 G/DL (ref 11.7–15.7)
IMM GRANULOCYTES # BLD: 0.1 10E9/L (ref 0–0.4)
IMM GRANULOCYTES NFR BLD: 0.8 %
LDLC SERPL CALC-MCNC: 108 MG/DL
LYMPHOCYTES # BLD AUTO: 4.5 10E9/L (ref 0.8–5.3)
LYMPHOCYTES NFR BLD AUTO: 27.6 %
MCH RBC QN AUTO: 33 PG (ref 26.5–33)
MCHC RBC AUTO-ENTMCNC: 34 G/DL (ref 31.5–36.5)
MCV RBC AUTO: 97 FL (ref 78–100)
MONOCYTES # BLD AUTO: 1.3 10E9/L (ref 0–1.3)
MONOCYTES NFR BLD AUTO: 7.9 %
NEUTROPHILS # BLD AUTO: 10.2 10E9/L (ref 1.6–8.3)
NEUTROPHILS NFR BLD AUTO: 62 %
NONHDLC SERPL-MCNC: 156 MG/DL
NRBC # BLD AUTO: 0 10*3/UL
NRBC BLD AUTO-RTO: 0 /100
PLATELET # BLD AUTO: 228 10E9/L (ref 150–450)
POTASSIUM SERPL-SCNC: 3.8 MMOL/L (ref 3.4–5.3)
PROT SERPL-MCNC: 6.9 G/DL (ref 6.8–8.8)
RBC # BLD AUTO: 5 10E12/L (ref 3.8–5.2)
SODIUM SERPL-SCNC: 140 MMOL/L (ref 133–144)
TRIGL SERPL-MCNC: 241 MG/DL
WBC # BLD AUTO: 16.4 10E9/L (ref 4–11)

## 2019-02-22 PROCEDURE — 99214 OFFICE O/P EST MOD 30 MIN: CPT | Mod: 25 | Performed by: NURSE PRACTITIONER

## 2019-02-22 PROCEDURE — G0438 PPPS, INITIAL VISIT: HCPCS | Performed by: NURSE PRACTITIONER

## 2019-02-22 PROCEDURE — 36415 COLL VENOUS BLD VENIPUNCTURE: CPT | Performed by: NURSE PRACTITIONER

## 2019-02-22 PROCEDURE — 85025 COMPLETE CBC W/AUTO DIFF WBC: CPT | Performed by: NURSE PRACTITIONER

## 2019-02-22 PROCEDURE — 80061 LIPID PANEL: CPT | Performed by: NURSE PRACTITIONER

## 2019-02-22 PROCEDURE — 84443 ASSAY THYROID STIM HORMONE: CPT | Performed by: NURSE PRACTITIONER

## 2019-02-22 PROCEDURE — 83036 HEMOGLOBIN GLYCOSYLATED A1C: CPT | Performed by: NURSE PRACTITIONER

## 2019-02-22 PROCEDURE — 82306 VITAMIN D 25 HYDROXY: CPT | Performed by: NURSE PRACTITIONER

## 2019-02-22 PROCEDURE — 80053 COMPREHEN METABOLIC PANEL: CPT | Performed by: NURSE PRACTITIONER

## 2019-02-22 RX ORDER — AZITHROMYCIN 250 MG/1
500 TABLET, FILM COATED ORAL DAILY
Qty: 10 TABLET | Refills: 1 | Status: SHIPPED | OUTPATIENT
Start: 2019-02-22 | End: 2019-04-12

## 2019-02-22 RX ORDER — ALBUTEROL SULFATE 90 UG/1
2 AEROSOL, METERED RESPIRATORY (INHALATION) EVERY 6 HOURS
Qty: 54 G | Refills: 3 | Status: SHIPPED | OUTPATIENT
Start: 2019-02-22 | End: 2019-04-12

## 2019-02-22 RX ORDER — IPRATROPIUM BROMIDE AND ALBUTEROL SULFATE 2.5; .5 MG/3ML; MG/3ML
1 SOLUTION RESPIRATORY (INHALATION) EVERY 6 HOURS PRN
Qty: 1 BOX | Refills: 3 | Status: SHIPPED | OUTPATIENT
Start: 2019-02-22 | End: 2020-02-05

## 2019-02-22 RX ORDER — PREDNISONE 20 MG/1
TABLET ORAL
Qty: 15 TABLET | Refills: 1 | Status: SHIPPED | OUTPATIENT
Start: 2019-02-22 | End: 2019-03-04

## 2019-02-22 ASSESSMENT — ANXIETY QUESTIONNAIRES
3. WORRYING TOO MUCH ABOUT DIFFERENT THINGS: SEVERAL DAYS
7. FEELING AFRAID AS IF SOMETHING AWFUL MIGHT HAPPEN: NOT AT ALL
GAD7 TOTAL SCORE: 6
7. FEELING AFRAID AS IF SOMETHING AWFUL MIGHT HAPPEN: NOT AT ALL
2. NOT BEING ABLE TO STOP OR CONTROL WORRYING: SEVERAL DAYS
GAD7 TOTAL SCORE: 6
6. BECOMING EASILY ANNOYED OR IRRITABLE: SEVERAL DAYS
5. BEING SO RESTLESS THAT IT IS HARD TO SIT STILL: SEVERAL DAYS
4. TROUBLE RELAXING: SEVERAL DAYS
1. FEELING NERVOUS, ANXIOUS, OR ON EDGE: SEVERAL DAYS
GAD7 TOTAL SCORE: 6

## 2019-02-22 ASSESSMENT — MIFFLIN-ST. JEOR: SCORE: 1153.65

## 2019-02-22 ASSESSMENT — PATIENT HEALTH QUESTIONNAIRE - PHQ9
SUM OF ALL RESPONSES TO PHQ QUESTIONS 1-9: 9
10. IF YOU CHECKED OFF ANY PROBLEMS, HOW DIFFICULT HAVE THESE PROBLEMS MADE IT FOR YOU TO DO YOUR WORK, TAKE CARE OF THINGS AT HOME, OR GET ALONG WITH OTHER PEOPLE: SOMEWHAT DIFFICULT
SUM OF ALL RESPONSES TO PHQ QUESTIONS 1-9: 9

## 2019-02-22 ASSESSMENT — PAIN SCALES - GENERAL: PAINLEVEL: NO PAIN (0)

## 2019-02-22 NOTE — LETTER
My Asthma Action Plan  Name: Aminata Gale   YOB: 1950  Date: 2/22/2019   My doctor: PEDRO Dominguez CNP   My clinic: [facility]        My Control Medicine: Tiotropium (Spiriva) -  Respimat 2.5 mcg inh  My Rescue Medicine: Albuterol (Proair/Ventolin/Proventil) inhaler inh   My Asthma Severity: mild persistent  Avoid your asthma triggers: Patient is unaware of triggers  None            GREEN ZONE   Good Control    I feel good    No cough or wheeze    Can work, sleep and play without asthma symptoms       Take your asthma control medicine every day.     1. If exercise triggers your asthma, take your rescue medication    15 minutes before exercise or sports, and    During exercise if you have asthma symptoms  2. Spacer to use with inhaler: If you have a spacer, make sure to use it with your inhaler             YELLOW ZONE Getting Worse  I have ANY of these:    I do not feel good    Cough or wheeze    Chest feels tight    Wake up at night   1. Keep taking your Green Zone medications  2. Start taking your rescue medicine:    every 20 minutes for up to 1 hour. Then every 4 hours for 24-48 hours.  3. If you stay in the Yellow Zone for more than 12-24 hours, contact your doctor.  4. If you do not return to the Green Zone in 12-24 hours or you get worse, start taking your oral steroid medicine if prescribed by your provider.           RED ZONE Medical Alert - Get Help  I have ANY of these:    I feel awful    Medicine is not helping    Breathing getting harder    Trouble walking or talking    Nose opens wide to breathe       1. Take your rescue medicine NOW  2. If your provider has prescribed an oral steroid medicine, start taking it NOW  3. Call your doctor NOW  4. If you are still in the Red Zone after 20 minutes and you have not reached your doctor:    Take your rescue medicine again and    Call 911 or go to the emergency room right away    See your regular doctor within 2  weeks of an Emergency Room or Urgent Care visit for follow-up treatment.          Annual Reminders:  Meet with Asthma Educator,  Flu Shot in the Fall, consider Pneumonia Vaccination for patients with asthma (aged 19 and older).    Pharmacy:    [facility] [place] [address] [facility] [place] [address] [address] [facility]  [facility] [place] - [address] [facility]  [id number] [place], [place] - [address] [place] [address] AT [address]SECTION [address] [facility]  [employer]                      Asthma Triggers  How To Control Things That Make Your Asthma Worse    Triggers are things that make your asthma worse.  Look at the list below to help you find your triggers and what you can do about them.  You can help prevent asthma flare-ups by staying away from your triggers.      Trigger                                                          What you can do   Cigarette Smoke  Tobacco smoke can make asthma worse. Do not allow smoking in your home, car or around you.  Be sure no one smokes at a child s day care or school.  If you smoke, ask your health care provider for ways to help you quit.  Ask family members to quit too.  Ask your health care provider for a referral to Quit Plan to help you quit smoking, or call 7-714-652-PLAN.     Colds, Flu, Bronchitis  These are common triggers of asthma. Wash your hands often.  Don t touch your eyes, nose or mouth.  Get a flu shot every year.     Dust Mites  These are tiny bugs that live in cloth or carpet. They are too small to see. Wash sheets and blankets in hot water every week.   Encase pillows and mattress in dust mite proof covers.  Avoid having carpet if you can. If you have carpet, vacuum weekly.   Use a dust mask and HEPA vacuum.   Pollen and Outdoor Mold  Some people are allergic to trees, grass, or weed pollen, or molds. Try to keep your windows closed.  Limit time out doors when pollen count is high.   Ask  you health care provider about taking medicine during allergy season.     Animal Dander  Some people are allergic to skin flakes, urine or saliva from pets with fur or feathers. Keep pets with fur or feathers out of your home.    If you can t keep the pet outdoors, then keep the pet out of your bedroom.  Keep the bedroom door closed.  Keep pets off cloth furniture and away from stuffed toys.     Mice, Rats, and Cockroaches  Some people are allergic to the waste from these pests.   Cover food and garbage.  Clean up spills and food crumbs.  Store grease in the refrigerator.   Keep food out of the bedroom.   Indoor Mold  This can be a trigger if your home has high moisture. Fix leaking faucets, pipes, or other sources of water.   Clean moldy surfaces.  Dehumidify basement if it is damp and smelly.   Smoke, Strong Odors, and Sprays  These can reduce air quality. Stay away from strong odors and sprays, such as perfume, powder, hair spray, paints, smoke incense, paint, cleaning products, candles and new carpet.   Exercise or Sports  Some people with asthma have this trigger. Be active!  Ask your doctor about taking medicine before sports or exercise to prevent symptoms.    Warm up for 5-10 minutes before and after sports or exercise.     Other Triggers of Asthma  Cold air:  Cover your nose and mouth with a scarf.  Sometimes laughing or crying can be a trigger.  Some medicines and food can trigger asthma.

## 2019-02-22 NOTE — LETTER
My COPD Action Plan     Name: Aminata Gale    YOB: 1950   Date: 2/22/2019    My doctor: PEDRO Dominguez CNP   My clinic: [address] [address] [phone]       My COPD Severity: Mild = FeV1 > 80%      Use of Oxygen: Oxygen Not Prescribed      Make sure you've had your pneumonia   vaccines.          GREEN ZONE       Doing well today      Usual level of activity and exercise    Usual amount of cough and mucus    No shortness of breath    Usual level of health (thinking clearly, sleeping well, feel like eating) Actions:      Take daily medicines    Use oxygen as prescribed    Follow regular exercise and diet plan    Avoid cigarette smoke and other irritants that harm the lungs           YELLOW ZONE          Having a bad day or flare up      Short of breath more than usual    A lot more sputum (mucus) than usual    Sputum looks yellow, green, tan, brown or bloody    More coughing or wheezing    Fever or chills    Less energy; trouble completing activities    Trouble thinking or focusing    Using quick relief inhaler or nebulizer more often    Poor sleep; symptoms wake me up    Do not feel like eating Actions:      Get plenty of rest    Take daily medicines    Use quick relief inhaler every  hours    If you use oxygen, call you doctor to see if you should adjust your oxygen    Do breathing exercises or other things to help you relax    Let a loved one, friend or neighbor know you are feeling worse    Call your care team if you have 2 or more symptoms.  Start taking steroids or antibiotics if directed by your care team           RED ZONE       Need medical care now      Severe shortness of breath (feel you can't breathe)    Fever, chills    Not enough breath to do any activity    Trouble coughing up mucus, walking or talking    Blood in mucus    Frequent coughing   Rescue medicines are not working    Not able to sleep because of  breathing    Feel confused or drowsy    Chest pain    Actions:      Call your health care team.  If you cannot reach your care team, call 911 or go to the emergency room.        Annual Reminders:  Meet with Care Team, Flu Shot every Fall  Pharmacy:    [facility] [place], [place] - [address] [place] - [address] [address] [facility]  [facility] [place], [place] - [address] [facility]  [id number] [place], [place] - [address] [place] [address] AT INTERSECTION [address] [place] [address]  [email or web address]

## 2019-02-22 NOTE — LETTER
February 27, 2019      Aminata Gale  [address] [place] [address]        Dear ,    We are writing to inform you of your test results.    Vitamin D deficiency present on labs.   Recommend over-the-counter vitamin D3 -5000 international units daily. You can ask any pharmacy for guidance with this. As discussed on the phone please follow up with Sana Olivo, MSN,FN-BC due to early Type II Diabetes. Thank you and have a great day. Please call us back at anytime for further questions.       Resulted Orders   Comprehensive metabolic panel   Result Value Ref Range    Sodium 140 133 - 144 mmol/L    Potassium 3.8 3.4 - 5.3 mmol/L    Chloride 102 94 - 109 mmol/L    Carbon Dioxide 33 (H) 20 - 32 mmol/L    Anion Gap 5 3 - 14 mmol/L    Glucose 103 (H) 70 - 99 mg/dL      Comment:      Fasting specimen    Urea Nitrogen 19 7 - 30 mg/dL    Creatinine 0.72 0.52 - 1.04 mg/dL    GFR Estimate 85 >60 mL/min/[1.73_m2]      Comment:      Non  GFR Calc  Starting 12/18/2018, serum creatinine based estimated GFR (eGFR) will be   calculated using the Chronic Kidney Disease Epidemiology Collaboration   (CKD-EPI) equation.      GFR Estimate If Black >90 >60 mL/min/[1.73_m2]      Comment:       GFR Calc  Starting 12/18/2018, serum creatinine based estimated GFR (eGFR) will be   calculated using the Chronic Kidney Disease Epidemiology Collaboration   (CKD-EPI) equation.      Calcium 8.5 8.5 - 10.1 mg/dL    Bilirubin Total 0.6 0.2 - 1.3 mg/dL    Albumin 3.5 3.4 - 5.0 g/dL    Protein Total 6.9 6.8 - 8.8 g/dL    Alkaline Phosphatase 118 40 - 150 U/L    ALT 70 (H) 0 - 50 U/L    AST 34 0 - 45 U/L   Lipid panel reflex to direct LDL Fasting   Result Value Ref Range    Cholesterol 216 (H) <200 mg/dL      Comment:      Desirable:       <200 mg/dl    Triglycerides 241 (H) <150 mg/dL      Comment:      Borderline high:  150-199 mg/dl  High:             200-499 mg/dl  Very high:       >499  mg/dl  Fasting specimen      HDL Cholesterol 60 >49 mg/dL    LDL Cholesterol Calculated 108 (H) <100 mg/dL      Comment:      Above desirable:  100-129 mg/dl  Borderline High:  130-159 mg/dL  High:             160-189 mg/dL  Very high:       >189 mg/dl      Non HDL Cholesterol 156 (H) <130 mg/dL      Comment:      Above Desirable:  130-159 mg/dl  Borderline high:  160-189 mg/dl  High:             190-219 mg/dl  Very high:       >219 mg/dl     CBC with platelets and differential   Result Value Ref Range    WBC 16.4 (H) 4.0 - 11.0 10e9/L    RBC Count 5.00 3.8 - 5.2 10e12/L    Hemoglobin 16.5 (H) 11.7 - 15.7 g/dL    Hematocrit 48.5 (H) 35.0 - 47.0 %    MCV 97 78 - 100 fl    MCH 33.0 26.5 - 33.0 pg    MCHC 34.0 31.5 - 36.5 g/dL    RDW 15.9 (H) 10.0 - 15.0 %    Platelet Count 228 150 - 450 10e9/L    Diff Method Automated Method     % Neutrophils 62.0 %    % Lymphocytes 27.6 %    % Monocytes 7.9 %    % Eosinophils 1.1 %    % Basophils 0.6 %    % Immature Granulocytes 0.8 %    Nucleated RBCs 0 0 /100    Absolute Neutrophil 10.2 (H) 1.6 - 8.3 10e9/L    Absolute Lymphocytes 4.5 0.8 - 5.3 10e9/L    Absolute Monocytes 1.3 0.0 - 1.3 10e9/L    Absolute Eosinophils 0.2 0.0 - 0.7 10e9/L    Absolute Basophils 0.1 0.0 - 0.2 10e9/L    Abs Immature Granulocytes 0.1 0 - 0.4 10e9/L    Absolute Nucleated RBC 0.0    Vitamin D Deficiency   Result Value Ref Range    Vitamin D Deficiency screening 19 (L) 20 - 75 ug/L      Comment:      Season, race, dietary intake, and treatment affect the concentration of   25-hydroxy-Vitamin D. Values may decrease during winter months and increase   during summer months. Values 20-29 ug/L may indicate Vitamin D insufficiency   and values <20 ug/L may indicate Vitamin D deficiency.  Vitamin D determination is routinely performed by an immunoassay specific for   25 hydroxyvitamin D3.  If an individual is on vitamin D2 (ergocalciferol)   supplementation, please specify 25 [place] vitamin D2 and D3 level  determination by   LCMSMS test VITD23.     Hemoglobin A1c   Result Value Ref Range    Hemoglobin A1C 6.7 (H) 0 - 5.6 %      Comment:      Normal <5.7% Prediabetes 5.7-6.4%  Diabetes 6.5% or higher - adopted from ADA   consensus guidelines.            Sana Brandon MSN,[facility]-[address] [address] [phone]

## 2019-02-22 NOTE — PROGRESS NOTES
"  SUBJECTIVE:   Aminata Gale is a 68 year old female who presents to clinic today for the following health issues:      RESPIRATORY SYMPTOMS      Duration: 2 month     Description  nasal congestion, rhinorrhea, cough and blubbery vision     Severity: moderate    Accompanying signs and symptoms: ear pain bilateral \"feels like fluid around the heart again\" took 10 days of prednisone      History (predisposing factors):  asthma, COPD and exposure to smoke    Precipitating or alleviating factors: None    Therapies tried and outcome:  rest and fluids    RECTAL/ABD PROBLEM      Duration: 1 month    Description (location/character/radiation): and pain off and on     Intensity:  moderate    Accompanying signs and symptoms: none    History (similar episodes/previous evaluation): None    Precipitating or alleviating factors: None    Therapies tried and outcome: None     Hemorrhoids       Duration: 1 week    Description:   Pain: YES  Itching: no     Accompanying signs and symptoms:   Blood in stool: YES  Changes in stool pattern: YES    History (similar episodes/previous evaluation): None      Annual Wellness Visit    Are you in the first 12 months of your Medicare Part B coverage?  No    Physical Health:    In general, how would you rate your overall physical health? good    If you drink alcohol do you typically have >3 drinks per day or >7 drinks per week? No    Do you usually eat at least 4 servings of fruit and vegetables a day, include whole grains & fiber and avoid regularly eating high fat or \"junk\" foods? Yes    Needs assistance for the following daily activities: housework and no assistance needed stairs     Which of the following safety concerns are present in your home?  lack of grab bars in the bathroom     Hearing impairment: No    In the past 6 months, have you been bothered by leaking of urine? yes    Mental Health:    In general, how would you rate your overall mental or emotional health? good  PHQ-2 Score:  "     Do you feel safe in your environment? No    Do you have a Health Care Directive? No: Advance care planning reviewed with patient; information given to patient to review.    Fall risk: 1 fall     click delete button to remove this line now      Current providers sharing in care for this patient include:   Patient Care Team:  Sana Olivo APRN CNP as PCP - General (Family Practice)  Sana Olivo APRN CNP as PCP - Assigned PCP      Do you have sleep apnea, excessive snoring or daytime drowsiness?: no    -------------------------------------    Problem list and histories reviewed & adjusted, as indicated.  Additional history: as documented    Labs reviewed in EPIC    Reviewed and updated as needed this visit by clinical staff  Tobacco  Allergies  Meds  Med Hx  Surg Hx  Fam Hx  Soc Hx      Reviewed and updated as needed this visit by Provider  Allergies         ROS:   ROS: 10 point ROS neg other than the symptoms noted above in the HPI.      OBJECTIVE:                                                    /72   Pulse 93   Temp 97.8  F (36.6  C) (Tympanic)   Resp 15   Ht 1.524 m (5')   Wt 71.2 kg (157 lb)   SpO2 93%   BMI 30.66 kg/m    Body mass index is 30.66 kg/m .   GENERAL alert, well nourished, well hydrated, mild respiratory  Distress with pursed lip breathing  HENT: ear canals- normal; TMs- normal; Nose- normal; Mouth- no ulcers, no lesions  NECK: no tenderness, no adenopathy, no asymmetry, no masses, no stiffness; thyroid- normal to palpation  RESP: lungs rhonchi throughout with expiratory wheeze   CV: regular rates and rhythm, normal S1 S2, no S3 or S4 and no murmur, no click or rub -  ABDOMEN: soft, no tenderness, no  hepatosplenomegaly, no masses, normal bowel sounds  MUSC tender trochanter bursa with palpation.  Limited range of motion secondary to discomfort  Skin: Rectal hemorrhoid 5 o'clock position 1.5 cm    Diagnostic test results:  Results for orders placed or  performed in visit on 02/22/19   Comprehensive metabolic panel   Result Value Ref Range    Sodium 140 133 - 144 mmol/L    Potassium 3.8 3.4 - 5.3 mmol/L    Chloride 102 94 - 109 mmol/L    Carbon Dioxide 33 (H) 20 - 32 mmol/L    Anion Gap 5 3 - 14 mmol/L    Glucose 103 (H) 70 - 99 mg/dL    Urea Nitrogen 19 7 - 30 mg/dL    Creatinine 0.72 0.52 - 1.04 mg/dL    GFR Estimate 85 >60 mL/min/[1.73_m2]    GFR Estimate If Black >90 >60 mL/min/[1.73_m2]    Calcium 8.5 8.5 - 10.1 mg/dL    Bilirubin Total 0.6 0.2 - 1.3 mg/dL    Albumin 3.5 3.4 - 5.0 g/dL    Protein Total 6.9 6.8 - 8.8 g/dL    Alkaline Phosphatase 118 40 - 150 U/L    ALT 70 (H) 0 - 50 U/L    AST 34 0 - 45 U/L   Lipid panel reflex to direct LDL Fasting   Result Value Ref Range    Cholesterol 216 (H) <200 mg/dL    Triglycerides 241 (H) <150 mg/dL    HDL Cholesterol 60 >49 mg/dL    LDL Cholesterol Calculated 108 (H) <100 mg/dL    Non HDL Cholesterol 156 (H) <130 mg/dL   CBC with platelets and differential   Result Value Ref Range    WBC 16.4 (H) 4.0 - 11.0 10e9/L    RBC Count 5.00 3.8 - 5.2 10e12/L    Hemoglobin 16.5 (H) 11.7 - 15.7 g/dL    Hematocrit 48.5 (H) 35.0 - 47.0 %    MCV 97 78 - 100 fl    MCH 33.0 26.5 - 33.0 pg    MCHC 34.0 31.5 - 36.5 g/dL    RDW 15.9 (H) 10.0 - 15.0 %    Platelet Count 228 150 - 450 10e9/L    Diff Method Automated Method     % Neutrophils 62.0 %    % Lymphocytes 27.6 %    % Monocytes 7.9 %    % Eosinophils 1.1 %    % Basophils 0.6 %    % Immature Granulocytes 0.8 %    Nucleated RBCs 0 0 /100    Absolute Neutrophil 10.2 (H) 1.6 - 8.3 10e9/L    Absolute Lymphocytes 4.5 0.8 - 5.3 10e9/L    Absolute Monocytes 1.3 0.0 - 1.3 10e9/L    Absolute Eosinophils 0.2 0.0 - 0.7 10e9/L    Absolute Basophils 0.1 0.0 - 0.2 10e9/L    Abs Immature Granulocytes 0.1 0 - 0.4 10e9/L    Absolute Nucleated RBC 0.0         ASSESSMENT/PLAN:                                                    1. Screen for colon cancer  Medicare annual physical   colonoscopy due.   Order placed.  - GASTROENTEROLOGY ADULT REF PROCEDURE ONLY    2. Visit for screening mammogram  Mammogram due yearly.  Order placed.    3. Need for hepatitis C screening test  Recommend  months    4. Moderate persistent asthma without complication  With COPD  Continue  - tiotropium (SPIRIVA RESPIMAT) 2.5 MCG/ACT inhaler; Inhale 2 puffs into the lungs daily  Dispense: 12 g; Refill: 3  Add in  - fluticasone-vilanterol (BREO ELLIPTA) 200-25 MCG/INH inhaler; Inhale 1 puff into the lungs daily  Dispense: 60 Inhaler; Refill: 3    5. Chronic obstructive pulmonary disease, unspecified COPD type (H)  Recent exacerbation.  Begin antibiotics.  Begin prednisone.  Begin Brio.  Continue Ventolin.  Continue Spiriva.  Continue DuoNeb.  Stop smoking.  - tiotropium (SPIRIVA RESPIMAT) 2.5 MCG/ACT inhaler; Inhale 2 puffs into the lungs daily  Dispense: 12 g; Refill: 3  - order for DME; Equipment being ordered: Nebulizer- PORTABLE with tubing. [employer] Phone: [phone]  Dispense: 1 Device; Refill: 0  - albuterol (PROAIR HFA/PROVENTIL HFA/VENTOLIN HFA) 108 (90 Base) MCG/ACT inhaler; Inhale 2 puffs into the lungs every 6 hours  Dispense: 54 g; Refill: 3  - predniSONE (DELTASONE) 20 MG tablet; Take 40 mg by mouth daily for 3 days, THEN 30 mg daily for 3 days, THEN 20 mg daily for 3 days, THEN 10 mg daily for 3 days.  Dispense: 15 tablet; Refill: 1  - azithromycin (ZITHROMAX) 250 MG tablet; Take 2 tablets (500 mg) by mouth daily for 5 days  Dispense: 10 tablet; Refill: 1    6. Bronchial pneumonia  Most likely bacterial etiology.  See plan above    7. COPD exacerbation (H)  - fluticasone-vilanterol (BREO ELLIPTA) 200-25 MCG/INH inhaler; Inhale 1 puff into the lungs daily  Dispense: 60 Inhaler; Refill: 3    8. Age-related osteoporosis without current pathological fracture  DEXA scan  Declined bisphosphonate.  Intermittent steroid use.  Increased risk.  Patient verbalized understanding and is in agreement      9. HTN,  goal below 140/90  Statin declined by patient due to previous side effects  Labs today to monitor  Continue torsemide.  Continue potassium.  Continue amlodipine  - Comprehensive metabolic panel  - Lipid panel reflex to direct LDL Fasting  - CBC with platelets and differential  - Vitamin D Deficiency    10. Hyperlipidemia, unspecified hyperlipidemia type  Continue Zetia.  Labs today to monitor  - Comprehensive metabolic panel  - Lipid panel reflex to direct LDL Fasting  - CBC with platelets and differential  - Vitamin D Deficiency    11. Other obesity   Dietary changes recommended  - Vitamin D Deficiency    12. Hip pain, right  Follow-up with orthopedics for repeat injection right hip  - ORTHO  REFERRAL    13. Elevated fasting glucose  Add on  - Hemoglobin A1c      Follow up with Provider - Call or return to the clinic with any worsening of symptoms or no resolution. Patient/Parent verbalized understanding and is in agreement. Medication side effects reviewed.   Current Outpatient Medications   Medication Sig Dispense Refill     albuterol (PROAIR HFA/PROVENTIL HFA/VENTOLIN HFA) 108 (90 Base) MCG/ACT inhaler Inhale 2 puffs into the lungs every 6 hours 54 g 3     azithromycin (ZITHROMAX) 250 MG tablet Take 2 tablets (500 mg) by mouth daily for 5 days 10 tablet 1     fluticasone-vilanterol (BREO ELLIPTA) 200-25 MCG/INH inhaler Inhale 1 puff into the lungs daily 60 Inhaler 3     ipratropium - albuterol 0.5 mg/2.5 mg/3 mL (DUONEB) 0.5-2.5 (3) MG/3ML neb solution Take 1 vial (3 mLs) by nebulization every 6 hours as needed for shortness of breath / dyspnea or wheezing 1 Box 3     order for DME Equipment being ordered: Nebulizer- PORTABLE with tubing. [facility] Phone: [phone] 1 Device 0     predniSONE (DELTASONE) 20 MG tablet Take 40 mg by mouth daily for 3 days, THEN 30 mg daily for 3 days, THEN 20 mg daily for 3 days, THEN 10 mg daily for 3 days. 15 tablet 1     tiotropium (SPIRIVA RESPIMAT)  2.5 MCG/ACT inhaler Inhale 2 puffs into the lungs daily 12 g 3     albuterol (PROAIR HFA) 108 (90 BASE) MCG/ACT Inhaler Inhale 1-2 puffs into the lungs every 4 hours To 6 hours as needed for shortness of breath 3 Inhaler 1     amLODIPine (NORVASC) 10 MG tablet Take 1 tablet (10 mg) by mouth daily For high blood pressure 90 tablet 3     ezetimibe (ZETIA) 10 MG tablet Take 1 tablet (10 mg) by mouth daily 90 tablet 3     FLUoxetine (PROZAC) 10 MG capsule TAKE ONE CAPSULE BY MOUTH EVERY DAY (TAKE WITH 20MG CAPSULE FOR TOTAL DAILY DOSE OF 30MG) 90 capsule 1     FLUoxetine (PROZAC) 20 MG capsule TAKE ONE CAPSULE BY MOUTH EVERY DAY (TAKE ALONG WITH 10MG CAPSULE FOR A TOTAL DAILY DOSE OF 30MG) 90 capsule 0     Ibuprofen (IBU-200 PO) Take  by mouth.       meloxicam (MOBIC) 15 MG tablet Take 1 tablet (15 mg) by mouth daily 90 tablet 1     potassium chloride SA (K-DUR/KLOR-CON M) 20 MEQ CR tablet Take 1 tablet (20 mEq) by mouth daily Take 2 tablets on day one 32 tablet 3     STATIN NOT PRESCRIBED, INTENTIONAL, 1 each daily Statin not prescribed intentionally due to Intolerance 0 each 0     torsemide (DEMADEX) 5 MG tablet Take 1 tablet (5 mg) by mouth daily 90 tablet 1        See Patient Instructions    Chart documentation with Dragon Voice recognition Software. Although reviewed after completion, some words and grammatical errors may remain.    PEDRO Dominguez [facility]

## 2019-02-22 NOTE — TELEPHONE ENCOUNTER
Type of surgery: amputation of second toe right foot  Location of surgery: [facility]  Date and time of surgery: 2-26-19  Surgeon: Dr Pérez  Pre-Op Appt Date: 2-22-19 @11:00am Dr Tavera  Post-Op Appt Date:    Packet sent out: Not Applicable  Pre-cert/Authorization completed:  Not Applicable  Date: 2-22-19

## 2019-02-23 LAB — HBA1C MFR BLD: 6.7 % (ref 0–5.6)

## 2019-02-23 ASSESSMENT — PATIENT HEALTH QUESTIONNAIRE - PHQ9: SUM OF ALL RESPONSES TO PHQ QUESTIONS 1-9: 9

## 2019-02-23 ASSESSMENT — ANXIETY QUESTIONNAIRES: GAD7 TOTAL SCORE: 6

## 2019-02-24 DIAGNOSIS — E11.65 TYPE 2 DIABETES MELLITUS WITH HYPERGLYCEMIA, WITHOUT LONG-TERM CURRENT USE OF INSULIN (H): Primary | ICD-10-CM

## 2019-02-24 LAB — TSH SERPL DL<=0.005 MIU/L-ACNC: 1.98 MU/L (ref 0.4–4)

## 2019-02-24 RX ORDER — LANCETS
EACH MISCELLANEOUS
Qty: 100 EACH | Refills: 6 | Status: SHIPPED | OUTPATIENT
Start: 2019-02-24 | End: 2019-02-25

## 2019-02-24 RX ORDER — GLUCOSAMINE HCL/CHONDROITIN SU 500-400 MG
CAPSULE ORAL
Qty: 100 EACH | Refills: 3 | Status: SHIPPED | OUTPATIENT
Start: 2019-02-24

## 2019-02-25 ENCOUNTER — TELEPHONE (OUTPATIENT)
Dept: FAMILY MEDICINE | Facility: CLINIC | Age: 69
End: 2019-02-25

## 2019-02-25 DIAGNOSIS — E11.65 TYPE 2 DIABETES MELLITUS WITH HYPERGLYCEMIA, WITHOUT LONG-TERM CURRENT USE OF INSULIN (H): ICD-10-CM

## 2019-02-25 RX ORDER — LANCETS
1 EACH MISCELLANEOUS 2 TIMES DAILY
Qty: 100 EACH | Refills: 6 | Status: SHIPPED | OUTPATIENT
Start: 2019-02-25 | End: 2020-01-01

## 2019-02-25 NOTE — TELEPHONE ENCOUNTER
Pharmacy received a new RX 2/24/19 for Aminata on her Lancets. Please send a new RX with directions on how often using the lancets.  Thank you    ELVIS MORA [facility]

## 2019-02-26 LAB — DEPRECATED CALCIDIOL+CALCIFEROL SERPL-MC: 19 UG/L (ref 20–75)

## 2019-03-04 ENCOUNTER — ALLIED HEALTH/NURSE VISIT (OUTPATIENT)
Dept: EDUCATION SERVICES | Facility: CLINIC | Age: 69
End: 2019-03-04
Payer: COMMERCIAL

## 2019-03-04 ENCOUNTER — OFFICE VISIT (OUTPATIENT)
Dept: FAMILY MEDICINE | Facility: CLINIC | Age: 69
End: 2019-03-04
Payer: COMMERCIAL

## 2019-03-04 ENCOUNTER — ANCILLARY PROCEDURE (OUTPATIENT)
Dept: GENERAL RADIOLOGY | Facility: CLINIC | Age: 69
End: 2019-03-04
Attending: NURSE PRACTITIONER
Payer: COMMERCIAL

## 2019-03-04 VITALS
TEMPERATURE: 99.3 F | BODY MASS INDEX: 30.47 KG/M2 | RESPIRATION RATE: 20 BRPM | OXYGEN SATURATION: 93 % | HEART RATE: 94 BPM | WEIGHT: 156 LBS | SYSTOLIC BLOOD PRESSURE: 132 MMHG | DIASTOLIC BLOOD PRESSURE: 62 MMHG

## 2019-03-04 DIAGNOSIS — E11.65 TYPE 2 DIABETES MELLITUS WITH HYPERGLYCEMIA, WITHOUT LONG-TERM CURRENT USE OF INSULIN (H): Primary | ICD-10-CM

## 2019-03-04 DIAGNOSIS — J44.1 COPD EXACERBATION (H): Primary | ICD-10-CM

## 2019-03-04 DIAGNOSIS — E11.65 TYPE 2 DIABETES MELLITUS WITH HYPERGLYCEMIA, WITHOUT LONG-TERM CURRENT USE OF INSULIN (H): ICD-10-CM

## 2019-03-04 DIAGNOSIS — J44.1 COPD EXACERBATION (H): ICD-10-CM

## 2019-03-04 LAB
BASOPHILS # BLD AUTO: 0.1 10E9/L (ref 0–0.2)
BASOPHILS NFR BLD AUTO: 0.5 %
CREAT UR-MCNC: 92 MG/DL
DIFFERENTIAL METHOD BLD: ABNORMAL
EOSINOPHIL # BLD AUTO: 0.2 10E9/L (ref 0–0.7)
EOSINOPHIL NFR BLD AUTO: 1.3 %
ERYTHROCYTE [DISTWIDTH] IN BLOOD BY AUTOMATED COUNT: 15 % (ref 10–15)
HCT VFR BLD AUTO: 49.8 % (ref 35–47)
HGB BLD-MCNC: 16 G/DL (ref 11.7–15.7)
LYMPHOCYTES # BLD AUTO: 4.1 10E9/L (ref 0.8–5.3)
LYMPHOCYTES NFR BLD AUTO: 33.5 %
MCH RBC QN AUTO: 30 PG (ref 26.5–33)
MCHC RBC AUTO-ENTMCNC: 32.1 G/DL (ref 31.5–36.5)
MCV RBC AUTO: 93 FL (ref 78–100)
MICROALBUMIN UR-MCNC: 29 MG/L
MICROALBUMIN/CREAT UR: 31.05 MG/G CR (ref 0–25)
MONOCYTES # BLD AUTO: 1.2 10E9/L (ref 0–1.3)
MONOCYTES NFR BLD AUTO: 9.4 %
NEUTROPHILS # BLD AUTO: 6.8 10E9/L (ref 1.6–8.3)
NEUTROPHILS NFR BLD AUTO: 55.3 %
PLATELET # BLD AUTO: 213 10E9/L (ref 150–450)
RBC # BLD AUTO: 5.33 10E12/L (ref 3.8–5.2)
WBC # BLD AUTO: 12.3 10E9/L (ref 4–11)

## 2019-03-04 PROCEDURE — 93000 ELECTROCARDIOGRAM COMPLETE: CPT | Performed by: NURSE PRACTITIONER

## 2019-03-04 PROCEDURE — 99214 OFFICE O/P EST MOD 30 MIN: CPT | Performed by: NURSE PRACTITIONER

## 2019-03-04 PROCEDURE — 85025 COMPLETE CBC W/AUTO DIFF WBC: CPT | Performed by: NURSE PRACTITIONER

## 2019-03-04 PROCEDURE — 36415 COLL VENOUS BLD VENIPUNCTURE: CPT | Performed by: NURSE PRACTITIONER

## 2019-03-04 PROCEDURE — 82043 UR ALBUMIN QUANTITATIVE: CPT | Performed by: NURSE PRACTITIONER

## 2019-03-04 PROCEDURE — 71046 X-RAY EXAM CHEST 2 VIEWS: CPT

## 2019-03-04 PROCEDURE — G0108 DIAB MANAGE TRN  PER INDIV: HCPCS | Performed by: DIETITIAN, REGISTERED

## 2019-03-04 PROCEDURE — 84443 ASSAY THYROID STIM HORMONE: CPT | Performed by: NURSE PRACTITIONER

## 2019-03-04 RX ORDER — AZITHROMYCIN 250 MG/1
TABLET, FILM COATED ORAL
Qty: 6 TABLET | Refills: 0 | Status: SHIPPED | OUTPATIENT
Start: 2019-03-04 | End: 2019-04-12

## 2019-03-04 NOTE — PROGRESS NOTES
SUBJECTIVE:   Aminata Gale is a 68 year old female who presents to clinic today for the following health issues:    NEW DIAGNOSIS DIABETES 2/22/2019    Hypertension Follow-up  BP Readings from Last 3 Encounters:   03/04/19 132/62   02/22/19 130/72   07/03/18 (P) 131/74       Outpatient blood pressures are not being checked.    Low Salt Diet: no added salt    COPD Follow-Up    Symptoms are currently: slightly worsened    Current fatigue or dyspnea with ambulation: worsened from baseline    Shortness of breath: slightly worsened    Increased or change in Cough/Sputum: No    Fever(s): No    Baseline ambulation without stopping to rest:  Can wander around [employer] for awhile as long as can stop.  Able to walk up 0 flights of stairs without stopping to rest.    Any ER/UC or hospital admissions since your last visit? No     Prednisone finished the zpack and steroid burst and taper.     Years of chemical exposure through work years ago.    Not taking diuretic,  Not using home oxygen  As concentrator very noisy trips or needs something more portable.  Continues to smoke.  History   Smoking Status     Current Every Day Smoker     Packs/day: 0.25     Years: 42.00     Types: Cigarettes   Smokeless Tobacco     Never Used     Comment: has not started the Chantix yet- 1/2016     No results found for: FEV1, NEO8SBT    Amount of exercise or physical activity: None    Problems taking medications regularly: No    Medication side effects: none    Diet: regular (no restrictions)        -------------------------------------    Problem list and histories reviewed & adjusted, as indicated.  Additional history: as documented    Labs reviewed in EPIC    Reviewed and updated as needed this visit by clinical staff  Tobacco  Allergies  Meds  Med Hx  Surg Hx  Fam Hx  Soc Hx      Reviewed and updated as needed this visit by Provider         ROS:   ROS: 10 point ROS neg other than the symptoms noted above in the HPI.      OBJECTIVE:                                                     /62 (BP Location: Left arm, Patient Position: Chair, Cuff Size: Adult Regular)   Pulse 107   Temp 99.3  F (37.4  C) (Tympanic)   Resp 20   Wt 70.8 kg (156 lb)   SpO2 91%   BMI 30.47 kg/m    Body mass index is 30.47 kg/m .   89% Oxygenation on Room Air placed on 1 L oxygen per nasal cannula and improved to 93%  GENERAL: healthy, alert, well nourished, well hydrated, no distress  HENT: ear canals- normal; TMs- normal; Nose- normal; Mouth- no ulcers, no lesions  NECK: no tenderness, no adenopathy, no asymmetry, no masses, no stiffness; thyroid- normal to palpation  RESP: lungs rhonchi clears with cough  CV: regular rates and rhythm, normal S1 S2, no S3 or S4 and no murmur, no click or rub -  ABDOMEN: soft, no tenderness, no  hepatosplenomegaly, no masses, normal bowel sounds  MUSC 2+ pitting edema lower extremities    Diagnostic test results:  Results for orders placed or performed in visit on 03/04/19   CBC with platelets and differential   Result Value Ref Range    WBC 12.3 (H) 4.0 - 11.0 10e9/L    RBC Count 5.33 (H) 3.8 - 5.2 10e12/L    Hemoglobin 16.0 (H) 11.7 - 15.7 g/dL    Hematocrit 49.8 (H) 35.0 - 47.0 %    MCV 93 78 - 100 fl    MCH 30.0 26.5 - 33.0 pg    MCHC 32.1 31.5 - 36.5 g/dL    RDW 15.0 10.0 - 15.0 %    Platelet Count 213 150 - 450 10e9/L    % Neutrophils 55.3 %    % Lymphocytes 33.5 %    % Monocytes 9.4 %    % Eosinophils 1.3 %    % Basophils 0.5 %    Absolute Neutrophil 6.8 1.6 - 8.3 10e9/L    Absolute Lymphocytes 4.1 0.8 - 5.3 10e9/L    Absolute Monocytes 1.2 0.0 - 1.3 10e9/L    Absolute Eosinophils 0.2 0.0 - 0.7 10e9/L    Absolute Basophils 0.1 0.0 - 0.2 10e9/L    Diff Method Automated Method    TSH with free T4 reflex   Result Value Ref Range    TSH 2.20 0.40 - 4.00 mU/L   Albumin Random Urine Quantitative with Creat Ratio   Result Value Ref Range    Creatinine Urine 92 mg/dL    Albumin Urine mg/L 29 mg/L    Albumin Urine mg/g Cr 31.05 (H) 0  - 25 mg/g Cr     Recent Results (from the past 744 hour(s))   XR Chest 2 Views    Narrative    XR CHEST 2 VW 3/4/2019 7:01 PM    COMPARISON: 7/1/2017    HISTORY: COPD.      Impression    IMPRESSION: Apically predominant emphysema. No focal airspace disease,  pleural effusion or pneumothorax. Cardiac silhouette within normal  limits.    BEBETO GRAY MD          ASSESSMENT/PLAN:                                                    1. COPD exacerbation (H)  Improving.  - XR Chest 2 Views; Future  - General PFT Lab (Please always keep checked); Future  - Pulmonary Function Test; Future  - 6 minute walk test; Future  - PULMONARY MEDICINE REFERRAL  - CBC with platelets and differential    2. Type 2 diabetes mellitus with hyperglycemia, without long-term current use of insulin (H)  New diagnosis.  Most likely due to intermittent use of high-dose oral prednisone recently for lung disease   Monitor fasting.  Monitor 2 hours postprandial  See diabetic educator 1 month  Medications declined at this time.  Will increase her physical activity as able and work on improving her nutrition  Repeat A1c again in 3 months to monitor  - TSH with free T4 reflex  - Albumin Random Urine Quantitative with Creat Ratio      Follow up with Provider -3 months  Call or return to the clinic with any worsening of symptoms or no resolution. Patient/Parent verbalized understanding and is in agreement. Medication side effects reviewed.   Current Outpatient Medications   Medication Sig Dispense Refill     albuterol (PROAIR HFA) 108 (90 BASE) MCG/ACT Inhaler Inhale 1-2 puffs into the lungs every 4 hours To 6 hours as needed for shortness of breath 3 Inhaler 1     albuterol (PROAIR HFA/PROVENTIL HFA/VENTOLIN HFA) 108 (90 Base) MCG/ACT inhaler Inhale 2 puffs into the lungs every 6 hours 54 g 3     alcohol swab prep pads Use to swab area of injection/raffi as directed. 100 each 3     amLODIPine (NORVASC) 10 MG tablet Take 1 tablet (10 mg) by mouth daily  For high blood pressure 90 tablet 3     aspirin (ASA) 81 MG tablet Take 1 tablet (81 mg) by mouth daily 100 tablet 3     azithromycin (ZITHROMAX) 250 MG tablet Take 2 tablets (500 mg) by mouth daily for 1 day, THEN 1 tablet (250 mg) daily for 4 days. 6 tablet 0     blood glucose (MILLIE MICROLET 2) lancing device Device to be used with lancets 2 times a day. 1 each 0     blood glucose (NO BRAND SPECIFIED) test strip Use to test blood sugar 2 times daily or as directed. To accompany: Blood Glucose Monitor Brands: per insurance. 100 strip 6     blood glucose calibration (NO BRAND SPECIFIED) solution To accompany: Blood Glucose Monitor Brands: per insurance. 1 Bottle 3     blood glucose monitoring (NO BRAND SPECIFIED) meter device kit Use to test blood sugar 2 times daily or as directed. 1 kit 0     ezetimibe (ZETIA) 10 MG tablet Take 1 tablet (10 mg) by mouth daily 90 tablet 3     FLUoxetine (PROZAC) 10 MG capsule TAKE ONE CAPSULE BY MOUTH EVERY DAY (TAKE WITH 20MG CAPSULE FOR TOTAL DAILY DOSE OF 30MG) 90 capsule 1     FLUoxetine (PROZAC) 20 MG capsule TAKE ONE CAPSULE BY MOUTH EVERY DAY (TAKE ALONG WITH 10MG CAPSULE FOR A TOTAL DAILY DOSE OF 30MG) 90 capsule 0     fluticasone-vilanterol (BREO ELLIPTA) 200-25 MCG/INH inhaler Inhale 1 puff into the lungs daily 60 Inhaler 3     Ibuprofen (IBU-200 PO) Take  by mouth.       ipratropium - albuterol 0.5 mg/2.5 mg/3 mL (DUONEB) 0.5-2.5 (3) MG/3ML neb solution Take 1 vial (3 mLs) by nebulization every 6 hours as needed for shortness of breath / dyspnea or wheezing 1 Box 3     meloxicam (MOBIC) 15 MG tablet Take 1 tablet (15 mg) by mouth daily 90 tablet 1     order for DME Equipment being ordered: Nebulizer- PORTABLE with tubing. [employer] Phone: [phone] 1 Device 0     potassium chloride SA (K-DUR/KLOR-CON M) 20 MEQ CR tablet Take 1 tablet (20 mEq) by mouth daily Take 2 tablets on day one 32 tablet 3     STATIN NOT PRESCRIBED (INTENTIONAL) Please choose  reason not prescribed, below       STATIN NOT PRESCRIBED, INTENTIONAL, 1 each daily Statin not prescribed intentionally due to Intolerance 0 each 0     thin (NO BRAND SPECIFIED) lancets 1 each by In Vitro route 2 times daily Use with lanceting device. To accompany: Blood Glucose Monitor Brands: per insurance. 100 each 6     tiotropium (SPIRIVA RESPIMAT) 2.5 MCG/ACT inhaler Inhale 2 puffs into the lungs daily 12 g 3     torsemide (DEMADEX) 5 MG tablet Take 1 tablet (5 mg) by mouth daily 90 tablet 1     Chart documentation with Dragon Voice recognition Software. Although reviewed after completion, some words and grammatical errors may remain.    See Patient Instructions    PEDRO Dominguez [facility]

## 2019-03-04 NOTE — PROGRESS NOTES
Diabetes Self-Management Education & Support    Diabetes Education Self Management & Training    SUBJECTIVE/OBJECTIVE:  Presents for: Initial Assessment for new diagnosis  Accompanied by: Self  Diabetes education in the past 24mo: No  Focus of Visit: Healthy Coping, Healthy Eating, Problem Solving, Being Active, Taking Medication, Monitoring  Diabetes type: Type 2  How confident are you filling out medical forms by yourself:: Not Assessed  Transportation concerns: No  Other concerns:: None  Cultural Influences/Ethnic Background:  American      Diabetes Symptoms & Complications          Patient Problem List and Family Medical History reviewed for relevant medical history, current medical status, and diabetes risk factors.    Vitals:  There were no vitals taken for this visit.  Estimated body mass index is 30.66 kg/m  as calculated from the following:    Height as of 2/22/19: 1.524 m (5').    Weight as of 2/22/19: 71.2 kg (157 lb).   Last 3 BP:   BP Readings from Last 3 Encounters:   02/22/19 130/72   07/03/18 (P) 131/74   06/08/18 132/84       History   Smoking Status     Current Every Day Smoker     Packs/day: 0.25     Years: 42.00     Types: Cigarettes   Smokeless Tobacco     Never Used     Comment: has not started the Chantix yet- 1/2016       Labs:  Lab Results   Component Value Date    A1C 6.7 02/22/2019     Lab Results   Component Value Date     02/22/2019     Lab Results   Component Value Date     02/22/2019     HDL Cholesterol   Date Value Ref Range Status   02/22/2019 60 >49 mg/dL Final   ]  GFR Estimate   Date Value Ref Range Status   02/22/2019 85 >60 mL/min/[1.73_m2] Final     Comment:     Non  GFR Calc  Starting 12/18/2018, serum creatinine based estimated GFR (eGFR) will be   calculated using the Chronic Kidney Disease Epidemiology Collaboration   (CKD-EPI) equation.       GFR Estimate If Black   Date Value Ref Range Status   02/22/2019 >90 >60 mL/min/[1.73_m2] Final      Comment:      GFR Calc  Starting 12/18/2018, serum creatinine based estimated GFR (eGFR) will be   calculated using the Chronic Kidney Disease Epidemiology Collaboration   (CKD-EPI) equation.       Lab Results   Component Value Date    CR 0.72 02/22/2019     No results found for: MICROALBUMIN    Healthy Eating  Healthy Eating Assessed Today: Yes  Cultural/Jew diet restrictions?: No  Breakfast: coffee 6-7 in the am, water  Lunch: brunch: veggies or cereal  Dinner: soup and sandwich  Snacks: struggles with snacking some, leftover tuna salad  Other: had a snack at 4-5 this am, woke up starving  Beverages: Water, Coffee, Other(when out at daughters was drinking pear juice, carrot juice and other juices)  Has patient met with a dietitian in the past?: Yes( had diabetes)    Being Active  Being Active Assessed Today: Yes  Exercise:: Currently not exercising(no equipment in the house, joined the health club but hasnt gone yet)    Monitoring                               Taking Medications  not on diabetes meds         Problem Solving   not assessed            Reducing Risks     Not assessed  Healthy Coping     Patient Activation Measure Survey Score:  KRISTEN Score (Last Two) 1/6/2011   KRISTEN Raw Score 40   Activation Score 60   KRISTEN Level 3       ASSESSMENT:  New diabetic, her  was diabetic as well so she is familiar with some pcs.  Doing blood sugar monitoring as well, doing well with that.  Went over meal plan and got her going on carb counting again.  She will do food log sheets and come back when she gets home from visiting with her daughter        Patient's most recent   Lab Results   Component Value Date    A1C 6.7 02/22/2019    is meeting goal of <7.0    INTERVENTION:   Diabetes knowledge and skills assessment:     Patient is knowledgeable in diabetes management concepts related to: Healthy Eating, Being Active and Monitoring    Patient needs further education on the following diabetes  management concepts: Healthy Eating, Being Active, Monitoring, Taking Medication, Problem Solving, Reducing Risks and Healthy Coping    Based on learning assessment above, most appropriate setting for further diabetes education would be: Individual setting.    Education provided today on:  AADE Self-Care Behaviors:  Healthy Eating: carbohydrate counting, consistency in amount, composition, and timing of food intake and label reading  Being Active: relationship to blood glucose and describe appropriate activity program  Monitoring: individual blood glucose targets and frequency of monitoring    Opportunities for ongoing education and support in diabetes-self management were discussed.    Pt verbalized understanding of concepts discussed and recommendations provided today.       Education Materials Provided:  [employer] Taking Charge of Your Diabetes Book, BG Log Sheet and Carbohydrate Counting    PLAN:  See Patient Instructions for co-developed, patient-stated behavior change goals.  AVS printed and provided to patient today. See Follow-Up section for recommended follow-up.      Time Spent: 30 minutes, pt thought appt was at different time so only able to spend 30 minutes with her  Encounter Type: Individual    Any diabetes medication dose changes were made via the CDE Protocol and Collaborative Practice Agreement with the patient's primary care provider. A copy of this encounter was shared with the provider.

## 2019-03-04 NOTE — PATIENT INSTRUCTIONS
1.  Watch your carbohydrates 2-3 at meals and 1-2 at snacks    2.  Work on being more active    3. Record your food choices and carbs on log sheets.

## 2019-03-05 LAB — TSH SERPL DL<=0.005 MIU/L-ACNC: 2.2 MU/L (ref 0.4–4)

## 2019-03-05 NOTE — PATIENT INSTRUCTIONS
TAKE YOUR DIURETIC  REPEAT THE Z MARIA ISABEL  MAKE APPOINTMENT TO FOLLOW UP WITH LUNG SPECIALIST IN [place]  REPEAT PULMONARY FUNCTION TESTING  USE OXYGEN AT HOME 2L as directed previously   NEB every 4 hours as needed for cough, wheeze, shortness of breath.   TO ER WITH WORSENING OR NO RESOLUTION  Take aspirin 81 mg daily      Patient Education     COPD Flare    You have had a flare-up of your COPD.  COPD, or chronic obstructive pulmonary disease, is a common lung disease. It causes your airways to become irritated and narrower. This makes it harder for you to breathe. Emphysema and chronic bronchitis are both types of COPD. This is a chronic condition, which means you always have it. Sometimes it gets worse. When this happens, it is called a flare-up.  Symptoms of COPD  People with COPD may have symptoms most of the time. In a flare-up, your symptoms get worse. These symptoms may mean you are having a flare-up:    Shortness of breath, shallow or rapid breathing, or wheezing that gets worse    Lung infection    Cough that gets worse    More mucus, thicker mucus or mucus of a different color    Tiredness, decreased energy, or trouble doing your usual activities    Fever    Chest tightness    Your symptoms don t get better even when you use your usual medicines, inhalers, and nebulizer    Trouble talking    You feel confused  Causes of flare-ups  Unfortunately, a flare-up can happen even though you did everything right, and you followed your doctor s instructions. Some causes of flare-ups are:    Smoking or secondhand smoke    Colds, the flu, or respiratory infections    Air pollution    Sudden change in the weather    Dust, irritating chemicals, or strong fumes    Not taking your medicines as prescribed  Home care  Here are some things you can do at home to treat a flare-up:    Try not to panic. This makes it harder to breathe, and keeps you from doing the right things.    Don t smoke or be around others who are  smoking.    Try to drink more fluids than usual during a flare-up, unless your doctor has told you not to because of heart and kidney problems. More fluids can help loosen the mucus.    Use your inhalers and nebulizer, if you have one, as you have been told to.    If you were given antibiotics, take them until they are used up or your doctor tells you to stop. It s important to finish the antibiotics, even though you feel better. This will make sure the infection has cleared.    If you were given prednisone or another steroid, finish it even if you feel better.  Preventing a flare-up  Even though flare-ups happen, the best way to treat one is to prevent it before it starts. Here are some pointers:    Don t smoke or be around others who are smoking.    Take your medicines as you have been told.    Talk with your doctor about getting a flu shot every year. Also find out if you need a pneumonia shot.    If there is a weather advisory warning to stay indoors, try to stay inside when possible.    Try to eat healthy and get plenty of sleep.    Try to avoid things that usually set you off, like dust, chemical fumes, hairsprays, or strong perfumes.  Follow-up care  Follow up with your healthcare provider, or as advised.  If a culture was done, you will be told if your treatment needs to be changed. You can call as directed for the results.  If X-rays were done, you will be notified of any new findings that may affect your care.  Call 911  Call 911 if any of these occur:    You have trouble breathing    You feel confused or it s difficult to wake you up    You faint or lose consciousness    You have a rapid heart rate    You have new pain in your chest, arm, shoulder, neck or upper back  When to seek medical advice  Call your healthcare provider right away if any of these occur:    Wheezing or shortness of breath gets worse    You need to use your inhalers more often than usual without relief    Fever of 100.4 F (38 C) or  higher, or as directed by your healthcare provider    Coughing up lots of dark-colored or bloody mucus (sputum)    Chest pain with each breath    You do not start to get better within 24 hours    Swelling of your ankles gets worse    Dizziness or weakness  Date Last Reviewed: 9/1/2016 2000-2018 [employer]. [address] [place] [address]. All rights reserved. This information is not intended as a substitute for professional medical care. Always follow your healthcare professional's instructions.

## 2019-03-11 DIAGNOSIS — R80.9 TYPE 2 DIABETES MELLITUS WITH MICROALBUMINURIA, WITHOUT LONG-TERM CURRENT USE OF INSULIN (H): Primary | ICD-10-CM

## 2019-03-11 DIAGNOSIS — D72.9 ABNORMAL WBC COUNT: ICD-10-CM

## 2019-03-11 DIAGNOSIS — E11.29 TYPE 2 DIABETES MELLITUS WITH MICROALBUMINURIA, WITHOUT LONG-TERM CURRENT USE OF INSULIN (H): Primary | ICD-10-CM

## 2019-03-11 RX ORDER — LISINOPRIL 10 MG/1
10 TABLET ORAL DAILY
Qty: 90 TABLET | Refills: 3 | Status: SHIPPED | OUTPATIENT
Start: 2019-03-11 | End: 2019-04-12 | Stop reason: SINTOL

## 2019-03-12 ENCOUNTER — MEDICAL CORRESPONDENCE (OUTPATIENT)
Dept: HEALTH INFORMATION MANAGEMENT | Facility: CLINIC | Age: 69
End: 2019-03-12

## 2019-03-12 ENCOUNTER — TELEPHONE (OUTPATIENT)
Dept: FAMILY MEDICINE | Facility: CLINIC | Age: 69
End: 2019-03-12

## 2019-03-12 NOTE — TELEPHONE ENCOUNTER
[facility]- Order Nebulizer  Form placed on Provider Sana Olivo NP desk for [facility].  [facility]

## 2019-03-14 NOTE — TELEPHONE ENCOUNTER
Form received back signed  3/14/19  Faxed Back & Sent to be scanned to this encounter  [facility]

## 2019-04-02 ENCOUNTER — TELEPHONE (OUTPATIENT)
Dept: FAMILY MEDICINE | Facility: CLINIC | Age: 69
End: 2019-04-02

## 2019-04-02 DIAGNOSIS — R05.9 COUGH: Primary | ICD-10-CM

## 2019-04-02 RX ORDER — AZITHROMYCIN 250 MG/1
TABLET, FILM COATED ORAL
Qty: 6 TABLET | Refills: 0 | Status: SHIPPED | OUTPATIENT
Start: 2019-04-02 | End: 2019-04-12

## 2019-04-02 NOTE — TELEPHONE ENCOUNTER
Voice message was left.4/1/19 at 5:33 PM  Reason for call:  Patient reporting a symptom    Symptom or request: Latoya says she is in [place] and has bronchitis. She would like Sana Olivo to prescribe a Z-jessica for her. She returns home late on Thursday night.    Duration (how long have symptoms been present): Did not say    Phone Number patient can be reached at:  Home number on file [phone] (home)    Best Time:  Did not say    Can we leave a detailed message on this number:  Did not say    Call taken on 4/2/2019 at 8:33 AM by Samia Barnhart

## 2019-04-08 ENCOUNTER — HOSPITAL ENCOUNTER (OUTPATIENT)
Dept: RESPIRATORY THERAPY | Facility: CLINIC | Age: 69
Discharge: HOME | End: 2019-04-08
Attending: NURSE PRACTITIONER | Admitting: NURSE PRACTITIONER
Payer: COMMERCIAL

## 2019-04-08 DIAGNOSIS — J44.1 COPD EXACERBATION (H): ICD-10-CM

## 2019-04-08 PROCEDURE — 94729 DIFFUSING CAPACITY: CPT | Mod: 26

## 2019-04-08 PROCEDURE — 94060 EVALUATION OF WHEEZING: CPT

## 2019-04-08 PROCEDURE — 94010 BREATHING CAPACITY TEST: CPT

## 2019-04-08 PROCEDURE — 94726 PLETHYSMOGRAPHY LUNG VOLUMES: CPT

## 2019-04-08 PROCEDURE — 94010 BREATHING CAPACITY TEST: CPT | Mod: 26

## 2019-04-08 PROCEDURE — 94726 PLETHYSMOGRAPHY LUNG VOLUMES: CPT | Mod: 26

## 2019-04-08 PROCEDURE — 94729 DIFFUSING CAPACITY: CPT

## 2019-04-08 NOTE — DISCHARGE INSTRUCTIONS
Thank you for completing pulmonary function testing today.  All results will be scanned into your epic results for your doctor to review.  Please resume taking all your current prescribed medications and diet as directed by your provider.   If your have not heard from your provider about your testing with in two weeks and do not have a follow-up appointment scheduled with them please contact your provider about an questions you have concerning your testing.   Thank you  [facility]

## 2019-04-09 ENCOUNTER — TELEPHONE (OUTPATIENT)
Dept: FAMILY MEDICINE | Facility: CLINIC | Age: 69
End: 2019-04-09

## 2019-04-09 NOTE — TELEPHONE ENCOUNTER
Latoya is asking for PFT results.  I told her I would let Sana Olivo know this.      Latoya says that her breathing has been worse since she returned form [place] ( 4/4/19).  Worse means short of breath when wipes self after going to the bathroom or doing anything else besides rest..  Breathing fine only at rest.    She does not feel like feels has to go to the ED.  Has oxygen concentrator on 1L/minute and that does not help with breathing    Breathing does not sound bad over the phone.     Tati Duncan RN

## 2019-04-09 NOTE — TELEPHONE ENCOUNTER
Reason for Call:  Results    Detailed comments: Pat is calling for results on her PFT test done yesterday. She is also noted she is having a hard time breathing.  Please advise    Phone Number Patient can be reached at: Home number on file [phone] (home)    Best Time: any    Can we leave a detailed message on this number? YES    Call taken on 4/9/2019 at 3:20 PM by Priscilla Coyle

## 2019-04-10 LAB
DLCOCOR-%PRED-PRE: 43 %
DLCOCOR-PRE: 7.44 ML/MIN/MMHG
DLCOUNC-%PRED-PRE: 46 %
DLCOUNC-PRE: 7.98 ML/MIN/MMHG
DLCOUNC-PRED: 17.23 ML/MIN/MMHG
ERV-%PRED-PRE: 118 %
ERV-PRE: 0.48 L
ERV-PRED: 0.41 L
EXPTIME-PRE: 7.74 SEC
FEF2575-%PRED-PRE: 14 %
FEF2575-PRE: 0.24 L/SEC
FEF2575-PRED: 1.64 L/SEC
FEFMAX-%PRED-PRE: 34 %
FEFMAX-PRE: 1.79 L/SEC
FEFMAX-PRED: 5.12 L/SEC
FEV1-%PRED-PRE: 33 %
FEV1-PRE: 0.61 L
FEV1FEV6-PRE: 48 %
FEV1FEV6-PRED: 79 %
FEV1FVC-PRE: 47 %
FEV1FVC-PRED: 79 %
FEV1SVC-PRE: 40 %
FEV1SVC-PRED: 70 %
FIFMAX-PRE: 1.78 L/SEC
FRCPLETH-%PRED-PRE: 129 %
FRCPLETH-PRE: 3.26 L
FRCPLETH-PRED: 2.51 L
FVC-%PRED-PRE: 55 %
FVC-PRE: 1.28 L
FVC-PRED: 2.31 L
GAW-%PRED-PRE: 14 %
GAW-PRE: 0.15 L/S/CMH2O
GAW-PRED: 1.03 L/S/CMH2O
IC-%PRED-PRE: 47 %
IC-PRE: 1.04 L
IC-PRED: 2.21 L
RVPLETH-%PRED-PRE: 145 %
RVPLETH-PRE: 2.78 L
RVPLETH-PRED: 1.9 L
SGAW-%PRED-PRE: 45 %
SGAW-PRE: 0.05 1/CMH2O*S
SGAW-PRED: 0.1 1/CMH2O*S
SRAW-%PRED-PRE: 456 %
SRAW-PRE: 21.73 CMH2O*S
SRAW-PRED: 4.76 CMH2O*S
TLCPLETH-%PRED-PRE: 98 %
TLCPLETH-PRE: 4.3 L
TLCPLETH-PRED: 4.35 L
VA-%PRED-PRE: 56 %
VA-PRE: 2.3 L
VC-%PRED-PRE: 58 %
VC-PRE: 1.53 L
VC-PRED: 2.62 L

## 2019-04-10 NOTE — TELEPHONE ENCOUNTER
Still waiting for official read.   Happy to see her this week if breathing continues to decline.   Thanks Sana Olivo [facility]

## 2019-04-11 NOTE — TELEPHONE ENCOUNTER
Pt will see Sana Olivo NP   4/12/19 11:40. If Provider lets her know if the results come back will call and cancel appt.  Pt said she is feeling a little better but continues to have SOB.    [facility]

## 2019-04-12 ENCOUNTER — OFFICE VISIT (OUTPATIENT)
Dept: FAMILY MEDICINE | Facility: CLINIC | Age: 69
End: 2019-04-12
Payer: COMMERCIAL

## 2019-04-12 VITALS
RESPIRATION RATE: 14 BRPM | TEMPERATURE: 97.3 F | DIASTOLIC BLOOD PRESSURE: 78 MMHG | WEIGHT: 154 LBS | HEART RATE: 90 BPM | BODY MASS INDEX: 30.08 KG/M2 | OXYGEN SATURATION: 95 % | SYSTOLIC BLOOD PRESSURE: 146 MMHG

## 2019-04-12 DIAGNOSIS — J44.9 CHRONIC OBSTRUCTIVE PULMONARY DISEASE, UNSPECIFIED COPD TYPE (H): ICD-10-CM

## 2019-04-12 DIAGNOSIS — I10 HTN, GOAL BELOW 140/90: ICD-10-CM

## 2019-04-12 DIAGNOSIS — E78.2 MIXED HYPERLIPIDEMIA: ICD-10-CM

## 2019-04-12 DIAGNOSIS — Z12.31 VISIT FOR SCREENING MAMMOGRAM: ICD-10-CM

## 2019-04-12 DIAGNOSIS — Z13.5 SCREENING FOR DIABETIC RETINOPATHY: ICD-10-CM

## 2019-04-12 DIAGNOSIS — R09.02 HYPOXIA: ICD-10-CM

## 2019-04-12 DIAGNOSIS — I10 ESSENTIAL HYPERTENSION, BENIGN: ICD-10-CM

## 2019-04-12 DIAGNOSIS — Z11.59 NEED FOR HEPATITIS C SCREENING TEST: ICD-10-CM

## 2019-04-12 DIAGNOSIS — Z13.89 SCREENING FOR DIABETIC PERIPHERAL NEUROPATHY: ICD-10-CM

## 2019-04-12 DIAGNOSIS — R09.02 HYPOXIA: Primary | ICD-10-CM

## 2019-04-12 PROCEDURE — 99214 OFFICE O/P EST MOD 30 MIN: CPT | Performed by: NURSE PRACTITIONER

## 2019-04-12 RX ORDER — ALBUTEROL SULFATE 90 UG/1
2 AEROSOL, METERED RESPIRATORY (INHALATION) EVERY 6 HOURS
Qty: 54 G | Refills: 3 | Status: SHIPPED | OUTPATIENT
Start: 2019-04-12 | End: 2020-02-05

## 2019-04-12 RX ORDER — TORSEMIDE 5 MG/1
5 TABLET ORAL DAILY
Qty: 90 TABLET | Refills: 1 | Status: SHIPPED | OUTPATIENT
Start: 2019-04-12 | End: 2020-02-05

## 2019-04-12 RX ORDER — AMLODIPINE BESYLATE 10 MG/1
10 TABLET ORAL DAILY
Qty: 90 TABLET | Refills: 3 | Status: SHIPPED | OUTPATIENT
Start: 2019-04-12 | End: 2020-02-05

## 2019-04-12 RX ORDER — EZETIMIBE 10 MG/1
10 TABLET ORAL DAILY
Qty: 90 TABLET | Refills: 3 | Status: SHIPPED | OUTPATIENT
Start: 2019-04-12 | End: 2020-02-05

## 2019-04-12 ASSESSMENT — ASTHMA QUESTIONNAIRES
ACT_TOTALSCORE: 10
QUESTION_2 LAST FOUR WEEKS HOW OFTEN HAVE YOU HAD SHORTNESS OF BREATH: ONCE A DAY
QUESTION_4 LAST FOUR WEEKS HOW OFTEN HAVE YOU USED YOUR RESCUE INHALER OR NEBULIZER MEDICATION (SUCH AS ALBUTEROL): ONE OR TWO TIMES PER DAY
QUESTION_1 LAST FOUR WEEKS HOW MUCH OF THE TIME DID YOUR ASTHMA KEEP YOU FROM GETTING AS MUCH DONE AT WORK, SCHOOL OR AT HOME: MOST OF THE TIME
QUESTION_5 LAST FOUR WEEKS HOW WOULD YOU RATE YOUR ASTHMA CONTROL: POORLY CONTROLLED
QUESTION_3 LAST FOUR WEEKS HOW OFTEN DID YOUR ASTHMA SYMPTOMS (WHEEZING, COUGHING, SHORTNESS OF BREATH, CHEST TIGHTNESS OR PAIN) WAKE YOU UP AT NIGHT OR EARLIER THAN USUAL IN THE MORNING: TWO OR THREE NIGHTS A WEEK
ACUTE_EXACERBATION_TODAY: YES

## 2019-04-12 ASSESSMENT — PAIN SCALES - GENERAL: PAINLEVEL: NO PAIN (0)

## 2019-04-12 NOTE — NURSING NOTE
Chief Complaint   Patient presents with     Results     PFT results      Breathing Problem       Initial /78   Pulse 90   Temp 97.3  F (36.3  C) (Tympanic)   Resp 14   Wt 69.9 kg (154 lb)   SpO2 (!) 88%   BMI 30.08 kg/m   Estimated body mass index is 30.08 kg/m  as calculated from the following:    Height as of 2/22/19: 1.524 m (5').    Weight as of this encounter: 69.9 kg (154 lb).    Patient presents to the clinic using No DME  Placed on 02     Health Maintenance that is potentially due pending provider review:  NONE    n/a    Is there anyone who you would like to be able to receive your results? No  If yes have patient fill out AMINATA

## 2019-04-12 NOTE — PATIENT INSTRUCTIONS
Call [facility] FOR A PORTABLE CONCENTRATOR         Patient Education   [facility] Handihaler  Medicine: Tiotropium ([id number]-UNO-pee-um)     What it does  This medicine relaxes the muscles around the airway and makes breathing easier.   Use every day to prevent symptoms, even if you are feeling well. Do not use for fast relief.  How to use this inhaler  Take care you do not get powder from the capsules in your eyes. This could be harmful.  1. Wash and dry your hands well.  2. Peel back the foil and remove a capsule right before using.  3. Open the dust cap by pulling upward.  4. Open the white mouthpiece under the cap.  5. Place the capsule in the center chamber.  6. Close the mouthpiece until you hear a click. Leave the dust cap open.  7. Hold inhaler with the mouthpiece up.  8. Press the green piercing button all the way down and release. The holes in the capsule will allow you to breathe in the medicine.  9. Hold head upright.  10. Exhale (breathe out) through mouth away from inhaler.  11. Place the mouthpiece in your mouth and seal your lips around it Inhaler should be level. Do not block the air vents.  12. Breathe in through your mouth until the lungs are full. You should hear the capsule vibrate.  13. Remove the mouthpiece from your mouth and hold breath for 10 seconds or as long as you can.  14. Breathe out slowly away from the inhaler.  15. Repeat, following steps 9 to14 (full dose is two puffs from the same capsule).  16. Open the mouthpiece and gently tap out capsule and powder into trash. Do not store capsules in the inhaler.  17. Close the mouthpiece and dust cap. Store in a cool, dry place.  Cleaning  Clean as needed. Rinse in warm water and let air-dry for 24 hours.  If you have questions about the use of your inhaler, please ask your pharmacist or provider.   For more information and video demos, go to [email or web address].  For informational purposes only. Not to replace the advice of your health care  provider.   Copyright   2013 [facility]. All rights reserved. [employer] [id number] - REV 05/16.       Patient Education   Ventolin HFA Inhaler  Medicine: Albuterol (al-BYOO-ter-ahl)  What it does  Works quickly to relax muscles around your airways and make breathing easier. The medicine usually lasts 3 to 4 hours. Use when you need fast relief.     Test spray (priming)  Prime before first use, if not used for 2 weeks, or if inhaler was dropped. Shake inhaler and spray 4 times, away from face.  How to use this inhaler  18. Wash and dry your hands well.  19. Remove the mouthpiece cover. Check for loose parts inside the mouthpiece.  20. Sit up straight or stand up.  21. Shake inhaler well before each spray.  22. Hold the inhaler so the mouthpiece is at the bottom and the canister is sticking straight up. Fully exhale (breathe out) through mouth.  23. Place the mouthpiece between your lips. Make sure that your tongue is flat under the mouthpiece and does not block the opening.  24. Seal your lips around the mouthpiece.  25. Push the canister all the way down as you slowly take a deep breath through your mouth over 5 seconds.  26. Hold your breath for 10 seconds. If you cannot hold your breath for 10 seconds, then hold it for as long as you can.  27. If you need another dose, wait one minute and repeat steps 4 to 9.  28. Replace the cover after each use. Store in a cool, dry place.  Cleaning  Clean each week. Remove canister and do not get it wet. Wash mouthpiece with warm water and let air-dry overnight. If not cleaned, the inhaler may not work well.  How to tell if the inhaler is empty  Each inhaler contains 200 puffs. The inhaler is empty when the dose counter reads 000. Be sure to replace the inhaler before it runs out.  If you have questions about the use of your inhaler, please ask your pharmacist or provider.  For more information and video demos, go to [email or web address].  For informational  purposes only. Not to replace the advice of your health care provider.  Copyright   2013 [facility]. All rights reserved. [employer] [id number] - REV 03/16.

## 2019-04-12 NOTE — PROGRESS NOTES
SUBJECTIVE:   Aminata Gale is a 68 year old female who presents to clinic today for the following   health issues:  COPD exacerbation  Productive cough while in [place].  Finished antibiotics and prednisone   2 days ago couldn't get herself dressed.   Decreased energy. Really having issues with worsenging SHORTNESS OF BREATH.   Hard to perform her ADL's now   Needing wheelchair at airport and on vacation.     Placed on 02- 2 liters in clinic with decreased work of breathing and mild mental confusion clearing.   o2 in clinic was 89 on RA    5 mins walking test 83% o2 on room air    Symptoms -Got worse after recent plane ride     Really short of breath on the plane this past week they would not give her oxygen and she had urine incontinence.  Taking BREO, ALBUTEROL, MUCINEX, SPIRIVA  IBUPROFEN.    Has an oxygen concentrator at home but does not use it.   Has a rental unit now. Kids messed with the buttons.   Not any quieter than her last one so hasnt been using this.  [facility] . Not sure what the dials should be on.   Not able to get it to function      RESULTS   PFT  IMPRESSION:  Very severe obstruction.  Moderate diffusion defect.  The total lung capacity is normal.  The increased RV and RV/TLC ratio suggests air trapping.  Compared with testing from 11/13/2013, FEV1 and FVC have decreased.  Dorie Lechuga MD  ____________________________________________DORIE BARAHONA      -------------------------------------    Additional history: as documented    Reviewed  and updated as needed this visit by clinical staff  Tobacco  Allergies  Meds  Med Hx  Surg Hx  Fam Hx  Soc Hx        Reviewed and updated as needed this visit by Provider         Labs reviewed in EPIC    ROS:   ROS: 10 point ROS neg other than the symptoms noted above in the HPI.      OBJECTIVE:                                                    /78   Pulse 90   Temp 97.3  F (36.3  C)  (Tympanic)   Resp 14   Wt 69.9 kg (154 lb)   SpO2 95%   BMI 30.08 kg/m    Body mass index is 30.08 kg/m .   GENERAL: alert, well nourished, well hydrated, mild resp distress with pursed lip breathing upon arrival. Cleared after oxygen at 2L per SOFT nasal canula  HENT: ear canals- normal; TMs- normal; Nose- normal; Mouth- no ulcers, no lesions  NECK: no tenderness, no adenopathy, no asymmetry, no masses, no stiffness; thyroid- normal to palpation  RESP: lungs distant breath sounds with mild exp wheezing  CV: regular rates and rhythm, normal S1 S2, no S3 or S4 and no murmur, no click or rub -  ABDOMEN: soft, no tenderness, no  hepatosplenomegaly, no masses, normal bowel sounds    Diagnostic test results:  Results for orders placed or performed during the hospital encounter of 04/08/19   General PFT Lab (Please always keep checked)   Result Value Ref Range    FVC-Pred 2.31 L    FVC-Pre 1.28 L    FVC-%Pred-Pre 55 %    FEV1-Pre 0.61 L    FEV1-%Pred-Pre 33 %    FEV1FVC-Pred 79 %    FEV1FVC-Pre 47 %    FEFMax-Pred 5.12 L/sec    FEFMax-Pre 1.79 L/sec    FEFMax-%Pred-Pre 34 %    FEF2575-Pred 1.64 L/sec    FEF2575-Pre 0.24 L/sec    TBY4988-%Pred-Pre 14 %    ExpTime-Pre 7.74 sec    FIFMax-Pre 1.78 L/sec    VC-Pred 2.62 L    VC-Pre 1.53 L    VC-%Pred-Pre 58 %    IC-Pred 2.21 L    IC-Pre 1.04 L    IC-%Pred-Pre 47 %    ERV-Pred 0.41 L    ERV-Pre 0.48 L    ERV-%Pred-Pre 118 %    FEV1FEV6-Pred 79 %    FEV1FEV6-Pre 48 %    FRCPleth-Pred 2.51 L    FRCPleth-Pre 3.26 L    FRCPleth-%Pred-Pre 129 %    RVPleth-Pred 1.90 L    RVPleth-Pre 2.78 L    RVPleth-%Pred-Pre 145 %    TLCPleth-Pred 4.35 L    TLCPleth-Pre 4.30 L    TLCPleth-%Pred-Pre 98 %    Gaw-Pred 1.03 L/s/cmH2O    Gaw-Pre 0.15 L/s/cmH2O    Gaw-%Pred-Pre 14 %    sGaw-Pred 0.10 1/cmH2O*s    sGaw-Pre 0.05 1/cmH2O*s    sGaw-%Pred-Pre 45 %    sRaw-Pred 4.76 cmH2O*s    sRaw-Pre 21.73 cmH2O*s    sRaw-%Pred-Pre 456 %    DLCOunc-Pred 17.23 ml/min/mmHg    DLCOunc-Pre 7.98 ml/min/mmHg     DLCOunc-%Pred-Pre 46 %    VA-Pre 2.30 L    VA-%Pred-Pre 56 %    FEV1SVC-Pred 70 %    FEV1SVC-Pre 40 %    DLCOcor-Pre 7.44 ml/min/mmHg    DLCOcor-%Pred-Pre 43 %    Narrative    IMPRESSION:  Very severe obstruction.  Moderate diffusion defect.  The total lung capacity is normal.  The increased RV and RV/TLC ratio suggests air trapping.  Compared with testing from 11/13/2013, FEV1 and FVC have decreased.  Ronak Lechuga MD  ____________________________________________RONAK BARAHONA  ï  ï  This interpretation has been electronically signed:  RONAK LECHUGA 04/10/2019  05:25:04 PMï  ï          ASSESSMENT/PLAN:                                                      (R09.02) Hypoxia  (primary encounter diagnosis)  Comment: NEEDS HOME O2   Plan: order for DME        [facility] contacted for portable concentrator - Oxygen DME order placed.   Recommended 2L per soft NC with Mask at night as needed.   CONTINUE TO WORK ON SMOKING CESSATION    (Z12.31) Visit for screening mammogram  Comment: due  Plan: please scheduled    (Z11.59) Need for hepatitis C screening test  Comment: due   Plan: declined today    (Z13.5) Screening for diabetic retinopathy  Comment: dye for yearly eye exam  Plan: EYE EXAM (SIMPLE-NONBILLABLE)          Call to schedule    (Z13.89) Screening for diabetic peripheral neuropathy  Comment: CMS intact bilaterally pulse 2+  Plan: yearly exam monofilament. Daily foot check encouraged.     (J44.9) Chronic obstructive pulmonary disease, unspecified COPD type (H)  Comment: SEVERE WITH RECENT EXACERBATION  Plan: albuterol (PROAIR HFA/PROVENTIL HFA/VENTOLIN         HFA) 108 (90 Base) MCG/ACT inhaler, order for         DME        NEEDS HOME OXYGEN  Continue current inhalers.     (I10) HTN, goal below 140/90  Comment:   BP Readings from Last 3 Encounters:   04/12/19 146/78   03/04/19 132/62   02/22/19 130/72     Plan: torsemide (DEMADEX) 5 MG tablet  AmLODIPine (NORVASC) 10 MG tablet refilled        Increase  work of breathing most likely contributing .  Continue current POC and recheck in clinic in 2 weeks.     (E78.2) Mixed hyperlipidemia  Comment:   LDL Cholesterol Calculated   Date Value Ref Range Status   02/22/2019 108 (H) <100 mg/dL Final     Comment:     Above desirable:  100-129 mg/dl  Borderline High:  130-159 mg/dL  High:             160-189 mg/dL  Very high:       >189 mg/dl       Plan: ezetimibe (ZETIA) 10 MG tablet        Not able to tolerated statins.         Follow up with Provider - 2 weeks.   Check BP and Oxygen levels.    See Patient Instructions    PEDRO Dominguez [facility]

## 2019-04-13 ASSESSMENT — ASTHMA QUESTIONNAIRES: ACT_TOTALSCORE: 10

## 2019-04-15 ENCOUNTER — TELEPHONE (OUTPATIENT)
Dept: FAMILY MEDICINE | Facility: CLINIC | Age: 69
End: 2019-04-15

## 2019-04-15 NOTE — TELEPHONE ENCOUNTER
Reason for Call: Request for an order or referral:    Order or referral being requested: Oxygen concentrator/pulsing device    Date needed: as soon as possible    Has the patient been seen by the PCP for this problem?     Additional comments: Needs order.    [facility] also sent a form asking yes/or no to okaying the order. This form to Sana Olivo.    Phone number Patient can be reached at:  [facility] [phone]  Fax [phone]    Best Time:  any    Can we leave a detailed message on this number?      Call taken on 4/15/2019 at 1:15 PM by Josefina Rivas

## 2019-04-16 ENCOUNTER — MEDICAL CORRESPONDENCE (OUTPATIENT)
Dept: HEALTH INFORMATION MANAGEMENT | Facility: CLINIC | Age: 69
End: 2019-04-16

## 2019-04-16 NOTE — TELEPHONE ENCOUNTER
Form signed, faxed and sent to scanning.    Priscilla Coyle  [facility]

## 2019-04-19 ENCOUNTER — OFFICE VISIT (OUTPATIENT)
Dept: FAMILY MEDICINE | Facility: CLINIC | Age: 69
End: 2019-04-19
Payer: COMMERCIAL

## 2019-04-19 VITALS
BODY MASS INDEX: 30.27 KG/M2 | HEART RATE: 85 BPM | OXYGEN SATURATION: 89 % | DIASTOLIC BLOOD PRESSURE: 78 MMHG | SYSTOLIC BLOOD PRESSURE: 128 MMHG | TEMPERATURE: 98.2 F | WEIGHT: 155 LBS

## 2019-04-19 DIAGNOSIS — K13.0 SORE OF LOWER LIP: Primary | ICD-10-CM

## 2019-04-19 PROCEDURE — 99213 OFFICE O/P EST LOW 20 MIN: CPT | Performed by: FAMILY MEDICINE

## 2019-04-19 NOTE — PROGRESS NOTES
SUBJECTIVE:   Aminata Gale is a 68 year old female who presents to clinic today for the following   health issues:      Concern - bottom lip wont stop bleeding   Onset: couple weeks     Description:   Lip started out as a crack, the last 4-5 days lip wont stop bleeding at time it spurts out     Intensity: moderate    Progression of Symptoms:  worsening    Accompanying Signs & Symptoms:  none    Previous history of similar problem:   none    Therapies Tried and outcome: peroxide, antibiotic ointment only helps for an 1-2 hours           Additional history: as documented    Reviewed  and updated as needed this visit by clinical staff         Reviewed and updated as needed this visit by Provider         Patient Active Problem List   Diagnosis     Systemic lupus erythematosus (H)     Esophageal reflux     Essential hypertension, benign     Allergic rhinitis     Polycythemia, secondary     Female stress incontinence     Other nonspecific abnormal result of function study of brain and central nervous system     Smoker     Hyperlipidemia LDL goal <130     Osteoporosis     Hiatal hernia     Advanced directives, counseling/discussion     Acne     SK (seborrheic keratosis)     Lentigo     Angioma     Moderate persistent asthma     Chronic pain     HTN, goal below 140/90     Anxiety     Refused influenza vaccine     Grief reaction     Centrilobular emphysema (H)     Acute cholecystitis     TMJ (temporomandibular joint syndrome)     Type 2 diabetes mellitus with hyperglycemia, without long-term current use of insulin (H)     Type 2 diabetes mellitus with microalbuminuria, without long-term current use of insulin (H)     Past Surgical History:   Procedure Laterality Date     C APPENDECTOMY       C REPAIR GUM      Cathy Dontal surgery     C/SECTION, LOW TRANSVERSE      , Low Transverse     COLONOSCOPY  2009     D & C      X 5     HC RECONSTR NOSE+DAVE SEPTAL REPAIR       HYSTERECTOMY, BRI      Hx of dysplasia      LAPAROSCOPIC CHOLECYSTECTOMY N/A 7/1/2017    Procedure: LAPAROSCOPIC CHOLECYSTECTOMY;  Laparoscopic Cholecystectomy;  Surgeon: Tami Barney MD;  Location: [facility] OR     SINUS SURGERY      Reconstruction       Social History     Tobacco Use     Smoking status: Current Every Day Smoker     Packs/day: 0.25     Years: 42.00     Pack years: 10.50     Types: Cigarettes     Smokeless tobacco: Never Used     Tobacco comment: has not started the Chantix yet- 1/2016   Substance Use Topics     Alcohol use: Yes     Alcohol/week: 0.0 oz     Comment: Occ. wine     Family History   Problem Relation Age of Onset     Diabetes Mother      Gastrointestinal Disease Mother         Gallbladder disease     Heart Disease Mother      Cancer Father         lung     Alcohol/Drug Father      Allergies Father      Heart Disease Father      Gastrointestinal Disease Father         Liver disease     Heart Disease Maternal Grandfather      Arthritis Sister      Osteoporosis Sister      Thyroid Disease Sister      Allergies Son      Alcohol/Drug Sister      Alcohol/Drug Sister          Current Outpatient Medications   Medication Sig Dispense Refill     albuterol (PROAIR HFA) 108 (90 BASE) MCG/ACT Inhaler Inhale 1-2 puffs into the lungs every 4 hours To 6 hours as needed for shortness of breath 3 Inhaler 1     albuterol (PROAIR HFA/PROVENTIL HFA/VENTOLIN HFA) 108 (90 Base) MCG/ACT inhaler Inhale 2 puffs into the lungs every 6 hours 54 g 3     alcohol swab prep pads Use to swab area of injection/raffi as directed. 100 each 3     amLODIPine (NORVASC) 10 MG tablet Take 1 tablet (10 mg) by mouth daily For high blood pressure 90 tablet 3     aspirin (ASA) 81 MG tablet Take 1 tablet (81 mg) by mouth daily 100 tablet 3     blood glucose (MILLIE MICROLET 2) lancing device Device to be used with lancets 2 times a day. 1 each 0     blood glucose (NO BRAND SPECIFIED) test strip Use to test blood sugar 2 times daily or as directed. To accompany:  Blood Glucose Monitor Brands: per insurance. 100 strip 6     blood glucose calibration (NO BRAND SPECIFIED) solution To accompany: Blood Glucose Monitor Brands: per insurance. 1 Bottle 3     blood glucose monitoring (NO BRAND SPECIFIED) meter device kit Use to test blood sugar 2 times daily or as directed. 1 kit 0     ezetimibe (ZETIA) 10 MG tablet Take 1 tablet (10 mg) by mouth daily 90 tablet 3     fluticasone-vilanterol (BREO ELLIPTA) 200-25 MCG/INH inhaler Inhale 1 puff into the lungs daily 60 Inhaler 3     Ibuprofen (IBU-200 PO) Take  by mouth.       ipratropium - albuterol 0.5 mg/2.5 mg/3 mL (DUONEB) 0.5-2.5 (3) MG/3ML neb solution Take 1 vial (3 mLs) by nebulization every 6 hours as needed for shortness of breath / dyspnea or wheezing 1 Box 3     order for DME Equipment being ordered: PORTABLE OXYGEN UNIT AND QUIETER OXYGEN CONCENTRATOR   2L per NC ANFD PORTABLE OXYGEN CONCENTRATOR    Pt Room Air at rest 89%  5 min walking room air 83%   Resting on 2 liters Oxygen per nasal cannula post 5 min walk 95% 1 Device 11     order for DME Equipment being ordered: Nebulizer- PORTABLE with tubing. Bundle It Phone: [phone] 1 Device 0     STATIN NOT PRESCRIBED (INTENTIONAL) Please choose reason not prescribed, below       STATIN NOT PRESCRIBED, INTENTIONAL, 1 each daily Statin not prescribed intentionally due to Intolerance 0 each 0     thin (NO BRAND SPECIFIED) lancets 1 each by In Vitro route 2 times daily Use with lanceting device. To accompany: Blood Glucose Monitor Brands: per insurance. 100 each 6     tiotropium (SPIRIVA RESPIMAT) 2.5 MCG/ACT inhaler Inhale 2 puffs into the lungs daily 12 g 3     torsemide (DEMADEX) 5 MG tablet Take 1 tablet (5 mg) by mouth daily 90 tablet 1     Allergies   Allergen Reactions     Amoxicillin Difficulty breathing and Rash     Ampicillin Difficulty breathing and Rash     Cipro [Ciprofloxacin] Difficulty breathing and Rash     Keflex [Cephalexin Monohydrate]  Difficulty breathing and Rash     Penicillin [Penicillins] Difficulty breathing and Rash     Sulfa Drugs Difficulty breathing and Rash     Tetracycline Difficulty breathing and Rash     Biaxin [Clarithromycin] Rash     Ceclor [Cefaclor] Hives and Swelling     Advair Diskus Hives     Hives on face,neck and chest     Egg White      Fish Shortness Of Breath     Mustard [Allyl Isothiocyanate]      Throat swelling     Recent Labs   Lab Test 03/04/19  1854 02/22/19  1128 07/01/17  0815 06/30/17  2350  04/11/17  1448 02/10/15  0908 09/20/13  0937   A1C  --  6.7*  --   --   --  6.3*  --   --    LDL  --  108*  --   --   --  145* 176* 198*   HDL  --  60  --   --   --  58  --  42*   TRIG  --  241*  --   --   --  147  --  168*   ALT  --  70* 141* 180*  --  59* 26 34   CR  --  0.72 0.68 0.73   < > 0.70 0.74 0.73   GFRESTIMATED  --  85 86 80   < > 83 78 80   GFRESTBLACK  --  >90 >90   GFR Calc   >90   GFR Calc     < > >90   GFR Calc   >90   GFR Calc   >90   POTASSIUM  --  3.8 3.7 3.5   < > 3.3* 4.1 4.2   TSH 2.20 1.98  --   --   --   --   --  2.46    < > = values in this interval not displayed.      BP Readings from Last 3 Encounters:   04/19/19 128/78   04/12/19 146/78   03/04/19 132/62    Wt Readings from Last 3 Encounters:   04/19/19 70.3 kg (155 lb)   04/12/19 69.9 kg (154 lb)   03/04/19 70.8 kg (156 lb)                  Labs reviewed in EPIC    ROS:  Constitutional, HEENT, cardiovascular, pulmonary, gi and gu systems are negative, except as otherwise noted.    OBJECTIVE:     /78   Pulse 85   Temp 98.2  F (36.8  C) (Tympanic)   Wt 70.3 kg (155 lb)   SpO2 (!) 89%   BMI 30.27 kg/m    Body mass index is 30.27 kg/m .  GENERAL: alert and no distress  EYES: Eyes grossly normal to inspection, PERRL and conjunctivae and sclerae normal  HENT: normal cephalic/atraumatic and single ulcerative lesion with superficial scab involving lower lip as shown below, not  bleeding currently   NECK: no adenopathy, no asymmetry, masses, or scars and thyroid normal to palpation  RESP: lungs clear to auscultation - no rales, rhonchi or wheezes  CV: regular rates and rhythm, normal S1 S2, no S3 or S4 and no murmur, click or rub  ABDOMEN: soft, nontender              ASSESSMENT/PLAN:     (K13.0) Sore of lower lip  (primary encounter diagnosis)  Comment: Lower lip lesion not actively bleeding currently, differentials discussed in detail.  Suggested to use petroleum jelly regularly, avoid triple antibiotic and peroxide, continue well hydration and balanced diet.  Dermatology referral placed for further review recommendations  Plan: DERMATOLOGY REFERRAL           Hemant Tavera MD  [facility]

## 2019-04-19 NOTE — NURSING NOTE
Chief Complaint   Patient presents with     Mouth/Lip Problem     bottom lip, wont stop bleeding for a few weeks        Initial There were no vitals taken for this visit. Estimated body mass index is 30.08 kg/m  as calculated from the following:    Height as of 2/22/19: 1.524 m (5').    Weight as of 4/12/19: 69.9 kg (154 lb).    Patient presents to the clinic using No DME    Health Maintenance that is potentially due pending provider review:  Mammogram    Pt declines to have .    Is there anyone who you would like to be able to receive your results? Not Applicable  If yes have patient fill out AMINATA

## 2019-05-09 ENCOUNTER — OFFICE VISIT (OUTPATIENT)
Dept: DERMATOLOGY | Facility: CLINIC | Age: 69
End: 2019-05-09
Attending: FAMILY MEDICINE
Payer: COMMERCIAL

## 2019-05-09 VITALS — DIASTOLIC BLOOD PRESSURE: 75 MMHG | SYSTOLIC BLOOD PRESSURE: 128 MMHG | HEART RATE: 66 BPM | OXYGEN SATURATION: 93 %

## 2019-05-09 DIAGNOSIS — L21.9 DERMATITIS, SEBORRHEIC: ICD-10-CM

## 2019-05-09 DIAGNOSIS — D48.5 NEOPLASM OF UNCERTAIN BEHAVIOR OF SKIN OF LIP: Primary | ICD-10-CM

## 2019-05-09 PROCEDURE — 99214 OFFICE O/P EST MOD 30 MIN: CPT | Mod: 25 | Performed by: PHYSICIAN ASSISTANT

## 2019-05-09 PROCEDURE — 40490 BIOPSY OF LIP: CPT | Performed by: PHYSICIAN ASSISTANT

## 2019-05-09 PROCEDURE — 88305 TISSUE EXAM BY PATHOLOGIST: CPT | Mod: TC | Performed by: PHYSICIAN ASSISTANT

## 2019-05-09 RX ORDER — FLUOCINONIDE TOPICAL SOLUTION USP, 0.05% 0.5 MG/ML
SOLUTION TOPICAL
Qty: 60 ML | Refills: 4 | Status: SHIPPED | OUTPATIENT
Start: 2019-05-09 | End: 2021-01-01

## 2019-05-09 RX ORDER — KETOCONAZOLE 20 MG/ML
SHAMPOO TOPICAL
Qty: 120 ML | Refills: 4 | Status: SHIPPED | OUTPATIENT
Start: 2019-05-09 | End: 2020-01-01

## 2019-05-09 NOTE — PATIENT INSTRUCTIONS
Wound Care Instructions     FOR SUPERFICIAL WOUNDS     [facility] [phone]    [facility] [phone]            lip    AFTER 24 HOURS YOU SHOULD REMOVE THE BANDAGE AND BEGIN DAILY DRESSING CHANGES AS FOLLOWS:     1) Remove Dressing.     2) Clean and dry the area with tap water using a Q-tip or sterile gauze pad.     3) Apply Vaseline, Aquaphor, Polysporin ointment or Bacitracin ointment over entire wound.  Do NOT use Neosporin ointment.     4) Cover the wound with a band-aid, or a sterile non-stick gauze pad and micropore paper tape      REPEAT THESE INSTRUCTIONS AT LEAST ONCE A DAY UNTIL THE WOUND HAS COMPLETELY HEALED.    It is an old wives tale that a wound heals better when it is exposed to air and allowed to dry out. The wound will heal faster with a better cosmetic result if it is kept moist with ointment and covered with a bandage.    **Do not let the wound dry out.**      Supplies Needed:      *Cotton tipped applicators (Q-tips)    *Polysporin Ointment or Bacitracin Ointment (NOT NEOSPORIN)    *Band-aids or non-stick gauze pads and micropore paper tape.      PATIENT INFORMATION:    During the healing process you will notice a number of changes. All wounds develop a small halo of redness surrounding the wound.  This means healing is occurring. Severe itching with extensive redness usually indicates sensitivity to the ointment or bandage tape used to dress the wound.  You should call our office if this develops.      Swelling  and/or discoloration around your surgical site is common, particularly when performed around the eye.    All wounds normally drain.  The larger the wound the more drainage there will be.  After 7-10 days, you will notice the wound beginning to shrink and new skin will begin to grow.  The wound is healed when you can see skin has formed over the entire area.  A healed wound has a healthy, shiny look to the surface and is red to dark pink in color to  normalize.  Wounds may take approximately 4-6 weeks to heal.  Larger wounds may take 6-8 weeks.  After the wound is healed you may discontinue dressing changes.    You may experience a sensation of tightness as your wound heals. This is normal and will gradually subside.    Your healed wound may be sensitive to temperature changes. This sensitivity improves with time, but if you re having a lot of discomfort, try to avoid temperature extremes.    Patients frequently experience itching after their wound appears to have healed because of the continue healing under the skin.  Plain Vaseline will help relieve the itching.        POSSIBLE COMPLICATIONS    BLEEDIN. Leave the bandage in place.  2. Use tightly rolled up gauze or a cloth to apply direct pressure over the bandage for 30  minutes.  3. Reapply pressure for an additional 30 minutes if necessary  4. Use additional gauze and tape to maintain pressure once the bleeding has stopped.

## 2019-05-09 NOTE — NURSING NOTE
Initial /75   Pulse 66   SpO2 93%  Estimated body mass index is 30.27 kg/m  as calculated from the following:    Height as of 2/22/19: 1.524 m (5').    Weight as of 4/19/19: 70.3 kg (155 lb). .    Shikha Cox LPN

## 2019-05-09 NOTE — LETTER
5/9/2019         RE: Aminata Gale  [address]   [facility] [id number]        Dear Colleague,    Thank you for referring your patient, Aminata Gale, to the [facility]. Please see a copy of my visit note below.    Aminata Gale is a 68 year old year old female patient here today for spot on lower lip.  Patient states this has been present for one month.  Patient reports the following symptoms:  bleeds.  Patient reports the following previous treatments vaseline, holding wound. She also notes an itchy scaly scalp and eyebrows. Patient has no other skin complaints today.  Remainder of the HPI, Meds, PMH, Allergies, FH, and SH was reviewed in chart.    Past Medical History:   Diagnosis Date     Acute pericarditis, unspecified      Chronic airway obstruction 12/13/2005    PFTs - 1/02 - mild COPD Problem list name updated by automated process. Provider to review     Dysuria      Esophageal reflux      GERD (gastroesophageal reflux disease) 9/20/2013     Hiatal hernia 5/4/2011     HTN, goal below 140/90 9/20/2013     Hyperlipidemia LDL goal <130 9/22/2010    Recent Labs  Lab Test 9/22/10 0908 10/12/06 0939    CHOL 214* 256*    HDL 31* 42*    * 186*    TRIG 149 143    CHOLHDLRATIO 7.0* 6.0*   4/8/11 - dobutamine echo showed NO infarct or ischemia LV 60-65%, normal exercise response.        Lupus      Osteoporosis 10/18/2010    10/10 - severe osteopenia in femoral necks, mild osteopenia in spine  (Problem list name updated by automated process. Provider to review and confirm.)     Pure hypercholesterolemia      SECONDARY POLYCYTHEMIA 10/17/2006    Due to smoking     Smoker 6/24/2008     Systemic lupus erythematosus 12/7/2005    Diagnosed in 1980's; history of pericarditis; was previously treated with mtx; not on active treatment; no hx of renal dz from the lupus.       Tobacco use disorder      Type 2 diabetes mellitus with hyperglycemia, without long-term current use of insulin (H)  2019    NEW DIAGNOSIS 2019     Unspecified essential hypertension        Past Surgical History:   Procedure Laterality Date     C APPENDECTOMY       C REPAIR GUM      Cathy Dontal surgery     C/SECTION, LOW TRANSVERSE      , Low Transverse     COLONOSCOPY  2009     D & C      X 5     HC RECONSTR NOSE+DAVE SEPTAL REPAIR       HYSTERECTOMY, BRI      Hx of dysplasia     LAPAROSCOPIC CHOLECYSTECTOMY N/A 2017    Procedure: LAPAROSCOPIC CHOLECYSTECTOMY;  Laparoscopic Cholecystectomy;  Surgeon: Tami Barney MD;  Location: [facility] OR     SINUS SURGERY      Reconstruction        Family History   Problem Relation Age of Onset     Diabetes Mother      Gastrointestinal Disease Mother         Gallbladder disease     Heart Disease Mother      Cancer Father         lung     Alcohol/Drug Father      Allergies Father      Heart Disease Father      Gastrointestinal Disease Father         Liver disease     Skin Cancer Father      Heart Disease Maternal Grandfather      Arthritis Sister      Osteoporosis Sister      Thyroid Disease Sister      Allergies Son      Alcohol/Drug Sister      Alcohol/Drug Sister      Melanoma No family hx of        Social History     Socioeconomic History     Marital status:      Spouse name: Javier Gale     Number of children: 2     Years of education: 12     Highest education level: Not on file   Occupational History     Employer: DISABLED   Social Needs     Financial resource strain: Not on file     Food insecurity:     Worry: Not on file     Inability: Not on file     Transportation needs:     Medical: Not on file     Non-medical: Not on file   Tobacco Use     Smoking status: Current Every Day Smoker     Packs/day: 0.01     Years: 42.00     Pack years: 0.42     Types: Cigarettes     Smokeless tobacco: Never Used     Tobacco comment: Has chantix at home, but hasn't started yet. 19.   Substance and Sexual Activity     Alcohol use: Yes     Alcohol/week: 0.0 oz      Comment: Occ. wine     Drug use: No     Sexual activity: Yes     Partners: Male     Birth control/protection: Surgical     Comment: Hyst    Lifestyle     Physical activity:     Days per week: Not on file     Minutes per session: Not on file     Stress: Not on file   Relationships     Social connections:     Talks on phone: Not on file     Gets together: Not on file     Attends Druze service: Not on file     Active member of club or organization: Not on file     Attends meetings of clubs or organizations: Not on file     Relationship status: Not on file     Intimate partner violence:     Fear of current or ex partner: Not on file     Emotionally abused: Not on file     Physically abused: Not on file     Forced sexual activity: Not on file   Other Topics Concern     Parent/sibling w/ CABG, MI or angioplasty before 65F 55M? No   Social History Narrative    The patient has a 50+ pk yr tobacco hx.  She has ongoing active use of 1/2 ppd.  Alcohol use is <1 alcoholic drinks per week.  She denies use of recreational drugs.          She is on disability.  Has been on disability since approximately 1989.  Previously worked in [employer].         The patient is .  Has 2 children.        Hot Tube Exposure: NO    Recent Travel: NO     Hx of incarceration:  NO    Bird Exposure:   NO    Animal Exposure:  NO    Inhalation Exposure:  Possible, father worked with asbestos               Outpatient Encounter Medications as of 5/9/2019   Medication Sig Dispense Refill     albuterol (PROAIR HFA/PROVENTIL HFA/VENTOLIN HFA) 108 (90 Base) MCG/ACT inhaler Inhale 2 puffs into the lungs every 6 hours 54 g 3     alcohol swab prep pads Use to swab area of injection/raffi as directed. 100 each 3     amLODIPine (NORVASC) 10 MG tablet Take 1 tablet (10 mg) by mouth daily For high blood pressure 90 tablet 3     blood glucose (MILLIE MICROLET 2) lancing device Device to be used with lancets 2 times a day. 1 each 0     blood  glucose (NO BRAND SPECIFIED) test strip Use to test blood sugar 2 times daily or as directed. To accompany: Blood Glucose Monitor Brands: per insurance. 100 strip 6     blood glucose calibration (NO BRAND SPECIFIED) solution To accompany: Blood Glucose Monitor Brands: per insurance. 1 Bottle 3     blood glucose monitoring (NO BRAND SPECIFIED) meter device kit Use to test blood sugar 2 times daily or as directed. 1 kit 0     Cetirizine HCl (ZYRTEC ALLERGY PO)        ezetimibe (ZETIA) 10 MG tablet Take 1 tablet (10 mg) by mouth daily 90 tablet 3     fluocinonide (LIDEX) 0.05 % external solution Apply twice daily on scalp as needed. 60 mL 4     Ibuprofen (IBU-200 PO) Take  by mouth.       ipratropium - albuterol 0.5 mg/2.5 mg/3 mL (DUONEB) 0.5-2.5 (3) MG/3ML neb solution Take 1 vial (3 mLs) by nebulization every 6 hours as needed for shortness of breath / dyspnea or wheezing 1 Box 3     ketoconazole (NIZORAL) 2 % external shampoo Use to wash scalp, leave on for 5 minutes. Use 2-3 times a week. 120 mL 4     order for DME Equipment being ordered: PORTABLE OXYGEN UNIT AND QUIETER OXYGEN CONCENTRATOR   2L per Pending sale to [facility] PORTABLE OXYGEN CONCENTRATOR    Pt Room Air at rest 89%  5 min walking room air 83%   Resting on 2 liters Oxygen per nasal cannula post 5 min walk 95% 1 Device 11     order for DME Equipment being ordered: Nebulizer- PORTABLE with tubing. [employer] Phone: [phone] 1 Device 0     STATIN NOT PRESCRIBED (INTENTIONAL) Please choose reason not prescribed, below       STATIN NOT PRESCRIBED, INTENTIONAL, 1 each daily Statin not prescribed intentionally due to Intolerance 0 each 0     thin (NO BRAND SPECIFIED) lancets 1 each by In Vitro route 2 times daily Use with lanceting device. To accompany: Blood Glucose Monitor Brands: per insurance. 100 each 6     tiotropium (SPIRIVA RESPIMAT) 2.5 MCG/ACT inhaler Inhale 2 puffs into the lungs daily 12 g 3     fluticasone-vilanterol (BREO ELLIPTA) 200-25  MCG/INH inhaler Inhale 1 puff into the lungs daily (Patient not taking: Reported on 5/9/2019) 60 Inhaler 3     torsemide (DEMADEX) 5 MG tablet Take 1 tablet (5 mg) by mouth daily (Patient not taking: Reported on 5/9/2019) 90 tablet 1     [DISCONTINUED] albuterol (PROAIR HFA) 108 (90 BASE) MCG/ACT Inhaler Inhale 1-2 puffs into the lungs every 4 hours To 6 hours as needed for shortness of breath (Patient not taking: Reported on 5/9/2019) 3 Inhaler 1     [DISCONTINUED] aspirin (ASA) 81 MG tablet Take 1 tablet (81 mg) by mouth daily 100 tablet 3     Facility-Administered Encounter Medications as of 5/9/2019   Medication Dose Route Frequency Provider Last Rate Last Dose     ropivacaine (NAROPIN) injection 3 mL  3 mL   Jonh Gracia DO   3 mL at 07/03/18 1209     triamcinolone acetonide (KENALOG-40) injection 40 mg  40 mg   Jonh Gracia DO   40 mg at 07/03/18 1209             Review Of Systems  Skin: As above  Eyes: negative  Ears/Nose/Throat: negative  Respiratory: No shortness of breath, dyspnea on exertion, cough, or hemoptysis  Cardiovascular: negative  Gastrointestinal: negative  Genitourinary: negative  Musculoskeletal: negative  Neurologic: negative  Psychiatric: negative  Hematologic/Lymphatic/Immunologic: negative  Endocrine: negative      O:   NAD, WDWN, Alert & Oriented, Mood & Affect wnl, Vitals stable   Here today alone   /75   Pulse 66   SpO2 93%    General appearance normal   Vitals stable   Alert, oriented and in no acute distress     0.2 cm Red scabbed macule on mid lower lip   Mild scaly plaques on occipital scalp and eyebrows    Eyes: Conjunctivae/lids:Normal     ENT: Lips: normal    MSK:Normal    Pulm: Breathing Normal    Neuro/Psych: Orientation:Normal; Mood/Affect:Normal  A/P:  1. R/O venous lake on mid lower lip  TANGENTIAL BIOPSY SENT OUT:  After consent, anesthesia with LEC and prep, tangential excision performed and specimen sent out for permanent section histology.   No complications and routine wound care. Patient told to call our office in 1-2 weeks for result.      2. Seborrheic Dermatitis   Seborrheic Dermatitis can cause an itchy scaly scalp and rash on face and neck. It is    caused by a reaction to yeast, that normally inhabits our skin.    To treat your seborrheic dermatitis I prescribed:     *Ketoconazole shampoo: Wash 2-3 times a week. When washing hair keep on scalp for    5 minutes and wash down face    Apply lidex twice daily as needed to scalp, can use for 3-5 days on eyebrow.       Again, thank you for allowing me to participate in the care of your patient.        Sincerely,        Beverly Tyler PA-C

## 2019-05-09 NOTE — PROGRESS NOTES
Aminata Gale is a 68 year old year old female patient here today for spot on lower lip.  Patient states this has been present for one month.  Patient reports the following symptoms:  bleeds.  Patient reports the following previous treatments vaseline, holding wound. She also notes an itchy scaly scalp and eyebrows. Patient has no other skin complaints today.  Remainder of the HPI, Meds, PMH, Allergies, FH, and SH was reviewed in chart.    Past Medical History:   Diagnosis Date     Acute pericarditis, unspecified      Chronic airway obstruction 2005    PFTs -  - mild COPD Problem list name updated by automated process. Provider to review     Dysuria      Esophageal reflux      GERD (gastroesophageal reflux disease) 2013     Hiatal hernia 2011     HTN, goal below 140/90 2013     Hyperlipidemia LDL goal <130 2010    Recent Labs  Lab Test 9/22/10 0908 10/12/06 0939    CHOL 214* 256*    HDL 31* 42*    * 186*    TRIG 149 143    CHOLHDLRATIO 7.0* 6.0*   11 - dobutamine echo showed NO infarct or ischemia LV 60-65%, normal exercise response.        Lupus      Osteoporosis 10/18/2010    10/10 - severe osteopenia in femoral necks, mild osteopenia in spine  (Problem list name updated by automated process. Provider to review and confirm.)     Pure hypercholesterolemia      SECONDARY POLYCYTHEMIA 10/17/2006    Due to smoking     Smoker 2008     Systemic lupus erythematosus 2005    Diagnosed in ; history of pericarditis; was previously treated with mtx; not on active treatment; no hx of renal dz from the lupus.       Tobacco use disorder      Type 2 diabetes mellitus with hyperglycemia, without long-term current use of insulin (H) 2019    NEW DIAGNOSIS 2019     Unspecified essential hypertension        Past Surgical History:   Procedure Laterality Date     C APPENDECTOMY       C REPAIR GUM      Cathy Dontal surgery     C/SECTION, LOW TRANSVERSE      , Low  Transverse     COLONOSCOPY  03/09/2009     D & C      X 5     HC RECONSTR NOSE+DAVE SEPTAL REPAIR       HYSTERECTOMY, BRI      Hx of dysplasia     LAPAROSCOPIC CHOLECYSTECTOMY N/A 7/1/2017    Procedure: LAPAROSCOPIC CHOLECYSTECTOMY;  Laparoscopic Cholecystectomy;  Surgeon: Tami Barney MD;  Location: [facility] OR     SINUS SURGERY      Reconstruction        Family History   Problem Relation Age of Onset     Diabetes Mother      Gastrointestinal Disease Mother         Gallbladder disease     Heart Disease Mother      Cancer Father         lung     Alcohol/Drug Father      Allergies Father      Heart Disease Father      Gastrointestinal Disease Father         Liver disease     Skin Cancer Father      Heart Disease Maternal Grandfather      Arthritis Sister      Osteoporosis Sister      Thyroid Disease Sister      Allergies Son      Alcohol/Drug Sister      Alcohol/Drug Sister      Melanoma No family hx of        Social History     Socioeconomic History     Marital status:      Spouse name: Javier Gale     Number of children: 2     Years of education: 12     Highest education level: Not on file   Occupational History     Employer: DISABLED   Social Needs     Financial resource strain: Not on file     Food insecurity:     Worry: Not on file     Inability: Not on file     Transportation needs:     Medical: Not on file     Non-medical: Not on file   Tobacco Use     Smoking status: Current Every Day Smoker     Packs/day: 0.01     Years: 42.00     Pack years: 0.42     Types: Cigarettes     Smokeless tobacco: Never Used     Tobacco comment: Has chantix at home, but hasn't started yet. 5/9/19.   Substance and Sexual Activity     Alcohol use: Yes     Alcohol/week: 0.0 oz     Comment: Occ. wine     Drug use: No     Sexual activity: Yes     Partners: Male     Birth control/protection: Surgical     Comment: Hyst    Lifestyle     Physical activity:     Days per week: Not on file     Minutes per session: Not on file      Stress: Not on file   Relationships     Social connections:     Talks on phone: Not on file     Gets together: Not on file     Attends Yazidi service: Not on file     Active member of club or organization: Not on file     Attends meetings of clubs or organizations: Not on file     Relationship status: Not on file     Intimate partner violence:     Fear of current or ex partner: Not on file     Emotionally abused: Not on file     Physically abused: Not on file     Forced sexual activity: Not on file   Other Topics Concern     Parent/sibling w/ CABG, MI or angioplasty before 65F 55M? No   Social History Narrative    The patient has a 50+ pk yr tobacco hx.  She has ongoing active use of 1/2 ppd.  Alcohol use is <1 alcoholic drinks per week.  She denies use of recreational drugs.          She is on disability.  Has been on disability since approximately 1989.  Previously worked in [employer].         The patient is .  Has 2 children.        Hot Tube Exposure: NO    Recent Travel: NO     Hx of incarceration:  NO    Bird Exposure:   NO    Animal Exposure:  NO    Inhalation Exposure:  Possible, father worked with asbestos               Outpatient Encounter Medications as of 5/9/2019   Medication Sig Dispense Refill     albuterol (PROAIR HFA/PROVENTIL HFA/VENTOLIN HFA) 108 (90 Base) MCG/ACT inhaler Inhale 2 puffs into the lungs every 6 hours 54 g 3     alcohol swab prep pads Use to swab area of injection/raffi as directed. 100 each 3     amLODIPine (NORVASC) 10 MG tablet Take 1 tablet (10 mg) by mouth daily For high blood pressure 90 tablet 3     blood glucose (MILLIE MICROLET 2) lancing device Device to be used with lancets 2 times a day. 1 each 0     blood glucose (NO BRAND SPECIFIED) test strip Use to test blood sugar 2 times daily or as directed. To accompany: Blood Glucose Monitor Brands: per insurance. 100 strip 6     blood glucose calibration (NO BRAND SPECIFIED) solution To accompany: Blood  Glucose Monitor Brands: per insurance. 1 Bottle 3     blood glucose monitoring (NO BRAND SPECIFIED) meter device kit Use to test blood sugar 2 times daily or as directed. 1 kit 0     Cetirizine HCl (ZYRTEC ALLERGY PO)        ezetimibe (ZETIA) 10 MG tablet Take 1 tablet (10 mg) by mouth daily 90 tablet 3     fluocinonide (LIDEX) 0.05 % external solution Apply twice daily on scalp as needed. 60 mL 4     Ibuprofen (IBU-200 PO) Take  by mouth.       ipratropium - albuterol 0.5 mg/2.5 mg/3 mL (DUONEB) 0.5-2.5 (3) MG/3ML neb solution Take 1 vial (3 mLs) by nebulization every 6 hours as needed for shortness of breath / dyspnea or wheezing 1 Box 3     ketoconazole (NIZORAL) 2 % external shampoo Use to wash scalp, leave on for 5 minutes. Use 2-3 times a week. 120 mL 4     order for DME Equipment being ordered: PORTABLE OXYGEN UNIT AND QUIETER OXYGEN CONCENTRATOR   2L per [facility] PORTABLE OXYGEN CONCENTRATOR    Pt Room Air at rest 89%  5 min walking room air 83%   Resting on 2 liters Oxygen per nasal cannula post 5 min walk 95% 1 Device 11     order for DME Equipment being ordered: Nebulizer- PORTABLE with tubing. [employer] Phone: [phone] 1 Device 0     STATIN NOT PRESCRIBED (INTENTIONAL) Please choose reason not prescribed, below       STATIN NOT PRESCRIBED, INTENTIONAL, 1 each daily Statin not prescribed intentionally due to Intolerance 0 each 0     thin (NO BRAND SPECIFIED) lancets 1 each by In Vitro route 2 times daily Use with lanceting device. To accompany: Blood Glucose Monitor Brands: per insurance. 100 each 6     tiotropium (SPIRIVA RESPIMAT) 2.5 MCG/ACT inhaler Inhale 2 puffs into the lungs daily 12 g 3     fluticasone-vilanterol (BREO ELLIPTA) 200-25 MCG/INH inhaler Inhale 1 puff into the lungs daily (Patient not taking: Reported on 5/9/2019) 60 Inhaler 3     torsemide (DEMADEX) 5 MG tablet Take 1 tablet (5 mg) by mouth daily (Patient not taking: Reported on 5/9/2019) 90 tablet 1      [DISCONTINUED] albuterol (PROAIR HFA) 108 (90 BASE) MCG/ACT Inhaler Inhale 1-2 puffs into the lungs every 4 hours To 6 hours as needed for shortness of breath (Patient not taking: Reported on 5/9/2019) 3 Inhaler 1     [DISCONTINUED] aspirin (ASA) 81 MG tablet Take 1 tablet (81 mg) by mouth daily 100 tablet 3     Facility-Administered Encounter Medications as of 5/9/2019   Medication Dose Route Frequency Provider Last Rate Last Dose     ropivacaine (NAROPIN) injection 3 mL  3 mL   Jonh Gracia DO   3 mL at 07/03/18 1209     triamcinolone acetonide (KENALOG-40) injection 40 mg  40 mg   Jonh Gracia DO   40 mg at 07/03/18 1209             Review Of Systems  Skin: As above  Eyes: negative  Ears/Nose/Throat: negative  Respiratory: No shortness of breath, dyspnea on exertion, cough, or hemoptysis  Cardiovascular: negative  Gastrointestinal: negative  Genitourinary: negative  Musculoskeletal: negative  Neurologic: negative  Psychiatric: negative  Hematologic/Lymphatic/Immunologic: negative  Endocrine: negative      O:   NAD, WDWN, Alert & Oriented, Mood & Affect wnl, Vitals stable   Here today alone   /75   Pulse 66   SpO2 93%    General appearance normal   Vitals stable   Alert, oriented and in no acute distress     0.2 cm Red scabbed macule on mid lower lip   Mild scaly plaques on occipital scalp and eyebrows    Eyes: Conjunctivae/lids:Normal     ENT: Lips: normal    MSK:Normal    Pulm: Breathing Normal    Neuro/Psych: Orientation:Normal; Mood/Affect:Normal  A/P:  1. R/O venous lake on mid lower lip  TANGENTIAL BIOPSY SENT OUT:  After consent, anesthesia with LEC and prep, tangential excision performed and specimen sent out for permanent section histology.  No complications and routine wound care. Patient told to call our office in 1-2 weeks for result.      2. Seborrheic Dermatitis   Seborrheic Dermatitis can cause an itchy scaly scalp and rash on face and neck. It is    caused by a  reaction to yeast, that normally inhabits our skin.    To treat your seborrheic dermatitis I prescribed:     *Ketoconazole shampoo: Wash 2-3 times a week. When washing hair keep on scalp for    5 minutes and wash down face    Apply lidex twice daily as needed to scalp, can use for 3-5 days on eyebrow.

## 2019-05-13 LAB — COPATH REPORT: NORMAL

## 2019-05-15 ENCOUNTER — TELEPHONE (OUTPATIENT)
Dept: FAMILY MEDICINE | Facility: CLINIC | Age: 69
End: 2019-05-15

## 2019-05-15 NOTE — TELEPHONE ENCOUNTER
[employer]  Form placed on Provider Sana Olivo APRMICHA-CNP desk for [employer].    [facility]

## 2019-05-21 ENCOUNTER — MEDICAL CORRESPONDENCE (OUTPATIENT)
Dept: HEALTH INFORMATION MANAGEMENT | Facility: CLINIC | Age: 69
End: 2019-05-21

## 2019-05-24 NOTE — TELEPHONE ENCOUNTER
Form received back signed  5/21/19  Faxed Back & Sent to be scanned to this encounter  [facility]

## 2019-06-18 ENCOUNTER — OFFICE VISIT (OUTPATIENT)
Dept: ORTHOPEDICS | Facility: CLINIC | Age: 69
End: 2019-06-18
Payer: COMMERCIAL

## 2019-06-18 VITALS
SYSTOLIC BLOOD PRESSURE: 140 MMHG | HEIGHT: 60 IN | WEIGHT: 154 LBS | BODY MASS INDEX: 30.23 KG/M2 | DIASTOLIC BLOOD PRESSURE: 76 MMHG

## 2019-06-18 DIAGNOSIS — M25.552 BILATERAL HIP PAIN: Primary | ICD-10-CM

## 2019-06-18 DIAGNOSIS — M16.11 PRIMARY OSTEOARTHRITIS OF RIGHT HIP: ICD-10-CM

## 2019-06-18 DIAGNOSIS — M25.551 BILATERAL HIP PAIN: Primary | ICD-10-CM

## 2019-06-18 DIAGNOSIS — M25.551 PAIN, JOINT, HIP, RIGHT: ICD-10-CM

## 2019-06-18 PROCEDURE — 99213 OFFICE O/P EST LOW 20 MIN: CPT | Mod: 25 | Performed by: FAMILY MEDICINE

## 2019-06-18 PROCEDURE — 20611 DRAIN/INJ JOINT/BURSA W/US: CPT | Mod: RT | Performed by: FAMILY MEDICINE

## 2019-06-18 RX ORDER — HYDROCODONE BITARTRATE AND ACETAMINOPHEN 5; 325 MG/1; MG/1
.5-1 TABLET ORAL
Qty: 10 TABLET | Refills: 0 | Status: SHIPPED | OUTPATIENT
Start: 2019-06-18 | End: 2020-02-05

## 2019-06-18 RX ADMIN — ROPIVACAINE HYDROCHLORIDE 3 ML: 5 INJECTION, SOLUTION EPIDURAL; INFILTRATION; PERINEURAL at 12:10

## 2019-06-18 RX ADMIN — TRIAMCINOLONE ACETONIDE 40 MG: 40 INJECTION, SUSPENSION INTRA-ARTICULAR; INTRAMUSCULAR at 12:10

## 2019-06-18 ASSESSMENT — MIFFLIN-ST. JEOR: SCORE: 1140.04

## 2019-06-18 NOTE — PROGRESS NOTES
Aminata Gale  :  1950  DOS: 19  MRN: [id number]    Sports Medicine Clinic Visit    PCP: Sana Olivo    Aminataradha Gale is a 67 year old female who is seen in consultation at the request of  Sana Olivo C.N.P. presenting with chronic bilateral hip pain.    Injury: Gradual onset of bilateral hip, buttocks pain over the last ~ 9 months.  Pain significantly worse over last ~ 6 weeks after falling while on vacation and injuring her coccyx.  Pain located over bilateral deep lateral hip, left SI joint, radiating to bilateral lateral, anterior thigh.  Occasional groin pain on right.  Reports intermittent radiating, pain to bilateral thighs.  Additional Features:  Positive: catching and weakness.  Symptoms are better with Other medications: Norco and Rest.  Symptoms are worse with: lying on either hip, prolonged walking/sitting, going from sit to stand.  Other evaluation and/or treatments so far consists of: Ibuprofen, Other medications: Norco, Rest and donut pillow, PCP.  Recent imaging completed: X-rays completed 18.  Prior History of related problems: none    Social History: retired - cares for grandchild    Interim History - July 3, 2018  Since last visit on 18 patient has moderate right deep hip pain.  Bilateral hip trochanteric injection completed on  provided good relief of lateral hip pain.  Continues to have discomfort in deep anterior hip.  Attended one session of physical therapy, has not returned d/t illness.  Traveling to [place] in ~ 1 week.  No new injury in the interim.    Interim History - 2019  Since last visit on 7/3/18 patient has moderate right hip pain.  Right hip intra-articular injection completed on 7/3 provided good relief for ~ 6 months.  Patient notes gradual return of pain in right anterior hip with radiation to right thigh over the last ~ 5 months.  Desires repeat injection today.  No new injury in the interim.    Review of  Systems  Musculoskeletal: as above  Remainder of review of systems is negative including constitutional, CV, pulmonary, GI, Skin and Neurologic except as noted in HPI or medical history.    Past Medical History:   Diagnosis Date     Acute pericarditis, unspecified      Chronic airway obstruction 2005    PFTs -  - mild COPD Problem list name updated by automated process. Provider to review     Dysuria      Esophageal reflux      GERD (gastroesophageal reflux disease) 2013     Hiatal hernia 2011     HTN, goal below 140/90 2013     Hyperlipidemia LDL goal <130 2010    Recent Labs  Lab Test 9/22/10 0908 10/12/06 0939    CHOL 214* 256*    HDL 31* 42*    * 186*    TRIG 149 143    CHOLHDLRATIO 7.0* 6.0*   11 - dobutamine echo showed NO infarct or ischemia LV 60-65%, normal exercise response.        Lupus (H)      Osteoporosis 10/18/2010    10/10 - severe osteopenia in femoral necks, mild osteopenia in spine  (Problem list name updated by automated process. Provider to review and confirm.)     Pure hypercholesterolemia      SECONDARY POLYCYTHEMIA 10/17/2006    Due to smoking     Smoker 2008     Systemic lupus erythematosus 2005    Diagnosed in ; history of pericarditis; was previously treated with mtx; not on active treatment; no hx of renal dz from the lupus.       Tobacco use disorder      Type 2 diabetes mellitus with hyperglycemia, without long-term current use of insulin (H) 2019    NEW DIAGNOSIS 2019     Unspecified essential hypertension      Past Surgical History:   Procedure Laterality Date     C APPENDECTOMY       C REPAIR GUM      Cathy Dontal surgery     C/SECTION, LOW TRANSVERSE      , Low Transverse     COLONOSCOPY  2009     D & C      X 5     HC RECONSTR NOSE+DAVE SEPTAL REPAIR       HYSTERECTOMY, BRI      Hx of dysplasia     LAPAROSCOPIC CHOLECYSTECTOMY N/A 2017    Procedure: LAPAROSCOPIC CHOLECYSTECTOMY;  Laparoscopic  Cholecystectomy;  Surgeon: Tami Barney MD;  Location: [facility] OR     SINUS SURGERY      Reconstruction       Objective  /76   Ht 1.524 m (5')   Wt 69.9 kg (154 lb)   BMI 30.08 kg/m      General: healthy, alert and in no distress    HEENT: no scleral icterus or conjunctival erythema   Skin: no suspicious lesions or rash. No jaundice.   CV: regular rhythm by palpation, 2+ distal pulses, no pedal edema    Resp: normal respiratory effort without conversational dyspnea   Psych: normal mood and affect    Gait: nonantalgic, appropriate coordination and balance   Neuro: normal light touch sensory exam of the extremities. Motor strength as noted below       Bilateral hip exam    Inspection:        no edema or ecchymosis in hip area    ROM:       Full active and passive ROM, pain with terminal flexion>ER>IR      Pain with active adduction over lateral hip and active ER    Strength:        flexion 5/5       extension 5/5       abduction 5/5       adduction 5/5    Tender:        greater trochanter       SI joint mild       Piriformis, external rotators mildly    Non Tender:        remainder of hip area       illiac crest       ASIS       pubis    Sensation:        grossly intact in hip and thigh    Skin:       well perfused       capillary refill brisk    Special Tests:        neg (-) MARIA       + FADIR       neg (-) scour       Improved Ramonita    Large Joint Injection/Arthocentesis: R hip joint  Date/Time: 6/18/2019 12:10 PM  Performed by: Jonh Gracia DO  Authorized by: Jonh Gracia DO     Indications:  Pain and diagnostic evaluation  Needle Size:  22 G  Guidance: ultrasound    Approach:  Anterior  Location:  Hip      Site:  R hip joint  Medications:  40 mg triamcinolone 40 MG/ML; 3 mL ropivacaine 5 MG/ML  Outcome:  Tolerated well, no immediate complications  Procedure discussed: discussed risks, benefits, and alternatives    Consent Given by:  Patient  Prep: patient was prepped and  draped in usual sterile fashion     4 ml's of 1% lidocaine was used as local anesthetic prior to injection      Radiology  PELVIS WITH BILATERAL HIP 1/30/2018 3:39 PM      HISTORY: Bilateral hip pain.     COMPARISON: 11/12/2008     FINDINGS:  Minimal loss of joint space bilaterally similar to  previous. There is no acute fracture. No dislocation. There are no  worrisome bony lesions.         IMPRESSION:  No acute osseous abnormality demonstrated.    Assessment:  1. Bilateral hip pain    2. Pain, joint, hip, right    3. Primary osteoarthritis of right hip        Plan:  Discussed the assessment with the patient.  Follow up: prn based on clinical progress  B/l troch bursa CSI for pain relief was helpful  Activity options and strategies reviewed in detail  XR images independently visualized and reviewed with patient today in clinic  More suggestive of intraarticular hip pain again today, repeat intraarticular CSI today  Expectations and goals of CSI reviewed  Often 2-3 days for steroid effect, and can take up to two weeks for maximum effect  We discussed modified progressive pain-free activity as tolerated  Do not overuse in first two weeks if feeling better due to concern for vulnerability while steroid is working  Supportive care reviewed  All questions were answered today  Contact us with additional questions or concerns  Signs and sx of concern reviewed      Jonh Gracia DO, AFSHIN  Primary Care Sports Medicine  [facility]             Disclaimer: This note consists of symbols derived from keyboarding, dictation and/or voice recognition software. As a result, there may be errors in the script that have gone undetected. Please consider this when interpreting information found in this chart.

## 2019-06-18 NOTE — Clinical Note
2019         RE: Aminata Gale  [address]   [facility] [address]        Dear Colleague,    Thank you for referring your patient, Aminata Gale, to the [facility]. Please see a copy of my visit note below.    Aminata Gale  :  1950  DOS: 19  MRN: [id number]    Sports Medicine Clinic Visit    PCP: Sana Olivo    Aminata Gale is a 67 year old female who is seen in consultation at the request of  Sana Olivo C.N.P. presenting with chronic bilateral hip pain.    Injury: Gradual onset of bilateral hip, buttocks pain over the last ~ 9 months.  Pain significantly worse over last ~ 6 weeks after falling while on vacation and injuring her coccyx.  Pain located over bilateral deep lateral hip, left SI joint, radiating to bilateral lateral, anterior thigh.  Occasional groin pain on right.  Reports intermittent radiating, pain to bilateral thighs.  Additional Features:  Positive: catching and weakness.  Symptoms are better with Other medications: Norco and Rest.  Symptoms are worse with: lying on either hip, prolonged walking/sitting, going from sit to stand.  Other evaluation and/or treatments so far consists of: Ibuprofen, Other medications: Norco, Rest and donut pillow, PCP.  Recent imaging completed: X-rays completed 18.  Prior History of related problems: none    Social History: retired - cares for grandchild    Interim History - July 3, 2018  Since last visit on 18 patient has moderate right deep hip pain.  Bilateral hip trochanteric injection completed on  provided good relief of lateral hip pain.  Continues to have discomfort in deep anterior hip.  Attended one session of physical therapy, has not returned d/t illness.  Traveling to [place] in ~ 1 week.  No new injury in the interim.    Interim History - 2019  Since last visit on 7/3/18 patient has moderate right hip pain.  Right hip intra-articular  injection completed on 7/3 provided good relief for ~ 6 months.  Patient notes gradual return of pain in right anterior hip with radiation to right thigh over the last ~ 5 months.  Desires repeat injection today.  No new injury in the interim.    Review of Systems  Musculoskeletal: as above  Remainder of review of systems is negative including constitutional, CV, pulmonary, GI, Skin and Neurologic except as noted in HPI or medical history.    Past Medical History:   Diagnosis Date     Acute pericarditis, unspecified      Chronic airway obstruction 2005    PFTs -  - mild COPD Problem list name updated by automated process. Provider to review     Dysuria      Esophageal reflux      GERD (gastroesophageal reflux disease) 2013     Hiatal hernia 2011     HTN, goal below 140/90 2013     Hyperlipidemia LDL goal <130 2010    Recent Labs  Lab Test 9/22/10 0908 10/12/06 0939    CHOL 214* 256*    HDL 31* 42*    * 186*    TRIG 149 143    CHOLHDLRATIO 7.0* 6.0*   11 - dobutamine echo showed NO infarct or ischemia LV 60-65%, normal exercise response.        Lupus (H)      Osteoporosis 10/18/2010    10/10 - severe osteopenia in femoral necks, mild osteopenia in spine  (Problem list name updated by automated process. Provider to review and confirm.)     Pure hypercholesterolemia      SECONDARY POLYCYTHEMIA 10/17/2006    Due to smoking     Smoker 2008     Systemic lupus erythematosus 2005    Diagnosed in ; history of pericarditis; was previously treated with mtx; not on active treatment; no hx of renal dz from the lupus.       Tobacco use disorder      Type 2 diabetes mellitus with hyperglycemia, without long-term current use of insulin (H) 2019    NEW DIAGNOSIS 2019     Unspecified essential hypertension      Past Surgical History:   Procedure Laterality Date     C APPENDECTOMY       C REPAIR GUM      Cathy Dontal surgery     C/SECTION, LOW TRANSVERSE      , Low  Transverse     COLONOSCOPY  03/09/2009     D & C      X 5     HC RECONSTR NOSE+DAVE SEPTAL REPAIR       HYSTERECTOMY, BRI      Hx of dysplasia     LAPAROSCOPIC CHOLECYSTECTOMY N/A 7/1/2017    Procedure: LAPAROSCOPIC CHOLECYSTECTOMY;  Laparoscopic Cholecystectomy;  Surgeon: Tami Barney MD;  Location: [facility]     SINUS SURGERY      Reconstruction       Objective  Ht 1.524 m (5')   Wt 69.9 kg (154 lb)   BMI 30.08 kg/m       General: healthy, alert and in no distress    HEENT: no scleral icterus or conjunctival erythema   Skin: no suspicious lesions or rash. No jaundice.   CV: regular rhythm by palpation, 2+ distal pulses, no pedal edema    Resp: normal respiratory effort without conversational dyspnea   Psych: normal mood and affect    Gait: nonantalgic, appropriate coordination and balance   Neuro: normal light touch sensory exam of the extremities. Motor strength as noted below       Bilateral hip exam    Inspection:        no edema or ecchymosis in hip area    ROM:       Full active and passive ROM, pain with terminal flexion>ER>IR      Pain with active adduction over lateral hip and active ER    Strength:        flexion 5/5       extension 5/5       abduction 5/5       adduction 5/5    Tender:        greater trochanter       SI joint mild       Piriformis, external rotators mildly    Non Tender:        remainder of hip area       illiac crest       ASIS       pubis    Sensation:        grossly intact in hip and thigh    Skin:       well perfused       capillary refill brisk    Special Tests:        neg (-) MARIA       + FADIR       neg (-) scour       Improved Ramonita    Large Joint Injection/Arthocentesis: R hip joint  Date/Time: 6/18/2019 12:10 PM  Performed by: Jonh Gracia DO  Authorized by: Jonh Gracia DO     Indications:  Pain and diagnostic evaluation  Needle Size:  22 G  Guidance: ultrasound    Approach:  Anterior  Location:  Hip      Site:  R hip joint  Medications:  3  mL ropivacaine 5 MG/ML; 40 mg triamcinolone 40 MG/ML  Outcome:  Tolerated well, no immediate complications  Procedure discussed: discussed risks, benefits, and alternatives    Consent Given by:  Patient  Prep: patient was prepped and draped in usual sterile fashion     4 ml's of 1% lidocaine was used as local anesthetic prior to injection      Radiology  PELVIS WITH BILATERAL HIP 1/30/2018 3:39 PM      HISTORY: Bilateral hip pain.     COMPARISON: 11/12/2008     FINDINGS:  Minimal loss of joint space bilaterally similar to  previous. There is no acute fracture. No dislocation. There are no  worrisome bony lesions.         IMPRESSION:  No acute osseous abnormality demonstrated.    Assessment:  No diagnosis found.    Plan:  Discussed the assessment with the patient.  Follow up: prn based on clinical progress  B/l troch bursa CSI for pain relief was helpful  Activity options and strategies reviewed in detail  XR images independently visualized and reviewed with patient today in clinic  More suggestive of intraarticular hip pain today, injection will have diagnostic value  Expectations and goals of CSI reviewed, US guided IA hip injection today  Often 2-3 days for steroid effect, and can take up to two weeks for maximum effect  We discussed modified progressive pain-free activity as tolerated  Do not overuse in first two weeks if feeling better due to concern for vulnerability while steroid is working  Supportive care reviewed  All questions were answered today  Contact us with additional questions or concerns  Signs and sx of concern reviewed      Jonh Gracia DO, AFSHIN  Primary Care Sports Medicine  [facility]             Disclaimer: This note consists of symbols derived from keyboarding, dictation and/or voice recognition software. As a result, there may be errors in the script that have gone undetected. Please consider this when interpreting information found in this chart.    Again, thank you  for allowing me to participate in the care of your patient.        Sincerely,        Jonh Gracia, DO

## 2019-06-20 ENCOUNTER — OFFICE VISIT (OUTPATIENT)
Dept: CARDIOLOGY | Facility: CLINIC | Age: 69
End: 2019-06-20
Payer: COMMERCIAL

## 2019-06-20 VITALS
BODY MASS INDEX: 30.86 KG/M2 | SYSTOLIC BLOOD PRESSURE: 138 MMHG | OXYGEN SATURATION: 97 % | HEART RATE: 97 BPM | WEIGHT: 158 LBS | DIASTOLIC BLOOD PRESSURE: 92 MMHG

## 2019-06-20 DIAGNOSIS — R60.0 BILATERAL LEG EDEMA: ICD-10-CM

## 2019-06-20 DIAGNOSIS — I10 ESSENTIAL HYPERTENSION, BENIGN: ICD-10-CM

## 2019-06-20 DIAGNOSIS — R07.89 CHEST DISCOMFORT: Primary | ICD-10-CM

## 2019-06-20 DIAGNOSIS — I51.89 DIASTOLIC DYSFUNCTION: ICD-10-CM

## 2019-06-20 DIAGNOSIS — I25.10 NONOCCLUSIVE CORONARY ATHEROSCLEROSIS OF NATIVE CORONARY ARTERY: ICD-10-CM

## 2019-06-20 PROCEDURE — 99214 OFFICE O/P EST MOD 30 MIN: CPT | Performed by: NURSE PRACTITIONER

## 2019-06-20 RX ORDER — LOSARTAN POTASSIUM 25 MG/1
25 TABLET ORAL DAILY
Qty: 30 TABLET | Refills: 11 | Status: SHIPPED | OUTPATIENT
Start: 2019-06-20 | End: 2020-02-05

## 2019-06-20 NOTE — PATIENT INSTRUCTIONS
"[facility]  [facility]~[address]~[place], [place]~[address]  Thank you for your  Heart Care visit today. If you have questions regarding your visit, please contact your cardiology RN's, Kassie Lutz or Debra Izquierdo, at [phone]. Your provider has recommended the following:  Medication Changes:  STOP lasix and potassium   RESTART torsemide   START losartan 25 mg daily   Recommendations:  1.  Compression stockings   Follow-up:  1. nonfasting lab work in 1 week   2. Stress test   3. See Jessie SPRING  for cardiology follow up at [facility]: 1 month  To schedule a future appointment, we kindly ask that you call cardiology scheduling at [phone] three months prior to requested revisit date.      [facility] cardiology clinic is staffed with \"Advance Practice Providers\". These are our cardiology Physician Assistants and Nurse Practitioners.   Please call cardiology scheduling if you feel you need clinical evaluation with them at any time for any cardiac reason.   Reminder:  For your safety, we ask that you bring in your current medication(s) or an updated list of your medications with you to EACH office visit. Include the medication name, dose of pill on bottle and how you are taking it. Include over-the-counter medications or supplements. Your provider will review this at each visit and plan your care based on your current information.   ~~~~~~~~~~~~~~~~~~~~~~~~~~~~~~~~~~~~~~~  \"[facility]\" [place] telephone numbers for reference:  Cardiology Scheduling~[phone]  Diagnostic Imaging Scheduling~[phone]  Lab Scheduling~[phone]  Anticoagulation Clinic~[phone]  Cardiac Rehabilitation~[phone]  CORE Clinic RN's~[phone] (at [facility])  Cardiology Clinic RN's~[phone] (Debra Izquierdo, RN & Kassie Lutz, RN)  ~~~~~~~~~~~~~~~~~~~~~~~~~~~~~~~~~~~~~~~~    "

## 2019-06-20 NOTE — PROGRESS NOTES
Cardiology Clinic Progress Note  Aminata Gale MRN# [id number]   YOB: 1950 Age: 68 year old     Reason For Visit: Annual f/u   Primary Cardiologist:   Dr. Santana           History of Presenting Illness:    Aminata Gale is a pleasant 68 year old patient with a past cardiac history significant for nonobstructive CAD, diastolic dysfunction, hypertension, hyperlipidemia.  Past medical history significant for tobacco abuse, COPD, and lupus.    She has a history of statin intolerance. Stress echocardiogram February 2017 was negative for ischemia  and echocardiogram portion showed LVEF  50-55% at rest.  Lexiscan nuclear stress test April 2017 showed a small apical infarct without ischemia.  Coronary angiogram  April 2017 showed mild LAD and circumflex disease with minimal plaque in the RCA.  Echocardiogram April 2017 showed LVEF 55%, no WMA, no significant valvular disease.    Pt was last seen by Dr. Santana in December 2017.  She continued with intermittent chest aching not related to exertion.  She had mild lower extremity edema.  A month prior, she stopped her torsemide because she started taking ibuprofen for back pain. She was encouraged to restart her torsemide given her edema.  Chest ache was thought to likely be noncardiac.    In April 2019 she had shortness of breath causing difficulty with ADLs.  She had a COPD exacerbation while in [place] and was placed on antibiotics and prednisone PFTs showed very severe obstruction with moderate diffusion defect.    Pt presents today for annual follow-up. She tells me that she has been taking Lasix, which she had a prior prescription for  and is unsure of the dose.  She has also been taking potassium supplementation which she had a prior prescription for.   She decided to start taking these because she was having swelling in her legs and was unsure what to do. Her current diuretic prescription is for torsemide and she has not been taking this  because she didn't know it was a diuretic.  She is agreeable to stopping Lasix and the potassium and will start taking her torsemide.  She is also agreeable to starting losartan for her hypertension.  She has been having chest discomfort at rest and with exertion.  This was brought on with mowing and raking the yard this past week and resolved approximately 20-30 minutes after resting.  She is agreeable to stress test. She continues with bilateral lower extremity edema and I have recommended compression stockings along with restarting her torsemide. In regards to her COPD, she has had medication adjustments and is using home oxygen as needed. Patient reports no shortness of breath, PND, orthopnea, presyncope, syncope, heart racing, or palpitations.    Current Cardiac Medications   Amlodipine 10 mg daily  Zetia 10 mg daily  Torsemide 5 mg daily- pt not taking                   Assessment and Plan:     Plan  1.  Discontinue Lasix and potassium  2.  Restart torsemide   3. Start losartan   4. BMP in 1 week  5. Lexiscan nuclear stress test for chest discomfort  6.  Follow up with VAL in one month to reassess hypertension, chest discomfort, and review stress test      1. Mild nonobstructive CAD with chest discomfort     Angio 2017 mild LAD and circumflex disease with minimal plaque in the RCA    Chest ache with rest and exertion     Continue CCB, start ARB, intolerant to statins      2. Diastolic dysfunction    LE edema     Continue torsemide and use compression stockings       3. hypertension    Not well controlled    continue amlodipine and start losartan       4. hyperlipidemia     on 2/2018    statin intolerance, continue Zetia         Thank you for allowing me to participate in this delightful patient's care.      This note was completed in part using Dragon voice recognition software. Although reviewed after completion, some word and grammatical errors may occur.    Jessie Campos, PEDRO,  CNP           Data:   All laboratory data reviewed          HPI and Plan:   See dictation    Orders Placed This Encounter   Procedures     NM Lexiscan stress test (nuc card)     Basic metabolic panel     Follow-Up with Cardiac Advanced Practice Provider       Orders Placed This Encounter   Medications     losartan (COZAAR) 25 MG tablet     Sig: Take 1 tablet (25 mg) by mouth daily     Dispense:  30 tablet     Refill:  11       There are no discontinued medications.      Encounter Diagnoses   Name Primary?     Essential hypertension, benign      Nonocclusive coronary atherosclerosis of native coronary artery      Diastolic dysfunction      Chest discomfort Yes     Bilateral leg edema        CURRENT MEDICATIONS:  Current Outpatient Medications   Medication Sig Dispense Refill     albuterol (PROAIR HFA/PROVENTIL HFA/VENTOLIN HFA) 108 (90 Base) MCG/ACT inhaler Inhale 2 puffs into the lungs every 6 hours 54 g 3     alcohol swab prep pads Use to swab area of injection/raffi as directed. 100 each 3     amLODIPine (NORVASC) 10 MG tablet Take 1 tablet (10 mg) by mouth daily For high blood pressure 90 tablet 3     blood glucose (Lendio MICROLET 2) lancing device Device to be used with lancets 2 times a day. 1 each 0     blood glucose (NO BRAND SPECIFIED) test strip Use to test blood sugar 2 times daily or as directed. To accompany: Blood Glucose Monitor Brands: per insurance. 100 strip 6     blood glucose calibration (NO BRAND SPECIFIED) solution To accompany: Blood Glucose Monitor Brands: per insurance. 1 Bottle 3     blood glucose monitoring (NO BRAND SPECIFIED) meter device kit Use to test blood sugar 2 times daily or as directed. 1 kit 0     Cetirizine HCl (ZYRTEC ALLERGY PO)        ezetimibe (ZETIA) 10 MG tablet Take 1 tablet (10 mg) by mouth daily 90 tablet 3     fluocinonide (LIDEX) 0.05 % external solution Apply twice daily on scalp as needed. 60 mL 4     fluticasone-vilanterol (BREO ELLIPTA) 200-25 MCG/INH inhaler  Inhale 1 puff into the lungs daily 60 Inhaler 3     HYDROcodone-acetaminophen (NORCO) 5-325 MG tablet Take 0.5-1 tablets by mouth nightly as needed for severe pain 10 tablet 0     Ibuprofen (IBU-200 PO) Take  by mouth.       ipratropium - albuterol 0.5 mg/2.5 mg/3 mL (DUONEB) 0.5-2.5 (3) MG/3ML neb solution Take 1 vial (3 mLs) by nebulization every 6 hours as needed for shortness of breath / dyspnea or wheezing 1 Box 3     losartan (COZAAR) 25 MG tablet Take 1 tablet (25 mg) by mouth daily 30 tablet 11     order for DME Equipment being ordered: PORTABLE OXYGEN UNIT AND QUIETER OXYGEN CONCENTRATOR   2L per NC AN PORTABLE OXYGEN CONCENTRATOR    Pt Room Air at rest 89%  5 min walking room air 83%   Resting on 2 liters Oxygen per nasal cannula post 5 min walk 95% 1 Device 11     order for DME Equipment being ordered: Nebulizer- PORTABLE with tubing. [employer] Phone: [phone] 1 Device 0     STATIN NOT PRESCRIBED (INTENTIONAL) Please choose reason not prescribed, below       STATIN NOT PRESCRIBED, INTENTIONAL, 1 each daily Statin not prescribed intentionally due to Intolerance 0 each 0     thin (NO BRAND SPECIFIED) lancets 1 each by In Vitro route 2 times daily Use with lanceting device. To accompany: Blood Glucose Monitor Brands: per insurance. 100 each 6     tiotropium (SPIRIVA RESPIMAT) 2.5 MCG/ACT inhaler Inhale 2 puffs into the lungs daily 12 g 3     ketoconazole (NIZORAL) 2 % external shampoo Use to wash scalp, leave on for 5 minutes. Use 2-3 times a week. (Patient not taking: Reported on 6/20/2019) 120 mL 4     torsemide (DEMADEX) 5 MG tablet Take 1 tablet (5 mg) by mouth daily (Patient not taking: Reported on 6/20/2019) 90 tablet 1       ALLERGIES     Allergies   Allergen Reactions     Amoxicillin Difficulty breathing and Rash     Ampicillin Difficulty breathing and Rash     Cipro [Ciprofloxacin] Difficulty breathing and Rash     Keflex [Cephalexin Monohydrate] Difficulty breathing and Rash      Penicillin [Penicillins] Difficulty breathing and Rash     Sulfa Drugs Difficulty breathing and Rash     Tetracycline Difficulty breathing and Rash     Biaxin [Clarithromycin] Rash     Ceclor [Cefaclor] Hives and Swelling     Advair Diskus Hives     Hives on face,neck and chest     Egg White      Fish Shortness Of Breath     Mustard [Allyl Isothiocyanate]      Throat swelling       PAST MEDICAL HISTORY:  Past Medical History:   Diagnosis Date     Acute pericarditis, unspecified      Chronic airway obstruction 2005    PFTs -  - mild COPD Problem list name updated by automated process. Provider to review     Dysuria      Esophageal reflux      GERD (gastroesophageal reflux disease) 2013     Hiatal hernia 2011     HTN, goal below 140/90 2013     Hyperlipidemia LDL goal <130 2010    Recent Labs  Lab Test 9/22/10 0908 10/12/06 0939    CHOL 214* 256*    HDL 31* 42*    * 186*    TRIG 149 143    CHOLHDLRATIO 7.0* 6.0*   11 - dobutamine echo showed NO infarct or ischemia LV 60-65%, normal exercise response.        Lupus (H)      Osteoporosis 10/18/2010    10/10 - severe osteopenia in femoral necks, mild osteopenia in spine  (Problem list name updated by automated process. Provider to review and confirm.)     Pure hypercholesterolemia      SECONDARY POLYCYTHEMIA 10/17/2006    Due to smoking     Smoker 2008     Systemic lupus erythematosus 2005    Diagnosed in ; history of pericarditis; was previously treated with mtx; not on active treatment; no hx of renal dz from the lupus.       Tobacco use disorder      Type 2 diabetes mellitus with hyperglycemia, without long-term current use of insulin (H) 2019    NEW DIAGNOSIS 2019     Unspecified essential hypertension        PAST SURGICAL HISTORY:  Past Surgical History:   Procedure Laterality Date     C APPENDECTOMY       C REPAIR GUM      Cathy Dontal surgery     C/SECTION, LOW TRANSVERSE      , Low  Transverse     COLONOSCOPY  03/09/2009     D & C      X 5     HC RECONSTR NOSE+DAVE SEPTAL REPAIR       HYSTERECTOMY, BRI      Hx of dysplasia     LAPAROSCOPIC CHOLECYSTECTOMY N/A 7/1/2017    Procedure: LAPAROSCOPIC CHOLECYSTECTOMY;  Laparoscopic Cholecystectomy;  Surgeon: Tami Barney MD;  Location: [facility]     SINUS SURGERY      Reconstruction       FAMILY HISTORY:  Family History   Problem Relation Age of Onset     Diabetes Mother      Gastrointestinal Disease Mother         Gallbladder disease     Heart Disease Mother      Cancer Father         lung     Alcohol/Drug Father      Allergies Father      Heart Disease Father      Gastrointestinal Disease Father         Liver disease     Skin Cancer Father      Heart Disease Maternal Grandfather      Arthritis Sister      Osteoporosis Sister      Thyroid Disease Sister      Allergies Son      Alcohol/Drug Sister      Alcohol/Drug Sister      Melanoma No family hx of        SOCIAL HISTORY:  Social History     Socioeconomic History     Marital status:      Spouse name: Javier Gale     Number of children: 2     Years of education: 12     Highest education level: None   Occupational History     Employer: [employer]   Social Needs     Financial resource strain: None     Food insecurity:     Worry: None     Inability: None     Transportation needs:     Medical: None     Non-medical: None   Tobacco Use     Smoking status: Current Every Day Smoker     Packs/day: 0.01     Years: 42.00     Pack years: 0.42     Types: Cigarettes     Smokeless tobacco: Never Used     Tobacco comment: Has chantix at home, but hasn't started yet. 5/9/19.   Substance and Sexual Activity     Alcohol use: Yes     Alcohol/week: 0.0 oz     Comment: Occ. wine     Drug use: No     Sexual activity: Yes     Partners: Male     Birth control/protection: Surgical     Comment: Hyst    Lifestyle     Physical activity:     Days per week: None     Minutes per session: None     Stress: None    Relationships     Social connections:     Talks on phone: None     Gets together: None     Attends Hinduism service: None     Active member of club or organization: None     Attends meetings of clubs or organizations: None     Relationship status: None     Intimate partner violence:     Fear of current or ex partner: None     Emotionally abused: None     Physically abused: None     Forced sexual activity: None   Other Topics Concern     Parent/sibling w/ CABG, MI or angioplasty before 65F 55M? No   Social History Narrative    The patient has a 50+ pk yr tobacco hx.  She has ongoing active use of 1/2 ppd.  Alcohol use is <1 alcoholic drinks per week.  She denies use of recreational drugs.          She is on disability.  Has been on disability since approximately 1989.  Previously worked in [employer].         The patient is .  Has 2 children.        Hot Tube Exposure: NO    Recent Travel: NO     Hx of incarceration:  NO    Bird Exposure:   NO    Animal Exposure:  NO    Inhalation Exposure:  Possible, father worked with asbestos               Review of Systems:  Skin:  Negative       Eyes:  Positive for glasses;visual blurring    ENT:  Negative      Respiratory:  Positive for shortness of breath;dyspnea on exertion;wheezing;cough     Cardiovascular:  Negative for;palpitations;syncope or near-syncope Positive for;chest pain;fatigue;edema;lightheadedness;dizziness    Gastroenterology: Negative abdominal pain;excessive gas or bloating    Genitourinary:  Positive for incontinence;urinary frequency;urgency    Musculoskeletal:  Positive for joint pain;joint stiffness    Neurologic:  Positive for headaches    Psychiatric:  Positive for anxiety;depression;sleep disturbances    Heme/Lymph/Imm:  Negative      Endocrine:  Negative        Physical Exam:  Vitals: BP (!) 138/92   Pulse 97   Wt 71.7 kg (158 lb)   SpO2 97%   BMI 30.86 kg/m      Constitutional:  cooperative;well developed overweight      Skin:   warm and dry to the touch          Head:  normocephalic        Eyes:  sclera white        Lymph:      ENT:  no pallor or cyanosis        Neck:  no stiffness        Respiratory:  clear to auscultation;normal symmetry diminished breath sounds bilaterally       Cardiac: regular rhythm;normal S1 and S2                not assessed this visit                                        GI:  abdomen soft        Extremities and Muscular Skeletal:      bilateral LE edema;1+;pitting          Neurological:  affect appropriate;no gross motor deficits        Psych:  Alert and Oriented x 3        CC  Adriano Santana MD  [facility]  [id number] [id number]

## 2019-06-20 NOTE — LETTER
6/20/2019    Sana Olivo, APRN CNP  [address] [facility] [address]    RE: Aminata Gale       Dear Colleague,    I had the pleasure of seeing Aminata Gale in the [facility].    Cardiology Clinic Progress Note  Aminata Gale MRN# [id number]   YOB: 1950 Age: 68 year old     Reason For Visit: Annual f/u   Primary Cardiologist:   Dr. Santana           History of Presenting Illness:    Aminata Gale is a pleasant 68 year old patient with a past cardiac history significant for nonobstructive CAD, diastolic dysfunction, hypertension, hyperlipidemia.  Past medical history significant for tobacco abuse, COPD, and lupus.    She has a history of statin intolerance. Stress echocardiogram February 2017 was negative for ischemia  and echocardiogram portion showed LVEF  50-55% at rest.  Lexiscan nuclear stress test April 2017 showed a small apical infarct without ischemia.  Coronary angiogram  April 2017 showed mild LAD and circumflex disease with minimal plaque in the RCA.  Echocardiogram April 2017 showed LVEF 55%, no WMA, no significant valvular disease.    Pt was last seen by Dr. Santana in December 2017.  She continued with intermittent chest aching not related to exertion.  She had mild lower extremity edema.  A month prior, she stopped her torsemide because she started taking ibuprofen for back pain. She was encouraged to restart her torsemide given her edema.  Chest ache was thought to likely be noncardiac.    In April 2019 she had shortness of breath causing difficulty with ADLs.  She had a COPD exacerbation while in [place] and was placed on antibiotics and prednisone PFTs showed very severe obstruction with moderate diffusion defect.    Pt presents today for annual follow-up. She tells me that she has been taking Lasix, which she had a prior prescription for  and is unsure of the dose.  She has also been taking potassium supplementation which she  had a prior prescription for.   She decided to start taking these because she was having swelling in her legs and was unsure what to do. Her current diuretic prescription is for torsemide and she has not been taking this because she didn't know it was a diuretic.  She is agreeable to stopping Lasix and the potassium and will start taking her torsemide.  She is also agreeable to starting losartan for her hypertension.  She has been having chest discomfort at rest and with exertion.  This was brought on with mowing and raking the yard this past week and resolved approximately 20-30 minutes after resting.  She is agreeable to stress test. She continues with bilateral lower extremity edema and I have recommended compression stockings along with restarting her torsemide. In regards to her COPD, she has had medication adjustments and is using home oxygen as needed. Patient reports no shortness of breath, PND, orthopnea, presyncope, syncope, heart racing, or palpitations.    Current Cardiac Medications   Amlodipine 10 mg daily  Zetia 10 mg daily  Torsemide 5 mg daily- pt not taking                   Assessment and Plan:     Plan  1.  Discontinue Lasix and potassium  2.  Restart torsemide   3. Start losartan   4. BMP in 1 week  5. Lexiscan nuclear stress test for chest discomfort  6.  Follow up with VAL in one month to reassess hypertension, chest discomfort, and review stress test      1. Mild nonobstructive CAD with chest discomfort     Angio 2017 mild LAD and circumflex disease with minimal plaque in the RCA    Chest ache with rest and exertion     Continue CCB, start ARB, intolerant to statins      2. Diastolic dysfunction    LE edema     Continue torsemide and use compression stockings       3. hypertension    Not well controlled    continue amlodipine and start losartan       4. hyperlipidemia     on 2/2018    statin intolerance, continue Zetia         Thank you for allowing me to participate in this delightful  patient's care.      This note was completed in part using Dragon voice recognition software. Although reviewed after completion, some word and grammatical errors may occur.    Jessie Campos, APRN, CNP           Data:   All laboratory data reviewed          HPI and Plan:   See dictation    Orders Placed This Encounter   Procedures     NM Lexiscan stress test (nuc card)     Basic metabolic panel     Follow-Up with Cardiac Advanced Practice Provider       Orders Placed This Encounter   Medications     losartan (COZAAR) 25 MG tablet     Sig: Take 1 tablet (25 mg) by mouth daily     Dispense:  30 tablet     Refill:  11       There are no discontinued medications.      Encounter Diagnoses   Name Primary?     Essential hypertension, benign      Nonocclusive coronary atherosclerosis of native coronary artery      Diastolic dysfunction      Chest discomfort Yes     Bilateral leg edema        CURRENT MEDICATIONS:  Current Outpatient Medications   Medication Sig Dispense Refill     albuterol (PROAIR HFA/PROVENTIL HFA/VENTOLIN HFA) 108 (90 Base) MCG/ACT inhaler Inhale 2 puffs into the lungs every 6 hours 54 g 3     alcohol swab prep pads Use to swab area of injection/raffi as directed. 100 each 3     amLODIPine (NORVASC) 10 MG tablet Take 1 tablet (10 mg) by mouth daily For high blood pressure 90 tablet 3     blood glucose (MILLIE MICROLET 2) lancing device Device to be used with lancets 2 times a day. 1 each 0     blood glucose (NO BRAND SPECIFIED) test strip Use to test blood sugar 2 times daily or as directed. To accompany: Blood Glucose Monitor Brands: per insurance. 100 strip 6     blood glucose calibration (NO BRAND SPECIFIED) solution To accompany: Blood Glucose Monitor Brands: per insurance. 1 Bottle 3     blood glucose monitoring (NO BRAND SPECIFIED) meter device kit Use to test blood sugar 2 times daily or as directed. 1 kit 0     Cetirizine HCl (ZYRTEC ALLERGY PO)        ezetimibe (ZETIA) 10 MG  tablet Take 1 tablet (10 mg) by mouth daily 90 tablet 3     fluocinonide (LIDEX) 0.05 % external solution Apply twice daily on scalp as needed. 60 mL 4     fluticasone-vilanterol (BREO ELLIPTA) 200-25 MCG/INH inhaler Inhale 1 puff into the lungs daily 60 Inhaler 3     HYDROcodone-acetaminophen (NORCO) 5-325 MG tablet Take 0.5-1 tablets by mouth nightly as needed for severe pain 10 tablet 0     Ibuprofen (IBU-200 PO) Take  by mouth.       ipratropium - albuterol 0.5 mg/2.5 mg/3 mL (DUONEB) 0.5-2.5 (3) MG/3ML neb solution Take 1 vial (3 mLs) by nebulization every 6 hours as needed for shortness of breath / dyspnea or wheezing 1 Box 3     losartan (COZAAR) 25 MG tablet Take 1 tablet (25 mg) by mouth daily 30 tablet 11     order for DME Equipment being ordered: PORTABLE OXYGEN UNIT AND QUIETER OXYGEN CONCENTRATOR   2L per [facility] PORTABLE OXYGEN CONCENTRATOR    Pt Room Air at rest 89%  5 min walking room air 83%   Resting on 2 liters Oxygen per nasal cannula post 5 min walk 95% 1 Device 11     order for DME Equipment being ordered: Nebulizer- PORTABLE with tubing. hint Phone: [phone] 1 Device 0     STATIN NOT PRESCRIBED (INTENTIONAL) Please choose reason not prescribed, below       STATIN NOT PRESCRIBED, INTENTIONAL, 1 each daily Statin not prescribed intentionally due to Intolerance 0 each 0     thin (NO BRAND SPECIFIED) lancets 1 each by In Vitro route 2 times daily Use with lanceting device. To accompany: Blood Glucose Monitor Brands: per insurance. 100 each 6     tiotropium (SPIRIVA RESPIMAT) 2.5 MCG/ACT inhaler Inhale 2 puffs into the lungs daily 12 g 3     ketoconazole (NIZORAL) 2 % external shampoo Use to wash scalp, leave on for 5 minutes. Use 2-3 times a week. (Patient not taking: Reported on 6/20/2019) 120 mL 4     torsemide (DEMADEX) 5 MG tablet Take 1 tablet (5 mg) by mouth daily (Patient not taking: Reported on 6/20/2019) 90 tablet 1       ALLERGIES     Allergies   Allergen  Reactions     Amoxicillin Difficulty breathing and Rash     Ampicillin Difficulty breathing and Rash     Cipro [Ciprofloxacin] Difficulty breathing and Rash     Keflex [Cephalexin Monohydrate] Difficulty breathing and Rash     Penicillin [Penicillins] Difficulty breathing and Rash     Sulfa Drugs Difficulty breathing and Rash     Tetracycline Difficulty breathing and Rash     Biaxin [Clarithromycin] Rash     Ceclor [Cefaclor] Hives and Swelling     Advair Diskus Hives     Hives on face,neck and chest     Egg White      Fish Shortness Of Breath     Mustard [Allyl Isothiocyanate]      Throat swelling       PAST MEDICAL HISTORY:  Past Medical History:   Diagnosis Date     Acute pericarditis, unspecified      Chronic airway obstruction 12/13/2005    PFTs - 1/02 - mild COPD Problem list name updated by automated process. Provider to review     Dysuria      Esophageal reflux      GERD (gastroesophageal reflux disease) 9/20/2013     Hiatal hernia 5/4/2011     HTN, goal below 140/90 9/20/2013     Hyperlipidemia LDL goal <130 9/22/2010    Recent Labs  Lab Test 9/22/10 0908 10/12/06 0939    CHOL 214* 256*    HDL 31* 42*    * 186*    TRIG 149 143    CHOLHDLRATIO 7.0* 6.0*   4/8/11 - dobutamine echo showed NO infarct or ischemia LV 60-65%, normal exercise response.        Lupus (H)      Osteoporosis 10/18/2010    10/10 - severe osteopenia in femoral necks, mild osteopenia in spine  (Problem list name updated by automated process. Provider to review and confirm.)     Pure hypercholesterolemia      SECONDARY POLYCYTHEMIA 10/17/2006    Due to smoking     Smoker 6/24/2008     Systemic lupus erythematosus 12/7/2005    Diagnosed in 1980's; history of pericarditis; was previously treated with mtx; not on active treatment; no hx of renal dz from the lupus.       Tobacco use disorder      Type 2 diabetes mellitus with hyperglycemia, without long-term current use of insulin (H) 2/24/2019    NEW DIAGNOSIS 2/2019     Unspecified  essential hypertension        PAST SURGICAL HISTORY:  Past Surgical History:   Procedure Laterality Date     C APPENDECTOMY       C REPAIR GUM      Cathy Dontal surgery     C/SECTION, LOW TRANSVERSE      , Low Transverse     COLONOSCOPY  2009     D & C      X 5     HC RECONSTR NOSE+DAVE SEPTAL REPAIR       HYSTERECTOMY, BRI      Hx of dysplasia     LAPAROSCOPIC CHOLECYSTECTOMY N/A 2017    Procedure: LAPAROSCOPIC CHOLECYSTECTOMY;  Laparoscopic Cholecystectomy;  Surgeon: Tami Barney MD;  Location: [facility] OR     SINUS SURGERY      Reconstruction       FAMILY HISTORY:  Family History   Problem Relation Age of Onset     Diabetes Mother      Gastrointestinal Disease Mother         Gallbladder disease     Heart Disease Mother      Cancer Father         lung     Alcohol/Drug Father      Allergies Father      Heart Disease Father      Gastrointestinal Disease Father         Liver disease     Skin Cancer Father      Heart Disease Maternal Grandfather      Arthritis Sister      Osteoporosis Sister      Thyroid Disease Sister      Allergies Son      Alcohol/Drug Sister      Alcohol/Drug Sister      Melanoma No family hx of        SOCIAL HISTORY:  Social History     Socioeconomic History     Marital status:      Spouse name: Javier Gale     Number of children: 2     Years of education: 12     Highest education level: None   Occupational History     Employer: DISABLED   Social Needs     Financial resource strain: None     Food insecurity:     Worry: None     Inability: None     Transportation needs:     Medical: None     Non-medical: None   Tobacco Use     Smoking status: Current Every Day Smoker     Packs/day: 0.01     Years: 42.00     Pack years: 0.42     Types: Cigarettes     Smokeless tobacco: Never Used     Tobacco comment: Has chantix at home, but hasn't started yet. 17.   Substance and Sexual Activity     Alcohol use: Yes     Alcohol/week: 0.0 oz     Comment: Occ. wine     Drug use:  No     Sexual activity: Yes     Partners: Male     Birth control/protection: Surgical     Comment: Hyst    Lifestyle     Physical activity:     Days per week: None     Minutes per session: None     Stress: None   Relationships     Social connections:     Talks on phone: None     Gets together: None     Attends Yarsani service: None     Active member of club or organization: None     Attends meetings of clubs or organizations: None     Relationship status: None     Intimate partner violence:     Fear of current or ex partner: None     Emotionally abused: None     Physically abused: None     Forced sexual activity: None   Other Topics Concern     Parent/sibling w/ CABG, MI or angioplasty before 65F 55M? No   Social History Narrative    The patient has a 50+ pk yr tobacco hx.  She has ongoing active use of 1/2 ppd.  Alcohol use is <1 alcoholic drinks per week.  She denies use of recreational drugs.          She is on disability.  Has been on disability since approximately 1989.  Previously worked in [employer].         The patient is .  Has 2 children.        Hot Tube Exposure: NO    Recent Travel: NO     Hx of incarceration:  NO    Bird Exposure:   NO    Animal Exposure:  NO    Inhalation Exposure:  Possible, father worked with asbestos               Review of Systems:  Skin:  Negative       Eyes:  Positive for glasses;visual blurring    ENT:  Negative      Respiratory:  Positive for shortness of breath;dyspnea on exertion;wheezing;cough     Cardiovascular:  Negative for;palpitations;syncope or near-syncope Positive for;chest pain;fatigue;edema;lightheadedness;dizziness    Gastroenterology: Negative abdominal pain;excessive gas or bloating    Genitourinary:  Positive for incontinence;urinary frequency;urgency    Musculoskeletal:  Positive for joint pain;joint stiffness    Neurologic:  Positive for headaches    Psychiatric:  Positive for anxiety;depression;sleep disturbances    Heme/Lymph/Imm:   Negative      Endocrine:  Negative        Physical Exam:  Vitals: BP (!) 138/92   Pulse 97   Wt 71.7 kg (158 lb)   SpO2 97%   BMI 30.86 kg/m       Constitutional:  cooperative;well developed overweight      Skin:  warm and dry to the touch          Head:  normocephalic        Eyes:  sclera white        Lymph:      ENT:  no pallor or cyanosis        Neck:  no stiffness        Respiratory:  clear to auscultation;normal symmetry diminished breath sounds bilaterally       Cardiac: regular rhythm;normal S1 and S2                not assessed this visit                                        GI:  abdomen soft        Extremities and Muscular Skeletal:      bilateral LE edema;1+;pitting          Neurological:  affect appropriate;no gross motor deficits        Psych:  Alert and Oriented x 3        CC  Adriano Santana MD  [facility]  [address] [place] [address]            Thank you for allowing me to participate in the care of your patient.    Sincerely,     PEDRO Pruitt [facility]

## 2019-06-25 ENCOUNTER — MEDICAL CORRESPONDENCE (OUTPATIENT)
Dept: HEALTH INFORMATION MANAGEMENT | Facility: CLINIC | Age: 69
End: 2019-06-25

## 2019-06-25 ENCOUNTER — TELEPHONE (OUTPATIENT)
Dept: FAMILY MEDICINE | Facility: CLINIC | Age: 69
End: 2019-06-25

## 2019-06-25 DIAGNOSIS — I10 ESSENTIAL HYPERTENSION, BENIGN: Primary | ICD-10-CM

## 2019-06-25 NOTE — TELEPHONE ENCOUNTER
[facility]- Oxygen- Description compressed Gas/Nasal Cannula  Form placed on Provider Sana Olivo NP desk for [employer].  [facility]

## 2019-06-27 DIAGNOSIS — D72.9 ABNORMAL WBC COUNT: ICD-10-CM

## 2019-06-27 DIAGNOSIS — I10 ESSENTIAL HYPERTENSION, BENIGN: ICD-10-CM

## 2019-06-27 LAB
ANION GAP SERPL CALCULATED.3IONS-SCNC: 2 MMOL/L (ref 3–14)
BUN SERPL-MCNC: 21 MG/DL (ref 7–30)
CALCIUM SERPL-MCNC: 9.2 MG/DL (ref 8.5–10.1)
CHLORIDE SERPL-SCNC: 100 MMOL/L (ref 94–109)
CO2 SERPL-SCNC: 36 MMOL/L (ref 20–32)
CREAT SERPL-MCNC: 0.8 MG/DL (ref 0.52–1.04)
ERYTHROCYTE [DISTWIDTH] IN BLOOD BY AUTOMATED COUNT: 14.8 % (ref 10–15)
GFR SERPL CREATININE-BSD FRML MDRD: 75 ML/MIN/{1.73_M2}
GLUCOSE SERPL-MCNC: 113 MG/DL (ref 70–99)
HCT VFR BLD AUTO: 52.8 % (ref 35–47)
HGB BLD-MCNC: 16.9 G/DL (ref 11.7–15.7)
MCH RBC QN AUTO: 29.4 PG (ref 26.5–33)
MCHC RBC AUTO-ENTMCNC: 32 G/DL (ref 31.5–36.5)
MCV RBC AUTO: 92 FL (ref 78–100)
PLATELET # BLD AUTO: 238 10E9/L (ref 150–450)
POTASSIUM SERPL-SCNC: 4.2 MMOL/L (ref 3.4–5.3)
RBC # BLD AUTO: 5.74 10E12/L (ref 3.8–5.2)
SODIUM SERPL-SCNC: 138 MMOL/L (ref 133–144)
WBC # BLD AUTO: 15.6 10E9/L (ref 4–11)

## 2019-06-27 PROCEDURE — 80048 BASIC METABOLIC PNL TOTAL CA: CPT | Performed by: NURSE PRACTITIONER

## 2019-06-27 PROCEDURE — 36415 COLL VENOUS BLD VENIPUNCTURE: CPT | Performed by: NURSE PRACTITIONER

## 2019-06-27 PROCEDURE — 85027 COMPLETE CBC AUTOMATED: CPT | Performed by: NURSE PRACTITIONER

## 2019-06-27 NOTE — TELEPHONE ENCOUNTER
Form received back signed  6/27/19  Faxed Back & Sent to be scanned to this encounter  [facility]

## 2019-06-27 NOTE — RESULT ENCOUNTER NOTE
Stable lab after lasix and potassium stopped/RESTART torsemide/START losartan 25 mg daily. Revisit pending 7/19/19.    Hi Pat,  Your kidney function and electrolytes all look stable after implementing the medication changes at your last office visit with Jessie Porter NP. Please continue taking medications as you have been and keep your scheduled follow up with her as planned on 7/19/19. Sincerely, Teresa Lutz RN Cardiology at [facility] June 27, 2019, 1:34 PM

## 2019-07-02 ENCOUNTER — OFFICE VISIT (OUTPATIENT)
Dept: FAMILY MEDICINE | Facility: CLINIC | Age: 69
End: 2019-07-02
Payer: COMMERCIAL

## 2019-07-02 VITALS
HEART RATE: 91 BPM | RESPIRATION RATE: 18 BRPM | BODY MASS INDEX: 30.23 KG/M2 | OXYGEN SATURATION: 98 % | HEIGHT: 60 IN | DIASTOLIC BLOOD PRESSURE: 74 MMHG | WEIGHT: 154 LBS | TEMPERATURE: 98.6 F | SYSTOLIC BLOOD PRESSURE: 122 MMHG

## 2019-07-02 DIAGNOSIS — D75.1 POLYCYTHEMIA, SECONDARY: ICD-10-CM

## 2019-07-02 DIAGNOSIS — I10 ESSENTIAL HYPERTENSION: Primary | ICD-10-CM

## 2019-07-02 DIAGNOSIS — I25.10 NONOCCLUSIVE CORONARY ATHEROSCLEROSIS OF NATIVE CORONARY ARTERY: ICD-10-CM

## 2019-07-02 DIAGNOSIS — J43.2 CENTRILOBULAR EMPHYSEMA (H): ICD-10-CM

## 2019-07-02 DIAGNOSIS — J45.40 MODERATE PERSISTENT ASTHMA WITHOUT COMPLICATION: ICD-10-CM

## 2019-07-02 DIAGNOSIS — Z12.11 SPECIAL SCREENING FOR MALIGNANT NEOPLASMS, COLON: Primary | ICD-10-CM

## 2019-07-02 LAB
ALBUMIN SERPL-MCNC: 3.9 G/DL (ref 3.4–5)
ANION GAP SERPL CALCULATED.3IONS-SCNC: 4 MMOL/L (ref 3–14)
BUN SERPL-MCNC: 16 MG/DL (ref 7–30)
CALCIUM SERPL-MCNC: 9.6 MG/DL (ref 8.5–10.1)
CHLORIDE SERPL-SCNC: 104 MMOL/L (ref 94–109)
CO2 SERPL-SCNC: 34 MMOL/L (ref 20–32)
CREAT SERPL-MCNC: 0.79 MG/DL (ref 0.52–1.04)
GFR SERPL CREATININE-BSD FRML MDRD: 76 ML/MIN/{1.73_M2}
GLUCOSE SERPL-MCNC: 112 MG/DL (ref 70–99)
PHOSPHATE SERPL-MCNC: 3.5 MG/DL (ref 2.5–4.5)
POTASSIUM SERPL-SCNC: 4.2 MMOL/L (ref 3.4–5.3)
SODIUM SERPL-SCNC: 142 MMOL/L (ref 133–144)

## 2019-07-02 PROCEDURE — 80069 RENAL FUNCTION PANEL: CPT | Performed by: NURSE PRACTITIONER

## 2019-07-02 PROCEDURE — 99214 OFFICE O/P EST MOD 30 MIN: CPT | Performed by: NURSE PRACTITIONER

## 2019-07-02 PROCEDURE — 36415 COLL VENOUS BLD VENIPUNCTURE: CPT | Performed by: NURSE PRACTITIONER

## 2019-07-02 ASSESSMENT — MIFFLIN-ST. JEOR: SCORE: 1140.04

## 2019-07-02 NOTE — NURSING NOTE
Chief Complaint   Patient presents with     Hypertension     Lipids     /74 (Cuff Size: Adult Regular)   Pulse 91   Temp 98.6  F (37  C) (Tympanic)   Resp 18   Ht 1.524 m (5')   Wt 69.9 kg (154 lb)   SpO2 98%   Breastfeeding? No   BMI 30.08 kg/m   Estimated body mass index is 30.08 kg/m  as calculated from the following:    Height as of this encounter: 1.524 m (5').    Weight as of this encounter: 69.9 kg (154 lb).  bp completed using cuff size: regular      Health Maintenance that is potentially due pending provider review:    Health Maintenance Due   Topic Date Due     DIABETIC FOOT EXAM  1950     URINE DRUG SCREEN  1950     HEPATITIS C SCREENING  1950     EYE EXAM  1950     ZOSTER IMMUNIZATION (1 of 2) 12/31/1998     ADVANCE CARE PLANNING  10/13/2016     MAMMO SCREENING  05/18/2018     COLONOSCOPY  03/09/2019        Pt will schedule appt.

## 2019-07-02 NOTE — PATIENT INSTRUCTIONS
Patient Education     Coughing Techniques    Airway clearance techniques help to remove mucus from your airways. Clearing the airways helps you breathe better and lowers the chance for infection. One method is controlled coughing. Be sure to also use any medicines before or after clearing your airways, as instructed by your healthcare provider. For example, some people use inhaled bronchodilators before clearing their airways.      Note: Be sure to keep a box of tissues beside you. Wash your hands when you are done.   Controlled coughing  Here is how to do it:     Sit on a chair with both feet on the floor.    Take a slow, deep breath through your nose. Hold for 2 counts.    Lean forward slightly.    Cough twice--2 short coughs.    Relax for a few seconds.  Repeat the steps as needed.   The  maria  technique  Here is how to do it:     Sit on a chair with both feet on the floor.    Take a slow, deep breath through your nose. Hold for 2 counts.    To breathe out, open your mouth and make a  maria  sound in your throat. (This is the same way you might breathe to clean a pair of eyeglasses.)    Maria 2 to 3 times as you breathe out.    Relax for a few seconds.  Repeat the steps as needed.  Follow-up care  Make a follow-up appointment as directed by our staff.     When to call the healthcare provider  Call your healthcare provider right away if you have any of the following:    Shortness of breath, wheezing, or coughing    Increased mucus    Yellow, green, bloody, or smelly mucus    Fever or chills    Tightness in your chest that does not go away with rest or medicine    An irregular heartbeat      Date Last Reviewed: 5/1/2016 2000-2018 [employer]). [address] [place] [address], [place] [address]. All rights reserved. This information is not intended as a substitute for professional medical care. Always follow your healthcare professional's instructions.           Patient Education     Treatment for  COPD    Your healthcare provider will prescribe the best treatments for your COPD.  Treatment  Recommendations include:    Medicines. Some medicines help ease symptoms when you have them. Others are taken daily to control swelling (inflammation) in the lungs. Always take your medicines as prescribed. Learn the names of your medicines, as well as how and when to use them.    Oxygen therapy. Oxygen may be prescribed if tests show that your blood contains too little oxygen.    Smoking. If you smoke, quit. Smoking is the main cause of COPD. Quitting will help you be able to better manage your COPD. Ask your healthcare provider about ways to help you quit smoking.    Not getting infections. Infections, such as a cold or the flu, can cause your symptoms to get worse. Try to stay away from people who are sick. Wash your hands often. And ask your healthcare provider about vaccines for the flu and pneumonia.  Coping with shortness of breath  Coping tips include:    Exercise. Try to be as active as possible. This will improve energy levels and strengthen your muscles, so you can do more.    Breathing methods. Ask your healthcare provider or nurse to show you how to do pursed-lip breathing.    Balance rest and activity. Each day, try to balance rest periods with activity. For example, you might start the day with getting dressed and eating breakfast. Then you can relax and read the paper. After that, take a brief walk. And then sit with your feet up for a while.    Pulmonary rehab (rehabilitation).  These programs help with managing your disease, breathing methods, exercise, support, and counseling. To find one, ask your provider or call your local hospital.    Healthy eating. Eating a healthy, balanced diet is important to staying as healthy as possible. So is trying to stay at your ideal weight. Make sure you have a lot of fruits and vegetables every day. And also eat balanced portions of whole grains, lean meats and fish,  and low-fat dairy products.  Date Last Reviewed: 8/1/2018 2000-2018 [employer]. [address] [place] [address], [place] [address]. All rights reserved. This information is not intended as a substitute for professional medical care. Always follow your healthcare professional's instructions.           Patient Education     Low-Salt Choices  Eating salt (sodium) can make your body retain too much water. Excess water makes your heart work harder. Canned, packaged, and frozen foods are easy to prepare. But they are often high in sodium. Here are some ideas for low-salt foods you can easily make yourself.    For breakfast    Fruit or 100% fruit juice    Whole-wheat bread or an English muffin. Look for sodium content on Nutrition Facts labels.    Low-fat milk or yogurt    Unsalted eggs    Shredded wheat    Corn tortillas    Unsalted steamed rice    Regular (not instant) hot cereal, made without salt  Stay away from:    Sausage, ruiz, and ham    Flour tortillas    Packaged muffins, pancakes, and biscuits    Instant hot cereals    Cottage cheese  For lunch and dinner    Fresh fish, chicken, turkey, or meat--baked, broiled, or roasted without salt    Dry beans, cooked without salt    Tofu, stir-fried without salt    Unsalted fresh fruit and vegetables, or frozen or canned fruit and vegetables with no added salt  Stay away from:    Lunch or deli meat that is cured or smoked    Cheese    Tomato juice and ketchup    Canned vegetables, soups, and fish not labeled as no-salt-added or reduced sodium    Packaged gravies and sauces    Olives, pickles, and relish    Bottled salad dressings  For snacks and desserts    Yogurt    Unsalted, air-popped popcorn    Unsalted nuts or seeds  Stay away from:    Pies and cakes    Packaged dessert mixes    Pizza    Canned and packaged puddings    Pretzels, chips, crackers, and nuts--unless the label says unsalted  Date Last Reviewed: 6/1/2017 2000-2018 [employer] [address]  [place] [address]. All rights reserved. This information is not intended as a substitute for professional medical care. Always follow your healthcare professional's instructions.

## 2019-07-02 NOTE — PROGRESS NOTES
Subjective     Aminata Gale is a 68 year old female who presents to clinic today for the following health issues:   has a past medical history of Acute pericarditis, unspecified, Chronic airway obstruction (12/13/2005), Dysuria, Esophageal reflux, GERD (gastroesophageal reflux disease) (9/20/2013), Hiatal hernia (5/4/2011), HTN, goal below 140/90 (9/20/2013), Hyperlipidemia LDL goal <130 (9/22/2010), Lupus (H), Osteoporosis (10/18/2010), Pure hypercholesterolemia, SECONDARY POLYCYTHEMIA (10/17/2006), Smoker (6/24/2008), Systemic lupus erythematosus (12/7/2005), Tobacco use disorder, Type 2 diabetes mellitus with hyperglycemia, without long-term current use of insulin (H) (2/24/2019), and Unspecified essential hypertension.      HPI   Hyperlipidemia Follow-Up      Are you having any of the following symptoms? (Select all that apply)  Shortness of breath    Are you regularly taking any medication or supplement to lower your cholesterol?   Yes- zetia    Are you having muscle aches or other side effects that you think could be caused by your cholesterol lowering medication?  No      Hypertension Follow-up      Do you check your blood pressure regularly outside of the clinic? No     Are you following a low salt diet? Yes    Are your blood pressures ever more than 140 on the top number (systolic) OR more   than 90 on the bottom number (diastolic), for example 140/90? No     Swelling in the lower legs and feet- getting a little better today. Missed her stress test yesterday due to low blood sugar issues and not being able to go that long without something to eat.     Results for orders placed or performed in visit on 06/27/19   **Basic metabolic panel FUTURE anytime   Result Value Ref Range    Sodium 138 133 - 144 mmol/L    Potassium 4.2 3.4 - 5.3 mmol/L    Chloride 100 94 - 109 mmol/L    Carbon Dioxide 36 (H) 20 - 32 mmol/L    Anion Gap 2 (L) 3 - 14 mmol/L    Glucose 113 (H) 70 - 99 mg/dL    Urea Nitrogen 21 7 - 30  mg/dL    Creatinine 0.80 0.52 - 1.04 mg/dL    GFR Estimate 75 >60 mL/min/[1.73_m2]    GFR Estimate If Black 87 >60 mL/min/[1.73_m2]    Calcium 9.2 8.5 - 10.1 mg/dL   CBC with platelets   Result Value Ref Range    WBC 15.6 (H) 4.0 - 11.0 10e9/L    RBC Count 5.74 (H) 3.8 - 5.2 10e12/L    Hemoglobin 16.9 (H) 11.7 - 15.7 g/dL    Hematocrit 52.8 (H) 35.0 - 47.0 %    MCV 92 78 - 100 fl    MCH 29.4 26.5 - 33.0 pg    MCHC 32.0 31.5 - 36.5 g/dL    RDW 14.8 10.0 - 15.0 %    Platelet Count 238 150 - 450 10e9/L       Left hip injection was helpful. Dr Gracia.   Still limiting her range of motion.  Chest pain ankle swelling resolving.   Losartan may be helping and restarted the torsemide 1 week ago.  Stopped the zyrtec. Not helpful. Going to try the allegra instead.   Recheck Renal panel today.  Had self stopped her torsemide then started to have a lot of pedal edema. Then saw cardiology restarted torsemide. Swelling getting better now.   Lisinopril gave her bad headaches.     COPD  Oxygen 2 liters. Trying to wean off the oxygen. Using it as needed during the day and especially when outside.   Using this more at night.  Mowing grass made her suffer with her breathing.   Worse when outside with the pollen.     BP Readings from Last 3 Encounters:   07/02/19 122/74   06/20/19 (!) 138/92   06/18/19 140/76     Lab Results   Component Value Date    A1C 6.7 02/22/2019    A1C 6.3 04/11/2017    A1C 5.8 01/24/2002     Declined A1c today due to recent steroid hip injection.  Lip biopsy done. No cancer   Lidex and ketoconazole has been very helpful    Mammogram due. Declined today      -------------------------------------    Patient Active Problem List   Diagnosis     Systemic lupus erythematosus (H)     Esophageal reflux     Essential hypertension, benign     Allergic rhinitis     Polycythemia, secondary     Female stress incontinence     Other nonspecific abnormal result of function study of brain and central nervous system     Smoker      Hyperlipidemia LDL goal <130     Osteoporosis     Hiatal hernia     Advanced directives, counseling/discussion     Acne     SK (seborrheic keratosis)     Lentigo     Angioma     Moderate persistent asthma     Chronic pain     HTN, goal below 140/90     Anxiety     Refused influenza vaccine     Grief reaction     Centrilobular emphysema (H)     Acute cholecystitis     TMJ (temporomandibular joint syndrome)     Type 2 diabetes mellitus with hyperglycemia, without long-term current use of insulin (H)     Type 2 diabetes mellitus with microalbuminuria, without long-term current use of insulin (H)     Nonocclusive coronary atherosclerosis of native coronary artery     Diastolic dysfunction     Past Surgical History:   Procedure Laterality Date     C APPENDECTOMY       C REPAIR GUM      Cathy Dontal surgery     C/SECTION, LOW TRANSVERSE      , Low Transverse     COLONOSCOPY  2009     D & C      X 5     HC RECONSTR NOSE+DAVE SEPTAL REPAIR       HYSTERECTOMY, BRI      Hx of dysplasia     LAPAROSCOPIC CHOLECYSTECTOMY N/A 2017    Procedure: LAPAROSCOPIC CHOLECYSTECTOMY;  Laparoscopic Cholecystectomy;  Surgeon: Tami Barney MD;  Location: [facility]     SINUS SURGERY      Reconstruction       Social History     Tobacco Use     Smoking status: Current Every Day Smoker     Packs/day: 0.01     Years: 42.00     Pack years: 0.42     Types: Cigarettes     Smokeless tobacco: Never Used     Tobacco comment: Has chantix at home, but hasn't started yet. 19.   Substance Use Topics     Alcohol use: Yes     Alcohol/week: 0.0 oz     Comment: Occ. wine     Family History   Problem Relation Age of Onset     Diabetes Mother      Gastrointestinal Disease Mother         Gallbladder disease     Heart Disease Mother      Cancer Father         lung     Alcohol/Drug Father      Allergies Father      Heart Disease Father      Gastrointestinal Disease Father         Liver disease     Skin Cancer Father      Heart Disease  Maternal Grandfather      Arthritis Sister      Osteoporosis Sister      Thyroid Disease Sister      Allergies Son      Alcohol/Drug Sister      Alcohol/Drug Sister      Melanoma No family hx of          Recent Labs   Lab Test 06/27/19  1038 03/04/19  1854 02/22/19  1128 07/01/17  0815 06/30/17  2350  04/11/17  1448 02/10/15  0908 09/20/13  0937   A1C  --   --  6.7*  --   --   --  6.3*  --   --    LDL  --   --  108*  --   --   --  145* 176* 198*   HDL  --   --  60  --   --   --  58  --  42*   TRIG  --   --  241*  --   --   --  147  --  168*   ALT  --   --  70* 141* 180*  --  59* 26 34   CR 0.80  --  0.72 0.68 0.73   < > 0.70 0.74 0.73   GFRESTIMATED 75  --  85 86 80   < > 83 78 80   GFRESTBLACK 87  --  >90 >90   GFR Calc   >90   GFR Calc     < > >90   GFR Calc   >90   GFR Calc   >90   POTASSIUM 4.2  --  3.8 3.7 3.5   < > 3.3* 4.1 4.2   TSH  --  2.20 1.98  --   --   --   --   --  2.46    < > = values in this interval not displayed.      BP Readings from Last 3 Encounters:   07/02/19 122/74   06/20/19 (!) 138/92   06/18/19 140/76    Wt Readings from Last 3 Encounters:   07/02/19 69.9 kg (154 lb)   06/20/19 71.7 kg (158 lb)   06/18/19 69.9 kg (154 lb)                    -------------------------------------  Reviewed and updated as needed this visit by Provider         Review of Systems    ROS: 10 point ROS neg other than the symptoms noted above in the HPI.        Objective    /74 (Cuff Size: Adult Regular)   Pulse 91   Temp 98.6  F (37  C) (Tympanic)   Resp 18   Ht 1.524 m (5')   Wt 69.9 kg (154 lb)   SpO2 98%   Breastfeeding? No   BMI 30.08 kg/m    Body mass index is 30.08 kg/m .  Physical Exam   Wt Readings from Last 5 Encounters:   07/02/19 69.9 kg (154 lb)   06/20/19 71.7 kg (158 lb)   06/18/19 69.9 kg (154 lb)   04/19/19 70.3 kg (155 lb)   04/12/19 69.9 kg (154 lb)     GENERAL: healthy, alert and no distress  EYES: Eyes grossly normal  to inspection, PERRL and conjunctivae and sclerae normal  HENT: ear canals and TM's normal, nose and mouth without ulcers or lesions  NECK: no adenopathy, no asymmetry, masses, or scars and thyroid normal to palpation  RESP: no rhonchi, no wheezes and prolonged expiratory phase  BREAST: normal without masses, tenderness or nipple discharge and no palpable axillary masses or adenopathy  CV: regular rate and rhythm, normal S1 S2, no S3 or S4, no murmur, click or rub, no peripheral edema and peripheral pulses strong  ABDOMEN: soft, nontender, no hepatosplenomegaly, no masses and bowel sounds normal  MS: no gross musculoskeletal defects noted, no edema  SKIN: no suspicious lesions or rashes  NEURO: Normal strength and tone, mentation intact and speech normal  PSYCH: mentation appears normal, affect normal/bright    Diagnostic Test Results:  Labs reviewed in Epic  Results for orders placed or performed in visit on 06/27/19   **Basic metabolic panel FUTURE anytime   Result Value Ref Range    Sodium 138 133 - 144 mmol/L    Potassium 4.2 3.4 - 5.3 mmol/L    Chloride 100 94 - 109 mmol/L    Carbon Dioxide 36 (H) 20 - 32 mmol/L    Anion Gap 2 (L) 3 - 14 mmol/L    Glucose 113 (H) 70 - 99 mg/dL    Urea Nitrogen 21 7 - 30 mg/dL    Creatinine 0.80 0.52 - 1.04 mg/dL    GFR Estimate 75 >60 mL/min/[1.73_m2]    GFR Estimate If Black 87 >60 mL/min/[1.73_m2]    Calcium 9.2 8.5 - 10.1 mg/dL   CBC with platelets   Result Value Ref Range    WBC 15.6 (H) 4.0 - 11.0 10e9/L    RBC Count 5.74 (H) 3.8 - 5.2 10e12/L    Hemoglobin 16.9 (H) 11.7 - 15.7 g/dL    Hematocrit 52.8 (H) 35.0 - 47.0 %    MCV 92 78 - 100 fl    MCH 29.4 26.5 - 33.0 pg    MCHC 32.0 31.5 - 36.5 g/dL    RDW 14.8 10.0 - 15.0 %    Platelet Count 238 150 - 450 10e9/L           Assessment & Plan     Aminata was seen today for hypertension and lipids.    Diagnoses and all orders for this visit:    Essential hypertension  -     Renal panel (Alb, BUN, Ca, Cl, CO2, Creat, Gluc, Phos,  K, Na)    Centrilobular emphysema (H)    Moderate persistent asthma without complication    Nonocclusive coronary atherosclerosis of native coronary artery    Polycythemia, secondary    smoker  Encouraged to stop  Continue torsemide 5 mg daily  Labs today fasting will call with results.   No other change in meds today.  Limit NSAID use  Continue oxygen at 2 liters   Allegra daily  Use respirator when outside mowing or hire done.      Tobacco Cessation:   reports that she has been smoking cigarettes.  She has a 0.42 pack-year smoking history. She has never used smokeless tobacco.  Tobacco Cessation Action Plan: Self help information given to patient      BMI:   Estimated body mass index is 30.08 kg/m  as calculated from the following:    Height as of this encounter: 1.524 m (5').    Weight as of this encounter: 69.9 kg (154 lb).   Weight management plan: Discussed healthy diet and exercise guidelines        Regular exercise  Call or return to the clinic with any worsening of symptoms or no resolution. Patient/Parent verbalized understanding and is in agreement. Medication side effects reviewed.   Current Outpatient Medications   Medication Sig Dispense Refill     albuterol (PROAIR HFA/PROVENTIL HFA/VENTOLIN HFA) 108 (90 Base) MCG/ACT inhaler Inhale 2 puffs into the lungs every 6 hours 54 g 3     alcohol swab prep pads Use to swab area of injection/raffi as directed. 100 each 3     amLODIPine (NORVASC) 10 MG tablet Take 1 tablet (10 mg) by mouth daily For high blood pressure 90 tablet 3     blood glucose (MILLIE MICROLET 2) lancing device Device to be used with lancets 2 times a day. 1 each 0     blood glucose (NO BRAND SPECIFIED) test strip Use to test blood sugar 2 times daily or as directed. To accompany: Blood Glucose Monitor Brands: per insurance. 100 strip 6     blood glucose calibration (NO BRAND SPECIFIED) solution To accompany: Blood Glucose Monitor Brands: per insurance. 1 Bottle 3     blood glucose  monitoring (NO BRAND SPECIFIED) meter device kit Use to test blood sugar 2 times daily or as directed. 1 kit 0     ezetimibe (ZETIA) 10 MG tablet Take 1 tablet (10 mg) by mouth daily 90 tablet 3     fluocinonide (LIDEX) 0.05 % external solution Apply twice daily on scalp as needed. 60 mL 4     fluticasone-vilanterol (BREO ELLIPTA) 200-25 MCG/INH inhaler Inhale 1 puff into the lungs daily 60 Inhaler 3     Ibuprofen (IBU-200 PO) Take  by mouth.       ipratropium - albuterol 0.5 mg/2.5 mg/3 mL (DUONEB) 0.5-2.5 (3) MG/3ML neb solution Take 1 vial (3 mLs) by nebulization every 6 hours as needed for shortness of breath / dyspnea or wheezing 1 Box 3     ketoconazole (NIZORAL) 2 % external shampoo Use to wash scalp, leave on for 5 minutes. Use 2-3 times a week. 120 mL 4     losartan (COZAAR) 25 MG tablet Take 1 tablet (25 mg) by mouth daily 30 tablet 11     order for DME Equipment being ordered: PORTABLE OXYGEN UNIT AND QUIETER OXYGEN CONCENTRATOR   2L per [facility] PORTABLE OXYGEN CONCENTRATOR    Pt Room Air at rest 89%  5 min walking room air 83%   Resting on 2 liters Oxygen per nasal cannula post 5 min walk 95% 1 Device 11     order for DME Equipment being ordered: Nebulizer- PORTABLE with tubing. [employer] Phone: [phone] 1 Device 0     STATIN NOT PRESCRIBED (INTENTIONAL) Please choose reason not prescribed, below       STATIN NOT PRESCRIBED, INTENTIONAL, 1 each daily Statin not prescribed intentionally due to Intolerance 0 each 0     thin (NO BRAND SPECIFIED) lancets 1 each by In Vitro route 2 times daily Use with lanceting device. To accompany: Blood Glucose Monitor Brands: per insurance. 100 each 6     tiotropium (SPIRIVA RESPIMAT) 2.5 MCG/ACT inhaler Inhale 2 puffs into the lungs daily 12 g 3     torsemide (DEMADEX) 5 MG tablet Take 1 tablet (5 mg) by mouth daily 90 tablet 1     HYDROcodone-acetaminophen (NORCO) 5-325 MG tablet Take 0.5-1 tablets by mouth nightly as needed for severe pain 10  tablet 0       See Patient Instructions    Return in about 3 months (around 10/2/2019) for DIABETES, COPD.    PEDRO Dominguez [facility]

## 2019-07-18 NOTE — PROGRESS NOTES
Cardiology Clinic Progress Note  Aminata Gale MRN# [id number]   YOB: 1950 Age: 68 year old     Reason For Visit: 1 month f/u   Primary Cardiologist:   Dr. Santana           History of Presenting Illness:    Aminata Gale is a pleasant 68 year old patient with a past cardiac history significant for nonobstructive CAD, diastolic dysfunction, hypertension, hyperlipidemia.  Past medical history significant for tobacco abuse, COPD, and lupus.    She has a history of statin intolerance. Stress echocardiogram February 2017 was negative for ischemia and echocardiogram portion showed LVEF  50-55% at rest.  Lexiscan nuclear stress test April 2017 showed a small apical infarct without ischemia.  Coronary angiogram  April 2017 showed mild LAD and circumflex disease with minimal plaque in the RCA.  Echocardiogram April 2017 showed LVEF 55%, no WMA, no significant valvular disease.    Pt saw Dr. Santana in December 2017.  She continued with intermittent chest aching not related to exertion.  She had mild lower extremity edema.  A month prior, she stopped her torsemide because she started taking ibuprofen for back pain. She was encouraged to restart her torsemide given her edema.  Chest ache was thought to likely be noncardiac.    In April 2019 she had shortness of breath causing difficulty with ADLs.  She had a COPD exacerbation while in [place] and was placed on antibiotics and prednisone. PFTs showed very severe obstruction with moderate diffusion defect.    Patient was last seen by me on 6/20/2019.   She had restarted Lasix and potassium which were prior prescriptions, due to leg edema.  She had a current prescription for torsemide, however was unsure what that medication was for and was not taking it.  Lasix was discontinued and she was placed back on torsemide. She was started on losartan for hypertension.  She noted chest discomfort at rest and with exertion resolving after resting for 20-30  minutes.      Pt presents today for 1 month follow-up. BMP, after changes to her diuretic and starting losartan, showed normal renal function and electrolytes. She unfortunately canceled her stress test due to illness and did not reschedule this. She denies any side effects after starting losartan and torsemide.  Her weight today in the clinic is down 5 pounds.  She continues with bilateral lower extremity edema but has not been wearing her compression stockings because she forgets.  She has discontinued the Lasix and potassium supplementation.  She continues with chest aching with exertion and at rest.  Given that it has been two years since her most schemic evaluation, we will continue with stress testing.  If no ischemia, she can follow-up with PCP as is likely noncardiac as previously described in 2017.  Blood pressure today is well-controlled. She describes a cough that has been ongoing for quite some time I recommended she follow-up with PCP.   She has p.r.n. Home oxygen. Patient reports no shortness of breath, PND, orthopnea, presyncope, syncope, heart racing, or palpitations.    Current Cardiac Medications   Amlodipine 10 mg daily  Zetia 10 mg daily  Torsemide 5 mg daily- pt not taking                   Assessment and Plan:     Plan  1.  Reschedule Lexiscan nuclear stress test that she did not complete this previously  2.  We will call with results of stress testing and recommendations  For follow up if abnormal  3.  Follow-up with Dr. Santana in six months      1. Mild nonobstructive CAD with chest discomfort     Angio 2017 mild LAD and circumflex disease with minimal plaque in the RCA    Chest ache with rest and exertion thought to be noncardiac previously     Continue CCB, start ARB, intolerant to statins      2. Diastolic dysfunction    LE edema improving     Continue torsemide and use compression stockings       3. hypertension    controlled    continue amlodipine and start losartan        4. hyperlipidemia     on 2/2018    statin intolerance, continue Zetia         Thank you for allowing me to participate in this delightful patient's care.      This note was completed in part using Dragon voice recognition software. Although reviewed after completion, some word and grammatical errors may occur.    Jessie Campos, APRN, CNP           Data:   All laboratory data reviewed          HPI and Plan:   See dictation    Orders Placed This Encounter   Procedures     Follow-Up with Cardiologist       No orders of the defined types were placed in this encounter.      There are no discontinued medications.      Encounter Diagnoses   Name Primary?     Essential hypertension, benign      Nonocclusive coronary atherosclerosis of native coronary artery Yes     Diastolic dysfunction      Hyperlipidemia LDL goal <130      Bilateral leg edema      Chest discomfort        CURRENT MEDICATIONS:  Current Outpatient Medications   Medication Sig Dispense Refill     albuterol (PROAIR HFA/PROVENTIL HFA/VENTOLIN HFA) 108 (90 Base) MCG/ACT inhaler Inhale 2 puffs into the lungs every 6 hours 54 g 3     alcohol swab prep pads Use to swab area of injection/raffi as directed. 100 each 3     amLODIPine (NORVASC) 10 MG tablet Take 1 tablet (10 mg) by mouth daily For high blood pressure 90 tablet 3     blood glucose (MILLIE MICROLET 2) lancing device Device to be used with lancets 2 times a day. 1 each 0     blood glucose (NO BRAND SPECIFIED) test strip Use to test blood sugar 2 times daily or as directed. To accompany: Blood Glucose Monitor Brands: per insurance. 100 strip 6     blood glucose calibration (NO BRAND SPECIFIED) solution To accompany: Blood Glucose Monitor Brands: per insurance. 1 Bottle 3     blood glucose monitoring (NO BRAND SPECIFIED) meter device kit Use to test blood sugar 2 times daily or as directed. 1 kit 0     ezetimibe (ZETIA) 10 MG tablet Take 1 tablet (10 mg) by mouth daily 90 tablet  3     fluocinonide (LIDEX) 0.05 % external solution Apply twice daily on scalp as needed. 60 mL 4     fluticasone-vilanterol (BREO ELLIPTA) 200-25 MCG/INH inhaler Inhale 1 puff into the lungs daily 60 Inhaler 3     HYDROcodone-acetaminophen (NORCO) 5-325 MG tablet Take 0.5-1 tablets by mouth nightly as needed for severe pain 10 tablet 0     Ibuprofen (IBU-200 PO) Take  by mouth.       ipratropium - albuterol 0.5 mg/2.5 mg/3 mL (DUONEB) 0.5-2.5 (3) MG/3ML neb solution Take 1 vial (3 mLs) by nebulization every 6 hours as needed for shortness of breath / dyspnea or wheezing 1 Box 3     ketoconazole (NIZORAL) 2 % external shampoo Use to wash scalp, leave on for 5 minutes. Use 2-3 times a week. 120 mL 4     losartan (COZAAR) 25 MG tablet Take 1 tablet (25 mg) by mouth daily 30 tablet 11     order for DME Equipment being ordered: PORTABLE OXYGEN UNIT AND QUIETER OXYGEN CONCENTRATOR   2L per [facility] PORTABLE OXYGEN CONCENTRATOR    Pt Room Air at rest 89%  5 min walking room air 83%   Resting on 2 liters Oxygen per nasal cannula post 5 min walk 95% 1 Device 11     order for DME Equipment being ordered: Nebulizer- PORTABLE with tubing. [employer] Phone: [phone] 1 Device 0     STATIN NOT PRESCRIBED (INTENTIONAL) Please choose reason not prescribed, below       STATIN NOT PRESCRIBED, INTENTIONAL, 1 each daily Statin not prescribed intentionally due to Intolerance 0 each 0     thin (NO BRAND SPECIFIED) lancets 1 each by In Vitro route 2 times daily Use with lanceting device. To accompany: Blood Glucose Monitor Brands: per insurance. 100 each 6     tiotropium (SPIRIVA RESPIMAT) 2.5 MCG/ACT inhaler Inhale 2 puffs into the lungs daily 12 g 3     torsemide (DEMADEX) 5 MG tablet Take 1 tablet (5 mg) by mouth daily 90 tablet 1       ALLERGIES     Allergies   Allergen Reactions     Amoxicillin Difficulty breathing and Rash     Ampicillin Difficulty breathing and Rash     Cipro [Ciprofloxacin] Difficulty breathing  and Rash     Keflex [Cephalexin Monohydrate] Difficulty breathing and Rash     Penicillin [Penicillins] Difficulty breathing and Rash     Sulfa Drugs Difficulty breathing and Rash     Tetracycline Difficulty breathing and Rash     Biaxin [Clarithromycin] Rash     Ceclor [Cefaclor] Hives and Swelling     Advair Diskus Hives     Hives on face,neck and chest     Egg White      Fish Shortness Of Breath     Mustard [Allyl Isothiocyanate]      Throat swelling       PAST MEDICAL HISTORY:  Past Medical History:   Diagnosis Date     Acute pericarditis, unspecified      Chronic airway obstruction 12/13/2005    PFTs - 1/02 - mild COPD Problem list name updated by automated process. Provider to review     Dysuria      Esophageal reflux      GERD (gastroesophageal reflux disease) 9/20/2013     Hiatal hernia 5/4/2011     HTN, goal below 140/90 9/20/2013     Hyperlipidemia LDL goal <130 9/22/2010    Recent Labs  Lab Test 9/22/10 0908 10/12/06 0939    CHOL 214* 256*    HDL 31* 42*    * 186*    TRIG 149 143    CHOLHDLRATIO 7.0* 6.0*   4/8/11 - dobutamine echo showed NO infarct or ischemia LV 60-65%, normal exercise response.        Lupus (H)      Osteoporosis 10/18/2010    10/10 - severe osteopenia in femoral necks, mild osteopenia in spine  (Problem list name updated by automated process. Provider to review and confirm.)     Pure hypercholesterolemia      SECONDARY POLYCYTHEMIA 10/17/2006    Due to smoking     Smoker 6/24/2008     Systemic lupus erythematosus 12/7/2005    Diagnosed in 1980's; history of pericarditis; was previously treated with mtx; not on active treatment; no hx of renal dz from the lupus.       Tobacco use disorder      Type 2 diabetes mellitus with hyperglycemia, without long-term current use of insulin (H) 2/24/2019    NEW DIAGNOSIS 2/2019     Unspecified essential hypertension        PAST SURGICAL HISTORY:  Past Surgical History:   Procedure Laterality Date     C APPENDECTOMY       C REPAIR GUM       Cathy Dontal surgery     C/SECTION, LOW TRANSVERSE      , Low Transverse     COLONOSCOPY  2009     D & C      X 5     HC RECONSTR NOSE+DAVE SEPTAL REPAIR       HYSTERECTOMY, BRI      Hx of dysplasia     LAPAROSCOPIC CHOLECYSTECTOMY N/A 2017    Procedure: LAPAROSCOPIC CHOLECYSTECTOMY;  Laparoscopic Cholecystectomy;  Surgeon: Tami Barney MD;  Location: [facility]     SINUS SURGERY      Reconstruction       FAMILY HISTORY:  Family History   Problem Relation Age of Onset     Diabetes Mother      Gastrointestinal Disease Mother         Gallbladder disease     Heart Disease Mother      Cancer Father         lung     Alcohol/Drug Father      Allergies Father      Heart Disease Father      Gastrointestinal Disease Father         Liver disease     Skin Cancer Father      Heart Disease Maternal Grandfather      Arthritis Sister      Osteoporosis Sister      Thyroid Disease Sister      Allergies Son      Alcohol/Drug Sister      Alcohol/Drug Sister      Melanoma No family hx of        SOCIAL HISTORY:  Social History     Socioeconomic History     Marital status:      Spouse name: Javier Gale     Number of children: 2     Years of education: 12     Highest education level: None   Occupational History     Employer: DISABLED   Social Needs     Financial resource strain: None     Food insecurity:     Worry: None     Inability: None     Transportation needs:     Medical: None     Non-medical: None   Tobacco Use     Smoking status: Current Every Day Smoker     Packs/day: 0.01     Years: 42.00     Pack years: 0.42     Types: Cigarettes     Smokeless tobacco: Never Used     Tobacco comment: Has chantix at home, but hasn't started yet. 17.   Substance and Sexual Activity     Alcohol use: Yes     Alcohol/week: 0.0 oz     Comment: Occ. wine     Drug use: No     Sexual activity: Yes     Partners: Male     Birth control/protection: Surgical     Comment: Hyst    Lifestyle     Physical activity:      Days per week: None     Minutes per session: None     Stress: None   Relationships     Social connections:     Talks on phone: None     Gets together: None     Attends Christianity service: None     Active member of club or organization: None     Attends meetings of clubs or organizations: None     Relationship status: None     Intimate partner violence:     Fear of current or ex partner: None     Emotionally abused: None     Physically abused: None     Forced sexual activity: None   Other Topics Concern     Parent/sibling w/ CABG, MI or angioplasty before 65F 55M? No   Social History Narrative    The patient has a 50+ pk yr tobacco hx.  She has ongoing active use of 1/2 ppd.  Alcohol use is <1 alcoholic drinks per week.  She denies use of recreational drugs.          She is on disability.  Has been on disability since approximately 1989.  Previously worked in [employer]         The patient is .  Has 2 children.        Hot Tube Exposure: NO    Recent Travel: NO     Hx of incarceration:  NO    Bird Exposure:   NO    Animal Exposure:  NO    Inhalation Exposure:  Possible, father worked with asbestos               Review of Systems:  Skin:  Negative       Eyes:  Positive for glasses;visual blurring    ENT:  Negative      Respiratory:  Positive for shortness of breath;dyspnea on exertion;wheezing;cough     Cardiovascular:    Positive for;fatigue;edema;lightheadedness;dizziness ache and jolt at times   Gastroenterology: Negative      Genitourinary:  not assessed      Musculoskeletal:  Positive for joint pain;joint stiffness    Neurologic:  Positive for headaches    Psychiatric:  Positive for anxiety;depression;sleep disturbances    Heme/Lymph/Imm:  Negative      Endocrine:  Negative        Physical Exam:  Vitals: /76   Pulse 99   Wt 69.4 kg (153 lb)   BMI 29.88 kg/m      Constitutional:  cooperative;well developed overweight      Skin:  warm and dry to the touch          Head:  normocephalic         Eyes:  sclera white        Lymph:      ENT:  no pallor or cyanosis        Neck:  no stiffness        Respiratory:  clear to auscultation;normal symmetry diminished breath sounds bilaterally       Cardiac: regular rhythm;normal S1 and S2                not assessed this visit                                        GI:  abdomen soft        Extremities and Muscular Skeletal:      bilateral LE edema;1+;pitting          Neurological:  affect appropriate;no gross motor deficits        Psych:  Alert and Oriented x 3        CC  Adriano Santana MD  [facility]  [id number] [id number]

## 2019-07-19 ENCOUNTER — OFFICE VISIT (OUTPATIENT)
Dept: CARDIOLOGY | Facility: CLINIC | Age: 69
End: 2019-07-19
Attending: NURSE PRACTITIONER
Payer: COMMERCIAL

## 2019-07-19 VITALS
HEART RATE: 99 BPM | WEIGHT: 153 LBS | DIASTOLIC BLOOD PRESSURE: 76 MMHG | SYSTOLIC BLOOD PRESSURE: 129 MMHG | BODY MASS INDEX: 29.88 KG/M2

## 2019-07-19 DIAGNOSIS — R07.89 CHEST DISCOMFORT: ICD-10-CM

## 2019-07-19 DIAGNOSIS — I10 ESSENTIAL HYPERTENSION, BENIGN: ICD-10-CM

## 2019-07-19 DIAGNOSIS — E78.5 HYPERLIPIDEMIA LDL GOAL <130: ICD-10-CM

## 2019-07-19 DIAGNOSIS — R60.0 BILATERAL LEG EDEMA: ICD-10-CM

## 2019-07-19 DIAGNOSIS — I25.10 NONOCCLUSIVE CORONARY ATHEROSCLEROSIS OF NATIVE CORONARY ARTERY: Primary | ICD-10-CM

## 2019-07-19 DIAGNOSIS — I51.89 DIASTOLIC DYSFUNCTION: ICD-10-CM

## 2019-07-19 PROCEDURE — 99214 OFFICE O/P EST MOD 30 MIN: CPT | Performed by: NURSE PRACTITIONER

## 2019-07-19 NOTE — LETTER
7/19/2019    Sana Olivo, APRN CNP  [address] [facility] [address]    RE: Aminata Gale       Dear Colleague,    I had the pleasure of seeing Aminata Gale in the [facility].    Cardiology Clinic Progress Note  Aminata Gale MRN# [id number]   YOB: 1950 Age: 68 year old     Reason For Visit: 1 month f/u   Primary Cardiologist:   Dr. Santana           History of Presenting Illness:    Aminata Gale is a pleasant 68 year old patient with a past cardiac history significant for nonobstructive CAD, diastolic dysfunction, hypertension, hyperlipidemia.  Past medical history significant for tobacco abuse, COPD, and lupus.    She has a history of statin intolerance. Stress echocardiogram February 2017 was negative for ischemia and echocardiogram portion showed LVEF  50-55% at rest.  Lexiscan nuclear stress test April 2017 showed a small apical infarct without ischemia.  Coronary angiogram  April 2017 showed mild LAD and circumflex disease with minimal plaque in the RCA.  Echocardiogram April 2017 showed LVEF 55%, no WMA, no significant valvular disease.    Pt saw Dr. Santana in December 2017.  She continued with intermittent chest aching not related to exertion.  She had mild lower extremity edema.  A month prior, she stopped her torsemide because she started taking ibuprofen for back pain. She was encouraged to restart her torsemide given her edema.  Chest ache was thought to likely be noncardiac.    In April 2019 she had shortness of breath causing difficulty with ADLs.  She had a COPD exacerbation while in [place] and was placed on antibiotics and prednisone. PFTs showed very severe obstruction with moderate diffusion defect.    Patient was last seen by me on 6/20/2019.   She had restarted Lasix and potassium which were prior prescriptions, due to leg edema.  She had a current prescription for torsemide, however was unsure what that medication was for  and was not taking it.  Lasix was discontinued and she was placed back on torsemide. She was started on losartan for hypertension.  She noted chest discomfort at rest and with exertion resolving after resting for 20-30 minutes.      Pt presents today for 1 month follow-up. BMP, after changes to her diuretic and starting losartan, showed normal renal function and electrolytes. She unfortunately canceled her stress test due to illness and did not reschedule this. She denies any side effects after starting losartan and torsemide.  Her weight today in the clinic is down 5 pounds.  She continues with bilateral lower extremity edema but has not been wearing her compression stockings because she forgets.  She has discontinued the Lasix and potassium supplementation.  She continues with chest aching with exertion and at rest.  Given that it has been two years since her most schemic evaluation, we will continue with stress testing.  If no ischemia, she can follow-up with PCP as is likely noncardiac as previously described in 2017.  Blood pressure today is well-controlled. She describes a cough that has been ongoing for quite some time I recommended she follow-up with PCP.   She has p.r.n. Home oxygen. Patient reports no shortness of breath, PND, orthopnea, presyncope, syncope, heart racing, or palpitations.    Current Cardiac Medications   Amlodipine 10 mg daily  Zetia 10 mg daily  Torsemide 5 mg daily- pt not taking                   Assessment and Plan:     Plan  1.  Reschedule Lexiscan nuclear stress test that she did not complete this previously  2.  We will call with results of stress testing and recommendations  For follow up if abnormal  3.  Follow-up with Dr. Santana in six months      1. Mild nonobstructive CAD with chest discomfort     Angio 2017 mild LAD and circumflex disease with minimal plaque in the RCA    Chest ache with rest and exertion thought to be noncardiac previously     Continue CCB, start ARB,  intolerant to statins      2. Diastolic dysfunction    LE edema improving     Continue torsemide and use compression stockings       3. hypertension    controlled    continue amlodipine and start losartan       4. hyperlipidemia     on 2/2018    statin intolerance, continue Zetia         Thank you for allowing me to participate in this delightful patient's care.      This note was completed in part using Dragon voice recognition software. Although reviewed after completion, some word and grammatical errors may occur.    Jessie Campos, APRN, CNP           Data:   All laboratory data reviewed          HPI and Plan:   See dictation    Orders Placed This Encounter   Procedures     Follow-Up with Cardiologist       No orders of the defined types were placed in this encounter.      There are no discontinued medications.      Encounter Diagnoses   Name Primary?     Essential hypertension, benign      Nonocclusive coronary atherosclerosis of native coronary artery Yes     Diastolic dysfunction      Hyperlipidemia LDL goal <130      Bilateral leg edema      Chest discomfort        CURRENT MEDICATIONS:  Current Outpatient Medications   Medication Sig Dispense Refill     albuterol (PROAIR HFA/PROVENTIL HFA/VENTOLIN HFA) 108 (90 Base) MCG/ACT inhaler Inhale 2 puffs into the lungs every 6 hours 54 g 3     alcohol swab prep pads Use to swab area of injection/raffi as directed. 100 each 3     amLODIPine (NORVASC) 10 MG tablet Take 1 tablet (10 mg) by mouth daily For high blood pressure 90 tablet 3     blood glucose (MILLIE MICROLET 2) lancing device Device to be used with lancets 2 times a day. 1 each 0     blood glucose (NO BRAND SPECIFIED) test strip Use to test blood sugar 2 times daily or as directed. To accompany: Blood Glucose Monitor Brands: per insurance. 100 strip 6     blood glucose calibration (NO BRAND SPECIFIED) solution To accompany: Blood Glucose Monitor Brands: per insurance. 1 Bottle 3      blood glucose monitoring (NO BRAND SPECIFIED) meter device kit Use to test blood sugar 2 times daily or as directed. 1 kit 0     ezetimibe (ZETIA) 10 MG tablet Take 1 tablet (10 mg) by mouth daily 90 tablet 3     fluocinonide (LIDEX) 0.05 % external solution Apply twice daily on scalp as needed. 60 mL 4     fluticasone-vilanterol (BREO ELLIPTA) 200-25 MCG/INH inhaler Inhale 1 puff into the lungs daily 60 Inhaler 3     HYDROcodone-acetaminophen (NORCO) 5-325 MG tablet Take 0.5-1 tablets by mouth nightly as needed for severe pain 10 tablet 0     Ibuprofen (IBU-200 PO) Take  by mouth.       ipratropium - albuterol 0.5 mg/2.5 mg/3 mL (DUONEB) 0.5-2.5 (3) MG/3ML neb solution Take 1 vial (3 mLs) by nebulization every 6 hours as needed for shortness of breath / dyspnea or wheezing 1 Box 3     ketoconazole (NIZORAL) 2 % external shampoo Use to wash scalp, leave on for 5 minutes. Use 2-3 times a week. 120 mL 4     losartan (COZAAR) 25 MG tablet Take 1 tablet (25 mg) by mouth daily 30 tablet 11     order for DME Equipment being ordered: PORTABLE OXYGEN UNIT AND QUIETER OXYGEN CONCENTRATOR   2L per [facility] PORTABLE OXYGEN CONCENTRATOR    Pt Room Air at rest 89%  5 min walking room air 83%   Resting on 2 liters Oxygen per nasal cannula post 5 min walk 95% 1 Device 11     order for DME Equipment being ordered: Nebulizer- PORTABLE with tubing. [employer] [place] Phone: [phone] 1 Device 0     STATIN NOT PRESCRIBED (INTENTIONAL) Please choose reason not prescribed, below       STATIN NOT PRESCRIBED, INTENTIONAL, 1 each daily Statin not prescribed intentionally due to Intolerance 0 each 0     thin (NO BRAND SPECIFIED) lancets 1 each by In Vitro route 2 times daily Use with lanceting device. To accompany: Blood Glucose Monitor Brands: per insurance. 100 each 6     tiotropium (SPIRIVA RESPIMAT) 2.5 MCG/ACT inhaler Inhale 2 puffs into the lungs daily 12 g 3     torsemide (DEMADEX) 5 MG tablet Take 1 tablet (5 mg) by mouth  daily 90 tablet 1       ALLERGIES     Allergies   Allergen Reactions     Amoxicillin Difficulty breathing and Rash     Ampicillin Difficulty breathing and Rash     Cipro [Ciprofloxacin] Difficulty breathing and Rash     Keflex [Cephalexin Monohydrate] Difficulty breathing and Rash     Penicillin [Penicillins] Difficulty breathing and Rash     Sulfa Drugs Difficulty breathing and Rash     Tetracycline Difficulty breathing and Rash     Biaxin [Clarithromycin] Rash     Ceclor [Cefaclor] Hives and Swelling     Advair Diskus Hives     Hives on face,neck and chest     Egg White      Fish Shortness Of Breath     Mustard [Allyl Isothiocyanate]      Throat swelling       PAST MEDICAL HISTORY:  Past Medical History:   Diagnosis Date     Acute pericarditis, unspecified      Chronic airway obstruction 12/13/2005    PFTs - 1/02 - mild COPD Problem list name updated by automated process. Provider to review     Dysuria      Esophageal reflux      GERD (gastroesophageal reflux disease) 9/20/2013     Hiatal hernia 5/4/2011     HTN, goal below 140/90 9/20/2013     Hyperlipidemia LDL goal <130 9/22/2010    Recent Labs  Lab Test 9/22/10 0908 10/12/06 0939    CHOL 214* 256*    HDL 31* 42*    * 186*    TRIG 149 143    CHOLHDLRATIO 7.0* 6.0*   4/8/11 - dobutamine echo showed NO infarct or ischemia LV 60-65%, normal exercise response.        Lupus (H)      Osteoporosis 10/18/2010    10/10 - severe osteopenia in femoral necks, mild osteopenia in spine  (Problem list name updated by automated process. Provider to review and confirm.)     Pure hypercholesterolemia      SECONDARY POLYCYTHEMIA 10/17/2006    Due to smoking     Smoker 6/24/2008     Systemic lupus erythematosus 12/7/2005    Diagnosed in 1980's; history of pericarditis; was previously treated with mtx; not on active treatment; no hx of renal dz from the lupus.       Tobacco use disorder      Type 2 diabetes mellitus with hyperglycemia, without long-term current use of  insulin (H) 2019    NEW DIAGNOSIS 2019     Unspecified essential hypertension        PAST SURGICAL HISTORY:  Past Surgical History:   Procedure Laterality Date     C APPENDECTOMY       C REPAIR GUM      Cathy Dontal surgery     C/SECTION, LOW TRANSVERSE      , Low Transverse     COLONOSCOPY  2009     D & C      X 5     HC RECONSTR NOSE+DAVE SEPTAL REPAIR       HYSTERECTOMY, BRI      Hx of dysplasia     LAPAROSCOPIC CHOLECYSTECTOMY N/A 2017    Procedure: LAPAROSCOPIC CHOLECYSTECTOMY;  Laparoscopic Cholecystectomy;  Surgeon: Tami Barney MD;  Location: [facility] OR     SINUS SURGERY      Reconstruction       FAMILY HISTORY:  Family History   Problem Relation Age of Onset     Diabetes Mother      Gastrointestinal Disease Mother         Gallbladder disease     Heart Disease Mother      Cancer Father         lung     Alcohol/Drug Father      Allergies Father      Heart Disease Father      Gastrointestinal Disease Father         Liver disease     Skin Cancer Father      Heart Disease Maternal Grandfather      Arthritis Sister      Osteoporosis Sister      Thyroid Disease Sister      Allergies Son      Alcohol/Drug Sister      Alcohol/Drug Sister      Melanoma No family hx of        SOCIAL HISTORY:  Social History     Socioeconomic History     Marital status:      Spouse name: Javier Gale     Number of children: 2     Years of education: 12     Highest education level: None   Occupational History     Employer: DISABLED   Social Needs     Financial resource strain: None     Food insecurity:     Worry: None     Inability: None     Transportation needs:     Medical: None     Non-medical: None   Tobacco Use     Smoking status: Current Every Day Smoker     Packs/day: 0.01     Years: 42.00     Pack years: 0.42     Types: Cigarettes     Smokeless tobacco: Never Used     Tobacco comment: Has chantix at home, but hasn't started yet. 17.   Substance and Sexual Activity     Alcohol use: Yes      Alcohol/week: 0.0 oz     Comment: Occ. wine     Drug use: No     Sexual activity: Yes     Partners: Male     Birth control/protection: Surgical     Comment: Hyst    Lifestyle     Physical activity:     Days per week: None     Minutes per session: None     Stress: None   Relationships     Social connections:     Talks on phone: None     Gets together: None     Attends Congregational service: None     Active member of club or organization: None     Attends meetings of clubs or organizations: None     Relationship status: None     Intimate partner violence:     Fear of current or ex partner: None     Emotionally abused: None     Physically abused: None     Forced sexual activity: None   Other Topics Concern     Parent/sibling w/ CABG, MI or angioplasty before 65F 55M? No   Social History Narrative    The patient has a 50+ pk yr tobacco hx.  She has ongoing active use of 1/2 ppd.  Alcohol use is <1 alcoholic drinks per week.  She denies use of recreational drugs.          She is on disability.  Has been on disability since approximately 1989.  Previously worked in [employer].         The patient is .  Has 2 children.        Hot Tube Exposure: NO    Recent Travel: NO     Hx of incarceration:  NO    Bird Exposure:   NO    Animal Exposure:  NO    Inhalation Exposure:  Possible, father worked with asbestos               Review of Systems:  Skin:  Negative       Eyes:  Positive for glasses;visual blurring    ENT:  Negative      Respiratory:  Positive for shortness of breath;dyspnea on exertion;wheezing;cough     Cardiovascular:    Positive for;fatigue;edema;lightheadedness;dizziness ache and jolt at times   Gastroenterology: Negative      Genitourinary:  not assessed      Musculoskeletal:  Positive for joint pain;joint stiffness    Neurologic:  Positive for headaches    Psychiatric:  Positive for anxiety;depression;sleep disturbances    Heme/Lymph/Imm:  Negative      Endocrine:  Negative        Physical  Exam:  Vitals: /76   Pulse 99   Wt 69.4 kg (153 lb)   BMI 29.88 kg/m       Constitutional:  cooperative;well developed overweight      Skin:  warm and dry to the touch          Head:  normocephalic        Eyes:  sclera white        Lymph:      ENT:  no pallor or cyanosis        Neck:  no stiffness        Respiratory:  clear to auscultation;normal symmetry diminished breath sounds bilaterally       Cardiac: regular rhythm;normal S1 and S2                not assessed this visit                                        GI:  abdomen soft        Extremities and Muscular Skeletal:      bilateral LE edema;1+;pitting          Neurological:  affect appropriate;no gross motor deficits        Psych:  Alert and Oriented x 3          Thank you for allowing me to participate in the care of your patient.    Sincerely,     PED[facility]

## 2019-07-19 NOTE — PATIENT INSTRUCTIONS
"Results for REMA KOENIG (MRN [id number]) as of 7/19/2019 10:07   Ref. Range 7/2/2019 12:11   Sodium Latest Ref Range: 133 - 144 mmol/L 142   Potassium Latest Ref Range: 3.4 - 5.3 mmol/L 4.2   Chloride Latest Ref Range: 94 - 109 mmol/L 104   Carbon Dioxide Latest Ref Range: 20 - 32 mmol/L 34 (H)   Urea Nitrogen Latest Ref Range: 7 - 30 mg/dL 16   Creatinine Latest Ref Range: 0.52 - 1.04 mg/dL 0.79   GFR Estimate Latest Ref Range: >60 mL/min/1.73_m2 76   GFR Estimate If Black Latest Ref Range: >60 mL/min/1.73_m2 88   Calcium Latest Ref Range: 8.5 - 10.1 mg/dL 9.6   Anion Gap Latest Ref Range: 3 - 14 mmol/L 4   Phosphorus Latest Ref Range: 2.5 - 4.5 mg/dL 3.5   Albumin Latest Ref Range: 3.4 - 5.0 g/dL 3.9       [facility] HEART [facility]~[address]~[place], [place]~[address]  Thank you for your  Heart Care visit today. If you have questions regarding your visit, please contact your cardiology RN's, Kassie Lutz or Debra Izquierdo, at [phone]. Your provider has recommended the following:  Medication Changes:  No changes   Recommendations:  1. Follow-up with primary provider regarding leg and hand cramps and cough    Follow-up:  1. Reschedule stress test - we will call with results and recommendations   2. See Dr. Santana for cardiology follow up at [facility]: Jan 2020  Call in Oct, to schedule the appointment.  To schedule a future appointment, we kindly ask that you call cardiology scheduling at [phone] three months prior to requested revisit date.      [facility] cardiology clinic is staffed with \"Advance Practice Providers\". These are our cardiology Physician Assistants and Nurse Practitioners.   Please call cardiology scheduling if you feel you need clinical evaluation with them at any time for any cardiac reason.   Reminder:  For your safety, we ask that you bring in your current medication(s) or an updated list of your medications with you to EACH " "office visit. Include the medication name, dose of pill on bottle and how you are taking it. Include over-the-counter medications or supplements. Your provider will review this at each visit and plan your care based on your current information.   ~~~~~~~~~~~~~~~~~~~~~~~~~~~~~~~~~~~~~~~  \"[facility]\" [place] telephone numbers for reference:  Cardiology Scheduling~[phone]  Diagnostic Imaging Scheduling~[phone]  Lab Scheduling~[phone]  Anticoagulation Clinic~[phone]  Cardiac Rehabilitation~[phone]  CORE Clinic RN's~[phone] (at [facility])  Cardiology Clinic RN's~[phone] (Debra Izquierdo, RN & Kassie Lutz, RN)  ~~~~~~~~~~~~~~~~~~~~~~~~~~~~~~~~~~~~~~~~    "

## 2019-07-31 ENCOUNTER — HOSPITAL ENCOUNTER (OUTPATIENT)
Dept: NUCLEAR MEDICINE | Facility: CLINIC | Age: 69
Setting detail: NUCLEAR MEDICINE
Discharge: HOME | End: 2019-07-31
Attending: NURSE PRACTITIONER | Admitting: NURSE PRACTITIONER
Payer: COMMERCIAL

## 2019-07-31 DIAGNOSIS — R07.89 CHEST DISCOMFORT: ICD-10-CM

## 2019-07-31 PROCEDURE — A9502 TC99M TETROFOSMIN: HCPCS | Performed by: NURSE PRACTITIONER

## 2019-07-31 PROCEDURE — 34300033 ZZH RX 343: Performed by: NURSE PRACTITIONER

## 2019-07-31 PROCEDURE — 78452 HT MUSCLE IMAGE SPECT MULT: CPT

## 2019-07-31 PROCEDURE — 78452 HT MUSCLE IMAGE SPECT MULT: CPT | Mod: 26 | Performed by: INTERNAL MEDICINE

## 2019-07-31 PROCEDURE — 93018 CV STRESS TEST I&R ONLY: CPT | Performed by: INTERNAL MEDICINE

## 2019-07-31 PROCEDURE — 25000128 H RX IP 250 OP 636: Performed by: NURSE PRACTITIONER

## 2019-07-31 PROCEDURE — 93016 CV STRESS TEST SUPVJ ONLY: CPT | Performed by: INTERNAL MEDICINE

## 2019-07-31 PROCEDURE — 93017 CV STRESS TEST TRACING ONLY: CPT

## 2019-07-31 RX ORDER — REGADENOSON 0.08 MG/ML
0.4 INJECTION, SOLUTION INTRAVENOUS ONCE
Status: COMPLETED | OUTPATIENT
Start: 2019-07-31 | End: 2019-07-31

## 2019-07-31 RX ADMIN — TETROFOSMIN 30.5 MCI.: 1.38 INJECTION, POWDER, LYOPHILIZED, FOR SOLUTION INTRAVENOUS at 14:20

## 2019-07-31 RX ADMIN — TETROFOSMIN 10.5 MCI.: 1.38 INJECTION, POWDER, LYOPHILIZED, FOR SOLUTION INTRAVENOUS at 12:15

## 2019-07-31 RX ADMIN — REGADENOSON 0.4 MG: 0.08 INJECTION, SOLUTION INTRAVENOUS at 14:16

## 2019-08-01 NOTE — RESULT ENCOUNTER NOTE
Results noted: small apical infarction without residual ischemia; EF 62%. No change from prior study of 2017. Pt notified of results via her [employer] account

## 2019-09-12 ENCOUNTER — TELEPHONE (OUTPATIENT)
Dept: FAMILY MEDICINE | Facility: CLINIC | Age: 69
End: 2019-09-12

## 2019-09-12 NOTE — TELEPHONE ENCOUNTER
[employer]- Portable Oxygen form received for Portable Oxygen Concentrator   Spoke with Pt to see if she wants this completed as pt gets her Oxygen from [facility]    Pt said she is unable to get a smaller Portable for traveling from [facility].  Pt was going check with her Insurance first to see if she can get this or not.  Explained to pt will hold form until she gives a call back as to if she wants this completed or not.  Liss [facility]

## 2019-09-23 ENCOUNTER — TELEPHONE (OUTPATIENT)
Dept: FAMILY MEDICINE | Facility: CLINIC | Age: 69
End: 2019-09-23

## 2019-09-23 NOTE — TELEPHONE ENCOUNTER
Panel Management Review      Patient has the following on her problem list: None      Composite cancer screening  Chart review shows that this patient is due/due soon for the following Colonoscopy  Summary:    Patient is due/failing the following:   COLONOSCOPY    Action needed:   Patient needs referral/order: colon    Type of outreach:    Sent letter.    Questions for provider review:    None                                                                                                                                    Janna Call CMA      Chart routed to Care Team .

## 2019-10-08 NOTE — TELEPHONE ENCOUNTER
Pt did return call as we received 2nd request received.  Spoke with pt she does not want form to be completed regarding Portable Oxygen Concentrator.  Liss [facility]

## 2019-10-16 DIAGNOSIS — J45.40 MODERATE PERSISTENT ASTHMA WITHOUT COMPLICATION: ICD-10-CM

## 2019-10-16 DIAGNOSIS — J44.1 COPD EXACERBATION (H): ICD-10-CM

## 2019-10-16 NOTE — TELEPHONE ENCOUNTER
"Requested Prescriptions   Pending Prescriptions Disp Refills     BREO ELLIPTA 200-25 MCG/INH Inhaler [Pharmacy Med Name: BREO ELLIPTA 200-25MCG/INH AEPB]  0     Sig: INHALE 1 PUFF INTO THE LUNGS DAILY       Inhaled Steroids Protocol Failed - 10/16/2019 12:34 PM        Failed - Asthma control assessment score within normal limits in last 6 months     Please review ACT score.     ACT Total Scores 7/6/2017 1/30/2018 4/12/2019   ACT TOTAL SCORE - - -   ASTHMA ER VISITS - - -   ASTHMA HOSPITALIZATIONS - - -   ACT TOTAL SCORE (Goal Greater than or Equal to 20) 20 17 10   In the past 12 months, how many times did you visit the emergency room for your asthma without being admitted to the hospital? 0 0 0   In the past 12 months, how many times were you hospitalized overnight because of your asthma? 0 0 0               Passed - Patient is age 12 or older        Passed - Medication is active on med list        Passed - Recent (6 mo) or future (30 days) visit within the authorizing provider's specialty     Patient had office visit in the last 6 months or has a visit in the next 30 days with authorizing provider or within the authorizing provider's specialty.  See \"Patient Info\" tab in inbasket, or \"Choose Columns\" in Meds & Orders section of the refill encounter.            Last Written Prescription Date:  2/22/19  Last Fill Quantity: 60,  # refills: 3   Last office visit: 7/2/2019 with prescribing provider:     Future Office Visit:      "

## 2019-10-29 ENCOUNTER — ANESTHESIA EVENT (OUTPATIENT)
Dept: GASTROENTEROLOGY | Facility: CLINIC | Age: 69
End: 2019-10-29
Payer: COMMERCIAL

## 2019-10-29 ASSESSMENT — LIFESTYLE VARIABLES: TOBACCO_USE: 1

## 2019-10-29 ASSESSMENT — COPD QUESTIONNAIRES: COPD: 1

## 2019-10-29 NOTE — ANESTHESIA PREPROCEDURE EVALUATION
Anesthesia Pre-Procedure Evaluation    Patient: Aminata Gale   MRN: [id number] : 1950          Preoperative Diagnosis: * No pre-op diagnosis entered *    Procedure(s):  COLONOSCOPY    Past Medical History:   Diagnosis Date     Acute pericarditis, unspecified      Chronic airway obstruction 2005    PFTs -  - mild COPD Problem list name updated by automated process. Provider to review     Dysuria      Esophageal reflux      GERD (gastroesophageal reflux disease) 2013     Hiatal hernia 2011     HTN, goal below 140/90 2013     Hyperlipidemia LDL goal <130 2010    Recent Labs  Lab Test 9/22/10 0908 10/12/06 0939    CHOL 214* 256*    HDL 31* 42*    * 186*    TRIG 149 143    CHOLHDLRATIO 7.0* 6.0*   11 - dobutamine echo showed NO infarct or ischemia LV 60-65%, normal exercise response.        Lupus (H)      Osteoporosis 10/18/2010    10/10 - severe osteopenia in femoral necks, mild osteopenia in spine  (Problem list name updated by automated process. Provider to review and confirm.)     Pure hypercholesterolemia      SECONDARY POLYCYTHEMIA 10/17/2006    Due to smoking     Smoker 2008     Systemic lupus erythematosus 2005    Diagnosed in ; history of pericarditis; was previously treated with mtx; not on active treatment; no hx of renal dz from the lupus.       Tobacco use disorder      Type 2 diabetes mellitus with hyperglycemia, without long-term current use of insulin (H) 2019    NEW DIAGNOSIS 2019     Unspecified essential hypertension      Past Surgical History:   Procedure Laterality Date     C APPENDECTOMY       C REPAIR GUM      Cathy Dontal surgery     C/SECTION, LOW TRANSVERSE      , Low Transverse     COLONOSCOPY  2009     D & C      X 5     HC RECONSTR NOSE+DAVE SEPTAL REPAIR       HYSTERECTOMY, BRI      Hx of dysplasia     LAPAROSCOPIC CHOLECYSTECTOMY N/A 2017    Procedure: LAPAROSCOPIC CHOLECYSTECTOMY;  Laparoscopic  Cholecystectomy;  Surgeon: Tami Barney MD;  Location: [facility]     SINUS SURGERY      Reconstruction       Anesthesia Evaluation     . Pt has had prior anesthetic.            ROS/MED HX    ENT/Pulmonary: Comment: TMJ    (+)tobacco use, Current use Moderate Persistent asthma COPD, , . .    Neurologic:       Cardiovascular: Comment: Pericarditis    (+) Dyslipidemia, hypertension--CAD, --. : . . . :. . Previous cardiac testing Echodate:3/2019results:SR Left atrial enlargement, Nonspecific T wave abnormalityStress Testdate:7/31/19 results:Order   NM Lexiscan stress test (nuc card) ([id number]) (Order [id number])   Exam Information     Exam Date Exam Time Accession # Performing Department Results   7/31/19  3:24 PM [id number] [facility] Nuclear Medicine      [facility] CUPID   PACS Images      Show images for NM Lexiscan stress test (nuc card)  Study Result     GATED MYOCARDIAL PERFUSION SCINTIGRAPHY WITH INTRAVENOUS PHARMACOLOGIC  VASODILATATION LEXISCAN -ONE DAY STUDY      7/31/2019 3:24 PM RONY KHAN WEST 68 years Female 1950.     Indication/Clinical History: Chest discomfort and coronary artery  disease     Impression  1. Myocardial perfusion imaging using single isotope technique  demonstrated a small apical infarction extending into the distal  inferolateral wall without residual ischemia.   2. Gated images demonstrated apical hypokinesis. The left ventricular  systolic function is normal, with an ejection fraction of 62%.  3. Compared to the prior study from 4/10/2017, there has been no  change .     Procedure  Pharmacologic stress testing was performed with Lexiscan at a rate of  0.08 mg/ml rapid bolus injection, for 15 seconds, 0.4 mg/5ml  intravenously. Low-level exercise was performed along with the  vasodilator infusion.  The heart rate was 94 at baseline and mark to  110 beats per minute during the Lexiscan infusion. The rest blood  pressure was 132/78 mmHg and was 126/80  mm Hg during Lexiscan  infusion. The patient experienced shortness of breath and fatigue   during the test.     Myocardial perfusion imaging was performed at rest, approximately 45  minutes after the injection intravenously of 10.5 mCi of Tc-99m  Myoview. At peak pharmacologic effect, 10-20 seconds after Lexiscan,   the patient was injected intravenously with 30.5 mCi of  Tc-99m  Myoview. The post-stress tomographic imaging was performed  approximately 60 minutes after stress.     EKG Findings  The resting EKG demonstrated sinus rhythm without abnormality. The  stress EKG demonstrated no ischemic changes.     Tomographic Findings  Overall, the study quality is excellent . On the stress images, there  is severe count depression involving the apex and distal inferolateral  wall. On the rest images, there is severe count depression involving  the apex and distal inferolateral wall . Gated images demonstrated  apical hypokinesis. The left ventricular ejection fraction was  calculated to be 62%. TID was absent.     SANYA ARENAS MD  Order-Level Documents:     There are no order-level documents.   Lab and Collection     NM Lexiscan stress test (nuc card) (Order: [id number]) - 7/31/2019   Result History     NM Lexiscan stress test (nuc card) (Order #[id number]) on 7/31/2019 - Order Result History Report  Result Information     Status: Final result (Exam End: 7/31/2019 15:24) Provider Status: Reviewed  Reading Providers      Reading Role Read Date  Sanya Arenas MD Cardiology 7/31/2019  Signed by     Signed Date/Time  Phone Pager  SANYA ARENAS 7/31/2019 16:56 [phone]   Reviewed by     Therese Izquierdo RN 8/1/2019 08:06  Associated Diagnoses     Chest discomfort (R07.89)     Patient Result Comments     Written by Therese Izquierdo RN on 8/1/2019  8:06 AM   Pat,   Your stress test did not show evidence of blocked blood flow to the heart muscle--it was unchanged from the one you did in 2017. Please follow up with   Max in 6 months.   Please do not hesitate to call with questions or concerns.   Debra LAMBERTN RN   [facility] at [facility]   [phone] (nurse line)   [phone] ()   [phone] (fax)    Patient Release Status:     This result is viewable by the patient in CSR.       Result Notes for NM Lexiscan stress test (nuc card)     Notes recorded by Therese Izquierdo RN on 8/1/2019 at 8:06 AM CDT  Results noted: small apical infarction without residual ischemia; EF 62%. No change from prior study of 2017. Pt notified of results via her CSR account       Recipient List for Orders   Sent From To Cc'd Forwarded To Results  7/31/2019  4:57 PM Interface, Radiant Ib PEDRO Wu CNP     NM Lexiscan stress test (nuc card) ([id number])         Order Providers     Authorizing Provider Encounter Provider Billing Provider  Jessie Porter APRN CNP [id number] Sanya Arenas MD  Base CPT Code (Reference Only)     Code CPT Chargeables  [id number]    29201    64778  External Result Report     External Result Report  Order Report     View Order Information     date: results: date: results:          METS/Exercise Tolerance:     Hematologic:     (+) Other Hematologic Disorder-Polycythemia      Musculoskeletal:         GI/Hepatic:     (+) GERD hiatal hernia,       Renal/Genitourinary:         Endo:     (+) type II DM Last HgA1c: 6.7 date: 2/22/19 .      Psychiatric:     (+) psychiatric history anxiety      Infectious Disease:         Malignancy:         Other: Comment: Lupus erythematosus   (+) H/O Chronic Pain,                        Physical Exam  Normal systems: cardiovascular, pulmonary and dental    Airway   Mallampati: II  TM distance: >3 FB  Neck ROM: full    Dental     Cardiovascular       Pulmonary             Lab Results   Component Value Date    WBC 15.6 (H) 06/27/2019    HGB 16.9 (H) 06/27/2019    HCT 52.8 (H) 06/27/2019     06/27/2019    CRP 6.2  06/30/2017    SED 4 09/21/2011     07/02/2019    POTASSIUM 4.2 07/02/2019    CHLORIDE 104 07/02/2019    CO2 34 (H) 07/02/2019    BUN 16 07/02/2019    CR 0.79 07/02/2019     (H) 07/02/2019    ELY 9.6 07/02/2019    PHOS 3.5 07/02/2019    ALBUMIN 3.9 07/02/2019    PROTTOTAL 6.9 02/22/2019    ALT 70 (H) 02/22/2019    AST 34 02/22/2019    ALKPHOS 118 02/22/2019    BILITOTAL 0.6 02/22/2019    LIPASE 164 07/01/2017    PTT 36 04/11/2017    INR 0.92 04/11/2017    TSH 2.20 03/04/2019    T4 1.1 03/29/2001       Preop Vitals  BP Readings from Last 3 Encounters:   07/19/19 129/76   07/02/19 122/74   06/20/19 (!) 138/92    Pulse Readings from Last 3 Encounters:   07/19/19 99   07/02/19 91   06/20/19 97      Resp Readings from Last 3 Encounters:   07/02/19 18   04/12/19 14   03/04/19 20    SpO2 Readings from Last 3 Encounters:   07/02/19 98%   06/20/19 97%   05/09/19 93%      Temp Readings from Last 1 Encounters:   07/02/19 37  C (98.6  F) (Tympanic)    Ht Readings from Last 1 Encounters:   07/02/19 1.524 m (5')      Wt Readings from Last 1 Encounters:   07/19/19 69.4 kg (153 lb)    Estimated body mass index is 29.88 kg/m  as calculated from the following:    Height as of 7/2/19: 1.524 m (5').    Weight as of 7/19/19: 69.4 kg (153 lb).       Anesthesia Plan      History & Physical Review  History and physical reviewed and following examination; no interval change.    ASA Status:  3 .    NPO Status:  > 6 hours    Plan for MAC Reason for MAC:  Deep or markedly invasive procedure (G8)         Postoperative Care      Consents  Anesthetic plan, risks, benefits and alternatives discussed with:  Patient..                 PEDRO Mackey CRNA

## 2019-10-31 ENCOUNTER — ANESTHESIA (OUTPATIENT)
Dept: GASTROENTEROLOGY | Facility: CLINIC | Age: 69
End: 2019-10-31
Payer: COMMERCIAL

## 2019-10-31 ENCOUNTER — HOSPITAL ENCOUNTER (OUTPATIENT)
Facility: CLINIC | Age: 69
Discharge: HOME | End: 2019-10-31
Attending: SURGERY | Admitting: SURGERY
Payer: COMMERCIAL

## 2019-10-31 VITALS
DIASTOLIC BLOOD PRESSURE: 60 MMHG | HEIGHT: 60 IN | WEIGHT: 143 LBS | OXYGEN SATURATION: 96 % | TEMPERATURE: 97.7 F | BODY MASS INDEX: 28.07 KG/M2 | SYSTOLIC BLOOD PRESSURE: 102 MMHG | RESPIRATION RATE: 16 BRPM

## 2019-10-31 LAB
COLONOSCOPY: NORMAL
GLUCOSE BLDC GLUCOMTR-MCNC: 112 MG/DL (ref 70–99)

## 2019-10-31 PROCEDURE — 45384 COLONOSCOPY W/LESION REMOVAL: CPT | Mod: PT | Performed by: SURGERY

## 2019-10-31 PROCEDURE — 25000128 H RX IP 250 OP 636: Performed by: NURSE ANESTHETIST, CERTIFIED REGISTERED

## 2019-10-31 PROCEDURE — 25000125 ZZHC RX 250: Performed by: SURGERY

## 2019-10-31 PROCEDURE — 25800030 ZZH RX IP 258 OP 636: Performed by: SURGERY

## 2019-10-31 PROCEDURE — 82962 GLUCOSE BLOOD TEST: CPT

## 2019-10-31 PROCEDURE — 88305 TISSUE EXAM BY PATHOLOGIST: CPT | Performed by: SURGERY

## 2019-10-31 PROCEDURE — 25000125 ZZHC RX 250: Performed by: NURSE ANESTHETIST, CERTIFIED REGISTERED

## 2019-10-31 PROCEDURE — 88305 TISSUE EXAM BY PATHOLOGIST: CPT | Mod: 26 | Performed by: SURGERY

## 2019-10-31 PROCEDURE — 37000008 ZZH ANESTHESIA TECHNICAL FEE, 1ST 30 MIN: Performed by: SURGERY

## 2019-10-31 PROCEDURE — 45384 COLONOSCOPY W/LESION REMOVAL: CPT | Performed by: SURGERY

## 2019-10-31 RX ORDER — PROPOFOL 10 MG/ML
INJECTION, EMULSION INTRAVENOUS CONTINUOUS PRN
Status: DISCONTINUED | OUTPATIENT
Start: 2019-10-31 | End: 2019-10-31

## 2019-10-31 RX ORDER — PROPOFOL 10 MG/ML
INJECTION, EMULSION INTRAVENOUS PRN
Status: DISCONTINUED | OUTPATIENT
Start: 2019-10-31 | End: 2019-10-31

## 2019-10-31 RX ORDER — SODIUM CHLORIDE, SODIUM LACTATE, POTASSIUM CHLORIDE, CALCIUM CHLORIDE 600; 310; 30; 20 MG/100ML; MG/100ML; MG/100ML; MG/100ML
INJECTION, SOLUTION INTRAVENOUS CONTINUOUS
Status: DISCONTINUED | OUTPATIENT
Start: 2019-10-31 | End: 2019-10-31 | Stop reason: HOSPADM

## 2019-10-31 RX ORDER — LIDOCAINE 40 MG/G
CREAM TOPICAL
Status: DISCONTINUED | OUTPATIENT
Start: 2019-10-31 | End: 2019-10-31 | Stop reason: HOSPADM

## 2019-10-31 RX ORDER — ONDANSETRON 2 MG/ML
4 INJECTION INTRAMUSCULAR; INTRAVENOUS
Status: DISCONTINUED | OUTPATIENT
Start: 2019-10-31 | End: 2019-10-31 | Stop reason: HOSPADM

## 2019-10-31 RX ADMIN — LIDOCAINE HYDROCHLORIDE 0.1 ML: 10 INJECTION, SOLUTION EPIDURAL; INFILTRATION; INTRACAUDAL; PERINEURAL at 09:25

## 2019-10-31 RX ADMIN — SODIUM CHLORIDE, POTASSIUM CHLORIDE, SODIUM LACTATE AND CALCIUM CHLORIDE: 600; 310; 30; 20 INJECTION, SOLUTION INTRAVENOUS at 09:24

## 2019-10-31 RX ADMIN — PROPOFOL 50 MG: 10 INJECTION, EMULSION INTRAVENOUS at 09:29

## 2019-10-31 RX ADMIN — PROPOFOL 200 MCG/KG/MIN: 10 INJECTION, EMULSION INTRAVENOUS at 09:29

## 2019-10-31 ASSESSMENT — MIFFLIN-ST. JEOR: SCORE: 1090.14

## 2019-10-31 NOTE — H&P
68 year old year old female here for colonoscopy for screening.    Patient Active Problem List   Diagnosis     Systemic lupus erythematosus (H)     Esophageal reflux     Essential hypertension, benign     Allergic rhinitis     Polycythemia, secondary     Female stress incontinence     Other nonspecific abnormal result of function study of brain and central nervous system     Smoker     Hyperlipidemia LDL goal <130     Osteoporosis     Hiatal hernia     Advanced directives, counseling/discussion     Acne     SK (seborrheic keratosis)     Lentigo     Angioma     Moderate persistent asthma     Chronic pain     HTN, goal below 140/90     Anxiety     Refused influenza vaccine     Grief reaction     Centrilobular emphysema (H)     Acute cholecystitis     TMJ (temporomandibular joint syndrome)     Type 2 diabetes mellitus with hyperglycemia, without long-term current use of insulin (H)     Type 2 diabetes mellitus with microalbuminuria, without long-term current use of insulin (H)     Nonocclusive coronary atherosclerosis of native coronary artery     Diastolic dysfunction       Past Medical History:   Diagnosis Date     Acute pericarditis, unspecified      Chronic airway obstruction 12/13/2005    PFTs - 1/02 - mild COPD Problem list name updated by automated process. Provider to review     Dysuria      Esophageal reflux      GERD (gastroesophageal reflux disease) 9/20/2013     Hiatal hernia 5/4/2011     HTN, goal below 140/90 9/20/2013     Hyperlipidemia LDL goal <130 9/22/2010    Recent Labs  Lab Test 9/22/10 0908 10/12/06 0939    CHOL 214* 256*    HDL 31* 42*    * 186*    TRIG 149 143    CHOLHDLRATIO 7.0* 6.0*   4/8/11 - dobutamine echo showed NO infarct or ischemia LV 60-65%, normal exercise response.        Lupus (H)      Osteoporosis 10/18/2010    10/10 - severe osteopenia in femoral necks, mild osteopenia in spine  (Problem list name updated by automated process. Provider to review and confirm.)     Pure  hypercholesterolemia      SECONDARY POLYCYTHEMIA 10/17/2006    Due to smoking     Smoker 2008     Systemic lupus erythematosus 2005    Diagnosed in ; history of pericarditis; was previously treated with mtx; not on active treatment; no hx of renal dz from the lupus.       Tobacco use disorder      Type 2 diabetes mellitus with hyperglycemia, without long-term current use of insulin (H) 2019    NEW DIAGNOSIS 2019     Unspecified essential hypertension        Past Surgical History:   Procedure Laterality Date     C APPENDECTOMY       C REPAIR GUM      Cathy Dontal surgery     C/SECTION, LOW TRANSVERSE      , Low Transverse     COLONOSCOPY  2009     D & C      X 5     HC RECONSTR NOSE+DAVE SEPTAL REPAIR       HYSTERECTOMY, BRI      Hx of dysplasia     LAPAROSCOPIC CHOLECYSTECTOMY N/A 2017    Procedure: LAPAROSCOPIC CHOLECYSTECTOMY;  Laparoscopic Cholecystectomy;  Surgeon: Tami Barney MD;  Location: [facility] OR     SINUS SURGERY      Reconstruction       @[facility]@    No current outpatient medications on file.       Allergies   Allergen Reactions     Amoxicillin Difficulty breathing and Rash     Ampicillin Difficulty breathing and Rash     Cipro [Ciprofloxacin] Difficulty breathing and Rash     Keflex [Cephalexin Monohydrate] Difficulty breathing and Rash     Penicillin [Penicillins] Difficulty breathing and Rash     Sulfa Drugs Difficulty breathing and Rash     Tetracycline Difficulty breathing and Rash     Biaxin [Clarithromycin] Rash     Ceclor [Cefaclor] Hives and Swelling     Advair Diskus Hives     Hives on face,neck and chest     Egg White      Fish Shortness Of Breath     Mustard [Allyl Isothiocyanate]      Throat swelling       Pt reports that she has been smoking cigarettes. She has a 0.42 pack-year smoking history. She has never used smokeless tobacco. She reports current alcohol use. She reports that she does not use drugs.    Exam:  There were no vitals taken  for this visit.    Awake, Alert OX3  Lungs - CTA bilaterally  CV - RRR, no murmurs, distal pulses intact  Abd - soft, non-distended, non-tender, +BS  Extr - No cyanosis or edema    A/P 68 year old year old female in need of colonoscopy for screening. Risks, benefits, alternatives, and complications were discussed including the possibility of perforation and the patient agreed to proceed    Javier Fernandez MD

## 2019-10-31 NOTE — ANESTHESIA POSTPROCEDURE EVALUATION
Patient: Aminata Gale    Procedure(s):  COLONOSCOPY, WITH LESION EXCISION USING HOT BIOPSY DEVICE    Diagnosis:* No pre-op diagnosis entered *  Diagnosis Additional Information: No value filed.    Anesthesia Type:  MAC    Note:  Anesthesia Post Evaluation    Patient location during evaluation: Bedside  Patient participation: Able to fully participate in evaluation  Level of consciousness: awake and alert  Pain management: adequate  Airway patency: patent  Cardiovascular status: acceptable  Respiratory status: acceptable  Hydration status: acceptable  PONV: none     Anesthetic complications: None          Last vitals:  Vitals:    10/31/19 0839   BP: 118/69   Resp: 20   Temp: 36.5  C (97.7  F)   SpO2: 98%         Electronically Signed By: Jae Salmon CRNA, APRN CRNA  October 31, 2019  9:44 AM

## 2019-10-31 NOTE — ANESTHESIA CARE TRANSFER NOTE
Patient: Aminata Gale    Procedure(s):  COLONOSCOPY, WITH LESION EXCISION USING HOT BIOPSY DEVICE    Diagnosis: * No pre-op diagnosis entered *  Diagnosis Additional Information: No value filed.    Anesthesia Type:   MAC     Note:    Patient transferred to:Phase II  Handoff Report: Identifed the Patient, Identified the Reponsible Provider, Reviewed the pertinent medical history, Discussed the surgical course, Reviewed Intra-OP anesthesia mangement and issues during anesthesia, Set expectations for post-procedure period and Allowed opportunity for questions and acknowledgement of understanding      Vitals: (Last set prior to Anesthesia Care Transfer)    CRNA VITALS  10/31/2019 0913 - 10/31/2019 0944      10/31/2019             Pulse:  89    Ht Rate:  89    SpO2:  94 %                Electronically Signed By: Jae Salmon CRNA, APRN CRNA  October 31, 2019  9:44 AM

## 2019-11-04 LAB — COPATH REPORT: NORMAL

## 2019-11-06 PROBLEM — D12.6 TUBULAR ADENOMA OF COLON: Status: ACTIVE | Noted: 2019-11-06

## 2019-11-06 PROBLEM — K63.5 COLON POLYP: Status: ACTIVE | Noted: 2019-11-06

## 2019-11-11 RX ORDER — ROPIVACAINE HYDROCHLORIDE 5 MG/ML
3 INJECTION, SOLUTION EPIDURAL; INFILTRATION; PERINEURAL
Status: DISCONTINUED | OUTPATIENT
Start: 2019-06-18 | End: 2020-02-08

## 2019-11-11 RX ORDER — TRIAMCINOLONE ACETONIDE 40 MG/ML
40 INJECTION, SUSPENSION INTRA-ARTICULAR; INTRAMUSCULAR
Status: DISCONTINUED | OUTPATIENT
Start: 2019-06-18 | End: 2020-01-01

## 2020-01-01 ENCOUNTER — HOSPITAL ENCOUNTER (OUTPATIENT)
Dept: CT IMAGING | Facility: CLINIC | Age: 70
Discharge: HOME | End: 2020-12-21
Attending: NURSE PRACTITIONER | Admitting: NURSE PRACTITIONER
Payer: COMMERCIAL

## 2020-01-01 ENCOUNTER — VIRTUAL VISIT (OUTPATIENT)
Dept: CARDIOLOGY | Facility: CLINIC | Age: 70
End: 2020-01-01
Attending: NURSE PRACTITIONER
Payer: COMMERCIAL

## 2020-01-01 ENCOUNTER — TELEPHONE (OUTPATIENT)
Dept: FAMILY MEDICINE | Facility: CLINIC | Age: 70
End: 2020-01-01

## 2020-01-01 ENCOUNTER — NURSE TRIAGE (OUTPATIENT)
Dept: FAMILY MEDICINE | Facility: CLINIC | Age: 70
End: 2020-01-01

## 2020-01-01 ENCOUNTER — VIRTUAL VISIT (OUTPATIENT)
Dept: FAMILY MEDICINE | Facility: CLINIC | Age: 70
End: 2020-01-01
Payer: COMMERCIAL

## 2020-01-01 ENCOUNTER — OFFICE VISIT (OUTPATIENT)
Dept: FAMILY MEDICINE | Facility: CLINIC | Age: 70
End: 2020-01-01
Payer: COMMERCIAL

## 2020-01-01 ENCOUNTER — OFFICE VISIT (OUTPATIENT)
Dept: ORTHOPEDICS | Facility: CLINIC | Age: 70
End: 2020-01-01
Payer: COMMERCIAL

## 2020-01-01 ENCOUNTER — VIRTUAL VISIT (OUTPATIENT)
Dept: CARDIOLOGY | Facility: CLINIC | Age: 70
End: 2020-01-01
Attending: INTERNAL MEDICINE
Payer: COMMERCIAL

## 2020-01-01 VITALS
BODY MASS INDEX: 26.7 KG/M2 | TEMPERATURE: 97.4 F | SYSTOLIC BLOOD PRESSURE: 124 MMHG | HEART RATE: 62 BPM | HEIGHT: 60 IN | WEIGHT: 136 LBS | OXYGEN SATURATION: 96 % | DIASTOLIC BLOOD PRESSURE: 68 MMHG | RESPIRATION RATE: 20 BRPM

## 2020-01-01 VITALS
TEMPERATURE: 98.6 F | SYSTOLIC BLOOD PRESSURE: 128 MMHG | HEART RATE: 90 BPM | DIASTOLIC BLOOD PRESSURE: 76 MMHG | BODY MASS INDEX: 28.04 KG/M2 | OXYGEN SATURATION: 92 % | WEIGHT: 143.6 LBS | RESPIRATION RATE: 18 BRPM

## 2020-01-01 VITALS
BODY MASS INDEX: 27.34 KG/M2 | DIASTOLIC BLOOD PRESSURE: 65 MMHG | SYSTOLIC BLOOD PRESSURE: 97 MMHG | OXYGEN SATURATION: 94 % | HEART RATE: 84 BPM | WEIGHT: 140 LBS

## 2020-01-01 VITALS
TEMPERATURE: 98.3 F | SYSTOLIC BLOOD PRESSURE: 124 MMHG | WEIGHT: 145.6 LBS | DIASTOLIC BLOOD PRESSURE: 86 MMHG | BODY MASS INDEX: 28.44 KG/M2 | RESPIRATION RATE: 20 BRPM | HEART RATE: 90 BPM

## 2020-01-01 VITALS
SYSTOLIC BLOOD PRESSURE: 125 MMHG | WEIGHT: 135 LBS | HEIGHT: 60 IN | DIASTOLIC BLOOD PRESSURE: 74 MMHG | BODY MASS INDEX: 26.5 KG/M2

## 2020-01-01 DIAGNOSIS — M70.61 TROCHANTERIC BURSITIS OF RIGHT HIP: ICD-10-CM

## 2020-01-01 DIAGNOSIS — M25.551 PAIN, JOINT, HIP, RIGHT: ICD-10-CM

## 2020-01-01 DIAGNOSIS — J44.1 COPD EXACERBATION (H): Primary | ICD-10-CM

## 2020-01-01 DIAGNOSIS — M16.11 PRIMARY OSTEOARTHRITIS OF RIGHT HIP: Primary | ICD-10-CM

## 2020-01-01 DIAGNOSIS — K21.9 GASTROESOPHAGEAL REFLUX DISEASE, UNSPECIFIED WHETHER ESOPHAGITIS PRESENT: ICD-10-CM

## 2020-01-01 DIAGNOSIS — Z01.818 PREOP GENERAL PHYSICAL EXAM: Primary | ICD-10-CM

## 2020-01-01 DIAGNOSIS — K62.89 PERIANAL MASS: Primary | ICD-10-CM

## 2020-01-01 DIAGNOSIS — I10 ESSENTIAL HYPERTENSION, BENIGN: ICD-10-CM

## 2020-01-01 DIAGNOSIS — I10 HTN, GOAL BELOW 140/90: ICD-10-CM

## 2020-01-01 DIAGNOSIS — I71.40 ABDOMINAL AORTIC ANEURYSM (AAA) WITHOUT RUPTURE (H): ICD-10-CM

## 2020-01-01 DIAGNOSIS — J44.1 COPD EXACERBATION (H): ICD-10-CM

## 2020-01-01 DIAGNOSIS — E11.29 TYPE 2 DIABETES MELLITUS WITH MICROALBUMINURIA, WITHOUT LONG-TERM CURRENT USE OF INSULIN (H): ICD-10-CM

## 2020-01-01 DIAGNOSIS — I51.89 DIASTOLIC DYSFUNCTION: ICD-10-CM

## 2020-01-01 DIAGNOSIS — J45.40 MODERATE PERSISTENT ASTHMA WITHOUT COMPLICATION: ICD-10-CM

## 2020-01-01 DIAGNOSIS — R10.2 PELVIC PAIN IN FEMALE: Primary | ICD-10-CM

## 2020-01-01 DIAGNOSIS — I25.10 NONOCCLUSIVE CORONARY ATHEROSCLEROSIS OF NATIVE CORONARY ARTERY: ICD-10-CM

## 2020-01-01 DIAGNOSIS — R80.9 TYPE 2 DIABETES MELLITUS WITH MICROALBUMINURIA, WITHOUT LONG-TERM CURRENT USE OF INSULIN (H): ICD-10-CM

## 2020-01-01 DIAGNOSIS — I25.10 NONOCCLUSIVE CORONARY ATHEROSCLEROSIS OF NATIVE CORONARY ARTERY: Primary | ICD-10-CM

## 2020-01-01 DIAGNOSIS — R10.2 PELVIC PAIN IN FEMALE: ICD-10-CM

## 2020-01-01 DIAGNOSIS — K59.00 CONSTIPATION, UNSPECIFIED CONSTIPATION TYPE: ICD-10-CM

## 2020-01-01 DIAGNOSIS — H53.9 VISION CHANGES: ICD-10-CM

## 2020-01-01 DIAGNOSIS — I10 ESSENTIAL (PRIMARY) HYPERTENSION: ICD-10-CM

## 2020-01-01 DIAGNOSIS — R30.0 DYSURIA: ICD-10-CM

## 2020-01-01 DIAGNOSIS — E78.5 HYPERLIPIDEMIA LDL GOAL <130: ICD-10-CM

## 2020-01-01 DIAGNOSIS — E78.2 MIXED HYPERLIPIDEMIA: ICD-10-CM

## 2020-01-01 LAB
ALBUMIN UR-MCNC: NEGATIVE MG/DL
ANION GAP SERPL CALCULATED.3IONS-SCNC: 4 MMOL/L (ref 3–14)
APPEARANCE UR: CLEAR
BILIRUB UR QL STRIP: NEGATIVE
BUN SERPL-MCNC: 24 MG/DL (ref 7–30)
CALCIUM SERPL-MCNC: 9.2 MG/DL (ref 8.5–10.1)
CHLORIDE SERPL-SCNC: 104 MMOL/L (ref 94–109)
CO2 SERPL-SCNC: 33 MMOL/L (ref 20–32)
COLOR UR AUTO: YELLOW
CREAT BLD-MCNC: 0.9 MG/DL (ref 0.52–1.04)
CREAT SERPL-MCNC: 0.76 MG/DL (ref 0.52–1.04)
CREAT UR-MCNC: 50 MG/DL
ERYTHROCYTE [DISTWIDTH] IN BLOOD BY AUTOMATED COUNT: 14 % (ref 10–15)
GFR SERPL CREATININE-BSD FRML MDRD: 62 ML/MIN/{1.73_M2}
GFR SERPL CREATININE-BSD FRML MDRD: 78 ML/MIN/{1.73_M2}
GLUCOSE SERPL-MCNC: 99 MG/DL (ref 70–99)
GLUCOSE UR STRIP-MCNC: NEGATIVE MG/DL
HBA1C MFR BLD: 6.1 % (ref 0–5.6)
HCT VFR BLD AUTO: 50.2 % (ref 35–47)
HGB BLD-MCNC: 16.6 G/DL (ref 11.7–15.7)
HGB UR QL STRIP: NEGATIVE
KETONES UR STRIP-MCNC: NEGATIVE MG/DL
LEUKOCYTE ESTERASE UR QL STRIP: NEGATIVE
MCH RBC QN AUTO: 30.2 PG (ref 26.5–33)
MCHC RBC AUTO-ENTMCNC: 33.1 G/DL (ref 31.5–36.5)
MCV RBC AUTO: 91 FL (ref 78–100)
MICROALBUMIN UR-MCNC: 6 MG/L
MICROALBUMIN/CREAT UR: 12.28 MG/G CR (ref 0–25)
NITRATE UR QL: NEGATIVE
PH UR STRIP: 6 PH (ref 5–7)
PLATELET # BLD AUTO: 285 10E9/L (ref 150–450)
POTASSIUM SERPL-SCNC: 4.3 MMOL/L (ref 3.4–5.3)
RBC # BLD AUTO: 5.5 10E12/L (ref 3.8–5.2)
SODIUM SERPL-SCNC: 141 MMOL/L (ref 133–144)
SOURCE: NORMAL
SP GR UR STRIP: 1.02 (ref 1–1.03)
TSH SERPL DL<=0.005 MIU/L-ACNC: 2.18 MU/L (ref 0.4–4)
UROBILINOGEN UR STRIP-ACNC: 0.2 EU/DL (ref 0.2–1)
WBC # BLD AUTO: 11.1 10E9/L (ref 4–11)

## 2020-01-01 PROCEDURE — 250N000011 HC RX IP 250 OP 636: Performed by: NURSE PRACTITIONER

## 2020-01-01 PROCEDURE — 99214 OFFICE O/P EST MOD 30 MIN: CPT | Performed by: PHYSICIAN ASSISTANT

## 2020-01-01 PROCEDURE — 99214 OFFICE O/P EST MOD 30 MIN: CPT | Performed by: NURSE PRACTITIONER

## 2020-01-01 PROCEDURE — 20611 DRAIN/INJ JOINT/BURSA W/US: CPT | Mod: RT | Performed by: FAMILY MEDICINE

## 2020-01-01 PROCEDURE — 74177 CT ABD & PELVIS W/CONTRAST: CPT

## 2020-01-01 PROCEDURE — 99213 OFFICE O/P EST LOW 20 MIN: CPT | Mod: 95 | Performed by: NURSE PRACTITIONER

## 2020-01-01 PROCEDURE — 83036 HEMOGLOBIN GLYCOSYLATED A1C: CPT | Performed by: NURSE PRACTITIONER

## 2020-01-01 PROCEDURE — 99213 OFFICE O/P EST LOW 20 MIN: CPT | Mod: 95 | Performed by: PHYSICIAN ASSISTANT

## 2020-01-01 PROCEDURE — 82565 ASSAY OF CREATININE: CPT

## 2020-01-01 PROCEDURE — 84443 ASSAY THYROID STIM HORMONE: CPT | Performed by: NURSE PRACTITIONER

## 2020-01-01 PROCEDURE — 250N000009 HC RX 250: Performed by: NURSE PRACTITIONER

## 2020-01-01 PROCEDURE — 99213 OFFICE O/P EST LOW 20 MIN: CPT | Performed by: NURSE PRACTITIONER

## 2020-01-01 PROCEDURE — 82043 UR ALBUMIN QUANTITATIVE: CPT | Performed by: NURSE PRACTITIONER

## 2020-01-01 PROCEDURE — 99213 OFFICE O/P EST LOW 20 MIN: CPT | Mod: 25 | Performed by: FAMILY MEDICINE

## 2020-01-01 PROCEDURE — 99214 OFFICE O/P EST MOD 30 MIN: CPT | Mod: 95 | Performed by: PHYSICIAN ASSISTANT

## 2020-01-01 PROCEDURE — 81003 URINALYSIS AUTO W/O SCOPE: CPT | Performed by: NURSE PRACTITIONER

## 2020-01-01 PROCEDURE — 85027 COMPLETE CBC AUTOMATED: CPT | Performed by: NURSE PRACTITIONER

## 2020-01-01 PROCEDURE — 99214 OFFICE O/P EST MOD 30 MIN: CPT | Mod: 95 | Performed by: INTERNAL MEDICINE

## 2020-01-01 PROCEDURE — 36415 COLL VENOUS BLD VENIPUNCTURE: CPT | Performed by: NURSE PRACTITIONER

## 2020-01-01 PROCEDURE — 80048 BASIC METABOLIC PNL TOTAL CA: CPT | Performed by: NURSE PRACTITIONER

## 2020-01-01 RX ORDER — CETIRIZINE HYDROCHLORIDE 10 MG/1
10 TABLET ORAL DAILY
COMMUNITY

## 2020-01-01 RX ORDER — ALBUTEROL SULFATE 90 UG/1
2 AEROSOL, METERED RESPIRATORY (INHALATION) EVERY 6 HOURS
Qty: 54 G | Refills: 0 | Status: SHIPPED | OUTPATIENT
Start: 2020-01-01 | End: 2021-01-01

## 2020-01-01 RX ORDER — NITROFURANTOIN 25; 75 MG/1; MG/1
100 CAPSULE ORAL 2 TIMES DAILY
Qty: 10 CAPSULE | Refills: 0 | Status: CANCELLED | OUTPATIENT
Start: 2020-01-01 | End: 2020-01-01

## 2020-01-01 RX ORDER — TORSEMIDE 10 MG/1
TABLET ORAL
Qty: 45 TABLET | Refills: 1 | OUTPATIENT
Start: 2020-01-01

## 2020-01-01 RX ORDER — AZITHROMYCIN 500 MG/1
500 TABLET, FILM COATED ORAL DAILY
Qty: 5 TABLET | Refills: 0 | Status: SHIPPED | OUTPATIENT
Start: 2020-01-01 | End: 2020-01-01

## 2020-01-01 RX ORDER — TRIAMCINOLONE ACETONIDE 40 MG/ML
40 INJECTION, SUSPENSION INTRA-ARTICULAR; INTRAMUSCULAR
Status: DISCONTINUED | OUTPATIENT
Start: 2020-01-01 | End: 2020-01-01

## 2020-01-01 RX ORDER — TORSEMIDE 5 MG/1
5 TABLET ORAL DAILY
Qty: 90 TABLET | Refills: 0 | Status: SHIPPED | OUTPATIENT
Start: 2020-01-01 | End: 2021-01-01

## 2020-01-01 RX ORDER — EZETIMIBE 10 MG/1
10 TABLET ORAL DAILY
Qty: 90 TABLET | Refills: 0 | Status: SHIPPED | OUTPATIENT
Start: 2020-01-01 | End: 2021-01-01

## 2020-01-01 RX ORDER — LOSARTAN POTASSIUM 25 MG/1
25 TABLET ORAL DAILY
Qty: 30 TABLET | Refills: 0 | Status: SHIPPED | OUTPATIENT
Start: 2020-01-01 | End: 2021-01-01

## 2020-01-01 RX ORDER — ROPIVACAINE HYDROCHLORIDE 5 MG/ML
3 INJECTION, SOLUTION EPIDURAL; INFILTRATION; PERINEURAL
Status: DISCONTINUED | OUTPATIENT
Start: 2020-01-01 | End: 2020-01-01

## 2020-01-01 RX ORDER — IOPAMIDOL 755 MG/ML
70 INJECTION, SOLUTION INTRAVASCULAR ONCE
Status: COMPLETED | OUTPATIENT
Start: 2020-01-01 | End: 2020-01-01

## 2020-01-01 RX ORDER — PREDNISONE 20 MG/1
40 TABLET ORAL DAILY
Qty: 10 TABLET | Refills: 0 | Status: SHIPPED | OUTPATIENT
Start: 2020-01-01 | End: 2020-01-01

## 2020-01-01 RX ORDER — AMLODIPINE BESYLATE 10 MG/1
10 TABLET ORAL DAILY
Qty: 90 TABLET | Refills: 0 | Status: SHIPPED | OUTPATIENT
Start: 2020-01-01 | End: 2021-01-01

## 2020-01-01 RX ORDER — KETOCONAZOLE 20 MG/ML
SHAMPOO TOPICAL
Qty: 120 ML | Refills: 4 | Status: SHIPPED | OUTPATIENT
Start: 2020-01-01

## 2020-01-01 RX ORDER — PREDNISONE 20 MG/1
TABLET ORAL
Qty: 10 TABLET | Refills: 0 | Status: SHIPPED | OUTPATIENT
Start: 2020-01-01 | End: 2020-01-01

## 2020-01-01 RX ORDER — LORATADINE 10 MG/1
10 TABLET ORAL DAILY
COMMUNITY

## 2020-01-01 RX ADMIN — SODIUM CHLORIDE 58 ML: 9 INJECTION, SOLUTION INTRAVENOUS at 16:31

## 2020-01-01 RX ADMIN — ROPIVACAINE HYDROCHLORIDE 3 ML: 5 INJECTION, SOLUTION EPIDURAL; INFILTRATION; PERINEURAL at 11:40

## 2020-01-01 RX ADMIN — TRIAMCINOLONE ACETONIDE 40 MG: 40 INJECTION, SUSPENSION INTRA-ARTICULAR; INTRAMUSCULAR at 11:40

## 2020-01-01 RX ADMIN — IOPAMIDOL 70 ML: 755 INJECTION, SOLUTION INTRAVENOUS at 16:31

## 2020-01-01 ASSESSMENT — MIFFLIN-ST. JEOR
SCORE: 1053.39
SCORE: 1048.86

## 2020-01-01 ASSESSMENT — ENCOUNTER SYMPTOMS
NAUSEA: 0
DIARRHEA: 0
ABDOMINAL PAIN: 1
FREQUENCY: 1
VOMITING: 0
FLANK PAIN: 0
DYSURIA: 0
CONSTIPATION: 1
CHILLS: 1
HEMATURIA: 0

## 2020-01-17 ENCOUNTER — TRANSFERRED RECORDS (OUTPATIENT)
Dept: HEALTH INFORMATION MANAGEMENT | Facility: CLINIC | Age: 70
End: 2020-01-17

## 2020-01-17 LAB — RETINOPATHY: NEGATIVE

## 2020-01-28 ENCOUNTER — TELEPHONE (OUTPATIENT)
Dept: FAMILY MEDICINE | Facility: CLINIC | Age: 70
End: 2020-01-28

## 2020-01-28 DIAGNOSIS — I10 ESSENTIAL HYPERTENSION: Primary | ICD-10-CM

## 2020-01-28 DIAGNOSIS — R80.9 TYPE 2 DIABETES MELLITUS WITH MICROALBUMINURIA, WITHOUT LONG-TERM CURRENT USE OF INSULIN (H): ICD-10-CM

## 2020-01-28 DIAGNOSIS — E11.29 TYPE 2 DIABETES MELLITUS WITH MICROALBUMINURIA, WITHOUT LONG-TERM CURRENT USE OF INSULIN (H): ICD-10-CM

## 2020-01-28 NOTE — TELEPHONE ENCOUNTER
Please ask Pat to schedule a follow up appt so we can discuss this.   Look like yearly exam due for diabetes HTN COPD CAD  Thanks Sana Olivo FNP-BC

## 2020-01-28 NOTE — TELEPHONE ENCOUNTER
Pat will see Sana Olivo on 2/5 for physical and diabetic check.   She will have lab drawn on Tuesday 2/4 in [place].  Please put in orders.     No need to call her back. She will be fasting

## 2020-01-28 NOTE — TELEPHONE ENCOUNTER
Reason for Call: Request for an order or referral:    Order or referral being requested: Elizabeth from [facility] is calling requesting a Discontinue of her O2 Concentrator.   Does Provider want to have a Overnight Oxyimetery test while sleeping. Does she need the Oxygen.  Per [facility] Pt said she does not need or want any more.  Fax Discontinue Order [phone]  Did Leave message for pt to call see what she says.    Date needed: as soon as possible    Has the patient been seen by the PCP for this problem? YES    Phone number Patient can be reached at:  Home number on file [phone] (home)    Best Time:  Any Time      Can we leave a detailed message on this number?  YES    Call taken on 1/28/2020 at 9:24 AM by Liss Petty

## 2020-02-04 DIAGNOSIS — R80.9 TYPE 2 DIABETES MELLITUS WITH MICROALBUMINURIA, WITHOUT LONG-TERM CURRENT USE OF INSULIN (H): ICD-10-CM

## 2020-02-04 DIAGNOSIS — E11.29 TYPE 2 DIABETES MELLITUS WITH MICROALBUMINURIA, WITHOUT LONG-TERM CURRENT USE OF INSULIN (H): ICD-10-CM

## 2020-02-04 LAB
CHOLEST SERPL-MCNC: 209 MG/DL
CREAT UR-MCNC: 140 MG/DL
HBA1C MFR BLD: 6.6 % (ref 0–5.6)
HDLC SERPL-MCNC: 55 MG/DL
LDLC SERPL CALC-MCNC: 125 MG/DL
MICROALBUMIN UR-MCNC: 40 MG/L
MICROALBUMIN/CREAT UR: 28.79 MG/G CR (ref 0–25)
NONHDLC SERPL-MCNC: 154 MG/DL
TRIGL SERPL-MCNC: 144 MG/DL

## 2020-02-04 PROCEDURE — 36415 COLL VENOUS BLD VENIPUNCTURE: CPT | Performed by: NURSE PRACTITIONER

## 2020-02-04 PROCEDURE — 80061 LIPID PANEL: CPT | Performed by: NURSE PRACTITIONER

## 2020-02-04 PROCEDURE — 82043 UR ALBUMIN QUANTITATIVE: CPT | Performed by: NURSE PRACTITIONER

## 2020-02-04 PROCEDURE — 83036 HEMOGLOBIN GLYCOSYLATED A1C: CPT | Performed by: NURSE PRACTITIONER

## 2020-02-05 ENCOUNTER — OFFICE VISIT (OUTPATIENT)
Dept: FAMILY MEDICINE | Facility: CLINIC | Age: 70
End: 2020-02-05
Payer: COMMERCIAL

## 2020-02-05 VITALS
HEART RATE: 95 BPM | HEIGHT: 60 IN | OXYGEN SATURATION: 94 % | BODY MASS INDEX: 26.7 KG/M2 | DIASTOLIC BLOOD PRESSURE: 62 MMHG | WEIGHT: 136 LBS | SYSTOLIC BLOOD PRESSURE: 110 MMHG | RESPIRATION RATE: 14 BRPM

## 2020-02-05 DIAGNOSIS — J44.1 COPD EXACERBATION (H): ICD-10-CM

## 2020-02-05 DIAGNOSIS — R80.9 TYPE 2 DIABETES MELLITUS WITH MICROALBUMINURIA, WITHOUT LONG-TERM CURRENT USE OF INSULIN (H): Primary | ICD-10-CM

## 2020-02-05 DIAGNOSIS — E78.2 MIXED HYPERLIPIDEMIA: ICD-10-CM

## 2020-02-05 DIAGNOSIS — E11.29 TYPE 2 DIABETES MELLITUS WITH MICROALBUMINURIA, WITHOUT LONG-TERM CURRENT USE OF INSULIN (H): Primary | ICD-10-CM

## 2020-02-05 DIAGNOSIS — I10 HTN, GOAL BELOW 140/90: ICD-10-CM

## 2020-02-05 DIAGNOSIS — J45.40 MODERATE PERSISTENT ASTHMA WITHOUT COMPLICATION: ICD-10-CM

## 2020-02-05 DIAGNOSIS — R10.32 ABDOMINAL PAIN, LEFT LOWER QUADRANT: ICD-10-CM

## 2020-02-05 DIAGNOSIS — Z11.59 NEED FOR HEPATITIS C SCREENING TEST: ICD-10-CM

## 2020-02-05 DIAGNOSIS — R10.2 VAGINAL PAIN: ICD-10-CM

## 2020-02-05 DIAGNOSIS — Z12.31 VISIT FOR SCREENING MAMMOGRAM: ICD-10-CM

## 2020-02-05 DIAGNOSIS — D75.1 POLYCYTHEMIA, SECONDARY: ICD-10-CM

## 2020-02-05 LAB
ALBUMIN SERPL-MCNC: 3.8 G/DL (ref 3.4–5)
ALP SERPL-CCNC: 119 U/L (ref 40–150)
ALT SERPL W P-5'-P-CCNC: 22 U/L (ref 0–50)
ANION GAP SERPL CALCULATED.3IONS-SCNC: 5 MMOL/L (ref 3–14)
AST SERPL W P-5'-P-CCNC: 17 U/L (ref 0–45)
BASOPHILS # BLD AUTO: 0 10E9/L (ref 0–0.2)
BASOPHILS NFR BLD AUTO: 0.4 %
BILIRUB SERPL-MCNC: 0.4 MG/DL (ref 0.2–1.3)
BUN SERPL-MCNC: 14 MG/DL (ref 7–30)
CALCIUM SERPL-MCNC: 9.4 MG/DL (ref 8.5–10.1)
CHLORIDE SERPL-SCNC: 103 MMOL/L (ref 94–109)
CO2 SERPL-SCNC: 32 MMOL/L (ref 20–32)
CREAT SERPL-MCNC: 0.89 MG/DL (ref 0.52–1.04)
DIFFERENTIAL METHOD BLD: ABNORMAL
EOSINOPHIL # BLD AUTO: 0.1 10E9/L (ref 0–0.7)
EOSINOPHIL NFR BLD AUTO: 1.1 %
ERYTHROCYTE [DISTWIDTH] IN BLOOD BY AUTOMATED COUNT: 14 % (ref 10–15)
GFR SERPL CREATININE-BSD FRML MDRD: 65 ML/MIN/{1.73_M2}
GLUCOSE SERPL-MCNC: 104 MG/DL (ref 70–99)
HCT VFR BLD AUTO: 50.8 % (ref 35–47)
HGB BLD-MCNC: 16.7 G/DL (ref 11.7–15.7)
LYMPHOCYTES # BLD AUTO: 3.1 10E9/L (ref 0.8–5.3)
LYMPHOCYTES NFR BLD AUTO: 30.2 %
MCH RBC QN AUTO: 29.9 PG (ref 26.5–33)
MCHC RBC AUTO-ENTMCNC: 32.9 G/DL (ref 31.5–36.5)
MCV RBC AUTO: 91 FL (ref 78–100)
MONOCYTES # BLD AUTO: 1.2 10E9/L (ref 0–1.3)
MONOCYTES NFR BLD AUTO: 11.9 %
NEUTROPHILS # BLD AUTO: 5.8 10E9/L (ref 1.6–8.3)
NEUTROPHILS NFR BLD AUTO: 56.4 %
PLATELET # BLD AUTO: 301 10E9/L (ref 150–450)
POTASSIUM SERPL-SCNC: 3.4 MMOL/L (ref 3.4–5.3)
PROT SERPL-MCNC: 8.1 G/DL (ref 6.8–8.8)
RBC # BLD AUTO: 5.58 10E12/L (ref 3.8–5.2)
SODIUM SERPL-SCNC: 140 MMOL/L (ref 133–144)
SPECIMEN SOURCE: NORMAL
WBC # BLD AUTO: 10.3 10E9/L (ref 4–11)
WET PREP SPEC: NORMAL

## 2020-02-05 PROCEDURE — 99214 OFFICE O/P EST MOD 30 MIN: CPT | Performed by: NURSE PRACTITIONER

## 2020-02-05 PROCEDURE — 85025 COMPLETE CBC W/AUTO DIFF WBC: CPT | Performed by: NURSE PRACTITIONER

## 2020-02-05 PROCEDURE — 87210 SMEAR WET MOUNT SALINE/INK: CPT | Performed by: NURSE PRACTITIONER

## 2020-02-05 PROCEDURE — 80053 COMPREHEN METABOLIC PANEL: CPT | Performed by: NURSE PRACTITIONER

## 2020-02-05 PROCEDURE — 36415 COLL VENOUS BLD VENIPUNCTURE: CPT | Performed by: NURSE PRACTITIONER

## 2020-02-05 PROCEDURE — 86803 HEPATITIS C AB TEST: CPT | Performed by: NURSE PRACTITIONER

## 2020-02-05 RX ORDER — LOSARTAN POTASSIUM 25 MG/1
25 TABLET ORAL DAILY
Qty: 30 TABLET | Refills: 11 | Status: SHIPPED | OUTPATIENT
Start: 2020-02-05 | End: 2020-01-01

## 2020-02-05 RX ORDER — EZETIMIBE 10 MG/1
10 TABLET ORAL DAILY
Qty: 90 TABLET | Refills: 3 | Status: SHIPPED | OUTPATIENT
Start: 2020-02-05 | End: 2020-01-01

## 2020-02-05 RX ORDER — AMLODIPINE BESYLATE 10 MG/1
10 TABLET ORAL DAILY
Qty: 90 TABLET | Refills: 3 | Status: SHIPPED | OUTPATIENT
Start: 2020-02-05 | End: 2020-01-01

## 2020-02-05 RX ORDER — IPRATROPIUM BROMIDE AND ALBUTEROL SULFATE 2.5; .5 MG/3ML; MG/3ML
1 SOLUTION RESPIRATORY (INHALATION) EVERY 6 HOURS PRN
Qty: 1 BOX | Refills: 3 | Status: SHIPPED | OUTPATIENT
Start: 2020-02-05

## 2020-02-05 RX ORDER — ALBUTEROL SULFATE 90 UG/1
2 AEROSOL, METERED RESPIRATORY (INHALATION) EVERY 6 HOURS
Qty: 54 G | Refills: 3 | Status: SHIPPED | OUTPATIENT
Start: 2020-02-05 | End: 2020-01-01

## 2020-02-05 RX ORDER — TORSEMIDE 5 MG/1
5 TABLET ORAL DAILY
Qty: 90 TABLET | Refills: 1 | Status: SHIPPED | OUTPATIENT
Start: 2020-02-05 | End: 2020-04-03

## 2020-02-05 ASSESSMENT — ASTHMA QUESTIONNAIRES
ACUTE_EXACERBATION_TODAY: NO
QUESTION_5 LAST FOUR WEEKS HOW WOULD YOU RATE YOUR ASTHMA CONTROL: SOMEWHAT CONTROLLED
QUESTION_4 LAST FOUR WEEKS HOW OFTEN HAVE YOU USED YOUR RESCUE INHALER OR NEBULIZER MEDICATION (SUCH AS ALBUTEROL): ONCE A WEEK OR LESS
ACT_TOTALSCORE: 20
QUESTION_2 LAST FOUR WEEKS HOW OFTEN HAVE YOU HAD SHORTNESS OF BREATH: ONCE OR TWICE A WEEK
QUESTION_3 LAST FOUR WEEKS HOW OFTEN DID YOUR ASTHMA SYMPTOMS (WHEEZING, COUGHING, SHORTNESS OF BREATH, CHEST TIGHTNESS OR PAIN) WAKE YOU UP AT NIGHT OR EARLIER THAN USUAL IN THE MORNING: ONCE OR TWICE
QUESTION_1 LAST FOUR WEEKS HOW MUCH OF THE TIME DID YOUR ASTHMA KEEP YOU FROM GETTING AS MUCH DONE AT WORK, SCHOOL OR AT HOME: NONE OF THE TIME

## 2020-02-05 ASSESSMENT — MIFFLIN-ST. JEOR: SCORE: 1053.39

## 2020-02-05 ASSESSMENT — PAIN SCALES - GENERAL: PAINLEVEL: NO PAIN (0)

## 2020-02-05 NOTE — LETTER
My Asthma Action Plan    Name: Aminata Gale   YOB: 1950  Date: 2/5/2020   My doctor: PEDRO Dominguez CNP   My clinic: [facility]        My Rescue Medicine:   Albuterol inhaler (Proair/Ventolin/Proventil HFA)  2-4 puffs EVERY 4 HOURS as needed. Use a spacer if recommended by your provider.   My Asthma Severity:   Intermittent / Exercise Induced  Know your asthma triggers: upper respiratory infections, mold and humidity  None          GREEN ZONE   Good Control    I feel good    No cough or wheeze    Can work, sleep and play without asthma symptoms       Take your asthma control medicine every day.     1. If exercise triggers your asthma, take your rescue medication    15 minutes before exercise or sports, and    During exercise if you have asthma symptoms  2. Spacer to use with inhaler: If you have a spacer, make sure to use it with your inhaler             YELLOW ZONE Getting Worse  I have ANY of these:    I do not feel good    Cough or wheeze    Chest feels tight    Wake up at night   1. Keep taking your Green Zone medications  2. Start taking your rescue medicine:    every 20 minutes for up to 1 hour. Then every 4 hours for 24-48 hours.  3. If you stay in the Yellow Zone for more than 12-24 hours, contact your doctor.  4. If you do not return to the Green Zone in 12-24 hours or you get worse, start taking your oral steroid medicine if prescribed by your provider.           RED ZONE Medical Alert - Get Help  I have ANY of these:    I feel awful    Medicine is not helping    Breathing getting harder    Trouble walking or talking    Nose opens wide to breathe       1. Take your rescue medicine NOW  2. If your provider has prescribed an oral steroid medicine, start taking it NOW  3. Call your doctor NOW  4. If you are still in the Red Zone after 20 minutes and you have not reached your doctor:    Take your rescue medicine again and    Call 911 or go to the emergency  room right away    See your regular doctor within 2 weeks of an Emergency Room or Urgent Care visit for follow-up treatment.          Annual Reminders:  Meet with Asthma Educator,  Flu Shot in the Fall, consider Pneumonia Vaccination for patients with asthma (aged 19 and older).    Pharmacy:    [facility] [place] [address] [facility] [place] [address]  [facility] [place] - [address]  [id number] [place] - [address] [place] [address] [address] [facility]  [place]  [id number] - [place], [place] - [address]  [facility] - [place], [place] - [address]    Electronically signed by PEDRO Dominguez CNP   Date: 02/05/20                    Asthma Triggers  How To Control Things That Make Your Asthma Worse    Triggers are things that make your asthma worse.  Look at the list below to help you find your triggers and   what you can do about them. You can help prevent asthma flare-ups by staying away from your triggers.      Trigger                                                          What you can do   Cigarette Smoke  Tobacco smoke can make asthma worse. Do not allow smoking in your home, car or around you.  Be sure no one smokes at a child s day care or school.  If you smoke, ask your health care provider for ways to help you quit.  Ask family members to quit too.  Ask your health care provider for a referral to Quit Plan to help you quit smoking, or call 5-319-118-PLAN.     Colds, Flu, Bronchitis  These are common triggers of asthma. Wash your hands often.  Don t touch your eyes, nose or mouth.  Get a flu shot every year.     Dust Mites  These are tiny bugs that live in cloth or carpet. They are too small to see. Wash sheets and blankets in hot water every week.   Encase pillows and mattress in dust mite proof  covers.  Avoid having carpet if you can. If you have carpet, vacuum weekly.   Use a dust mask and HEPA vacuum.   Pollen and Outdoor Mold  Some people are allergic to trees, grass, or weed pollen, or molds. Try to keep your windows closed.  Limit time out doors when pollen count is high.   Ask you health care provider about taking medicine during allergy season.     Animal Dander  Some people are allergic to skin flakes, urine or saliva from pets with fur or feathers. Keep pets with fur or feathers out of your home.    If you can t keep the pet outdoors, then keep the pet out of your bedroom.  Keep the bedroom door closed.  Keep pets off cloth furniture and away from stuffed toys.     Mice, Rats, and Cockroaches  Some people are allergic to the waste from these pests.   Cover food and garbage.  Clean up spills and food crumbs.  Store grease in the refrigerator.   Keep food out of the bedroom.   Indoor Mold  This can be a trigger if your home has high moisture. Fix leaking faucets, pipes, or other sources of water.   Clean moldy surfaces.  Dehumidify basement if it is damp and smelly.   Smoke, Strong Odors, and Sprays  These can reduce air quality. Stay away from strong odors and sprays, such as perfume, powder, hair spray, paints, smoke incense, paint, cleaning products, candles and new carpet.   Exercise or Sports  Some people with asthma have this trigger. Be active!  Ask your doctor about taking medicine before sports or exercise to prevent symptoms.    Warm up for 5-10 minutes before and after sports or exercise.     Other Triggers of Asthma  Cold air:  Cover your nose and mouth with a scarf.  Sometimes laughing or crying can be a trigger.  Some medicines and food can trigger asthma.

## 2020-02-05 NOTE — LETTER
February 5, 2020      Aminata KHAN [place]wood  [address] [place] [address]      To [facility]       Discontinue oxygen concentrator and portable oxygen tanks through your organization.  Patient is opting to go to another carrier for services.     Sincerely,        PEDRO Dominguez CNP

## 2020-02-05 NOTE — PROGRESS NOTES
Subjective     Aminata Gale is a 69 year old female who presents to clinic today for the following health issues:    HPI   Diabetes Follow-up      How often are you checking your blood sugar? Not at all    What concerns do you have today about your diabetes? None     Do you have any of these symptoms? (Select all that apply)  No numbness or tingling in feet.  No redness, sores or blisters on feet.  No complaints of excessive thirst.  No reports of blurry vision.  No significant changes to weight.    Have you had a diabetic eye exam in the last 12 months? Yes- Date of last eye exam: 01/2020,  Location: [place] [facility]        BP Readings from Last 2 Encounters:   02/05/20 110/62   10/31/19 102/60     Hemoglobin A1C (%)   Date Value   02/04/2020 6.6 (H)   02/22/2019 6.7 (H)     LDL Cholesterol Calculated (mg/dL)   Date Value   02/04/2020 125 (H)   02/22/2019 108 (H)                 Asthma Follow-Up    Was ACT completed today?    Yes    ACT Total Scores 2/5/2020   ACT TOTAL SCORE -   ASTHMA ER VISITS -   ASTHMA HOSPITALIZATIONS -   ACT TOTAL SCORE (Goal Greater than or Equal to 20) 20   In the past 12 months, how many times did you visit the emergency room for your asthma without being admitted to the hospital? 0   In the past 12 months, how many times were you hospitalized overnight because of your asthma? 0       COPD   Ongoing tobacco use.   Needs portable concentrator for travel. Difficulty with [employer]. Would like to get from a different source. Would like to discontinue her home concentrator too noisey and big and refuses to use this. SHORTNESS OF BREATH is fine at home most days unless she has go be more physically active then will use 2L per [place]. Very severe when she travels especially when flying needs portable lightweight concentrator for same.     INTERMITTENT SEVERE LLQ PAIN. Off and on for several months. Occasional dysuria. Occasional vaginal pain/dryness  NO CONSTIPATION OR DIARRHEA. NO BLOOD IN THE  STOOLS.     Patient Active Problem List   Diagnosis     Systemic lupus erythematosus (H)     Esophageal reflux     Essential hypertension, benign     Allergic rhinitis     Polycythemia, secondary     Female stress incontinence     Other nonspecific abnormal result of function study of brain and central nervous system     Smoker     Hyperlipidemia LDL goal <130     Osteoporosis     Hiatal hernia     Advanced directives, counseling/discussion     Acne     SK (seborrheic keratosis)     Lentigo     Angioma     Moderate persistent asthma     Chronic pain     HTN, goal below 140/90     Anxiety     Refused influenza vaccine     Grief reaction     Centrilobular emphysema (H)     Acute cholecystitis     TMJ (temporomandibular joint syndrome)     Type 2 diabetes mellitus with hyperglycemia, without long-term current use of insulin (H)     Type 2 diabetes mellitus with microalbuminuria, without long-term current use of insulin (H)     Nonocclusive coronary atherosclerosis of native coronary artery     Diastolic dysfunction     Tubular adenoma of colon     Colon polyp     Past Surgical History:   Procedure Laterality Date     C APPENDECTOMY       C REPAIR GUM      Cathy Dontal surgery     C/SECTION, LOW TRANSVERSE      , Low Transverse     COLONOSCOPY  2009     D & C      X 5     HC RECONSTR NOSE+DAVE SEPTAL REPAIR       HYSTERECTOMY, BRI      Hx of dysplasia     LAPAROSCOPIC CHOLECYSTECTOMY N/A 2017    Procedure: LAPAROSCOPIC CHOLECYSTECTOMY;  Laparoscopic Cholecystectomy;  Surgeon: Tami Barney MD;  Location: [facility]     SINUS SURGERY      Reconstruction       Social History     Tobacco Use     Smoking status: Current Every Day Smoker     Packs/day: 0.01     Years: 42.00     Pack years: 0.42     Types: Cigarettes     Smokeless tobacco: Never Used     Tobacco comment: Has chantix at home, but hasn't started yet. 17.   Substance Use Topics     Alcohol use: Yes     Alcohol/week: 0.0 standard  drinks     Comment: Occ. wine     Family History   Problem Relation Age of Onset     Diabetes Mother      Gastrointestinal Disease Mother         Gallbladder disease     Heart Disease Mother      Cancer Father         lung     Alcohol/Drug Father      Allergies Father      Heart Disease Father      Gastrointestinal Disease Father         Liver disease     Skin Cancer Father      Heart Disease Maternal Grandfather      Arthritis Sister      Osteoporosis Sister      Thyroid Disease Sister      Allergies Son      Alcohol/Drug Sister      Alcohol/Drug Sister      Melanoma No family hx of            -------------------------------------  Reviewed and updated as needed this visit by Provider         Review of Systems   ROS COMP: Constitutional, HEENT, cardiovascular, pulmonary, GI, , musculoskeletal, neuro, skin, endocrine and psych systems are negative, except as otherwise noted.      Objective    /62   Pulse 95   Resp 14   Ht 1.524 m (5')   Wt 61.7 kg (136 lb)   SpO2 94%   BMI 26.56 kg/m    Body mass index is 26.56 kg/m .  Physical Exam   Oxygen 94% on Room Air    GENERAL: healthy, alert and no distress  EYES: Eyes grossly normal to inspection, PERRL and conjunctivae and sclerae normal  HENT: ear canals and TM's normal, nose and mouth without ulcers or lesions  NECK: no adenopathy, no asymmetry, masses, or scars and thyroid normal to palpation  RESP: lungs clear to auscultation - no rales, rhonchi or wheezes  CV: regular rate and rhythm, normal S1 S2, no S3 or S4, no murmur, click or rub, no peripheral edema and peripheral pulses strong  ABDOMEN: soft, nontender, no hepatosplenomegaly, no masses and bowel sounds normal  MS: no gross musculoskeletal defects noted, no edema  SKIN: no suspicious lesions or rashes  NEURO: Normal strength and tone, mentation intact and speech normal  PSYCH: mentation appears normal, affect normal/bright    Diagnostic Test Results:  Labs reviewed in Epic  Results for orders  placed or performed in visit on 02/05/20   CBC with platelets and differential     Status: Abnormal   Result Value Ref Range    WBC 10.3 4.0 - 11.0 10e9/L    RBC Count 5.58 (H) 3.8 - 5.2 10e12/L    Hemoglobin 16.7 (H) 11.7 - 15.7 g/dL    Hematocrit 50.8 (H) 35.0 - 47.0 %    MCV 91 78 - 100 fl    MCH 29.9 26.5 - 33.0 pg    MCHC 32.9 31.5 - 36.5 g/dL    RDW 14.0 10.0 - 15.0 %    Platelet Count 301 150 - 450 10e9/L    % Neutrophils 56.4 %    % Lymphocytes 30.2 %    % Monocytes 11.9 %    % Eosinophils 1.1 %    % Basophils 0.4 %    Absolute Neutrophil 5.8 1.6 - 8.3 10e9/L    Absolute Lymphocytes 3.1 0.8 - 5.3 10e9/L    Absolute Monocytes 1.2 0.0 - 1.3 10e9/L    Absolute Eosinophils 0.1 0.0 - 0.7 10e9/L    Absolute Basophils 0.0 0.0 - 0.2 10e9/L    Diff Method Automated Method            Assessment & Plan     Aminata was seen today for diabetes and asthma.    Diagnoses and all orders for this visit:    Type 2 diabetes mellitus with microalbuminuria, without long-term current use of insulin (H)  -     Cancel: HEMOGLOBIN A1C  -     Cancel: Lipid panel reflex to direct LDL Fasting  -     Cancel: Albumin Random Urine Quantitative with Creat Ratio    COPD exacerbation (H)  -     fluticasone-vilanterol (BREO ELLIPTA) 200-25 MCG/INH inhaler; INHALE 1 PUFF INTO THE LUNGS DAILY    Polycythemia, secondary  -     CBC with platelets and differential    Need for hepatitis C screening test  -     Hepatitis C Screen Reflex to HCV RNA Quant and Genotype    Visit for screening mammogram  -     MA SCREENING DIGITAL BILAT - Future  (s+30); Future    Mixed hyperlipidemia  -     ezetimibe (ZETIA) 10 MG tablet; Take 1 tablet (10 mg) by mouth daily    HTN, goal below 140/90  -     amLODIPine (NORVASC) 10 MG tablet; Take 1 tablet (10 mg) by mouth daily For high blood pressure    Moderate persistent asthma without complication  -     fluticasone-vilanterol (BREO ELLIPTA) 200-25 MCG/INH inhaler; INHALE 1 PUFF INTO THE LUNGS DAILY  -      tiotropium (SPIRIVA RESPIMAT) 2.5 MCG/ACT inhaler; Inhale 2 puffs into the lungs daily    Vaginal pain  -     Wet prep    Abdominal pain, left lower quadrant  -     CT Abdomen Pelvis w Contrast; Future    Other orders  -     Comprehensive metabolic panel  -     losartan (COZAAR) 25 MG tablet; Take 1 tablet (25 mg) by mouth daily  -     ipratropium - albuterol 0.5 mg/2.5 mg/3 mL (DUONEB) 0.5-2.5 (3) MG/3ML neb solution; Take 1 vial (3 mLs) by nebulization every 6 hours as needed for shortness of breath / dyspnea or wheezing  -     torsemide (DEMADEX) 5 MG tablet; Take 1 tablet (5 mg) by mouth daily  -     albuterol (PROAIR HFA/PROVENTIL HFA/VENTOLIN HFA) 108 (90 Base) MCG/ACT inhaler; Inhale 2 puffs into the lungs every 6 hours      Smoking cessation strongly recommended  CT scan abdomen pelvis with contrast secondary to ongoing pelvic pain left lower quadrant pain  Wet prep today  Continue Spiriva  Continue albuterol  Continue Brio Ellipta  Losartan 25 mg daily  Aspirin 81 mg daily  Zetia 10 mg daily  Amlodipine 10 mg daily  Torsemide 5 mg daily as needed  : Schedule mammogram  Letter generated today for [facility] to discontinue services through their organization  May need a repeat 6-minute walk test for recertification    BMI:   Estimated body mass index is 26.56 kg/m  as calculated from the following:    Height as of this encounter: 1.524 m (5').    Weight as of this encounter: 61.7 kg (136 lb).           Consider pulmonary rehab  See Patient Instructions    6 months diabetes follow-up    [facility] Doming[facility]

## 2020-02-06 LAB — HCV AB SERPL QL IA: NONREACTIVE

## 2020-02-06 ASSESSMENT — ASTHMA QUESTIONNAIRES: ACT_TOTALSCORE: 20

## 2020-02-09 NOTE — PATIENT INSTRUCTIONS
"  Patient Education   Taking Aspirin Every Day  The basics  Taking aspirin every day can lower your risk of heart attack and stroke. Ask your doctor about taking aspirin if you:    Are a man age 45 or older    Are a woman age 55 or older    Smoke    Have high blood pressure, high cholesterol or diabetes    Have a family history of heart disease    Have already had a heart attack or stroke  For most people, aspirin is safe. But it's not right for everyone. Talk to your doctor before you start taking aspirin every day.  The benefits  Aspirin can reduce your risk of heart attack or stroke. It can:    Improve the flow of blood to the heart and brain    Help keep your arteries open if you have had a stroke or angioplasty  If you have already had a heart attack or stroke, daily aspirin can lower your risk of having another one.  Take action!  Your doctor can help you decide if aspirin is the right choice for you. Talk to your doctor about:    Your risk of heart attack    What kind of aspirin to take    How much to take    How often to take it  Be sure to tell your doctor about all the other medicines that you take, including vitamins.   For informational purposes only. Not to replace the advice of your health care provider. From \"Talk with Your Doctor about Taking Aspirin Every Day,\" by the U.S. Dept. of Health and Human Services (www.healthfinder.gov). [employer] 570210 - Rev 03/16.       Patient Education     Prevention Guidelines, Women Ages 65 and Older  Screening tests and vaccines are an important part of managing your health. A screening test is done to find possible disorders or diseases in people who don't have any symptoms. The goal is to find a disease early so lifestyle changes can be made and you can be watched more closely to reduce the risk of disease, or to detect it early enough to treat it most effectively. Screening tests are not considered diagnostic, but are used to determine if more testing is " needed. Health counseling is essential, too. Below are guidelines for these, for women ages 65 and older. Talk with your healthcare provider to make sure you re up to date on what you need.  Screening Who needs it How often   Type 2 diabetes or prediabetes All women ages 40 to 75 who are overweight or obese At least every 3 years   Type 2 diabetes All women with prediabetes Every year   Alcohol misuse All women in this age group At routine exams   Blood pressure All women in this age group Yearly checkup if your blood pressure is normal*  Normal blood pressure is less than 120/80 mm Hg*  If your blood pressure reading is higher than normal, follow the advice of your healthcare provider   Breast cancer All women of average risk  There are no guidelines for breast cancer screening for 75 years and older.  Mammograms should be done every 1 or 2 years until age 75. At that point a woman should talk to her doctor about whether to continue screening. Talk to your doctor regarding your recommended frequency depending on your risk factors.   Cervical cancer Only women who had abnormal screening results before age 65 Talk with your healthcare provider   Chlamydia Women at increased risk for infection At routine exams   Colorectal cancer All women over the age of 50  This screening is advised against for women over 75 or if there is a life expectancy of less than 10 years.  Multiple tests are available and are used at different times. Possible tests include: flexible sigmoidoscopy, colonoscopy, double-contrast barium enema, yearly fecal occult blood test, fecal immunochemical test, or stool DNA test as often as your healthcare provider advises. Talk with your healthcare provider about which tests are best for you.   Depression All women in this age group At routine exams   Gonorrhea Sexually active women at increased risk for infection At routine exams   Hepatitis C Anyone at increased risk; 1 time for those born between 1945  and 1965 At routine exams   High cholesterol or triglycerides All women in this age group who are at risk for coronary artery disease At least every 5 years   HIV Women at increased risk for infection-talk with your healthcare provider At routine exams   Lung cancer Adults ages 55 to 74 who have smoked with a 30-pack-a-year history and have smoked within the past 15 years  Annual low dose CT scan   Obesity All women in this age group At routine exams   Osteoporosis All women in this age group Bone density test at age 65, then follow-up as advised by your healthcare provider   Syphilis Women at increased risk for infection-talk with your healthcare provider At routine exams   Thyroid-Stimulating Hormone (TSH) All women in this age group with symptoms of thyroid dysfunction. There is not enough evidence to support TSH screening in women without symptoms.  ACOG recommendation is every 5 years; American Academy of Family Physicians concludes there is not enough evidence to support routine screening in adults without symptoms.    Tuberculosis Women at increased risk for infection-talk with your healthcare provider Ask your healthcare provider   Vision All women in this age group Every 1 to 2 years; if you have a chronic health condition, ask your healthcare provider if you need exams more often   Vaccine Who needs it How often   Chickenpox (varicella) All women in this age group who have no record of this infection or vaccine 2 doses; second dose should be given at least 4 weeks after the first dose   Hepatitis A Women at increased risk for infection-talk with your healthcare provider 2 doses given 6 months apart   Hepatitis B Women at increased risk for infection-talk with your healthcare provider 3 doses over 6 months; second dose should be given 1 month after the first dose; the third dose should be given at least 2 months after the second dose and at least 4 months after the first dose   Haemophilus influenza Type B  (HIB) Women at increased risk for infection-talk with your healthcare provider 1 to 3 doses   Influenza (flu) All women in this age group Once a year   Pneumococcal conjugate vaccine (PCV13) and pneumococcal polysaccharide vaccine (PPSV23) All women in this age group 1 dose of each vaccine   Tetanus/diphtheria/pertussis (Td/Tdap) booster All women in this age group Td every 10 years, or a one-time dose of Tdap instead of a Td booster after age 18, then Td every 10 years   Zoster All women in this age group 1 dose   Counseling Who needs it How often   Diet and exercise Women who are overweight or obese When diagnosed, and then at routine exams   Fall prevention (exercise and vitamin D supplements) All women in this age group At routine exams   Sexually transmitted infection prevention Women at increased risk for infection-talk with your healthcare provider At routine exams   Use of daily aspirin Talk to your healthcare provider about whether or not to start taking low-dose aspirin for the prevention of cardiovascular disease (CVD) and colorectal cancer in adults ages 60 to 69 who have at least a 10% risk of getting CVD within the next 10 years.  People who are not at increased risk for bleeding, have a life expectancy of at least 10 years, and are willing to take low-dose aspirin daily for at least 10 years are more likely to benefit. When the advantages of taking low-dose aspirin outweigh the risks, people may choose to start taking a low-dose aspirin.  There is not enough data to support the use of aspirin in people over the age of 70.  When your risk is known   Use of tobacco and the health effects it can cause All women in this age group Every exam   Date Last Reviewed: 11/1/2017 2000-2019 [employer]. [address] [place] [address], [place] [address]. All rights reserved. This information is not intended as a substitute for professional medical care. Always follow your healthcare professional's  instructions.           Patient Education     Stroke and Heart Disease  Every part of your body, including your heart and brain, needs oxygen to work. Oxygen is carried in the blood. Blood vessels called arteries carry oxygen-rich blood throughout the body. Both heart attack and stroke are due to problems in the arteries. The same factors that cause heart disease can make you more likely to have a stroke.    Heart attack. A heart attack is caused by blockage in an artery that carries blood to the heart muscle. If blood is blocked, that part of the heart muscle is damaged or dies.    Stroke. If an artery supplying the brain is blocked, a stroke may result. This is called an ischemic stroke. It is caused by a piece of plaque breaking loose from an artery (such as a carotid artery in the neck) or from the heart and lodging in the brain. A stroke caused by the rupture of a weakened blood vessel is called a hemorrhagic stroke.  Both heart attack and stroke are medical emergencies that can lead to serious health problems. They can even be fatal.      Healthy artery  A healthy artery is a tube with flexible walls and a smooth inner lining. Blood flows freely through it.  Unhealthy artery  Artery problems start when the inner lining gets damaged. This is often because of risk factors such as smoking and high blood pressure. These can make the artery walls stiff. Plaque, a fatty mix of cholesterol and other material, forms in the lining. This narrows the channel. Plaque can break, restricting blood flow further. It can also cause a blood clot to form. A blood clot may block the artery s channel completely.   Reducing your risk  Making changes that make your arteries healthier will help lower your risk for both heart attack and stroke. If you have heart disease, you may need to work on a few aspects of your lifestyle. But remember that the things that are good for your arteries, heart, and brain are also good for the rest of  your body.  Your healthcare provider will work with you to make lifestyle changes as needed to help prevent progression of atherosclerotic cardiovascular disease. This can lead to heart attack or stroke. Factors you may need to work on include:    Diet. Your healthcare provider will give you information on dietary changes that you may need to make based on your situation. Your provider may recommend that you see a registered dietitian for help with diet changes. Changes may include:  ? Reducing fat and cholesterol intake  ? Reducing sodium (salt) intake, especially if you have high blood pressure  ? Increasing your intake of fresh vegetables and fruits  ? Eating lean proteins, such as fish, poultry, and legumes (beans and peas) and eating less red meat and processed meats  ? Using low- or no-fat dairy products  ? Using vegetable and nut oils in limited amounts  ? Limiting sweets and processed foods such as chips, cookies, and baked goods    Physical activity. Your healthcare provider may recommend that you increase your physical activity if you have not been as active as possible. Depending on your situation, your provider may advise you to include moderate to vigorous intensity activity for at least 40 minutes each day for at least 3 to 4 days per week. Examples of moderate to vigorous activity include:  ? Walking at a brisk pace, about 3 to 4 miles per hour  ? Jogging or running  ? Swimming or water aerobics  ? Hiking  ? Dancing  ? Martial arts  ? Tennis  ? Riding a bike or a stationary bike    Weight management. If you are overweight or obese, your healthcare provider will work with you to lose weight and lower your BMI (body mass image) to a normal or near-normal level. Making dietary changes and increasing physical activity can help.    Smoking. If you smoke, break the smoking habit. Enroll in a stop-smoking program to improve your chances of success.    Stress. Learn stress management techniques to help you  deal with stress in your home and work life.  Date Last Reviewed: 7/1/2017 2000-2019 [employer]. [address] [place] [address], [place] [address]. All rights reserved. This information is not intended as a substitute for professional medical care. Always follow your healthcare professional's instructions.           Patient Education     Your Heart is at Risk    Plaque and blood clots in the coronary arteries reduce blood flow to the heart:    When a coronary artery narrows, less blood and oxygen flow to the heart muscle. The decreased blood flow can cause symptoms of angina. This is often felt as temporary pain or pressure in or near the chest. This can also feel like pain in the jaw, neck, and shoulder.    When a coronary artery narrows too much, very little blood and oxygen reach the heart muscle beyond the site of narrowing. If a clot forms, blood flow in the artery may stop. This can result in a heart attack. If the muscle goes without oxygen for too long, that part of the heart muscle dies.  Your whole body is at risk  Plaque buildup can lead to problems throughout the body. Common sites of artery problems include:    Brain. Arteries in the brain or leading to the brain can become blocked. When this happens, blood flow to that part of the brain is reduced and that part of the brain can t get the oxygen it needs. That portion of the brain is damaged. This is a stroke.    Kidneys. If an artery that carries blood to the kidneys is narrowed, the kidneys have a hard time filtering blood. This can lead to kidney damage.    Aorta. This is the body s main artery. It connects directly to the heart. If this artery is damaged, the affected section can weaken and balloon out. This is called an aortic aneurysm.    Legs. If arteries in the legs are clogged with plaque, cramping, or aching in the buttocks, thighs, or calves can occur when walking. This is called claudication.  Date Last Reviewed: 6/1/2016     2802-8269 [employer]. [address] [place] [address], [place] [address]. All rights reserved. This information is not intended as a substitute for professional medical care. Always follow your healthcare professional's instructions.

## 2020-02-19 ENCOUNTER — HOSPITAL ENCOUNTER (OUTPATIENT)
Dept: CT IMAGING | Facility: CLINIC | Age: 70
Discharge: HOME | End: 2020-02-19
Attending: NURSE PRACTITIONER | Admitting: NURSE PRACTITIONER
Payer: COMMERCIAL

## 2020-02-19 DIAGNOSIS — R10.32 ABDOMINAL PAIN, LEFT LOWER QUADRANT: ICD-10-CM

## 2020-02-19 PROCEDURE — 25000128 H RX IP 250 OP 636: Performed by: RADIOLOGY

## 2020-02-19 PROCEDURE — 74177 CT ABD & PELVIS W/CONTRAST: CPT

## 2020-02-19 PROCEDURE — 25000125 ZZHC RX 250: Performed by: RADIOLOGY

## 2020-02-19 RX ORDER — IOPAMIDOL 755 MG/ML
66 INJECTION, SOLUTION INTRAVASCULAR ONCE
Status: COMPLETED | OUTPATIENT
Start: 2020-02-19 | End: 2020-02-19

## 2020-02-19 RX ADMIN — IOPAMIDOL 66 ML: 755 INJECTION, SOLUTION INTRAVENOUS at 11:30

## 2020-02-19 RX ADMIN — SODIUM CHLORIDE 56 ML: 9 INJECTION, SOLUTION INTRAVENOUS at 11:30

## 2020-03-02 ENCOUNTER — OFFICE VISIT (OUTPATIENT)
Dept: FAMILY MEDICINE | Facility: CLINIC | Age: 70
End: 2020-03-02
Payer: COMMERCIAL

## 2020-03-02 VITALS
TEMPERATURE: 98.5 F | RESPIRATION RATE: 20 BRPM | BODY MASS INDEX: 27.54 KG/M2 | DIASTOLIC BLOOD PRESSURE: 60 MMHG | WEIGHT: 141 LBS | SYSTOLIC BLOOD PRESSURE: 112 MMHG | HEART RATE: 68 BPM

## 2020-03-02 DIAGNOSIS — N30.00 ACUTE CYSTITIS WITHOUT HEMATURIA: ICD-10-CM

## 2020-03-02 DIAGNOSIS — R30.0 DYSURIA: Primary | ICD-10-CM

## 2020-03-02 LAB
ALBUMIN UR-MCNC: NEGATIVE MG/DL
APPEARANCE UR: CLEAR
BACTERIA #/AREA URNS HPF: ABNORMAL /HPF
BILIRUB UR QL STRIP: NEGATIVE
COLOR UR AUTO: YELLOW
GLUCOSE UR STRIP-MCNC: NEGATIVE MG/DL
HGB UR QL STRIP: NEGATIVE
KETONES UR STRIP-MCNC: NEGATIVE MG/DL
LEUKOCYTE ESTERASE UR QL STRIP: ABNORMAL
NITRATE UR QL: POSITIVE
NON-SQ EPI CELLS #/AREA URNS LPF: ABNORMAL /LPF
PH UR STRIP: 5.5 PH (ref 5–7)
RBC #/AREA URNS AUTO: ABNORMAL /HPF
SOURCE: ABNORMAL
SP GR UR STRIP: 1.02 (ref 1–1.03)
UROBILINOGEN UR STRIP-ACNC: 0.2 EU/DL (ref 0.2–1)
WBC #/AREA URNS AUTO: ABNORMAL /HPF

## 2020-03-02 PROCEDURE — 99214 OFFICE O/P EST MOD 30 MIN: CPT | Performed by: NURSE PRACTITIONER

## 2020-03-02 PROCEDURE — 81001 URINALYSIS AUTO W/SCOPE: CPT | Performed by: NURSE PRACTITIONER

## 2020-03-02 PROCEDURE — 87186 SC STD MICRODIL/AGAR DIL: CPT | Performed by: NURSE PRACTITIONER

## 2020-03-02 PROCEDURE — 87086 URINE CULTURE/COLONY COUNT: CPT | Performed by: NURSE PRACTITIONER

## 2020-03-02 PROCEDURE — 87088 URINE BACTERIA CULTURE: CPT | Performed by: NURSE PRACTITIONER

## 2020-03-02 RX ORDER — NITROFURANTOIN 25; 75 MG/1; MG/1
100 CAPSULE ORAL 2 TIMES DAILY
Qty: 14 CAPSULE | Refills: 0 | Status: SHIPPED | OUTPATIENT
Start: 2020-03-02 | End: 2020-03-17

## 2020-03-02 RX ORDER — PHENAZOPYRIDINE HYDROCHLORIDE 95 MG/1
190 TABLET ORAL 3 TIMES DAILY
Qty: 18 TABLET | Refills: 0 | Status: SHIPPED | OUTPATIENT
Start: 2020-03-02 | End: 2020-01-01

## 2020-03-02 NOTE — PATIENT INSTRUCTIONS
Patient Education     Urinary Tract Infections in Women    Urinary tract infections (UTIs) are most often caused by bacteria. These bacteria enter the urinary tract. The bacteria may come from outside the body. Or they may travel from the skin outside the rectum or vagina into the urethra. Female anatomy makes it easy for bacteria from the bowel to enter a woman s urinary tract, which is the most common source of UTI. This means women develop UTIs more often than men. Pain in or around the urinary tract is a common UTI symptom. But the only way to know for sure if you have a UTI for the healthcare provider to test your urine. The two tests that may be done are the urinalysis and urine culture.  Types of UTIs    Cystitis. A bladder infection (cystitis) is the most common UTI in women. You may have urgent or frequent urination. You may also have pain, burning when you urinate, and bloody urine.    Urethritis. This is an inflamed urethra, which is the tube that carries urine from the bladder to outside the body. You may have lower stomach or back pain. You may also have urgent or frequent urination.    Pyelonephritis. This is a kidney infection. If not treated, it can be serious and damage your kidneys. In severe cases, you may need to stay in the hospital. You may have a fever and lower back pain.  Medicines to treat a UTI  Most UTIs are treated with antibiotics. These kill the bacteria. The length of time you need to take them depends on the type of infection. It may be as short as 3 days. If you have repeated UTIs, you may need a low-dose antibiotic for several months. Take antibiotics exactly as directed. Don t stop taking them until all of the medicine is gone. If you stop taking the antibiotic too soon, the infection may not go away. You may also develop a resistance to the antibiotic. This can make it much harder to treat.  Lifestyle changes to treat and prevent UTIs  The lifestyle changes below will help get  rid of your UTI. They may also help prevent future UTIs.    Drink plenty of fluids. This includes water, juice, or other caffeine-free drinks. Fluids help flush bacteria out of your body.    Empty your bladder. Always empty your bladder when you feel the urge to urinate. And always urinate before going to sleep. Urine that stays in your bladder can lead to infection. Try to urinate before and after sex as well.    Practice good personal hygiene. Wipe yourself from front to back after using the toilet. This helps keep bacteria from getting into the urethra.    Use condoms during sex. These help prevent UTIs caused by sexually transmitted bacteria. Also don't use spermicides during sex. These can increase the risk for UTIs. Choose other forms of birth control instead. For women who tend to get UTIs after sex, a low-dose of a preventive antibiotic may be used. Be sure to discuss this option with your healthcare provider.    Follow up with your healthcare provider as directed. He or she may test to make sure the infection has cleared. If needed, more treatment may be started.  Date Last Reviewed: 1/1/2017 2000-2019 [employer]. [address] [place] [address], [place] [address]. All rights reserved. This information is not intended as a substitute for professional medical care. Always follow your healthcare professional's instructions.

## 2020-03-02 NOTE — PROGRESS NOTES
Subjective     Aminata Gale is a 69 year old female who presents to clinic today for the following health issues:    HPI   URINARY TRACT SYMPTOMS  Onset: Started over the weekend     Description:   Painful urination (Dysuria): YES           Frequency: Yes- but maybe due to water    Blood in urine (Hematuria): no   Delay in urine (Hesitency): no     Intensity: mild    Progression of Symptoms:  same    Accompanying Signs & Symptoms:  Fever/chills: no   Flank pain YES- only last week for a day or 2.  Now gone  Nausea and vomiting: YES  Any vaginal symptoms: none  Abdominal/Pelvic Pain: YES    History:   History of frequent UTI's: no   History of kidney stones: no   Sexually Active: no   Possibility of pregnancy: No    Precipitating factors:   Thought maybe seen a couple specs in toilet.  Had recent scan and showed kidney stone     Therapies Tried and outcome: Increase fluid intake-water   -------------------------------------    Patient Active Problem List   Diagnosis     Systemic lupus erythematosus (H)     Esophageal reflux     Essential hypertension, benign     Allergic rhinitis     Polycythemia, secondary     Female stress incontinence     Other nonspecific abnormal result of function study of brain and central nervous system     Smoker     Hyperlipidemia LDL goal <130     Osteoporosis     Hiatal hernia     Advanced directives, counseling/discussion     Acne     SK (seborrheic keratosis)     Lentigo     Angioma     Moderate persistent asthma     Chronic pain     HTN, goal below 140/90     Anxiety     Refused influenza vaccine     Grief reaction     Centrilobular emphysema (H)     Acute cholecystitis     TMJ (temporomandibular joint syndrome)     Type 2 diabetes mellitus with hyperglycemia, without long-term current use of insulin (H)     Type 2 diabetes mellitus with microalbuminuria, without long-term current use of insulin (H)     Nonocclusive coronary atherosclerosis of native coronary artery     Diastolic  dysfunction     Tubular adenoma of colon     Colon polyp     Past Surgical History:   Procedure Laterality Date     C APPENDECTOMY       C REPAIR GUM      Cathy Dontal surgery     C/SECTION, LOW TRANSVERSE      , Low Transverse     COLONOSCOPY  2009     D & C      X 5     HC RECONSTR NOSE+DAVE SEPTAL REPAIR       HYSTERECTOMY, BRI      Hx of dysplasia     LAPAROSCOPIC CHOLECYSTECTOMY N/A 2017    Procedure: LAPAROSCOPIC CHOLECYSTECTOMY;  Laparoscopic Cholecystectomy;  Surgeon: Tami Barney MD;  Location: [facility]     SINUS SURGERY      Reconstruction       Social History     Tobacco Use     Smoking status: Current Every Day Smoker     Packs/day: 0.01     Years: 42.00     Pack years: 0.42     Types: Cigarettes     Smokeless tobacco: Never Used     Tobacco comment: Has chantix at home, but hasn't started yet. 17.   Substance Use Topics     Alcohol use: Yes     Alcohol/week: 0.0 standard drinks     Comment: Occ. wine     Family History   Problem Relation Age of Onset     Diabetes Mother      Gastrointestinal Disease Mother         Gallbladder disease     Heart Disease Mother      Cancer Father         lung     Alcohol/Drug Father      Allergies Father      Heart Disease Father      Gastrointestinal Disease Father         Liver disease     Skin Cancer Father      Heart Disease Maternal Grandfather      Arthritis Sister      Osteoporosis Sister      Thyroid Disease Sister      Allergies Son      Alcohol/Drug Sister      Alcohol/Drug Sister      Melanoma No family hx of              Reviewed and updated as needed this visit by Provider         Review of Systems   ROS COMP: Constitutional, HEENT, cardiovascular, pulmonary, GI, , musculoskeletal, neuro, skin, endocrine and psych systems are negative, except as otherwise noted.      Objective    /60 (BP Location: Left arm, Patient Position: Chair, Cuff Size: Adult Regular)   Pulse 68   Temp 98.5  F (36.9  C) (Tympanic)   Resp 20    Wt 64 kg (141 lb)   BMI 27.54 kg/m    Body mass index is 27.54 kg/m .  Physical Exam   GENERAL: healthy, alert and no distress  EYES: Eyes grossly normal to inspection, PERRL and conjunctivae and sclerae normal  HENT: ear canals and TM's normal, nose and mouth without ulcers or lesions  NECK: no adenopathy, no asymmetry, masses, or scars and thyroid normal to palpation  RESP: bronchial breath sounds throughout and prolonged expiratory phase  CV: regular rate and rhythm, normal S1 S2, no S3 or S4, no murmur, click or rub, no peripheral edema and peripheral pulses strong  ABDOMEN: tenderness suprapubic and bowel sounds normal  MS: no gross musculoskeletal defects noted, no edema  SKIN: no suspicious lesions or rashes  NEURO: Normal strength and tone, mentation intact and speech normal  PSYCH: mentation appears normal, affect normal/bright    Diagnostic Test Results:  Labs reviewed in Epic  Results for orders placed or performed in visit on 03/02/20   *UA reflex to Microscopic and Culture ([place] [facility] (except [place] and [place])     Status: Abnormal   Result Value Ref Range    Color Urine Yellow     Appearance Urine Clear     Glucose Urine Negative NEG^Negative mg/dL    Bilirubin Urine Negative NEG^Negative    Ketones Urine Negative NEG^Negative mg/dL    Specific Gravity Urine 1.025 1.003 - 1.035    Blood Urine Negative NEG^Negative    pH Urine 5.5 5.0 - 7.0 pH    Protein Albumin Urine Negative NEG^Negative mg/dL    Urobilinogen Urine 0.2 0.2 - 1.0 EU/dL    Nitrite Urine Positive (A) NEG^Negative    Leukocyte Esterase Urine Trace (A) NEG^Negative    Source Midstream Urine    Urine Microscopic     Status: Abnormal   Result Value Ref Range    WBC Urine 5-10 (A) OTO5^0 - 5 /HPF    RBC Urine O - 2 OTO2^O - 2 /HPF    Squamous Epithelial /LPF Urine Many (A) FEW^Few /LPF    Bacteria Urine Many (A) NEG^Negative /HPF           Assessment & Plan     Aminata was seen today for urinary pain.    Diagnoses and  all orders for this visit:    Dysuria  -     *UA reflex to Microscopic and Culture ([facility] (except [place] and [place])  -     Urine Culture Aerobic Bacterial  -     Urine Microscopic    Acute cystitis without hematuria  -     nitroFURantoin macrocrystal-monohydrate (MACROBID) 100 MG capsule; Take 1 capsule (100 mg) by mouth 2 times daily for 7 days  -     phenazopyridine (AZO URINARY PAIN RELIEF) 95 MG tablet; Take 2 tablets (190 mg) by mouth 3 times daily      Urine culture pending  Begin Macrobid twice daily for 7 days  Azo up to 3 times daily as needed for discomfort  Smoking cessation again recommended  Follow-up 2 weeks ongoing symptoms      Call or return to the clinic with any worsening of symptoms or no resolution. Patient/Parent verbalized understanding and is in agreement. Medication side effects reviewed.   Current Outpatient Medications   Medication Sig Dispense Refill     albuterol (PROAIR HFA/PROVENTIL HFA/VENTOLIN HFA) 108 (90 Base) MCG/ACT inhaler Inhale 2 puffs into the lungs every 6 hours 54 g 3     alcohol swab prep pads Use to swab area of injection/raffi as directed. 100 each 3     amLODIPine (NORVASC) 10 MG tablet Take 1 tablet (10 mg) by mouth daily For high blood pressure 90 tablet 3     aspirin (ASA) 81 MG tablet Take 1 tablet (81 mg) by mouth daily       blood glucose (MILLIE MICROLET 2) lancing device Device to be used with lancets 2 times a day. 1 each 0     blood glucose calibration (NO BRAND SPECIFIED) solution To accompany: Blood Glucose Monitor Brands: per insurance. 1 Bottle 3     ezetimibe (ZETIA) 10 MG tablet Take 1 tablet (10 mg) by mouth daily 90 tablet 3     fluocinonide (LIDEX) 0.05 % external solution Apply twice daily on scalp as needed. 60 mL 4     fluticasone-vilanterol (BREO ELLIPTA) 200-25 MCG/INH inhaler INHALE 1 PUFF INTO THE LUNGS DAILY 60 Inhaler 0     Ibuprofen (IBU-200 PO) Take  by mouth.       ipratropium - albuterol 0.5 mg/2.5 mg/3 mL  (DUONEB) 0.5-2.5 (3) MG/3ML neb solution Take 1 vial (3 mLs) by nebulization every 6 hours as needed for shortness of breath / dyspnea or wheezing 1 Box 3     ketoconazole (NIZORAL) 2 % external shampoo Use to wash scalp, leave on for 5 minutes. Use 2-3 times a week. 120 mL 4     losartan (COZAAR) 25 MG tablet Take 1 tablet (25 mg) by mouth daily 30 tablet 11     nitroFURantoin macrocrystal-monohydrate (MACROBID) 100 MG capsule Take 1 capsule (100 mg) by mouth 2 times daily for 7 days 14 capsule 0     order for DME Equipment being ordered: PORTABLE OXYGEN UNIT AND QUIETER OXYGEN CONCENTRATOR   2L per [facility] PORTABLE OXYGEN CONCENTRATOR    Pt Room Air at rest 89%  5 min walking room air 83%   Resting on 2 liters Oxygen per nasal cannula post 5 min walk 95% 1 Device 11     phenazopyridine (AZO URINARY PAIN RELIEF) 95 MG tablet Take 2 tablets (190 mg) by mouth 3 times daily 18 tablet 0     STATIN NOT PRESCRIBED (INTENTIONAL) Please choose reason not prescribed, below       thin (NO BRAND SPECIFIED) lancets 1 each by In Vitro route 2 times daily Use with lanceting device. To accompany: Blood Glucose Monitor Brands: per insurance. 100 each 6     tiotropium (SPIRIVA RESPIMAT) 2.5 MCG/ACT inhaler Inhale 2 puffs into the lungs daily 12 g 3     torsemide (DEMADEX) 5 MG tablet Take 1 tablet (5 mg) by mouth daily 90 tablet 1     Chart documentation with Dragon Voice recognition Software. Although reviewed after completion, some words and grammatical errors may remain.    See Patient Instructions      PEDRO Dominguez [facility]

## 2020-03-02 NOTE — NURSING NOTE
Chief Complaint   Patient presents with     Urinary Pain       Initial /60 (BP Location: Left arm, Patient Position: Chair, Cuff Size: Adult Regular)   Pulse 68   Temp 98.5  F (36.9  C) (Tympanic)   Resp 20   Wt 64 kg (141 lb)   BMI 27.54 kg/m   Estimated body mass index is 27.54 kg/m  as calculated from the following:    Height as of 2/5/20: 1.524 m (5').    Weight as of this encounter: 64 kg (141 lb).    Patient presents to the clinic using No DME    Health Maintenance that is potentially due pending provider review:  Mammogram- reminded    n/a    Is there anyone who you would like to be able to receive your results? No  If yes have patient fill out AMINATA Newman M.A.

## 2020-03-03 ENCOUNTER — HOSPITAL ENCOUNTER (OUTPATIENT)
Facility: CLINIC | Age: 70
End: 2020-03-03
Attending: OPHTHALMOLOGY | Admitting: OPHTHALMOLOGY
Payer: COMMERCIAL

## 2020-03-05 LAB
BACTERIA SPEC CULT: ABNORMAL
BACTERIA SPEC CULT: ABNORMAL
SPECIMEN SOURCE: ABNORMAL

## 2020-03-17 ENCOUNTER — TELEPHONE (OUTPATIENT)
Dept: FAMILY MEDICINE | Facility: CLINIC | Age: 70
End: 2020-03-17

## 2020-03-17 ENCOUNTER — VIRTUAL VISIT (OUTPATIENT)
Dept: FAMILY MEDICINE | Facility: CLINIC | Age: 70
End: 2020-03-17
Payer: COMMERCIAL

## 2020-03-17 DIAGNOSIS — N39.0 RECURRENT UTI: Primary | ICD-10-CM

## 2020-03-17 DIAGNOSIS — N30.00 ACUTE CYSTITIS WITHOUT HEMATURIA: ICD-10-CM

## 2020-03-17 DIAGNOSIS — R35.0 URINARY FREQUENCY: Primary | ICD-10-CM

## 2020-03-17 PROCEDURE — 99441 ZZC PHYSICIAN TELEPHONE EVALUATION 5-10 MIN: CPT | Performed by: NURSE PRACTITIONER

## 2020-03-17 RX ORDER — NITROFURANTOIN 25; 75 MG/1; MG/1
100 CAPSULE ORAL 2 TIMES DAILY
Qty: 20 CAPSULE | Refills: 0 | Status: SHIPPED | OUTPATIENT
Start: 2020-03-17 | End: 2020-01-01

## 2020-03-17 NOTE — TELEPHONE ENCOUNTER
S-(situation): I talked with Pat.  She C/O pain in the lower pelvic region.  C/O urinary frequency.  No blood in urine.   Symptoms gone when on Macrobid and came back 2 days ago.      B-(background): seen in clinic 3/2/20 and treated with Macrobid.  Got better.    A-(assessment): urinary frequency    R-(recommendations): OK to do UA per Sana Duncan RN

## 2020-03-17 NOTE — PATIENT INSTRUCTIONS
Patient Education     Anatomy of the Female Urinary Tract  Your urinary tract helps get rid of urine (your body s liquid waste). The kidneys collect chemicals and water your body doesn t need. This is turned into urine. Urine travels out of the kidneys through the ureters to the bladder. The bladder holds urine until you re ready to release it. The urethra carries urine from the bladder out of the body. The main sphincter muscle circles the mid-urethra.      Front view of female urinary tract.   Date Last Reviewed: 1/1/2017 2000-2019 [employer]. [address] [place] [id number]. All rights reserved. This information is not intended as a substitute for professional medical care. Always follow your healthcare professional's instructions.

## 2020-03-17 NOTE — TELEPHONE ENCOUNTER
Patient is calling stating she was seen and treated for a UTI. She was given Macrobid and she took it for the prescribed time and it did go away. She is now having the same symptoms.     Please advise.    Vida Samuels-[place]

## 2020-03-17 NOTE — PROGRESS NOTES
"Aminata Gale is a 69 year old female who is being evaluated via a billable telephone visit.      The patient has been notified of following:     \"This telephone visit will be conducted via a call between you and your physician/provider. We have found that certain health care needs can be provided without the need for a physical exam.  This service lets us provide the care you need with a short phone conversation.  If a prescription is necessary we can send it directly to your pharmacy.  If lab work is needed we can place an order for that and you can then stop by our lab to have the test done at a later time.    If during the course of the call the physician/provider feels a telephone visit is not appropriate, you will not be charged for this service.\"       Aminata Gale complains of UTI symptoms recurrent     I have reviewed and updated the patient's Past Medical History, Social History, Family History and Medication List.    ALLERGIES  Amoxicillin; Ampicillin; Cipro [ciprofloxacin]; Keflex [cephalexin monohydrate]; Penicillin [penicillins]; Sulfa drugs; Tetracycline; Biaxin [clarithromycin]; Ceclor [cefaclor]; Advair diskus; Egg white; Fish; and Mustard [allyl isothiocyanate]    SB (MA signature)    Additional provider notes: UTI - Female  Duration of complaint: 3 days recurrent symptoms    Description:   Painful urination (Dysuria): YES           Frequency: YES  Blood in urine (Hematuria): no   Delay in urine (Hesitency): no  Intensity: moderate  Progression of Symptoms:  worsening  Accompanying Signs & Symptoms:  Fever/chills: no   Flank pain no   Nausea and vomiting: no   Any vaginal symptoms: none  Abdominal/Pelvic Pain: YES  History:   History of frequent UTI's: YES  History of kidney stones: no   Sexually Active: no   Possibility of pregnancy: No  Precipitating factors:   Therapies Tried and outcome: pyridium and OTC advil or tylenol       Assessment/Plan:  ASSESSMENT / PLAN:  (N39.0) Recurrent UTI  " (primary encounter diagnosis)  (N30.00) Acute cystitis without hematuria  Comment: last treated 3/2/2020 not able to come in today due to risk of COVID-19  Plan: nitroFURantoin macrocrystal-monohydrate         (MACROBID) 100 MG capsule  Stop smoking   Use pyridum  Call or return to the clinic with any worsening of symptoms or no resolution. Patient/Parent verbalized understanding and is in agreement. Medication side effects reviewed.              I have reviewed the note as documented above.  This accurately captures the substance of my conversation with the patient.  Pat    Phone call contact time  6 minutes     Sana Olivo MSN,FNP-[address] [address] [phone]

## 2020-04-03 DIAGNOSIS — J44.1 COPD EXACERBATION (H): ICD-10-CM

## 2020-04-03 DIAGNOSIS — J45.40 MODERATE PERSISTENT ASTHMA WITHOUT COMPLICATION: ICD-10-CM

## 2020-04-03 DIAGNOSIS — I10 HTN, GOAL BELOW 140/90: Primary | ICD-10-CM

## 2020-04-03 RX ORDER — TORSEMIDE 5 MG/1
5 TABLET ORAL DAILY
Qty: 90 TABLET | Refills: 1 | Status: SHIPPED | OUTPATIENT
Start: 2020-04-03 | End: 2020-01-01

## 2020-04-03 NOTE — TELEPHONE ENCOUNTER
"Requested Prescriptions   Pending Prescriptions Disp Refills     BREO ELLIPTA 200-25 MCG/INH Inhaler [Pharmacy Med Name: fluticasone furoate 200 mcg-vilanteroL 25 mcg/dose inhalation powder (BREO ELLIPTA)]  0     Sig: INHALE 1 PUFF INTO THE LUNGS DAILY       Inhaled Steroids Protocol Passed - 4/3/2020  9:14 AM        Passed - Patient is age 12 or older        Passed - Asthma control assessment score within normal limits in last 6 months     Please review ACT score.           Passed - Medication is active on med list        Passed - Recent (6 mo) or future (30 days) visit within the authorizing provider's specialty     Patient had office visit in the last 6 months or has a visit in the next 30 days with authorizing provider or within the authorizing provider's specialty.  See \"Patient Info\" tab in inbasket, or \"Choose Columns\" in Meds & Orders section of the refill encounter.               Last Written Prescription Date:  2/5/20  Last Fill Quantity: 60 inhaler,  # refills: 0   Last office visit: 3/17/2020 with prescribing provider:  Sana VASQUEZ-CNP    Future Office Visit:      "

## 2020-04-03 NOTE — TELEPHONE ENCOUNTER
"Pat called and said she also needs her Torsemide (Demadex) 5 mg.  She says the new pharmacy with [facility] gave her 10 mg pills but she didn't realize it until she ran out.  She has been taking a full 10 mg tablet daily. She says the directions on the bottle said to take 1/2 tablet but she never saw that. She is out now so needs this refilled.             torsemide (DEMADEX) 5 MG tablet 90 tablet 1     Sig: Take 1 tablet (5 mg) by mouth daily       Diuretics (Including Combos) Protocol Passed - 4/3/2020 10:20 AM        Passed - Blood pressure under 140/90 in past 12 months     BP Readings from Last 3 Encounters:   03/02/20 112/60   02/05/20 110/62   10/31/19 102/60                 Passed - Recent (12 mo) or future (30 days) visit within the authorizing provider's specialty     Patient has had an office visit with the authorizing provider or a provider within the authorizing providers department within the previous 12 mos or has a future within next 30 days. See \"Patient Info\" tab in inbasket, or \"Choose Columns\" in Meds & Orders section of the refill encounter.              Passed - Medication is active on med list        Passed - Patient is age 18 or older        Passed - No active pregancy on record        Passed - Normal serum creatinine on file in past 12 months     Recent Labs   Lab Test 02/05/20  1340   CR 0.89              Passed - Normal serum potassium on file in past 12 months     Recent Labs   Lab Test 02/05/20  1340   POTASSIUM 3.4                    Passed - Normal serum sodium on file in past 12 months     Recent Labs   Lab Test 02/05/20  1340                 Passed - No positive pregnancy test in past 12 months         Torsemide 5 mg  Last Written Prescription Date:  2/5/20  Last Fill Quantity: 90,  # refills: 1   Last office visit: 3/17/2020 with prescribing provider:  RANJITH Olivo   Future Office Visit:      "

## 2020-06-11 NOTE — PROGRESS NOTES
"Aminata Gale is a 69 year old female who is being evaluated via a billable telephone visit.      The patient has been notified of following:     \"This telephone visit will be conducted via a call between you and your physician/provider. We have found that certain health care needs can be provided without the need for a physical exam.  This service lets us provide the care you need with a short phone conversation.  If a prescription is necessary we can send it directly to your pharmacy.  If lab work is needed we can place an order for that and you can then stop by our lab to have the test done at a later time.    Telephone visits are billed at different rates depending on your insurance coverage. During this emergency period, for some insurers they may be billed the same as an in-person visit.  Please reach out to your insurance provider with any questions.    If during the course of the call the physician/provider feels a telephone visit is not appropriate, you will not be charged for this service.\"    Patient has given verbal consent for Telephone visit?  Yes    What phone number would you like to be contacted at? [phone]    How would you like to obtain your AVS? Mail a copy  9:32 AM 06/11/20 HATTIE Jackson CMA    Additional provider notes:  69-year-old female seen for nonocclusive CAD. Her cardiac risk factors include dyslipidemia with a history of statin intolerance, tobacco abuse, hypertension, and lupus.     She has a history of COPD. PFTs in 2013 showed FEV1 of 1.1, FVC 1.9, DLCO 47% predicted.        Stress echo February 2017 showed 3:30 on the Dino protocol, 83% maximum heart rate achieved, no chest pain during exercise, EKG negative for ischemia, rest echo with EF of 50-55%, stress echo shows minimal improvement of ejection fraction to 55-60%, distal anterior wall hypokinetic.         Lexiscan nuclear stress test April 2017 shows small apical infarct, no ischemia, ejection fraction 60%, " apical hypokinesis.         Coronary angiogram April 2017 showed mild disease of the LAD, which does not quite reach the apex, mild disease of the circumflex, dominant RCA with minimal plaque. LV gram showed ejection fraction of 55-60% with small area of apical akinesis, LVEDP 28 mmHg.        Echo April 27, 2017 shows ejection fraction 55%, no wall motion abnormality, mild LVH, no significant valve disease.    Nuclear stress July 2019 showed small apical infarct extending to the distal inferolateral wall, no ischemia, EF 62% with apical hypokinesis.    She has been feeling about the same recently.  She will do a lot of yard work and denies any exertional chest pain.  She has chronic dyspnea from her COPD.  Weight is steady around 140 pounds, she takes torsemide 5 mg daily.  She does have some upper back pain that gets worse when she holds things, but this is not exertional.  Blood pressure tends to be in the 120s.    Data:  February 2020: Potassium 3.4, creatinine 0.9    Assessment:  69-year-old female seen for follow-up of diastolic dysfunction and nonocclusive coronary artery disease.  She is doing well from a cardiac perspective.  Her edema is well controlled on the torsemide.  She really has no concerning symptoms.  Medications will be kept the same for now.    Recommendations:  1.  Diastolic dysfunction  -Continue torsemide 5 mg daily, dry weight around 140 pounds  -Strict blood pressure control    2.  Nonocclusive coronary artery disease  -Continue medical therapy, consider repeat stress testing in a few years, sooner with any new symptoms    Phone call duration: 15 minutes    A total of 25 minutes was spent with clinic visit, including chart review and coordinating care, >50% of time was spent talking with patient.    Adriano Santana MD  Cardiology - [facility]  Pager: [phone]  Text Page  June 11, 2020

## 2020-06-11 NOTE — PATIENT INSTRUCTIONS
It was a pleasure talking to you on our recent phone visit.  It sounds like everything with your heart is stable.  You do not have any concerning heart symptoms at this time.  Please keep your medications the same.  Continue the torsemide 5 mg once daily, if you notice any increased swelling in your legs or a few pounds of weight from fluid retention, you could take an extra 5 mg dose.  You do not need any heart testing at this time.  We would like to see you back in the clinic in about 6 months.  Our schedulers will call you when it is closer to then to set up the follow-up appointment.

## 2020-06-11 NOTE — LETTER
6/11/2020    Sana Olivo, APRN CNP  [address] [place] [address]    RE: Aminata Gale       Dear Colleague,    I had the pleasure of seeing Aminata Gale in the [facility].    Aminata Gale is a 69 year old female who is being evaluated via a billable telephone visit.        69-year-old female seen for nonocclusive CAD. Her cardiac risk factors include dyslipidemia with a history of statin intolerance, tobacco abuse, hypertension, and lupus.     She has a history of COPD. PFTs in 2013 showed FEV1 of 1.1, FVC 1.9, DLCO 47% predicted.        Stress echo February 2017 showed 3:30 on the Dino protocol, 83% maximum heart rate achieved, no chest pain during exercise, EKG negative for ischemia, rest echo with EF of 50-55%, stress echo shows minimal improvement of ejection fraction to 55-60%, distal anterior wall hypokinetic.         Lexiscan nuclear stress test April 2017 shows small apical infarct, no ischemia, ejection fraction 60%, apical hypokinesis.         Coronary angiogram April 2017 showed mild disease of the LAD, which does not quite reach the apex, mild disease of the circumflex, dominant RCA with minimal plaque. LV gram showed ejection fraction of 55-60% with small area of apical akinesis, LVEDP 28 mmHg.        Echo April 27, 2017 shows ejection fraction 55%, no wall motion abnormality, mild LVH, no significant valve disease.    Nuclear stress July 2019 showed small apical infarct extending to the distal inferolateral wall, no ischemia, EF 62% with apical hypokinesis.    She has been feeling about the same recently.  She will do a lot of yard work and denies any exertional chest pain.  She has chronic dyspnea from her COPD.  Weight is steady around 140 pounds, she takes torsemide 5 mg daily.  She does have some upper back pain that gets worse when she holds things, but this is not exertional.  Blood pressure tends to be in the 120s.    Data:  February  2020: Potassium 3.4, creatinine 0.9    Assessment:  69-year-old female seen for follow-up of diastolic dysfunction and nonocclusive coronary artery disease.  She is doing well from a cardiac perspective.  Her edema is well controlled on the torsemide.  She really has no concerning symptoms.  Medications will be kept the same for now.    Recommendations:  1.  Diastolic dysfunction  -Continue torsemide 5 mg daily, dry weight around 140 pounds  -Strict blood pressure control    2.  Nonocclusive coronary artery disease  -Continue medical therapy, consider repeat stress testing in a few years, sooner with any new symptoms    Phone call duration: 15 minutes    A total of 25 minutes was spent with clinic visit, including chart review and coordinating care, >50% of time was spent talking with patient.    Adriano Santana MD  Cardiology - [facility]  Pager: [phone]  Text Page  June 11, 2020      Thank you for allowing me to participate in the care of your patient.    Sincerely,     Adriano Santana MD     [facility]

## 2020-07-17 NOTE — PROGRESS NOTES
[facility]  [address] [place] [id number]  [phone]  Dept: [phone]    PRE-OP EVALUATION:  Today's date: 2020    Aminata Gale (: 1950) presents for pre-operative evaluation assessment as requested by Dr. Bernal.  She requires evaluation and anesthesia risk assessment prior to undergoing surgery/procedure for treatment of EYe lid  .    Fax number for surgical facility: SHE WILL CALL WITH FAX number  Primary Physician: Sana Olivo  Type of Anesthesia Anticipated: UNKNOWN    Patient has a Health Care Directive or Living Will:  NO    Preop Questions 2020   Who is doing your surgery? stephan bernal   What are you having done? eye lid   Date of Surgery/Procedure: [id number]   Facility or Hospital where procedure/surgery [facility] be performed[facility]   1.  Do you have a history of Heart attack, stroke, stent, coronary bypass surgery, or other heart surgery? No   2.  Do you ever have any pain or discomfort in your chest? No   3.  Do you have a history of  Heart Failure? No   4.   Are you troubled by shortness of breath when:  walking on a level surface, or up a slight hill, or at night? YES - COPD/ASTHMA   5.  Do you currently have a cold, bronchitis or other respiratory infection? No   6.  Do you have a cough, shortness of breath, or wheezing? YES - YES   7.  Do you sometimes get pains in the calves of your legs when you walk? No    8. Do you or anyone in your family have previous history of blood clots? No   9.  Do you or does anyone in your family have a serious bleeding problem such as prolonged bleeding following surgeries or cuts? No   10. Have you ever had problems with anemia or been told to take iron pills? No   11. Have you had any abnormal blood loss such as black, tarry or bloody stools, or abnormal vaginal bleeding? No   12. Have you ever had a blood transfusion? UNKNOWN    13. Have you or any of your relatives ever had problems with  anesthesia? No   14. Do you have sleep apnea, excessive snoring or daytime drowsiness? No   15. Do you have any prosthetic heart valves? No   16. Do you have prosthetic joints? No   17. Is there any chance that you may be pregnant? No         HPI:     HPI related to upcoming procedure: decreased vision and eye fatigue- eye lids hanging over the eye lids.         COPD - Patient has a longstanding history of moderate-severe COPD . Patient has been doing well overall noting SOB and COUGH and continues on medication regimen consisting of duoneb, spiriva respimat, breo ellipta without adverse reactions or side effects.    DIABETES - Patient has a longstanding history of DiabetesType Type II . Patient is being treated with diet and denies significant side effects. Control has been good. Complicating factors include but are not limited to: hypertension, hyperlipidemia and tobacco use.     HYPERLIPIDEMIA - Patient has a long history of significant Hyperlipidemia requiring medication for treatment with recent fair control. Patient reports no problems or side effects with the medication.     HYPERTENSION - Patient has longstanding history of HTN , currently denies any symptoms referable to elevated blood pressure. Specifically denies chest pain, palpitations, dyspnea, orthopnea, PND or peripheral edema. Blood pressure readings have been in normal range. Current medication regimen is as listed below. Patient denies any side effects of medication.       MEDICAL HISTORY:        Past Medical History:   Diagnosis Date     Acute pericarditis, unspecified      Chronic airway obstruction 12/13/2005    PFTs - 1/02 - mild COPD Problem list name updated by automated process. Provider to review     Dysuria      Esophageal reflux      GERD (gastroesophageal reflux disease) 9/20/2013     Hiatal hernia 5/4/2011     HTN, goal below 140/90 9/20/2013     Hyperlipidemia LDL goal <130 9/22/2010    Recent Labs  Lab Test 9/22/10 0908 10/12/06 0939     CHOL 214* 256*    HDL 31* 42*    * 186*    TRIG 149 143    CHOLHDLRATIO 7.0* 6.0*   11 - dobutamine echo showed NO infarct or ischemia LV 60-65%, normal exercise response.        Lupus (H)      Osteoporosis 10/18/2010    10/10 - severe osteopenia in femoral necks, mild osteopenia in spine  (Problem list name updated by automated process. Provider to review and confirm.)     Pure hypercholesterolemia      SECONDARY POLYCYTHEMIA 10/17/2006    Due to smoking     Smoker 2008     Systemic lupus erythematosus 2005    Diagnosed in ; history of pericarditis; was previously treated with mtx; not on active treatment; no hx of renal dz from the lupus.       Tobacco use disorder      Type 2 diabetes mellitus with hyperglycemia, without long-term current use of insulin (H) 2019    NEW DIAGNOSIS 2019     Unspecified essential hypertension      Past Surgical History:   Procedure Laterality Date     C APPENDECTOMY       C REPAIR GUM      Cathy Dontal surgery     C/SECTION, LOW TRANSVERSE      , Low Transverse     COLONOSCOPY  2009     D & C      X 5     HC RECONSTR NOSE+DAVE SEPTAL REPAIR       HYSTERECTOMY, BRI      Hx of dysplasia     LAPAROSCOPIC CHOLECYSTECTOMY N/A 2017    Procedure: LAPAROSCOPIC CHOLECYSTECTOMY;  Laparoscopic Cholecystectomy;  Surgeon: Tami Barney MD;  Location: [facility] OR     SINUS SURGERY      Reconstruction     Current Outpatient Medications   Medication Sig Dispense Refill     albuterol (PROAIR HFA/PROVENTIL HFA/VENTOLIN HFA) 108 (90 Base) MCG/ACT inhaler Inhale 2 puffs into the lungs every 6 hours 54 g 3     alcohol swab prep pads Use to swab area of injection/raffi as directed. 100 each 3     amLODIPine (NORVASC) 10 MG tablet Take 1 tablet (10 mg) by mouth daily For high blood pressure 90 tablet 3     aspirin (ASA) 81 MG tablet Take 1 tablet (81 mg) by mouth daily       BREO ELLIPTA 200-25 MCG/INH Inhaler INHALE 1 PUFF INTO THE LUNGS DAILY  3 Inhaler 1     cetirizine (ZYRTEC) 10 MG tablet Take 10 mg by mouth daily       ezetimibe (ZETIA) 10 MG tablet Take 1 tablet (10 mg) by mouth daily 90 tablet 3     fluocinonide (LIDEX) 0.05 % external solution Apply twice daily on scalp as needed. 60 mL 4     Ibuprofen (IBU-200 PO) Take  by mouth.       ipratropium - albuterol 0.5 mg/2.5 mg/3 mL (DUONEB) 0.5-2.5 (3) MG/3ML neb solution Take 1 vial (3 mLs) by nebulization every 6 hours as needed for shortness of breath / dyspnea or wheezing 1 Box 3     ketoconazole (NIZORAL) 2 % external shampoo Use to wash scalp, leave on for 5 minutes. Use 2-3 times a week. 120 mL 4     losartan (COZAAR) 25 MG tablet Take 1 tablet (25 mg) by mouth daily 30 tablet 11     STATIN NOT PRESCRIBED (INTENTIONAL) Please choose reason not prescribed, below       tiotropium (SPIRIVA RESPIMAT) 2.5 MCG/ACT inhaler Inhale 2 puffs into the lungs daily 12 g 3     torsemide (DEMADEX) 5 MG tablet Take 1 tablet (5 mg) by mouth daily 90 tablet 1     blood glucose (MILLIE MICROLET 2) lancing device Device to be used with lancets 2 times a day. 1 each 0     loratadine (CLARITIN) 10 MG tablet Take 10 mg by mouth daily       OTC products: None, except as noted above    Allergies   Allergen Reactions     Amoxicillin Difficulty breathing and Rash     Ampicillin Difficulty breathing and Rash     Cipro [Ciprofloxacin] Difficulty breathing and Rash     Keflex [Cephalexin Monohydrate] Difficulty breathing and Rash     Penicillin [Penicillins] Difficulty breathing and Rash     Sulfa Drugs Difficulty breathing and Rash     Tetracycline Difficulty breathing and Rash     Biaxin [Clarithromycin] Rash     Ceclor [Cefaclor] Hives and Swelling     Advair Diskus Hives     Hives on face,neck and chest     Egg White      Fish Shortness Of Breath     Mustard [Allyl Isothiocyanate]      Throat swelling      Latex Allergy: NO    Social History     Tobacco Use     Smoking status: Current Every Day Smoker     Packs/day: 0.01      Years: 42.00     Pack years: 0.42     Types: Cigarettes     Smokeless tobacco: Never Used     Tobacco comment: Has chantix at home, but hasn't started yet. 5/9/19.   Substance Use Topics     Alcohol use: Yes     Alcohol/week: 0.0 standard drinks     Comment: Occ. wine     History   Drug Use No       REVIEW OF SYSTEMS:   Constitutional, neuro, ENT, endocrine, pulmonary, cardiac, gastrointestinal, genitourinary, musculoskeletal, integument and psychiatric systems are negative, except as otherwise noted.    EXAM:   /68   Pulse 62   Temp 97.4  F (36.3  C) (Tympanic)   Resp 20   Ht 1.524 m (5')   Wt 61.7 kg (136 lb)   SpO2 96%   BMI 26.56 kg/m      GENERAL APPEARANCE: healthy, alert and no distress     EYES: EOMI, PERRL     HENT: ear canals and TM's normal and nose and mouth without ulcers or lesions     NECK: no adenopathy, no asymmetry, masses, or scars and thyroid normal to palpation     RESP: lungs clear to auscultation - no rales, rhonchi or wheezes     CV: regular rates and rhythm, normal S1 S2, no S3 or S4 and no murmur, click or rub     ABDOMEN:  soft, nontender, no HSM or masses and bowel sounds normal     MS: extremities normal- no gross deformities noted, no evidence of inflammation in joints, FROM in all extremities.     SKIN: no suspicious lesions or rashes     NEURO: Normal strength and tone, sensory exam grossly normal, mentation intact and speech normal     PSYCH: mentation appears normal. and affect normal/bright     LYMPHATICS: No cervical adenopathy    DIAGNOSTICS:     No labs or EKG required for low risk surgery (cataract, skin procedure, breast biopsy, etc)  Labs Drawn and in Process:     Recent Labs   Lab Test 02/05/20  1340 02/04/20  0911 07/02/19  1211 06/27/19  1038  02/22/19  1128  04/11/17  1448   HGB 16.7*  --   --  16.9*   < > 16.5*   < > 17.8*     --   --  238   < > 228   < > 251   INR  --   --   --   --   --   --   --  0.92     --  142 138  --  140   < > 140    POTASSIUM 3.4  --  4.2 4.2  --  3.8   < > 3.3*   CR 0.89  --  0.79 0.80  --  0.72   < > 0.70   A1C  --  6.6*  --   --   --  6.7*  --  6.3*    < > = values in this interval not displayed.        IMPRESSION:   Reason for surgery/procedure: Eyelid changes bilaterally affecting vision   Diagnosis/reason for consult: pre op evaluation     The proposed surgical procedure is considered LOW risk.    REVISED CARDIAC RISK INDEX  The patient has the following serious cardiovascular risks for perioperative complications such as (MI, PE, VFib and 3  AV Block):  No serious cardiac risks      The patient has the following additional risks for perioperative complications:  No identified additional risks      ICD-10-CM    1. Preop general physical exam  Z01.818    2. Vision changes  H53.9    3. HTN, goal below 140/90  I10 CBC with platelets     Basic metabolic panel  (Ca, Cl, CO2, Creat, Gluc, K, Na, BUN)     TSH with free T4 reflex     Albumin Random Urine Quantitative with Creat Ratio     Hemoglobin A1c   4. Type 2 diabetes mellitus with microalbuminuria, without long-term current use of insulin (H)  E11.29 CBC with platelets    R80.9 Basic metabolic panel  (Ca, Cl, CO2, Creat, Gluc, K, Na, BUN)     TSH with free T4 reflex     Albumin Random Urine Quantitative with Creat Ratio     Hemoglobin A1c     Lab orders placed today we will contact with results as they become available  Smoking cessation encouraged      RECOMMENDATIONS:       Pulmonary Risk  Maximize COPD treatment        --Patient is to take all scheduled medications on the day of surgery EXCEPT for modifications listed below.    Anticoagulant or Antiplatelet Medication Use  ASPIRIN: Discontinue ASA 7-10 days prior to procedure to reduce bleeding risk.  It should be resumed post-operatively.  NSAIDS: Ibuprofen (Motrin):         Stop one day prior to surgery        ACE Inhibitor or Angiotensin Receptor Blocker (ARB) Use  Ace inhibitor or Angiotensin Receptor Blocker (ARB)  and should HOLD this medication for the 24 hours prior to surgery.      APPROVAL GIVEN to proceed with proposed procedure, without further diagnostic evaluation       Signed Electronically by: PEDRO Dominguez CNP    Copy of this evaluation report is provided to requesting physician.    [place] Preop Guidelines    Revised Cardiac Risk Index

## 2020-07-17 NOTE — NURSING NOTE
Chief Complaint   Patient presents with     Pre-Op Exam       Initial /68   Pulse 62   Temp 97.4  F (36.3  C) (Tympanic)   Resp 20   Ht 1.524 m (5')   Wt 61.7 kg (136 lb)   SpO2 96%   BMI 26.56 kg/m   Estimated body mass index is 26.56 kg/m  as calculated from the following:    Height as of this encounter: 1.524 m (5').    Weight as of this encounter: 61.7 kg (136 lb).    Patient presents to the clinic using No DME    Health Maintenance that is potentially due pending provider review:  NONE    n/a    Is there anyone who you would like to be able to receive your results? No  If yes have patient fill out AMINATA

## 2020-07-17 NOTE — PATIENT INSTRUCTIONS
Before Your Surgery      Call your surgeon if there is any change in your health. This includes signs of a cold or flu (such as a sore throat, runny nose, cough, rash or fever).    Do not smoke, drink alcohol or take over the counter medicine (unless your surgeon or primary care doctor tells you to) for the 24 hours before and after surgery.    If you take prescribed drugs: Follow your doctor s orders about which medicines to take and which to stop until after surgery.    Eating and drinking prior to surgery: follow the instructions from your surgeon    Take a shower or bath the night before surgery. Use the soap your surgeon gave you to gently clean your skin. If you do not have soap from your surgeon, use your regular soap. Do not shave or scrub the surgery site.  Wear clean pajamas and have clean sheets on your bed.     STOP ASPIRIN 7 days before procedure  STOP ibuprofen 1 day before procedure.  Ok to use tylenol if needed.

## 2020-09-15 NOTE — LETTER
9/15/2020         RE: Aminata Gale  [address]   [facility] [address]        Dear Colleague,    Thank you for referring your patient, Aminata Gale, to the [facility]. Please see a copy of my visit note below.    Aminata Gale  :  1950  DOS: 09/15/2020  MRN: [id number]    Sports Medicine Clinic Visit    PCP: Sana Olivo    Aminata Gale is a 67 year old female who is seen in consultation at the request of  Sana Olivo C.N.P. presenting with chronic bilateral hip pain.    Injury: Gradual onset of bilateral hip, buttocks pain over the last ~ 9 months.  Pain significantly worse over last ~ 6 weeks after falling while on vacation and injuring her coccyx.  Pain located over bilateral deep lateral hip, left SI joint, radiating to bilateral lateral, anterior thigh.  Occasional groin pain on right.  Reports intermittent radiating, pain to bilateral thighs.  Additional Features:  Positive: catching and weakness.  Symptoms are better with Other medications: Norco and Rest.  Symptoms are worse with: lying on either hip, prolonged walking/sitting, going from sit to stand.  Other evaluation and/or treatments so far consists of: Ibuprofen, Other medications: Norco, Rest and donut pillow, PCP.  Recent imaging completed: X-rays completed 18.  Prior History of related problems: none    Social History: retired - cares for grandchild    Interim History - July 3, 2018  Since last visit on 18 patient has moderate right deep hip pain.  Bilateral hip trochanteric injection completed on  provided good relief of lateral hip pain.  Continues to have discomfort in deep anterior hip.  Attended one session of physical therapy, has not returned d/t illness.  Traveling to [place] in ~ 1 week.  No new injury in the interim.    Interim History - 2019  Since last visit on 7/3/18 patient has moderate right hip pain.  Right hip intra-articular  injection completed on 7/3 provided good relief for ~ 6 months.  Patient notes gradual return of pain in right anterior hip with radiation to right thigh over the last ~ 5 months.  Desires repeat injection today.  No new injury in the interim.    Interim History - September 15, 2020  Since last visit on 6/18/19 patient has moderate-severe right hip pain.  Right hip intra-articular steroid injection completed on 6/18/19 provided good relief for ~ 12+ months.  Patient notes that hip pain flared over past 3 - 4 weeks after getting into/falling into lower seat in car.  No new injury in the interim.    Review of Systems  Musculoskeletal: as above  Remainder of review of systems is negative including constitutional, CV, pulmonary, GI, Skin and Neurologic except as noted in HPI or medical history.    Past Medical History:   Diagnosis Date     Acute pericarditis, unspecified      Chronic airway obstruction 12/13/2005    PFTs - 1/02 - mild COPD Problem list name updated by automated process. Provider to review     Dysuria      Esophageal reflux      GERD (gastroesophageal reflux disease) 9/20/2013     Hiatal hernia 5/4/2011     HTN, goal below 140/90 9/20/2013     Hyperlipidemia LDL goal <130 9/22/2010    Recent Labs  Lab Test 9/22/10 0908 10/12/06 0939    CHOL 214* 256*    HDL 31* 42*    * 186*    TRIG 149 143    CHOLHDLRATIO 7.0* 6.0*   4/8/11 - dobutamine echo showed NO infarct or ischemia LV 60-65%, normal exercise response.        Lupus (H)      Osteoporosis 10/18/2010    10/10 - severe osteopenia in femoral necks, mild osteopenia in spine  (Problem list name updated by automated process. Provider to review and confirm.)     Pure hypercholesterolemia      SECONDARY POLYCYTHEMIA 10/17/2006    Due to smoking     Smoker 6/24/2008     Systemic lupus erythematosus 12/7/2005    Diagnosed in 1980's; history of pericarditis; was previously treated with mtx; not on active treatment; no hx of renal dz from the lupus.        Tobacco use disorder      Type 2 diabetes mellitus with hyperglycemia, without long-term current use of insulin (H) 2019    NEW DIAGNOSIS 2019     Unspecified essential hypertension      Past Surgical History:   Procedure Laterality Date     C APPENDECTOMY       C REPAIR GUM      Cathy Dontal surgery     C/SECTION, LOW TRANSVERSE      , Low Transverse     COLONOSCOPY  2009     D & C      X 5     HC RECONSTR NOSE+DAVE SEPTAL REPAIR       HYSTERECTOMY, BRI      Hx of dysplasia     LAPAROSCOPIC CHOLECYSTECTOMY N/A 2017    Procedure: LAPAROSCOPIC CHOLECYSTECTOMY;  Laparoscopic Cholecystectomy;  Surgeon: Tami Barney MD;  Location: [facility]     SINUS SURGERY      Reconstruction       Objective  /74   Ht 1.524 m (5')   Wt 61.2 kg (135 lb)   BMI 26.37 kg/m      General: healthy, alert and in no distress    HEENT: no scleral icterus or conjunctival erythema   Skin: no suspicious lesions or rash. No jaundice.   CV: regular rhythm by palpation, 2+ distal pulses, no pedal edema    Resp: normal respiratory effort without conversational dyspnea   Psych: normal mood and affect    Gait: nonantalgic, appropriate coordination and balance   Neuro: normal light touch sensory exam of the extremities. Motor strength as noted below       Bilateral hip exam    Inspection:        no edema or ecchymosis in hip area    ROM:       Full active and passive ROM, pain with terminal flexion>ER>IR      Pain with active adduction over lateral hip mild/moderate today    Strength:        flexion 5/5       extension 5/5       abduction 5/5       adduction 5/5    Tender:        greater trochanter       SI joint mild    Non Tender:        remainder of hip area       illiac crest       ASIS       pubis    Sensation:        grossly intact in hip and thigh    Skin:       well perfused       capillary refill brisk    Special Tests:        neg (-) MARIA       + FADIR       neg (-) scour       painful Ramonita    Large  Joint Injection/Arthocentesis: R hip joint    Date/Time: 9/15/2020 11:40 AM  Performed by: Jonh Gracia DO  Authorized by: Jonh Gracia DO     Indications:  Pain and diagnostic evaluation  Needle Size:  22 G  Guidance: ultrasound    Approach:  Anterior  Location:  Hip      Site:  R hip joint  Medications:  40 mg triamcinolone 40 MG/ML; 3 mL ropivacaine 5 MG/ML  Outcome:  Tolerated well, no immediate complications  Procedure discussed: discussed risks, benefits, and alternatives    Consent Given by:  Patient  Timeout: timeout called immediately prior to procedure    Prep: patient was prepped and draped in usual sterile fashion     4 ml's of 1% lidocaine was used as local anesthetic prior to injection      Radiology  PELVIS WITH BILATERAL HIP 1/30/2018 3:39 PM      HISTORY: Bilateral hip pain.     COMPARISON: 11/12/2008     FINDINGS:  Minimal loss of joint space bilaterally similar to  previous. There is no acute fracture. No dislocation. There are no  worrisome bony lesions.         IMPRESSION:  No acute osseous abnormality demonstrated.    Assessment:  1. Primary osteoarthritis of right hip    2. Pain, joint, hip, right    3. Trochanteric bursitis of right hip        Plan:  Discussed the assessment with the patient.  Follow up: prn based on clinical progress  B/l troch bursa CSI for pain relief was helpful, as was prior intraarticular CSI, which was last intervention > 1yr ago  Activity options and strategies reviewed in detail  XR images independently visualized and reviewed with patient today in clinic  More suggestive of intraarticular hip pain again today, repeat intraarticular CSI today  Consider troch bursa injection for labile sx in that location  Low impact activity strategies and PT options reviewed  Reviewed risks of corticosteroid use including CSI during current viral pandemic, patient acknowledged and wished to proceed  The patient has greater than 5/10 pain with limitations in  daily activity and has exhausted other supportive care measures including OTC medications without relief  Expectations and goals of CSI reviewed  Often 2-3 days for steroid effect, and can take up to two weeks for maximum effect  We discussed modified progressive pain-free activity as tolerated  Do not overuse in first two weeks if feeling better due to concern for vulnerability while steroid is working  Supportive care reviewed  All questions were answered today  Contact us with additional questions or concerns  Signs and sx of concern reviewed       Jonh Gracia DO, CADANNY  Primary Care Sports Medicine  [facility]             Disclaimer: This note consists of symbols derived from keyboarding, dictation and/or voice recognition software. As a result, there may be errors in the script that have gone undetected. Please consider this when interpreting information found in this chart.    Again, thank you for allowing me to participate in the care of your patient.        Sincerely,        Jonh Gracia DO

## 2020-09-15 NOTE — PROGRESS NOTES
Aminata Gale  :  1950  DOS: 09/15/2020  MRN: [id number]    Sports Medicine Clinic Visit    PCP: Sana Olivo    Aminataradha Gale is a 67 year old female who is seen in consultation at the request of  Sana Olivo C.N.P. presenting with chronic bilateral hip pain.    Injury: Gradual onset of bilateral hip, buttocks pain over the last ~ 9 months.  Pain significantly worse over last ~ 6 weeks after falling while on vacation and injuring her coccyx.  Pain located over bilateral deep lateral hip, left SI joint, radiating to bilateral lateral, anterior thigh.  Occasional groin pain on right.  Reports intermittent radiating, pain to bilateral thighs.  Additional Features:  Positive: catching and weakness.  Symptoms are better with Other medications: Norco and Rest.  Symptoms are worse with: lying on either hip, prolonged walking/sitting, going from sit to stand.  Other evaluation and/or treatments so far consists of: Ibuprofen, Other medications: Norco, Rest and donut pillow, PCP.  Recent imaging completed: X-rays completed 18.  Prior History of related problems: none    Social History: retired - cares for grandchild    Interim History - July 3, 2018  Since last visit on 18 patient has moderate right deep hip pain.  Bilateral hip trochanteric injection completed on  provided good relief of lateral hip pain.  Continues to have discomfort in deep anterior hip.  Attended one session of physical therapy, has not returned d/t illness.  Traveling to [place] in ~ 1 week.  No new injury in the interim.    Interim History - 2019  Since last visit on 7/3/18 patient has moderate right hip pain.  Right hip intra-articular injection completed on 7/3 provided good relief for ~ 6 months.  Patient notes gradual return of pain in right anterior hip with radiation to right thigh over the last ~ 5 months.  Desires repeat injection today.  No new injury in the interim.    Interim History -  September 15, 2020  Since last visit on 6/18/19 patient has moderate-severe right hip pain.  Right hip intra-articular steroid injection completed on 6/18/19 provided good relief for ~ 12+ months.  Patient notes that hip pain flared over past 3 - 4 weeks after getting into/falling into lower seat in car.  No new injury in the interim.    Review of Systems  Musculoskeletal: as above  Remainder of review of systems is negative including constitutional, CV, pulmonary, GI, Skin and Neurologic except as noted in HPI or medical history.    Past Medical History:   Diagnosis Date     Acute pericarditis, unspecified      Chronic airway obstruction 12/13/2005    PFTs - 1/02 - mild COPD Problem list name updated by automated process. Provider to review     Dysuria      Esophageal reflux      GERD (gastroesophageal reflux disease) 9/20/2013     Hiatal hernia 5/4/2011     HTN, goal below 140/90 9/20/2013     Hyperlipidemia LDL goal <130 9/22/2010    Recent Labs  Lab Test 9/22/10 0908 10/12/06 0939    CHOL 214* 256*    HDL 31* 42*    * 186*    TRIG 149 143    CHOLHDLRATIO 7.0* 6.0*   4/8/11 - dobutamine echo showed NO infarct or ischemia LV 60-65%, normal exercise response.        Lupus (H)      Osteoporosis 10/18/2010    10/10 - severe osteopenia in femoral necks, mild osteopenia in spine  (Problem list name updated by automated process. Provider to review and confirm.)     Pure hypercholesterolemia      SECONDARY POLYCYTHEMIA 10/17/2006    Due to smoking     Smoker 6/24/2008     Systemic lupus erythematosus 12/7/2005    Diagnosed in 1980's; history of pericarditis; was previously treated with mtx; not on active treatment; no hx of renal dz from the lupus.       Tobacco use disorder      Type 2 diabetes mellitus with hyperglycemia, without long-term current use of insulin (H) 2/24/2019    NEW DIAGNOSIS 2/2019     Unspecified essential hypertension      Past Surgical History:   Procedure Laterality Date     C APPENDECTOMY        C REPAIR GUM      Cathy Dontal surgery     C/SECTION, LOW TRANSVERSE      , Low Transverse     COLONOSCOPY  2009     D & C      X 5     HC RECONSTR NOSE+DAVE SEPTAL REPAIR       HYSTERECTOMY, BRI      Hx of dysplasia     LAPAROSCOPIC CHOLECYSTECTOMY N/A 2017    Procedure: LAPAROSCOPIC CHOLECYSTECTOMY;  Laparoscopic Cholecystectomy;  Surgeon: Tami Barney MD;  Location: [facility]     SINUS SURGERY      Reconstruction       Objective  /74   Ht 1.524 m (5')   Wt 61.2 kg (135 lb)   BMI 26.37 kg/m      General: healthy, alert and in no distress    HEENT: no scleral icterus or conjunctival erythema   Skin: no suspicious lesions or rash. No jaundice.   CV: regular rhythm by palpation, 2+ distal pulses, no pedal edema    Resp: normal respiratory effort without conversational dyspnea   Psych: normal mood and affect    Gait: nonantalgic, appropriate coordination and balance   Neuro: normal light touch sensory exam of the extremities. Motor strength as noted below       Bilateral hip exam    Inspection:        no edema or ecchymosis in hip area    ROM:       Full active and passive ROM, pain with terminal flexion>ER>IR      Pain with active adduction over lateral hip mild/moderate today    Strength:        flexion 5/5       extension 5/5       abduction 5/5       adduction 5/5    Tender:        greater trochanter       SI joint mild    Non Tender:        remainder of hip area       illiac crest       ASIS       pubis    Sensation:        grossly intact in hip and thigh    Skin:       well perfused       capillary refill brisk    Special Tests:        neg (-) MARIA       + FADIR       neg (-) scour       painful Ramonita    Large Joint Injection/Arthocentesis: R hip joint    Date/Time: 9/15/2020 11:40 AM  Performed by: Jonh Gracia DO  Authorized by: Jonh Gracia DO     Indications:  Pain and diagnostic evaluation  Needle Size:  22 G  Guidance: ultrasound     Approach:  Anterior  Location:  Hip      Site:  R hip joint  Medications:  40 mg triamcinolone 40 MG/ML; 3 mL ropivacaine 5 MG/ML  Outcome:  Tolerated well, no immediate complications  Procedure discussed: discussed risks, benefits, and alternatives    Consent Given by:  Patient  Timeout: timeout called immediately prior to procedure    Prep: patient was prepped and draped in usual sterile fashion     4 ml's of 1% lidocaine was used as local anesthetic prior to injection      Radiology  PELVIS WITH BILATERAL HIP 1/30/2018 3:39 PM      HISTORY: Bilateral hip pain.     COMPARISON: 11/12/2008     FINDINGS:  Minimal loss of joint space bilaterally similar to  previous. There is no acute fracture. No dislocation. There are no  worrisome bony lesions.         IMPRESSION:  No acute osseous abnormality demonstrated.    Assessment:  1. Primary osteoarthritis of right hip    2. Pain, joint, hip, right    3. Trochanteric bursitis of right hip        Plan:  Discussed the assessment with the patient.  Follow up: prn based on clinical progress  B/l troch bursa CSI for pain relief was helpful, as was prior intraarticular CSI, which was last intervention > 1yr ago  Activity options and strategies reviewed in detail  XR images independently visualized and reviewed with patient today in clinic  More suggestive of intraarticular hip pain again today, repeat intraarticular CSI today  Consider troch bursa injection for labile sx in that location  Low impact activity strategies and PT options reviewed  Reviewed risks of corticosteroid use including CSI during current viral pandemic, patient acknowledged and wished to proceed  The patient has greater than 5/10 pain with limitations in daily activity and has exhausted other supportive care measures including OTC medications without relief  Expectations and goals of CSI reviewed  Often 2-3 days for steroid effect, and can take up to two weeks for maximum effect  We discussed modified  progressive pain-free activity as tolerated  Do not overuse in first two weeks if feeling better due to concern for vulnerability while steroid is working  Supportive care reviewed  All questions were answered today  Contact us with additional questions or concerns  Signs and sx of concern reviewed       Jonh Gracia DO, AFSHIN  Primary Care Sports Medicine  [facility]             Disclaimer: This note consists of symbols derived from keyboarding, dictation and/or voice recognition software. As a result, there may be errors in the script that have gone undetected. Please consider this when interpreting information found in this chart.

## 2020-10-14 NOTE — PROGRESS NOTES
"Aminata Gale is a 69 year old female who is being evaluated via a billable telephone visit.      The patient has been notified of following:     \"This telephone visit will be conducted via a call between you and your physician/provider. We have found that certain health care needs can be provided without the need for a physical exam.  This service lets us provide the care you need with a short phone conversation.  If a prescription is necessary we can send it directly to your pharmacy.  If lab work is needed we can place an order for that and you can then stop by our lab to have the test done at a later time.    Telephone visits are billed at different rates depending on your insurance coverage. During this emergency period, for some insurers they may be billed the same as an in-person visit.  Please reach out to your insurance provider with any questions.    If during the course of the call the physician/provider feels a telephone visit is not appropriate, you will not be charged for this service.\"    Patient has given verbal consent for Telephone visit?  Yes    What phone number would you like to be contacted at? [phone]    How would you like to obtain your AVS? Mail a copy    Subjective   Aminata Gale is a 69 year old female who presents via phone visit today for the following health issues:    HPI  Acute Illness  Acute illness concerns: cough and sinus  Onset/Duration: 3 weeks  Symptoms:  Fever: no  Chills/Sweats: no  Headache (location?): YES - front of head  Sinus Pressure: YES  Conjunctivitis:  YES- itchy and dry  Ear Pain: YES: left  Rhinorrhea: YES  Congestion: YES  Sore Throat: no  Cough: YES-productive of clear sputum, productive of yellow sputum, with shortness of breath, worsening over time  Wheeze: YES  Decreased Appetite: no  Nausea: no  Vomiting: no  Diarrhea: no  Dysuria/Freq.: no  Dysuria or Hematuria: no  Fatigue/Achiness: YES  Sick/Strep Exposure: YES- Grandkids have a cold  Therapies " tried and outcome: Nebulizer and inhalers with improvement.   Has been following social distancing and avoiding all crowds. No known exposures. Has been wearing masks all the time.      Review of Systems   Constitutional, HEENT, cardiovascular, pulmonary, gi and gu systems are negative, except as otherwise noted.     Objective      Vitals:  No vitals were obtained today due to virtual visit.  General: healthy, alert and no distress  PSYCH: Alert and oriented times 3; coherent speech, normal   rate and volume, able to articulate logical thoughts, able   to abstract reason, no tangential thoughts, no hallucinations   or delusions  Her affect is normal  RESP: No cough, no audible wheezing, able to talk in full sentences  Remainder of exam unable to be completed due to telephone visits      Assessment & Plan   COPD exacerbation (H)  Pt presents with progressively worsening Uri symptoms and shortness of breath that is similiar to her  previous COPD exacerbations. Last exacerbation was about 3 years ago.   Able to talk in full sentences. Has been active with yard work and burning leaves. Thinks there may be an allergic component to this. Has been taking Covid precautions very seriously. No known sick contacts. If this was a covid infection, would expect her to either be improving or given her history, more severely symptomatic at 3 weeks of symptoms. Given her hx and unlikely exposure to Covid, we will treat for a COPD exacerbation with Azithromycin 500 mg daily per patient chart and Prednisone burst. Follow-up Friday virtually as well. If symptoms no improving or if they are worsening will have her come into clinic additional evaluation.   - azithromycin (ZITHROMAX) 500 MG tablet; Take 1 tablet (500 mg) by mouth daily for 5 days  - predniSONE (DELTASONE) 20 MG tablet; Take 2 tablets (40 mg) by mouth daily for 5 days    Return in about 2 days (around 10/16/2020) for Phone Visit.    [facility]  [facility]    Phone call duration:  20 minutes

## 2020-10-16 NOTE — PATIENT INSTRUCTIONS
Continue current course of Azithromycin and Prednisone.      Pepcid over the counter for your acid reflux. You can take this once per day if needed.

## 2020-10-16 NOTE — PROGRESS NOTES
"Aminata Gale is a 69 year old female who is being evaluated via a billable telephone visit.      The patient has been notified of following:     \"This telephone visit will be conducted via a call between you and your physician/provider. We have found that certain health care needs can be provided without the need for a physical exam.  This service lets us provide the care you need with a short phone conversation.  If a prescription is necessary we can send it directly to your pharmacy.  If lab work is needed we can place an order for that and you can then stop by our lab to have the test done at a later time.    Telephone visits are billed at different rates depending on your insurance coverage. During this emergency period, for some insurers they may be billed the same as an in-person visit.  Please reach out to your insurance provider with any questions.    If during the course of the call the physician/provider feels a telephone visit is not appropriate, you will not be charged for this service.\"    Patient has given verbal consent for Telephone visit?  Yes    What phone number would you like to be contacted at? [phone]    How would you like to obtain your AVS? Mail a copy    Subjective     Aminata Gale is a 69 year old female who presents via phone visit today for the following health issues:    HPI  Patient states that she is feeling better. Her breathing is much better today.  Much improved since two days ago.   Nearly back to her usual state of health.  A little wheezy and cough first thing in the morning, but then none afterwards    History   Smoking Status     Current Every Day Smoker     Packs/day: 0.01     Years: 42.00     Types: Cigarettes   Smokeless Tobacco     Never Used     Comment: Has chantix at home, but hasn't started yet. 5/9/19.       Review of Systems   Constitutional, HEENT, cardiovascular, pulmonary, gi and gu systems are negative, except as otherwise noted.     Objective    "   Vitals:  No vitals were obtained today due to virtual visit.  General: healthy, alert and no distress  PSYCH: Alert and oriented times 3; coherent speech, normal   rate and volume, able to articulate logical thoughts, able   to abstract reason, no tangential thoughts, no hallucinations   or delusions  Her affect is normal  RESP: No cough, no audible wheezing, able to talk in full sentences  Remainder of exam unable to be completed due to telephone visits      Assessment & Plan   COPD exacerbation (H)  Much improved over the last two days since starting Azithromycin and Prednisone. Sounds much improved over the phone. Denies any fevers, chills, fatigue. Continue current course. If symptoms return after finishing course, pt will call back for longer steroid taper.      Gastroesophageal reflux disease, unspecified whether esophagitis present  Worsening since being on Prednisone. Not taking anything for this. She sometimes wakes up with heart burn at night. Recommended to  Pepcid OTC. RTC prn for any new, changing or worsening symptoms.       Tobacco Cessation:   reports that she has been smoking cigarettes. She has a 0.42 pack-year smoking history. She has never used smokeless tobacco.  Tobacco Cessation Action Plan: Referred to PCP    BMI:   Estimated body mass index is 26.37 kg/m  as calculated from the following:    Height as of 9/15/20: 1.524 m (5').    Weight as of 9/15/20: 61.2 kg (135 lb).     Return in about 1 week (around 10/23/2020), or if symptoms worsen or fail to improve, for In-Clinic Visit.    Ritchie Barron PA-C  [facility]    Phone call duration:  12 minutes

## 2020-10-16 NOTE — PROGRESS NOTES
"Subjective     Aminata Gale is a 69 year old female who presents to clinic today for the following health issues:    HPI         COPD Follow-Up    Overall, how are your COPD symptoms since your last clinic visit?  { :453639::\"No change\"}    How much fatigue or shortness of breath do you have when you are walking?  { :327987}    How much shortness of breath do you have when you are resting?  { :614377}    How often do you cough? { :829284}    Have you noticed any change in your sputum/phlegm?  { :338474}    Have you experienced a recent fever? { :302525}    Please describe how far you can walk without stopping to rest:  { :690120}    How many flights of stairs are you able to walk up without stopping?  { :655964}    Have you had any Emergency Room Visits, Urgent Care Visits, or Hospital Admissions because of your COPD since your last office visit?  { :062163}    History   Smoking Status     Current Every Day Smoker     Packs/day: 0.01     Years: 42.00     Types: Cigarettes   Smokeless Tobacco     Never Used     Comment: Has chantix at home, but hasn't started yet. 5/9/19.     No results found for: FEV1, [id number]    {additonal problems for provider to add (Optional):585879}    Review of Systems   {ROS COMP (Optional):100362}      Objective    There were no vitals taken for this visit.  There is no height or weight on file to calculate BMI.  Physical Exam   {Exam List (Optional):013368}    {Diagnostic Test Results (Optional):937319}        {PROVIDER CHARTING PREFERENCE:401435}    "

## 2020-10-19 NOTE — TELEPHONE ENCOUNTER
Finished prednisone and z-pack today but she said at her visit Friday you mentioned perhaps continuing prednisone longer? She still has wheezing in the mornings but feels much better.

## 2020-10-19 NOTE — TELEPHONE ENCOUNTER
Reason for call:  Patient reporting a symptom    Symptom or request: Pt said she took her last day of Prednisone from 10/16/20. Pt said she is feeling better but wondering if she should continue to get another dose to help.   Please Advise.    Phone Number patient can be reached at:  Home number on file [phone] (home)    Best Time:  Any Time      Can we leave a detailed message on this number:  YES    Call taken on 10/19/2020 at 1:49 PM by Liss Petty

## 2020-11-30 NOTE — PROGRESS NOTES
Pre-Bronch    Post-Bronch         Actual Pred %Pred  Actual %Pred %Chng       ---- SPIROMETRY ----                          FVC (L) 1.28 2.31 55                    FEV1 (L) 0.61 1.83 33                    FEV1/FVC (%) 47 79                      FEV1/SVC (%) 40 70                      FEV1/FEV6 (%) 48 79                      FEF Max (L/sec) 1.79 5.12 34                    FEF 25-75% (L/sec) 0.24 1.64 14                    FIVC (L) 1.10                        FIF Max (L/sec) 1.78 4.15 42                    Expiratory Time (sec) 7.74                        ----LUNG MECHANICS                           MVV (L/min)   79                      MEP (cmH2O)                          MIP (cmH2O)                          ---- LUNG VOLUMES ----                          SVC (L) 1.53 2.62 58                    IC (L) 1.04 2.21 47                    ERV (L) 0.48 0.41 118                    FRC(Pleth) (L) 3.26 2.51 129                    RV (Pleth) (L) 2.78 1.90 145                    TLC (Pleth) (L) 4.30 4.35 98                    RV/TLC (Pleth) (%) 64 43                      ---- DIFFUSION ----                          DLCOunc (ml/min/mmHg) 7.98 17.23 46                    DLCOcor (ml/min/mmHg) 7.44 17.23 43                    DL/VA (ml/min/mmHg/L) 3.24 4.25                      VA (L) 2.30 4.05 56                    IVC (L) 1.46                        Hgb (gm/dL) 16.0 12-18                      ---- BLOOD GASES ----                               Number Of Hemigard Strips Per Side: 1

## 2020-12-17 NOTE — LETTER
"12/17/2020    Sana Olivo, PEDRO CNP  [address] [place] [id number]    RE: Aminata Gale       Dear Colleague,    I had the pleasure of seeing Aminata Gale in the [facility].    Aminata Gale is a 69 year old female who is being evaluated via a billable telephone visit.      The patient has been notified of following:     \"This telephone visit will be conducted via a call between you and your physician/provider. We have found that certain health care needs can be provided without the need for a physical exam.  This service lets us provide the care you need with a short phone conversation.  If a prescription is necessary we can send it directly to your pharmacy.  If lab work is needed we can place an order for that and you can then stop by our lab to have the test done at a later time.    Telephone visits are billed at different rates depending on your insurance coverage. During this emergency period, for some insurers they may be billed the same as an in-person visit.  Please reach out to your insurance provider with any questions.    If during the course of the call the physician/provider feels a telephone visit is not appropriate, you will not be charged for this service.\"    Patient has given verbal consent for Telephone visit?  Yes    What phone number would you like to be contacted at? [phone]    How would you like to obtain your AVS? MyChart  And mail     Phone call duration: 15 minutes    PEDRO [facility]      Cardiology Clinic Progress Note  Aminata Gale MRN# [id number]   YOB: 1950 Age: 68 year old     Primary Cardiologist:   Dr. Santana           History of Presenting Illness:    Aminata Gale is a pleasant 68 year old patient with a past cardiac history significant for   1. nonobstructive CAD,   2. diastolic dysfunction,   3. hypertension,   4. hyperlipidemia.    Past medical history significant for " tobacco abuse, COPD, and lupus.    She has a history of statin intolerance. Stress echocardiogram February 2017 was negative for ischemia and echocardiogram portion showed LVEF  50-55% at rest.  Lexiscan nuclear stress test April 2017 showed a small apical infarct without ischemia.  Coronary angiogram  April 2017 showed mild LAD and circumflex disease with minimal plaque in the RCA.  Echocardiogram April 2017 showed LVEF 55%, no WMA, no significant valvular disease. PFTs 2019 showed very severe obstruction with moderate diffusion defect. Nuclear stress July 2019 showed small apical infarct extending to the distal inferolateral wall, no ischemia, EF 62% with apical hypokinesis.    Patient was last seen by Dr. Santana in June 2020 in a virtual visit.  She was able to do yard work without any anginal symptoms.  She continues with chronic dyspnea from her COPD.  Weight was stable at 140 pounds using torsemide daily.    Pt presents today for 6 month follow-up.  Most recent BMP July 2020 showing normal renal function and electrolytes.  Since she was last seen, blood pressures have been well controlled.  She does continue with a chronic dull chest ache which she notices mostly at rest.  This occurs occasionally and does not notice any with exertion.  Overall, she feels this has been stable and has actually been improved over the last 6 months.  She continues with dyspnea on exertion which has been stable over the past 6 months.  She does admit that she has been less active now with the colder months and given that she has been quarantining due to COVID-19 pandemic.  We discussed ways to get exercise inside such as climbing her stairs a few times a day.  She reports that since being less active she has noted more leg edema occasionally.  She did take an extra torsemide yesterday and is hoping this will improve.  She has not seen any significant increases in her weight. She has p.r.n. Home oxygen. Patient reports no PND,  orthopnea, presyncope, syncope, heart racing, or palpitations.    Current Cardiac Medications   Amlodipine 10 mg daily  ASA 81 mg daily   Zetia 10 mg daily  Losartan 25 mg daily   Torsemide 5 mg daily                   Assessment and Plan:     Plan  1. Check blood pressure at least 1 hour after medications. Call the clinic if your blood pressure is consistently greater than 130/80.   2. Check daily weights and call the clinic if your weight has increased more than 2 lbs in one day or 5 lbs in one week.  3. Call if leg swelling is not improving or if you are needing extra torsemide more often.         Follow-up:  See Dr. Santana for cardiology follow up at [facility]: June 2021.      1. Mild nonobstructive CAD with chest discomfort     Angio 2017 mild LAD and circumflex disease with minimal plaque in the RCA    Nuclear stress test 2019 no ischemia    Chronic chest discomfort improved but present occ.     Continue CCB, start ARB, intolerant to statins    Consider repeat stress testing 2021 or 2022      2. Diastolic dysfunction    LE edema improved with torsemide     Dry weight 140 pounds    Continue torsemide and use compression stockings       3. hypertension    controlled    continue amlodipine and losartan       4. hyperlipidemia     2/2020    statin intolerance, continue Zetia         Thank you for allowing me to participate in this delightful patient's care.      This note was completed in part using Dragon voice recognition software. Although reviewed after completion, some word and grammatical errors may occur.    Jessie Campos, APRN, CNP           Data:   All laboratory data reviewed          HPI and Plan:   See dictation    Orders Placed This Encounter   Procedures     Follow-Up with Cardiologist       No orders of the defined types were placed in this encounter.      There are no discontinued medications.      Encounter Diagnoses   Name Primary?     Nonocclusive coronary  atherosclerosis of native coronary artery Yes     Essential hypertension, benign      Hyperlipidemia LDL goal <130      Diastolic dysfunction        CURRENT MEDICATIONS:  Current Outpatient Medications   Medication Sig Dispense Refill     albuterol (PROAIR HFA/PROVENTIL HFA/VENTOLIN HFA) 108 (90 Base) MCG/ACT inhaler Inhale 2 puffs into the lungs every 6 hours 54 g 0     alcohol swab prep pads Use to swab area of injection/raffi as directed. 100 each 3     amLODIPine (NORVASC) 10 MG tablet Take 1 tablet (10 mg) by mouth daily For high blood pressure 90 tablet 0     aspirin (ASA) 81 MG tablet Take 1 tablet (81 mg) by mouth daily       blood glucose (MILLIE MICROLET 2) lancing device Device to be used with lancets 2 times a day. 1 each 0     cetirizine (ZYRTEC) 10 MG tablet Take 10 mg by mouth daily       ezetimibe (ZETIA) 10 MG tablet Take 1 tablet (10 mg) by mouth daily 90 tablet 0     fluocinonide (LIDEX) 0.05 % external solution Apply twice daily on scalp as needed. 60 mL 4     fluticasone-vilanterol (BREO ELLIPTA) 200-25 MCG/INH inhaler INHALE 1 PUFF INTO THE LUNGS DAILY 3 Inhaler 0     Ibuprofen (IBU-200 PO) Take  by mouth.       ipratropium - albuterol 0.5 mg/2.5 mg/3 mL (DUONEB) 0.5-2.5 (3) MG/3ML neb solution Take 1 vial (3 mLs) by nebulization every 6 hours as needed for shortness of breath / dyspnea or wheezing 1 Box 3     ketoconazole (NIZORAL) 2 % external shampoo Use to wash scalp, leave on for 5 minutes. Use 2-3 times a week. 120 mL 4     loratadine (CLARITIN) 10 MG tablet Take 10 mg by mouth daily       losartan (COZAAR) 25 MG tablet Take 1 tablet (25 mg) by mouth daily 30 tablet 0     STATIN NOT PRESCRIBED (INTENTIONAL) Please choose reason not prescribed, below       tiotropium (SPIRIVA RESPIMAT) 2.5 MCG/ACT inhaler Inhale 2 puffs into the lungs daily 12 g 0     torsemide (DEMADEX) 5 MG tablet Take 1 tablet (5 mg) by mouth daily 90 tablet 0       ALLERGIES     Allergies   Allergen Reactions      Amoxicillin Difficulty breathing and Rash     Ampicillin Difficulty breathing and Rash     Cipro [Ciprofloxacin] Difficulty breathing and Rash     Keflex [Cephalexin Monohydrate] Difficulty breathing and Rash     Penicillin [Penicillins] Difficulty breathing and Rash     Sulfa Drugs Difficulty breathing and Rash     Tetracycline Difficulty breathing and Rash     Biaxin [Clarithromycin] Rash     Ceclor [Cefaclor] Hives and Swelling     Advair Diskus Hives     Hives on face,neck and chest     Egg White      Fish Shortness Of Breath     Mustard [Allyl Isothiocyanate]      Throat swelling       PAST MEDICAL HISTORY:  Past Medical History:   Diagnosis Date     Acute pericarditis, unspecified      Chronic airway obstruction 12/13/2005    PFTs - 1/02 - mild COPD Problem list name updated by automated process. Provider to review     Dysuria      Esophageal reflux      GERD (gastroesophageal reflux disease) 9/20/2013     Hiatal hernia 5/4/2011     HTN, goal below 140/90 9/20/2013     Hyperlipidemia LDL goal <130 9/22/2010    Recent Labs  Lab Test 9/22/10 0908 10/12/06 0939    CHOL 214* 256*    HDL 31* 42*    * 186*    TRIG 149 143    CHOLHDLRATIO 7.0* 6.0*   4/8/11 - dobutamine echo showed NO infarct or ischemia LV 60-65%, normal exercise response.        Lupus (H)      Osteoporosis 10/18/2010    10/10 - severe osteopenia in femoral necks, mild osteopenia in spine  (Problem list name updated by automated process. Provider to review and confirm.)     Pure hypercholesterolemia      SECONDARY POLYCYTHEMIA 10/17/2006    Due to smoking     Smoker 6/24/2008     Systemic lupus erythematosus 12/7/2005    Diagnosed in 1980's; history of pericarditis; was previously treated with mtx; not on active treatment; no hx of renal dz from the lupus.       Tobacco use disorder      Type 2 diabetes mellitus with hyperglycemia, without long-term current use of insulin (H) 2/24/2019    NEW DIAGNOSIS 2/2019     Unspecified essential  hypertension        PAST SURGICAL HISTORY:  Past Surgical History:   Procedure Laterality Date     C APPENDECTOMY       C REPAIR GUM      Cathy Dontal surgery     C/SECTION, LOW TRANSVERSE      , Low Transverse     COLONOSCOPY  2009     D & C      X 5     HC RECONSTR NOSE+DAVE SEPTAL REPAIR       HYSTERECTOMY, BRI      Hx of dysplasia     LAPAROSCOPIC CHOLECYSTECTOMY N/A 2017    Procedure: LAPAROSCOPIC CHOLECYSTECTOMY;  Laparoscopic Cholecystectomy;  Surgeon: Tami Barney MD;  Location: [facility] OR     SINUS SURGERY      Reconstruction       FAMILY HISTORY:  Family History   Problem Relation Age of Onset     Diabetes Mother      Gastrointestinal Disease Mother         Gallbladder disease     Heart Disease Mother      Cancer Father         lung     Alcohol/Drug Father      Allergies Father      Heart Disease Father      Gastrointestinal Disease Father         Liver disease     Skin Cancer Father      Heart Disease Maternal Grandfather      Arthritis Sister      Osteoporosis Sister      Thyroid Disease Sister      Allergies Son      Alcohol/Drug Sister      Alcohol/Drug Sister      Melanoma No family hx of        SOCIAL HISTORY:  Social History     Socioeconomic History     Marital status:      Spouse name: Javier Gale     Number of children: 2     Years of education: 12     Highest education level: None   Occupational History     Employer: DISABLED   Social Needs     Financial resource strain: None     Food insecurity     Worry: None     Inability: None     Transportation needs     Medical: None     Non-medical: None   Tobacco Use     Smoking status: Current Every Day Smoker     Packs/day: 0.01     Years: 42.00     Pack years: 0.42     Types: Cigarettes     Smokeless tobacco: Never Used     Tobacco comment: Has chantix at home, but hasn't started yet. 19.   Substance and Sexual Activity     Alcohol use: Yes     Alcohol/week: 0.0 standard drinks     Comment: Occ. wine     Drug  use: No     Sexual activity: Yes     Partners: Male     Birth control/protection: Surgical     Comment: Hyst    Lifestyle     Physical activity     Days per week: None     Minutes per session: None     Stress: None   Relationships     Social connections     Talks on phone: None     Gets together: None     Attends Sikhism service: None     Active member of club or organization: None     Attends meetings of clubs or organizations: None     Relationship status: None     Intimate partner violence     Fear of current or ex partner: None     Emotionally abused: None     Physically abused: None     Forced sexual activity: None   Other Topics Concern     Parent/sibling w/ CABG, MI or angioplasty before 65F 55M? No   Social History Narrative    The patient has a 50+ pk yr tobacco hx.  She has ongoing active use of 1/2 ppd.  Alcohol use is <1 alcoholic drinks per week.  She denies use of recreational drugs.          She is on disability.  Has been on disability since approximately 1989.  Previously worked in [employer].         The patient is .  Has 2 children.        Hot Tube Exposure: NO    Recent Travel: NO     Hx of incarceration:  NO    Bird Exposure:   NO    Animal Exposure:  NO    Inhalation Exposure:  Possible, father worked with asbestos               Review of Systems:  Skin:  Positive for itching;scaling     Eyes:  Positive for glasses;visual blurring;cataracts    ENT:  Negative      Respiratory:  Positive for dyspnea on exertion;shortness of breath;wheezing     Cardiovascular:    Positive for;chest pain;lower extremity symptoms;edema;lightheadedness;dizziness;fatigue    Gastroenterology: Positive for excessive gas or bloating    Genitourinary:  Positive for kidney stone    Musculoskeletal:  Positive for nocturnal cramping    Neurologic:  Negative      Psychiatric:  Positive for depression    Heme/Lymph/Imm:  Negative      Endocrine:  Positive for diabetes      Physical Exam:  Vitals: There were  no vitals taken for this visit.    Constitutional:           Skin:             Head:           Eyes:           Lymph:      ENT:           Neck:           Respiratory:            Cardiac:                                                           GI:           Extremities and Muscular Skeletal:                 Neurological:           Psych:             Thank you for allowing me to participate in the care of your patient.    Sincerely,     PEDRO [facility]

## 2020-12-17 NOTE — PROGRESS NOTES
"Aminata Gale is a 69 year old female who is being evaluated via a billable telephone visit.      The patient has been notified of following:     \"This telephone visit will be conducted via a call between you and your physician/provider. We have found that certain health care needs can be provided without the need for a physical exam.  This service lets us provide the care you need with a short phone conversation.  If a prescription is necessary we can send it directly to your pharmacy.  If lab work is needed we can place an order for that and you can then stop by our lab to have the test done at a later time.    Telephone visits are billed at different rates depending on your insurance coverage. During this emergency period, for some insurers they may be billed the same as an in-person visit.  Please reach out to your insurance provider with any questions.    If during the course of the call the physician/provider feels a telephone visit is not appropriate, you will not be charged for this service.\"    Patient has given verbal consent for Telephone visit?  Yes    What phone number would you like to be contacted at? [phone]    How would you like to obtain your AVS? MyChart  And mail     Phone call duration: 15 minutes    [facility]      Cardiology Clinic Progress Note  Aminata Gale MRN# [id number]   YOB: 1950 Age: 68 year old     Primary Cardiologist:   Dr. Santana           History of Presenting Illness:    Aminata Gale is a pleasant 68 year old patient with a past cardiac history significant for   1. nonobstructive CAD,   2. diastolic dysfunction,   3. hypertension,   4. hyperlipidemia.    Past medical history significant for tobacco abuse, COPD, and lupus.    She has a history of statin intolerance. Stress echocardiogram February 2017 was negative for ischemia and echocardiogram portion showed LVEF  50-55% at rest.  Lexiscan nuclear stress test April " 2017 showed a small apical infarct without ischemia.  Coronary angiogram  April 2017 showed mild LAD and circumflex disease with minimal plaque in the RCA.  Echocardiogram April 2017 showed LVEF 55%, no WMA, no significant valvular disease. PFTs 2019 showed very severe obstruction with moderate diffusion defect. Nuclear stress July 2019 showed small apical infarct extending to the distal inferolateral wall, no ischemia, EF 62% with apical hypokinesis.    Patient was last seen by Dr. Santana in June 2020 in a virtual visit.  She was able to do yard work without any anginal symptoms.  She continues with chronic dyspnea from her COPD.  Weight was stable at 140 pounds using torsemide daily.    Pt presents today for 6 month follow-up.  Most recent BMP July 2020 showing normal renal function and electrolytes.  Since she was last seen, blood pressures have been well controlled.  She does continue with a chronic dull chest ache which she notices mostly at rest.  This occurs occasionally and does not notice any with exertion.  Overall, she feels this has been stable and has actually been improved over the last 6 months.  She continues with dyspnea on exertion which has been stable over the past 6 months.  She does admit that she has been less active now with the colder months and given that she has been quarantining due to COVID-19 pandemic.  We discussed ways to get exercise inside such as climbing her stairs a few times a day.  She reports that since being less active she has noted more leg edema occasionally.  She did take an extra torsemide yesterday and is hoping this will improve.  She has not seen any significant increases in her weight. She has p.r.n. Home oxygen. Patient reports no PND, orthopnea, presyncope, syncope, heart racing, or palpitations.    Current Cardiac Medications   Amlodipine 10 mg daily  ASA 81 mg daily   Zetia 10 mg daily  Losartan 25 mg daily   Torsemide 5 mg daily                   Assessment  and Plan:     Plan  1. Check blood pressure at least 1 hour after medications. Call the clinic if your blood pressure is consistently greater than 130/80.   2. Check daily weights and call the clinic if your weight has increased more than 2 lbs in one day or 5 lbs in one week.  3. Call if leg swelling is not improving or if you are needing extra torsemide more often.         Follow-up:  See Dr. Santana for cardiology follow up at [facility]: June 2021.      1. Mild nonobstructive CAD with chest discomfort     Angio 2017 mild LAD and circumflex disease with minimal plaque in the RCA    Nuclear stress test 2019 no ischemia    Chronic chest discomfort improved but present occ.     Continue CCB, start ARB, intolerant to statins    Consider repeat stress testing 2021 or 2022      2. Diastolic dysfunction    LE edema improved with torsemide     Dry weight 140 pounds    Continue torsemide and use compression stockings       3. hypertension    controlled    continue amlodipine and losartan       4. hyperlipidemia     2/2020    statin intolerance, continue Zetia         Thank you for allowing me to participate in this delightful patient's care.      This note was completed in part using Dragon voice recognition software. Although reviewed after completion, some word and grammatical errors may occur.    Jessie Campos, APRN, CNP           Data:   All laboratory data reviewed          HPI and Plan:   See dictation    Orders Placed This Encounter   Procedures     Follow-Up with Cardiologist       No orders of the defined types were placed in this encounter.      There are no discontinued medications.      Encounter Diagnoses   Name Primary?     Nonocclusive coronary atherosclerosis of native coronary artery Yes     Essential hypertension, benign      Hyperlipidemia LDL goal <130      Diastolic dysfunction        CURRENT MEDICATIONS:  Current Outpatient Medications   Medication Sig Dispense Refill      albuterol (PROAIR HFA/PROVENTIL HFA/VENTOLIN HFA) 108 (90 Base) MCG/ACT inhaler Inhale 2 puffs into the lungs every 6 hours 54 g 0     alcohol swab prep pads Use to swab area of injection/raffi as directed. 100 each 3     amLODIPine (NORVASC) 10 MG tablet Take 1 tablet (10 mg) by mouth daily For high blood pressure 90 tablet 0     aspirin (ASA) 81 MG tablet Take 1 tablet (81 mg) by mouth daily       blood glucose (MILLIE MICROLET 2) lancing device Device to be used with lancets 2 times a day. 1 each 0     cetirizine (ZYRTEC) 10 MG tablet Take 10 mg by mouth daily       ezetimibe (ZETIA) 10 MG tablet Take 1 tablet (10 mg) by mouth daily 90 tablet 0     fluocinonide (LIDEX) 0.05 % external solution Apply twice daily on scalp as needed. 60 mL 4     fluticasone-vilanterol (BREO ELLIPTA) 200-25 MCG/INH inhaler INHALE 1 PUFF INTO THE LUNGS DAILY 3 Inhaler 0     Ibuprofen (IBU-200 PO) Take  by mouth.       ipratropium - albuterol 0.5 mg/2.5 mg/3 mL (DUONEB) 0.5-2.5 (3) MG/3ML neb solution Take 1 vial (3 mLs) by nebulization every 6 hours as needed for shortness of breath / dyspnea or wheezing 1 Box 3     ketoconazole (NIZORAL) 2 % external shampoo Use to wash scalp, leave on for 5 minutes. Use 2-3 times a week. 120 mL 4     loratadine (CLARITIN) 10 MG tablet Take 10 mg by mouth daily       losartan (COZAAR) 25 MG tablet Take 1 tablet (25 mg) by mouth daily 30 tablet 0     STATIN NOT PRESCRIBED (INTENTIONAL) Please choose reason not prescribed, below       tiotropium (SPIRIVA RESPIMAT) 2.5 MCG/ACT inhaler Inhale 2 puffs into the lungs daily 12 g 0     torsemide (DEMADEX) 5 MG tablet Take 1 tablet (5 mg) by mouth daily 90 tablet 0       ALLERGIES     Allergies   Allergen Reactions     Amoxicillin Difficulty breathing and Rash     Ampicillin Difficulty breathing and Rash     Cipro [Ciprofloxacin] Difficulty breathing and Rash     Keflex [Cephalexin Monohydrate] Difficulty breathing and Rash     Penicillin [Penicillins]  Difficulty breathing and Rash     Sulfa Drugs Difficulty breathing and Rash     Tetracycline Difficulty breathing and Rash     Biaxin [Clarithromycin] Rash     Ceclor [Cefaclor] Hives and Swelling     Advair Diskus Hives     Hives on face,neck and chest     Egg White      Fish Shortness Of Breath     Mustard [Allyl Isothiocyanate]      Throat swelling       PAST MEDICAL HISTORY:  Past Medical History:   Diagnosis Date     Acute pericarditis, unspecified      Chronic airway obstruction 2005    PFTs -  - mild COPD Problem list name updated by automated process. Provider to review     Dysuria      Esophageal reflux      GERD (gastroesophageal reflux disease) 2013     Hiatal hernia 2011     HTN, goal below 140/90 2013     Hyperlipidemia LDL goal <130 2010    Recent Labs  Lab Test 9/22/10 0908 10/12/06 0939    CHOL 214* 256*    HDL 31* 42*    * 186*    TRIG 149 143    CHOLHDLRATIO 7.0* 6.0*   11 - dobutamine echo showed NO infarct or ischemia LV 60-65%, normal exercise response.        Lupus (H)      Osteoporosis 10/18/2010    10/10 - severe osteopenia in femoral necks, mild osteopenia in spine  (Problem list name updated by automated process. Provider to review and confirm.)     Pure hypercholesterolemia      SECONDARY POLYCYTHEMIA 10/17/2006    Due to smoking     Smoker 2008     Systemic lupus erythematosus 2005    Diagnosed in ; history of pericarditis; was previously treated with mtx; not on active treatment; no hx of renal dz from the lupus.       Tobacco use disorder      Type 2 diabetes mellitus with hyperglycemia, without long-term current use of insulin (H) 2019    NEW DIAGNOSIS 2019     Unspecified essential hypertension        PAST SURGICAL HISTORY:  Past Surgical History:   Procedure Laterality Date     C APPENDECTOMY       C REPAIR GUM      Cathy Dontal surgery     C/SECTION, LOW TRANSVERSE      , Low Transverse     COLONOSCOPY   03/09/2009     D & C      X 5     HC RECONSTR NOSE+DAVE SEPTAL REPAIR       HYSTERECTOMY, BRI      Hx of dysplasia     LAPAROSCOPIC CHOLECYSTECTOMY N/A 7/1/2017    Procedure: LAPAROSCOPIC CHOLECYSTECTOMY;  Laparoscopic Cholecystectomy;  Surgeon: Tami Barney MD;  Location: [facility]     SINUS SURGERY      Reconstruction       FAMILY HISTORY:  Family History   Problem Relation Age of Onset     Diabetes Mother      Gastrointestinal Disease Mother         Gallbladder disease     Heart Disease Mother      Cancer Father         lung     Alcohol/Drug Father      Allergies Father      Heart Disease Father      Gastrointestinal Disease Father         Liver disease     Skin Cancer Father      Heart Disease Maternal Grandfather      Arthritis Sister      Osteoporosis Sister      Thyroid Disease Sister      Allergies Son      Alcohol/Drug Sister      Alcohol/Drug Sister      Melanoma No family hx of        SOCIAL HISTORY:  Social History     Socioeconomic History     Marital status:      Spouse name: Javier Gale     Number of children: 2     Years of education: 12     Highest education level: None   Occupational History     Employer: DISABLED   Social Needs     Financial resource strain: None     Food insecurity     Worry: None     Inability: None     Transportation needs     Medical: None     Non-medical: None   Tobacco Use     Smoking status: Current Every Day Smoker     Packs/day: 0.01     Years: 42.00     Pack years: 0.42     Types: Cigarettes     Smokeless tobacco: Never Used     Tobacco comment: Has chantix at home, but hasn't started yet. 5/9/19.   Substance and Sexual Activity     Alcohol use: Yes     Alcohol/week: 0.0 standard drinks     Comment: Occ. wine     Drug use: No     Sexual activity: Yes     Partners: Male     Birth control/protection: Surgical     Comment: Hyst    Lifestyle     Physical activity     Days per week: None     Minutes per session: None     Stress: None   Relationships      Social connections     Talks on phone: None     Gets together: None     Attends [employer] service: None     Active member of club or organization: None     Attends meetings of clubs or organizations: None     Relationship status: None     Intimate partner violence     Fear of current or ex partner: None     Emotionally abused: None     Physically abused: None     Forced sexual activity: None   Other Topics Concern     Parent/sibling w/ CABG, MI or angioplasty before 65F 55M? No   Social History Narrative    The patient has a 50+ pk yr tobacco hx.  She has ongoing active use of 1/2 ppd.  Alcohol use is <1 alcoholic drinks per week.  She denies use of recreational drugs.          She is on disability.  Has been on disability since approximately 1989.  Previously worked in [employer].         The patient is .  Has 2 children.        Hot Tube Exposure: NO    Recent Travel: NO     Hx of incarceration:  NO    Bird Exposure:   NO    Animal Exposure:  NO    Inhalation Exposure:  Possible, father worked with asbestos               Review of Systems:  Skin:  Positive for itching;scaling     Eyes:  Positive for glasses;visual blurring;cataracts    ENT:  Negative      Respiratory:  Positive for dyspnea on exertion;shortness of breath;wheezing     Cardiovascular:    Positive for;chest pain;lower extremity symptoms;edema;lightheadedness;dizziness;fatigue    Gastroenterology: Positive for excessive gas or bloating    Genitourinary:  Positive for kidney stone    Musculoskeletal:  Positive for nocturnal cramping    Neurologic:  Negative      Psychiatric:  Positive for depression    Heme/Lymph/Imm:  Negative      Endocrine:  Positive for diabetes      Physical Exam:  Vitals: There were no vitals taken for this visit.    Constitutional:           Skin:             Head:           Eyes:           Lymph:      ENT:           Neck:           Respiratory:            Cardiac:                                                            GI:           Extremities and Muscular Skeletal:                 Neurological:           Psych:           CC  Adriano Santana MD  [facility]  [address] [place] [address]

## 2020-12-17 NOTE — PATIENT INSTRUCTIONS
Medication Changes:  None     Recommendations:  1. Check blood pressure at least 1 hour after medications. Call the clinic if your blood pressure is consistently greater than 130/80.   2. Check daily weights and call the clinic if your weight has increased more than 2 lbs in one day or 5 lbs in one week.        Follow-up:  See Dr. Santana for cardiology follow up at [place]: June 2021. Call in March to schedule.     Cardiology Scheduling~[phone]  Cardiology Clinic RNs~[phone] (Melissa Garcia RN and Debra Izquierdo RN)

## 2020-12-17 NOTE — TELEPHONE ENCOUNTER
"Pt calling to report Lower pelvic pain. Denies fever, flank pain, hematuria, dysuria. Pt denies fever. Pt stated this feels like what she has had in the past, Kidney stones. Pt advised to be seen. Patient stated an understanding and agreed with plan.  Next 5 appointments (look out 90 days)    Dec 18, 2020 10:20 AM  SHORT with PEDRO Dominguez CNP  [facility] ([facility] [address] [place] [id number] [phone]             Answer Assessment - Initial Assessment Questions  1. SEVERITY: \"How bad is the pain?\"  (e.g., Scale 1-10; mild, moderate, or severe)    - MILD (1-3): complains slightly about urination hurting    - MODERATE (4-7): interferes with normal activities      - SEVERE (8-10): excruciating, unwilling or unable to urinate because of the pain       mild  2. FREQUENCY: \"How many times have you had painful urination today?\"       none  3. PATTERN: \"Is pain present every time you urinate or just sometimes?\"       no  4. ONSET: \"When did the painful urination start?\"       4 days ago  5. FEVER: \"Do you have a fever?\" If so, ask: \"What is your temperature, how was it measured, and when did it start?\"      no  6. PAST UTI: \"Have you had a urine infection before?\" If so, ask: \"When was the last time?\" and \"What happened that time?\"       No usually  7. CAUSE: \"What do you think is causing the painful urination?\"  (e.g., UTI, scratch, Herpes sore)      Kidney stones  8. OTHER SYMPTOMS: \"Do you have any other symptoms?\" (e.g., flank pain, vaginal discharge, genital sores, urgency, blood in urine)      none  9. PREGNANCY: \"Is there any chance you are pregnant?\" \"When was your last menstrual period?\"      no    Protocols used: URINATION PAIN - FEMALE-A-[place]    RASTA Dacosta [facility] [place] Triage    "

## 2020-12-18 NOTE — PROGRESS NOTES
Subjective     Aminata Gale is a 69 year old female who presents to clinic today for the following health issues:    HPI     Genitourinary - Female  Onset/Duration: about a week    Description:   Painful urination (Dysuria): no           Frequency: YES  Blood in urine (Hematuria): no  Delay in urine (Hesitency): no  Intensity: moderate  Progression of Symptoms:  improving  Accompanying Signs & Symptoms:  Fever/chills: YES- Chills for one night around the time the symptoms started   Flank pain: no  Nausea and vomiting: no  Vaginal symptoms: none  Abdominal/Pelvic Pain: YES  History:   History of frequent UTI s: no  History of kidney stones: YES  Sexually Active: no  Possibility of pregnancy: No  Precipitating or alleviating factors: None  Therapies tried and outcome: Increase fluid intake    Additional provider notes: UTI like symptoms started ~1 week ago with frequency, pelvic ache, and chills. Last UTI was 03/2020. States urine this morning was green tinted.     Multiple allergies. Has tolerated Macrobid well in the past for UTIs.     Had abdominal/pelvic CT scan in 02/2020 and it was recommended she have follow-up scan in 3-6 months.     Constipation: Patient has hx of constipation x 1 year with hemorrhoids. Has been eating a lot of cabbage, but has not used any OTC medications yet.     Review of Systems   Constitutional: Positive for chills.   Gastrointestinal: Positive for abdominal pain and constipation. Negative for diarrhea, nausea and vomiting.        Bleeding occasionally from hemorroid   Genitourinary: Positive for frequency. Negative for dysuria, flank pain, hematuria, vaginal bleeding, vaginal discharge and vaginal pain.            Objective    /76 (BP Location: Right arm, Patient Position: Sitting, Cuff Size: Adult Regular)   Pulse 90   Temp 98.6  F (37  C) (Tympanic)   Resp 18   Wt 65.1 kg (143 lb 9.6 oz)   SpO2 92%   BMI 28.04 kg/m    Body mass index is 28.04 kg/m .  Physical  Exam  Vitals signs and nursing note reviewed.   Constitutional:       General: She is not in acute distress.     Appearance: Normal appearance. She is not ill-appearing or toxic-appearing.   Cardiovascular:      Rate and Rhythm: Normal rate and regular rhythm.      Pulses: Normal pulses.      Heart sounds: Normal heart sounds.   Pulmonary:      Effort: Pulmonary effort is normal.      Breath sounds: Normal breath sounds.   Abdominal:      General: Abdomen is flat. Bowel sounds are normal. There is no distension.      Palpations: Abdomen is soft. There is no mass.      Tenderness: There is abdominal tenderness in the right lower quadrant, suprapubic area and left lower quadrant. There is no right CVA tenderness, left CVA tenderness, guarding or rebound.      Hernia: No hernia is present.   Skin:     General: Skin is warm and dry.   Neurological:      Mental Status: She is alert and oriented to person, place, and time.   Psychiatric:         Behavior: Behavior normal.            Results for orders placed or performed in visit on 12/18/20 (from the past 24 hour(s))   *UA reflex to Microscopic and Culture ([place] [facility] (except [place] and [place])    Specimen: Midstream Urine   Result Value Ref Range    Color Urine Yellow     Appearance Urine Clear     Glucose Urine Negative NEG^Negative mg/dL    Bilirubin Urine Negative NEG^Negative    Ketones Urine Negative NEG^Negative mg/dL    Specific Gravity Urine 1.020 1.003 - 1.035    Blood Urine Negative NEG^Negative    pH Urine 6.0 5.0 - 7.0 pH    Protein Albumin Urine Negative NEG^Negative mg/dL    Urobilinogen Urine 0.2 0.2 - 1.0 EU/dL    Nitrite Urine Negative NEG^Negative    Leukocyte Esterase Urine Negative NEG^Negative    Source Midstream Urine            Assessment & Plan     Pelvic pain in female  UA was negative for signs of infection. Patient had CT abd/pelvic scan in 02/2020 - recommendation to repeat in 3-6 months were not done. Discussed with  patient, she agrees to have that done. Number provided for her to schedule this. No concerning s/s that would warrant emergent evaluation were noted. Concerning signs/sx that would warrant urgent evaluation over the weekend were discussed.  All questions were answered, patient understands and agrees with plan.       - CT Abdomen Pelvis w Contrast; Future    Constipation, unspecified constipation type  Chronic. Continue diet adjustments to help. Also recommended Miralax PRN daily to help. Discussed preventing straining which will worsen hemorrhoids.     Dysuria  UA negative.     - *UA reflex to Microscopic and Culture ([place] (except [place] and [place])        Patient Instructions   Urine was negative for UTI.     CT scan ordered of your abdomen/pelvice. Please call [phone] to schedule that.     You can take Ibuprofen up to 800mg every 8 hours for pain. You can also take Tylenol up to 1000mg every 8 hours for pain.     For constipation, I would recommend using Miralax as needed daily. You can get this over the counter.     If symptoms worsen (fever, severe abdominal pain), please go to ER this weekend.         Return if symptoms worsen or fail to improve.    PEDRO Lassiter [facility]

## 2020-12-18 NOTE — PATIENT INSTRUCTIONS
Urine was negative for UTI.     CT scan ordered of your abdomen/pelvice. Please call [phone] to schedule that.     You can take Ibuprofen up to 800mg every 8 hours for pain. You can also take Tylenol up to 1000mg every 8 hours for pain.     For constipation, I would recommend using Miralax as needed daily. You can get this over the counter.     If symptoms worsen (fever, severe abdominal pain), please go to ER this weekend.

## 2020-12-22 NOTE — TELEPHONE ENCOUNTER
Discussed results with patient. Questions answered. Recommend follow-up with PCP in 1 week to discuss CT results further and plan going forward (possible flex sigmoid, when to do follow-up CT, etc).     Concerning signs/sx that would warrant urgent evaluation over the holidays/weekend were discussed.  All questions were answered, patient understands and agrees with plan.         Hailey Meyers DNP, NP-C 12/22/2020 3:33 PM

## 2020-12-29 NOTE — PATIENT INSTRUCTIONS
Use both Miralax and your fiber supplement every single day for your constipation.     If not improved or still difficult to use the bathroom, you can use Dulcolax. Use sparingly.     Follow-up with the GI team at [place] about your radha-anal mass.

## 2020-12-29 NOTE — PROGRESS NOTES
Tiana aGle is a 69 year old female who presents to clinic today for the following health issues:    HPI   Concern - Discuss CT Results( pelvic pain)  Onset: Few weeks   Description: Patient seen in clinic on 12/18/20 for urinary frequency and pelvic pain. No infection so a CT was ordered    IMPRESSION:   1.  Indeterminate partially imaged perianal mass. Recommend direct  visualization.  2.  Colonic diverticulosis without signs of diverticulitis.  3.  Infrarenal abdominal aortic aneurysm.  4.  Bilateral nonobstructing intrarenal calculi. No hydronephrosis.  5.  Similar-appearing right upper pole renal cortical thinning and  scarring.  6.  Prior cholecystectomy and hysterectomy.    Intensity: mild-moderate  Progression of Symptoms:  improving and same  Accompanying Signs & Symptoms: urinary frequency   Previous history of similar problem: kidney stones   Precipitating factors:        Worsened by: na   Alleviating factors:        Improved by: na   Therapies tried and outcome: Ibuprofen     Pain has improved. No fevers, chills, nausea, vomiting, changes in stools.   Does note ongoing constipation. Has been using Dulcolax sometimes. Has not tried fiber or Miralax yet.     Review of Systems   Constitutional, HEENT, cardiovascular, pulmonary, gi and gu systems are negative, except as otherwise noted.      Objective    /86 (BP Location: Left arm, Patient Position: Sitting, Cuff Size: Adult Regular)   Pulse 90   Temp 98.3  F (36.8  C) (Tympanic)   Resp 20   Wt 66 kg (145 lb 9.6 oz)   BMI 28.44 kg/m    Body mass index is 28.44 kg/m .  Physical Exam   Constitutional: healthy, alert, and no distress  Head: Normocephalic. Atraumatic  Eyes: No conjunctival injection, sclera anicteric  Respiratory: No resp distress.  Musculoskeletal: extremities normal- no gross deformities noted, and normal muscle tone  Neurologic: Gait normal. CN 2-12 grossly intact  Psychiatric: mentation appears normal and affect  normal/bright       Assessment & Plan   Perianal mass  Seen on CT. Unclear etiology. Likely now the cause of her abdominal pain and urinary symptoms. Referred to GI for further evaluation. Unclear if she needs an additional imaging like an MRI or if a colonoscopy/anoscopy would be better. Pt did have a colonoscopy in 2019 that had one polyp removed.   - GASTROENTEROLOGY ADULT REF CONSULT ONLY; Future    Constipation, unspecified constipation type  Discussed usual cares. Pt will use Miralax and fiber supplements.     Abdominal aortic aneurysm (AAA) without rupture (H)  Discussed size and need for annual screening with an US. Discussed low risk of rupture. Pt verbalized understanding.    Return in about 3 months (around 3/29/2021), or if symptoms worsen or fail to improve, for In-Clinic Visit.    MONICO [facility]

## 2021-01-01 ENCOUNTER — HOSPITAL ENCOUNTER (OUTPATIENT)
Dept: MRI IMAGING | Facility: CLINIC | Age: 71
Discharge: HOME | End: 2021-02-12
Attending: RADIOLOGY | Admitting: RADIOLOGY
Payer: COMMERCIAL

## 2021-01-01 ENCOUNTER — APPOINTMENT (OUTPATIENT)
Dept: RADIATION THERAPY | Facility: OUTPATIENT CENTER | Age: 71
End: 2021-01-01
Payer: COMMERCIAL

## 2021-01-01 ENCOUNTER — TELEPHONE (OUTPATIENT)
Dept: FAMILY MEDICINE | Facility: CLINIC | Age: 71
End: 2021-01-01

## 2021-01-01 ENCOUNTER — OFFICE VISIT (OUTPATIENT)
Dept: RADIATION THERAPY | Facility: OUTPATIENT CENTER | Age: 71
End: 2021-01-01
Payer: COMMERCIAL

## 2021-01-01 ENCOUNTER — INFUSION THERAPY VISIT (OUTPATIENT)
Dept: INFUSION THERAPY | Facility: CLINIC | Age: 71
End: 2021-01-01
Attending: INTERNAL MEDICINE
Payer: COMMERCIAL

## 2021-01-01 ENCOUNTER — TRANSFERRED RECORDS (OUTPATIENT)
Dept: HEALTH INFORMATION MANAGEMENT | Facility: CLINIC | Age: 71
End: 2021-01-01

## 2021-01-01 ENCOUNTER — APPOINTMENT (OUTPATIENT)
Dept: PHYSICAL THERAPY | Facility: CLINIC | Age: 71
DRG: 393 | End: 2021-01-01
Payer: COMMERCIAL

## 2021-01-01 ENCOUNTER — OFFICE VISIT (OUTPATIENT)
Dept: FAMILY MEDICINE | Facility: CLINIC | Age: 71
End: 2021-01-01
Payer: COMMERCIAL

## 2021-01-01 ENCOUNTER — MEDICAL CORRESPONDENCE (OUTPATIENT)
Dept: HEALTH INFORMATION MANAGEMENT | Facility: CLINIC | Age: 71
End: 2021-01-01

## 2021-01-01 ENCOUNTER — APPOINTMENT (OUTPATIENT)
Dept: CT IMAGING | Facility: CLINIC | Age: 71
DRG: 393 | End: 2021-01-01
Attending: FAMILY MEDICINE
Payer: COMMERCIAL

## 2021-01-01 ENCOUNTER — HOSPITAL ENCOUNTER (INPATIENT)
Facility: CLINIC | Age: 71
LOS: 5 days | DRG: 393 | End: 2021-04-10
Attending: FAMILY MEDICINE | Admitting: INTERNAL MEDICINE
Payer: COMMERCIAL

## 2021-01-01 ENCOUNTER — APPOINTMENT (OUTPATIENT)
Dept: CT IMAGING | Facility: CLINIC | Age: 71
DRG: 393 | End: 2021-01-01
Attending: EMERGENCY MEDICINE
Payer: COMMERCIAL

## 2021-01-01 VITALS
WEIGHT: 141.2 LBS | RESPIRATION RATE: 18 BRPM | SYSTOLIC BLOOD PRESSURE: 108 MMHG | BODY MASS INDEX: 27.58 KG/M2 | OXYGEN SATURATION: 97 % | HEART RATE: 91 BPM | DIASTOLIC BLOOD PRESSURE: 66 MMHG

## 2021-01-01 VITALS
DIASTOLIC BLOOD PRESSURE: 34 MMHG | HEIGHT: 65 IN | OXYGEN SATURATION: 93 % | SYSTOLIC BLOOD PRESSURE: 48 MMHG | TEMPERATURE: 98.2 F | BODY MASS INDEX: 23.88 KG/M2 | RESPIRATION RATE: 21 BRPM | HEART RATE: 141 BPM | WEIGHT: 143.3 LBS

## 2021-01-01 VITALS — DIASTOLIC BLOOD PRESSURE: 56 MMHG | SYSTOLIC BLOOD PRESSURE: 121 MMHG | HEART RATE: 96 BPM | TEMPERATURE: 96.6 F

## 2021-01-01 VITALS
WEIGHT: 140.8 LBS | DIASTOLIC BLOOD PRESSURE: 79 MMHG | RESPIRATION RATE: 18 BRPM | SYSTOLIC BLOOD PRESSURE: 119 MMHG | HEART RATE: 87 BPM | OXYGEN SATURATION: 94 % | BODY MASS INDEX: 27.5 KG/M2

## 2021-01-01 VITALS
HEART RATE: 94 BPM | DIASTOLIC BLOOD PRESSURE: 71 MMHG | WEIGHT: 139 LBS | RESPIRATION RATE: 18 BRPM | SYSTOLIC BLOOD PRESSURE: 117 MMHG | BODY MASS INDEX: 27.15 KG/M2 | OXYGEN SATURATION: 95 %

## 2021-01-01 VITALS
RESPIRATION RATE: 18 BRPM | TEMPERATURE: 97.1 F | DIASTOLIC BLOOD PRESSURE: 56 MMHG | SYSTOLIC BLOOD PRESSURE: 114 MMHG | HEART RATE: 89 BPM

## 2021-01-01 VITALS
OXYGEN SATURATION: 94 % | DIASTOLIC BLOOD PRESSURE: 56 MMHG | BODY MASS INDEX: 26.95 KG/M2 | WEIGHT: 138 LBS | TEMPERATURE: 97.3 F | SYSTOLIC BLOOD PRESSURE: 114 MMHG | RESPIRATION RATE: 14 BRPM | HEART RATE: 94 BPM

## 2021-01-01 VITALS
DIASTOLIC BLOOD PRESSURE: 53 MMHG | HEART RATE: 98 BPM | WEIGHT: 147.6 LBS | SYSTOLIC BLOOD PRESSURE: 112 MMHG | BODY MASS INDEX: 28.83 KG/M2

## 2021-01-01 VITALS
HEART RATE: 87 BPM | BODY MASS INDEX: 28.71 KG/M2 | WEIGHT: 147 LBS | OXYGEN SATURATION: 94 % | SYSTOLIC BLOOD PRESSURE: 124 MMHG | RESPIRATION RATE: 16 BRPM | DIASTOLIC BLOOD PRESSURE: 77 MMHG

## 2021-01-01 VITALS — HEART RATE: 88 BPM | TEMPERATURE: 97.7 F | SYSTOLIC BLOOD PRESSURE: 117 MMHG | DIASTOLIC BLOOD PRESSURE: 54 MMHG

## 2021-01-01 VITALS
WEIGHT: 148.2 LBS | SYSTOLIC BLOOD PRESSURE: 117 MMHG | BODY MASS INDEX: 28.94 KG/M2 | DIASTOLIC BLOOD PRESSURE: 76 MMHG | HEART RATE: 109 BPM

## 2021-01-01 VITALS — HEART RATE: 96 BPM | SYSTOLIC BLOOD PRESSURE: 126 MMHG | DIASTOLIC BLOOD PRESSURE: 50 MMHG

## 2021-01-01 VITALS
WEIGHT: 145.2 LBS | DIASTOLIC BLOOD PRESSURE: 68 MMHG | SYSTOLIC BLOOD PRESSURE: 134 MMHG | BODY MASS INDEX: 28.36 KG/M2 | HEART RATE: 87 BPM

## 2021-01-01 VITALS
TEMPERATURE: 97.8 F | BODY MASS INDEX: 28.67 KG/M2 | SYSTOLIC BLOOD PRESSURE: 134 MMHG | DIASTOLIC BLOOD PRESSURE: 70 MMHG | WEIGHT: 146.8 LBS | HEART RATE: 90 BPM | RESPIRATION RATE: 18 BRPM

## 2021-01-01 DIAGNOSIS — T45.1X5A CHEMOTHERAPY INDUCED NAUSEA AND VOMITING: ICD-10-CM

## 2021-01-01 DIAGNOSIS — L98.499 ISCHEMIC ULCER, UNSPECIFIED ULCER STAGE (H): ICD-10-CM

## 2021-01-01 DIAGNOSIS — D75.1 POLYCYTHEMIA, SECONDARY: ICD-10-CM

## 2021-01-01 DIAGNOSIS — C21.0 ANAL SQUAMOUS CELL CARCINOMA (H): ICD-10-CM

## 2021-01-01 DIAGNOSIS — E86.0 DEHYDRATION: Primary | ICD-10-CM

## 2021-01-01 DIAGNOSIS — Z13.220 SCREENING FOR HYPERLIPIDEMIA: Primary | ICD-10-CM

## 2021-01-01 DIAGNOSIS — C21.1 MALIGNANT NEOPLASM OF ANAL CANAL (H): ICD-10-CM

## 2021-01-01 DIAGNOSIS — G89.29 OTHER CHRONIC PAIN: ICD-10-CM

## 2021-01-01 DIAGNOSIS — R11.2 CHEMOTHERAPY INDUCED NAUSEA AND VOMITING: ICD-10-CM

## 2021-01-01 DIAGNOSIS — C21.0 ANAL SQUAMOUS CELL CARCINOMA (H): Primary | ICD-10-CM

## 2021-01-01 DIAGNOSIS — G89.3 CANCER RELATED PAIN: Primary | ICD-10-CM

## 2021-01-01 DIAGNOSIS — L58.9 RADIATION DERMATITIS: Primary | ICD-10-CM

## 2021-01-01 DIAGNOSIS — L98.499 RADIATION SKIN ULCER (H): ICD-10-CM

## 2021-01-01 DIAGNOSIS — J44.1 COPD EXACERBATION (H): ICD-10-CM

## 2021-01-01 DIAGNOSIS — I51.89 DIASTOLIC DYSFUNCTION: ICD-10-CM

## 2021-01-01 DIAGNOSIS — L30.9 ECZEMA, UNSPECIFIED TYPE: ICD-10-CM

## 2021-01-01 DIAGNOSIS — J45.40 MODERATE PERSISTENT ASTHMA WITHOUT COMPLICATION: ICD-10-CM

## 2021-01-01 DIAGNOSIS — K62.89 MASS OF ANUS: ICD-10-CM

## 2021-01-01 DIAGNOSIS — R80.9 TYPE 2 DIABETES MELLITUS WITH MICROALBUMINURIA, WITHOUT LONG-TERM CURRENT USE OF INSULIN (H): ICD-10-CM

## 2021-01-01 DIAGNOSIS — E78.5 HYPERLIPIDEMIA LDL GOAL <130: ICD-10-CM

## 2021-01-01 DIAGNOSIS — R19.7 DIARRHEA, UNSPECIFIED TYPE: ICD-10-CM

## 2021-01-01 DIAGNOSIS — I10 HTN, GOAL BELOW 140/90: ICD-10-CM

## 2021-01-01 DIAGNOSIS — E11.29 TYPE 2 DIABETES MELLITUS WITH MICROALBUMINURIA, WITHOUT LONG-TERM CURRENT USE OF INSULIN (H): ICD-10-CM

## 2021-01-01 DIAGNOSIS — M62.81 GENERALIZED MUSCLE WEAKNESS: ICD-10-CM

## 2021-01-01 DIAGNOSIS — M81.0 AGE-RELATED OSTEOPOROSIS WITHOUT CURRENT PATHOLOGICAL FRACTURE: ICD-10-CM

## 2021-01-01 DIAGNOSIS — J43.2 CENTRILOBULAR EMPHYSEMA (H): ICD-10-CM

## 2021-01-01 DIAGNOSIS — C21.0 ANAL CANCER (H): Primary | ICD-10-CM

## 2021-01-01 DIAGNOSIS — K12.31 MUCOSITIS DUE TO CHEMOTHERAPY: Primary | ICD-10-CM

## 2021-01-01 DIAGNOSIS — E11.65 TYPE 2 DIABETES MELLITUS WITH HYPERGLYCEMIA, WITHOUT LONG-TERM CURRENT USE OF INSULIN (H): ICD-10-CM

## 2021-01-01 DIAGNOSIS — G89.3 CANCER RELATED PAIN: ICD-10-CM

## 2021-01-01 DIAGNOSIS — Z11.52 ENCOUNTER FOR SCREENING LABORATORY TESTING FOR SEVERE ACUTE RESPIRATORY SYNDROME CORONAVIRUS 2 (SARS-COV-2): ICD-10-CM

## 2021-01-01 DIAGNOSIS — Z01.818 PREOP GENERAL PHYSICAL EXAM: Primary | ICD-10-CM

## 2021-01-01 DIAGNOSIS — Z72.0 TOBACCO ABUSE: ICD-10-CM

## 2021-01-01 DIAGNOSIS — E78.2 MIXED HYPERLIPIDEMIA: ICD-10-CM

## 2021-01-01 LAB
ALBUMIN SERPL-MCNC: 1.6 G/DL (ref 3.4–5)
ALBUMIN SERPL-MCNC: 1.8 G/DL (ref 3.4–5)
ALBUMIN SERPL-MCNC: 1.9 G/DL (ref 3.4–5)
ALBUMIN SERPL-MCNC: 2 G/DL (ref 3.4–5)
ALBUMIN SERPL-MCNC: 2.1 G/DL (ref 3.4–5)
ALBUMIN SERPL-MCNC: 2.4 G/DL (ref 3.4–5)
ALBUMIN UR-MCNC: NEGATIVE MG/DL
ALP SERPL-CCNC: 59 U/L (ref 40–150)
ALP SERPL-CCNC: 59 U/L (ref 40–150)
ALP SERPL-CCNC: 60 U/L (ref 40–150)
ALP SERPL-CCNC: 62 U/L (ref 40–150)
ALP SERPL-CCNC: 68 U/L (ref 40–150)
ALP SERPL-CCNC: 77 U/L (ref 40–150)
ALT SERPL W P-5'-P-CCNC: 38 U/L (ref 0–50)
ALT SERPL W P-5'-P-CCNC: 61 U/L (ref 0–50)
ALT SERPL W P-5'-P-CCNC: 81 U/L (ref 0–50)
ALT SERPL W P-5'-P-CCNC: 85 U/L (ref 0–50)
ALT SERPL W P-5'-P-CCNC: 87 U/L (ref 0–50)
ALT SERPL W P-5'-P-CCNC: 88 U/L (ref 0–50)
AMPHETAMINES UR QL: NOT DETECTED NG/ML
ANION GAP SERPL CALCULATED.3IONS-SCNC: 4 MMOL/L (ref 3–14)
ANION GAP SERPL CALCULATED.3IONS-SCNC: 5 MMOL/L (ref 3–14)
ANION GAP SERPL CALCULATED.3IONS-SCNC: 6 MMOL/L (ref 3–14)
ANION GAP SERPL CALCULATED.3IONS-SCNC: 8 MMOL/L (ref 3–14)
ANISOCYTOSIS BLD QL SMEAR: SLIGHT
APPEARANCE UR: CLEAR
AST SERPL W P-5'-P-CCNC: 12 U/L (ref 0–45)
AST SERPL W P-5'-P-CCNC: 26 U/L (ref 0–45)
AST SERPL W P-5'-P-CCNC: 28 U/L (ref 0–45)
AST SERPL W P-5'-P-CCNC: 29 U/L (ref 0–45)
AST SERPL W P-5'-P-CCNC: 32 U/L (ref 0–45)
AST SERPL W P-5'-P-CCNC: 5 U/L (ref 0–45)
BACTERIA #/AREA URNS HPF: ABNORMAL /HPF
BACTERIA SPEC CULT: ABNORMAL
BACTERIA SPEC CULT: ABNORMAL
BARBITURATES UR QL SCN: NOT DETECTED NG/ML
BASOPHILS # BLD AUTO: 0 10E9/L (ref 0–0.2)
BASOPHILS NFR BLD AUTO: 0.4 %
BASOPHILS NFR BLD AUTO: 0.5 %
BASOPHILS NFR BLD AUTO: 1.4 %
BENZODIAZ UR QL SCN: NOT DETECTED NG/ML
BILIRUB DIRECT SERPL-MCNC: 0.2 MG/DL (ref 0–0.2)
BILIRUB DIRECT SERPL-MCNC: 0.3 MG/DL (ref 0–0.2)
BILIRUB DIRECT SERPL-MCNC: 0.5 MG/DL (ref 0–0.2)
BILIRUB SERPL-MCNC: 0.4 MG/DL (ref 0.2–1.3)
BILIRUB SERPL-MCNC: 0.6 MG/DL (ref 0.2–1.3)
BILIRUB SERPL-MCNC: 0.7 MG/DL (ref 0.2–1.3)
BILIRUB SERPL-MCNC: 0.8 MG/DL (ref 0.2–1.3)
BILIRUB UR QL STRIP: NEGATIVE
BUN SERPL-MCNC: 10 MG/DL (ref 7–30)
BUN SERPL-MCNC: 12 MG/DL (ref 7–30)
BUN SERPL-MCNC: 14 MG/DL (ref 7–30)
BUN SERPL-MCNC: 16 MG/DL (ref 7–30)
BUN SERPL-MCNC: 22 MG/DL (ref 7–30)
BUN SERPL-MCNC: 25 MG/DL (ref 7–30)
BUPRENORPHINE UR QL: NOT DETECTED NG/ML
C COLI+JEJUNI+LARI FUSA STL QL NAA+PROBE: NOT DETECTED
C DIFF TOX B STL QL: NEGATIVE
CALCIUM SERPL-MCNC: 7.2 MG/DL (ref 8.5–10.1)
CALCIUM SERPL-MCNC: 7.2 MG/DL (ref 8.5–10.1)
CALCIUM SERPL-MCNC: 7.3 MG/DL (ref 8.5–10.1)
CALCIUM SERPL-MCNC: 7.4 MG/DL (ref 8.5–10.1)
CALCIUM SERPL-MCNC: 7.6 MG/DL (ref 8.5–10.1)
CALCIUM SERPL-MCNC: 8.2 MG/DL (ref 8.5–10.1)
CANNABINOIDS UR QL: NOT DETECTED NG/ML
CHLORIDE SERPL-SCNC: 106 MMOL/L (ref 94–109)
CHLORIDE SERPL-SCNC: 108 MMOL/L (ref 94–109)
CHLORIDE SERPL-SCNC: 109 MMOL/L (ref 94–109)
CHLORIDE SERPL-SCNC: 110 MMOL/L (ref 94–109)
CHLORIDE SERPL-SCNC: 110 MMOL/L (ref 94–109)
CHLORIDE SERPL-SCNC: 113 MMOL/L (ref 94–109)
CO2 SERPL-SCNC: 27 MMOL/L (ref 20–32)
CO2 SERPL-SCNC: 27 MMOL/L (ref 20–32)
CO2 SERPL-SCNC: 28 MMOL/L (ref 20–32)
CO2 SERPL-SCNC: 29 MMOL/L (ref 20–32)
CO2 SERPL-SCNC: 29 MMOL/L (ref 20–32)
CO2 SERPL-SCNC: 30 MMOL/L (ref 20–32)
COCAINE UR QL SCN: NOT DETECTED NG/ML
COLOR UR AUTO: YELLOW
CREAT BLD-MCNC: 0.8 MG/DL (ref 0.52–1.04)
CREAT SERPL-MCNC: 0.59 MG/DL (ref 0.52–1.04)
CREAT SERPL-MCNC: 0.6 MG/DL (ref 0.52–1.04)
CREAT SERPL-MCNC: 0.75 MG/DL (ref 0.52–1.04)
CREAT SERPL-MCNC: 0.78 MG/DL (ref 0.52–1.04)
CREAT SERPL-MCNC: 0.82 MG/DL (ref 0.52–1.04)
CREAT SERPL-MCNC: 1 MG/DL (ref 0.52–1.04)
D-METHAMPHET UR QL: NOT DETECTED NG/ML
DIFFERENTIAL METHOD BLD: ABNORMAL
EC STX1 GENE STL QL NAA+PROBE: NOT DETECTED
EC STX2 GENE STL QL NAA+PROBE: NOT DETECTED
ENTERIC PATHOGEN COMMENT: NORMAL
EOSINOPHIL # BLD AUTO: 0 10E9/L (ref 0–0.7)
EOSINOPHIL # BLD AUTO: 0.2 10E9/L (ref 0–0.7)
EOSINOPHIL # BLD AUTO: 0.2 10E9/L (ref 0–0.7)
EOSINOPHIL NFR BLD AUTO: 4.3 %
EOSINOPHIL NFR BLD AUTO: 4.3 %
EOSINOPHIL NFR BLD AUTO: 8.4 %
ERYTHROCYTE [DISTWIDTH] IN BLOOD BY AUTOMATED COUNT: 15.4 % (ref 10–15)
ERYTHROCYTE [DISTWIDTH] IN BLOOD BY AUTOMATED COUNT: 15.5 % (ref 10–15)
ERYTHROCYTE [DISTWIDTH] IN BLOOD BY AUTOMATED COUNT: 15.6 % (ref 10–15)
ERYTHROCYTE [DISTWIDTH] IN BLOOD BY AUTOMATED COUNT: 15.8 % (ref 10–15)
GENTAMICIN SERPL-MCNC: 1.5 MG/L
GFR SERPL CREATININE-BSD FRML MDRD: 56 ML/MIN/{1.73_M2}
GFR SERPL CREATININE-BSD FRML MDRD: 71 ML/MIN/{1.73_M2}
GFR SERPL CREATININE-BSD FRML MDRD: 72 ML/MIN/{1.73_M2}
GFR SERPL CREATININE-BSD FRML MDRD: 76 ML/MIN/{1.73_M2}
GFR SERPL CREATININE-BSD FRML MDRD: 79 ML/MIN/{1.73_M2}
GFR SERPL CREATININE-BSD FRML MDRD: >90 ML/MIN/{1.73_M2}
GFR SERPL CREATININE-BSD FRML MDRD: >90 ML/MIN/{1.73_M2}
GLUCOSE BLDC GLUCOMTR-MCNC: 145 MG/DL (ref 70–99)
GLUCOSE SERPL-MCNC: 122 MG/DL (ref 70–99)
GLUCOSE SERPL-MCNC: 128 MG/DL (ref 70–99)
GLUCOSE SERPL-MCNC: 129 MG/DL (ref 70–99)
GLUCOSE SERPL-MCNC: 190 MG/DL (ref 70–99)
GLUCOSE SERPL-MCNC: 90 MG/DL (ref 70–99)
GLUCOSE SERPL-MCNC: 93 MG/DL (ref 70–99)
GLUCOSE UR STRIP-MCNC: NEGATIVE MG/DL
HCT VFR BLD AUTO: 32.5 % (ref 35–47)
HCT VFR BLD AUTO: 33.4 % (ref 35–47)
HCT VFR BLD AUTO: 34.5 % (ref 35–47)
HCT VFR BLD AUTO: 35.7 % (ref 35–47)
HCT VFR BLD AUTO: 36.9 % (ref 35–47)
HCT VFR BLD AUTO: 40.9 % (ref 35–47)
HGB BLD-MCNC: 11 G/DL (ref 11.7–15.7)
HGB BLD-MCNC: 11.1 G/DL (ref 11.7–15.7)
HGB BLD-MCNC: 11.6 G/DL (ref 11.7–15.7)
HGB BLD-MCNC: 12 G/DL (ref 11.7–15.7)
HGB BLD-MCNC: 12.2 G/DL (ref 11.7–15.7)
HGB BLD-MCNC: 13.4 G/DL (ref 11.7–15.7)
HGB BLD-MCNC: 16.5 G/DL (ref 11.7–15.7)
HGB UR QL STRIP: ABNORMAL
IMM GRANULOCYTES # BLD: 0 10E9/L (ref 0–0.4)
IMM GRANULOCYTES # BLD: 0 10E9/L (ref 0–0.4)
IMM GRANULOCYTES # BLD: 0.1 10E9/L (ref 0–0.4)
IMM GRANULOCYTES NFR BLD: 0 %
IMM GRANULOCYTES NFR BLD: 0.9 %
IMM GRANULOCYTES NFR BLD: 1.2 %
KETONES UR STRIP-MCNC: 20 MG/DL
LABORATORY COMMENT REPORT: NORMAL
LACTATE BLD-SCNC: 0.5 MMOL/L (ref 0.7–2)
LACTATE BLD-SCNC: 1.1 MMOL/L (ref 0.7–2)
LACTATE BLD-SCNC: 1.3 MMOL/L (ref 0.7–2)
LACTATE BLD-SCNC: 1.8 MMOL/L (ref 0.7–2)
LACTATE BLD-SCNC: 2.4 MMOL/L (ref 0.7–2)
LACTATE BLD-SCNC: 4.5 MMOL/L (ref 0.7–2)
LEUKOCYTE ESTERASE UR QL STRIP: ABNORMAL
LIPASE SERPL-CCNC: 31 U/L (ref 73–393)
LYMPHOCYTES # BLD AUTO: 0.1 10E9/L (ref 0.8–5.3)
LYMPHOCYTES NFR BLD AUTO: 14.5 %
LYMPHOCYTES NFR BLD AUTO: 2.6 %
LYMPHOCYTES NFR BLD AUTO: 5.3 %
Lab: ABNORMAL
MAGNESIUM SERPL-MCNC: 1.9 MG/DL (ref 1.6–2.3)
MAGNESIUM SERPL-MCNC: 2.1 MG/DL (ref 1.6–2.3)
MCH RBC QN AUTO: 30.1 PG (ref 26.5–33)
MCH RBC QN AUTO: 30.5 PG (ref 26.5–33)
MCH RBC QN AUTO: 30.7 PG (ref 26.5–33)
MCHC RBC AUTO-ENTMCNC: 32.8 G/DL (ref 31.5–36.5)
MCHC RBC AUTO-ENTMCNC: 33.1 G/DL (ref 31.5–36.5)
MCHC RBC AUTO-ENTMCNC: 33.2 G/DL (ref 31.5–36.5)
MCHC RBC AUTO-ENTMCNC: 33.6 G/DL (ref 31.5–36.5)
MCHC RBC AUTO-ENTMCNC: 33.6 G/DL (ref 31.5–36.5)
MCHC RBC AUTO-ENTMCNC: 33.8 G/DL (ref 31.5–36.5)
MCV RBC AUTO: 90 FL (ref 78–100)
MCV RBC AUTO: 91 FL (ref 78–100)
MCV RBC AUTO: 93 FL (ref 78–100)
MCV RBC AUTO: 94 FL (ref 78–100)
METHADONE UR QL SCN: NOT DETECTED NG/ML
MONOCYTES # BLD AUTO: 0 10E9/L (ref 0–1.3)
MONOCYTES # BLD AUTO: 0.1 10E9/L (ref 0–1.3)
MONOCYTES # BLD AUTO: 0.1 10E9/L (ref 0–1.3)
MONOCYTES NFR BLD AUTO: 1.4 %
MONOCYTES NFR BLD AUTO: 2.7 %
MONOCYTES NFR BLD AUTO: 5.8 %
MUCOUS THREADS #/AREA URNS LPF: PRESENT /LPF
NEUTROPHILS # BLD AUTO: 0.5 10E9/L (ref 1.6–8.3)
NEUTROPHILS # BLD AUTO: 1.9 10E9/L (ref 1.6–8.3)
NEUTROPHILS # BLD AUTO: 3.8 10E9/L (ref 1.6–8.3)
NEUTROPHILS NFR BLD AUTO: 74 %
NEUTROPHILS NFR BLD AUTO: 82.3 %
NEUTROPHILS NFR BLD AUTO: 90 %
NITRATE UR QL: POSITIVE
NOROV GI+II ORF1-ORF2 JNC STL QL NAA+PR: NOT DETECTED
NRBC # BLD AUTO: 0 10*3/UL
NRBC BLD AUTO-RTO: 0 /100
OPIATES UR QL SCN: ABNORMAL NG/ML
OXYCODONE UR QL SCN: ABNORMAL NG/ML
PCP UR QL SCN: NOT DETECTED NG/ML
PH UR STRIP: 5 PH (ref 5–7)
PHOSPHATE SERPL-MCNC: 2 MG/DL (ref 2.5–4.5)
PHOSPHATE SERPL-MCNC: 3.2 MG/DL (ref 2.5–4.5)
PHOSPHATE SERPL-MCNC: 3.5 MG/DL (ref 2.5–4.5)
PLATELET # BLD AUTO: 100 10E9/L (ref 150–450)
PLATELET # BLD AUTO: 113 10E9/L (ref 150–450)
PLATELET # BLD AUTO: 27 10E9/L (ref 150–450)
PLATELET # BLD AUTO: 41 10E9/L (ref 150–450)
PLATELET # BLD AUTO: 63 10E9/L (ref 150–450)
PLATELET # BLD AUTO: 70 10E9/L (ref 150–450)
PLATELET # BLD EST: ABNORMAL 10*3/UL
POTASSIUM SERPL-SCNC: 3.1 MMOL/L (ref 3.4–5.3)
POTASSIUM SERPL-SCNC: 3.2 MMOL/L (ref 3.4–5.3)
POTASSIUM SERPL-SCNC: 3.3 MMOL/L (ref 3.4–5.3)
POTASSIUM SERPL-SCNC: 3.3 MMOL/L (ref 3.4–5.3)
POTASSIUM SERPL-SCNC: 3.4 MMOL/L (ref 3.4–5.3)
POTASSIUM SERPL-SCNC: 3.6 MMOL/L (ref 3.4–5.3)
POTASSIUM SERPL-SCNC: 3.6 MMOL/L (ref 3.4–5.3)
POTASSIUM SERPL-SCNC: 3.7 MMOL/L (ref 3.4–5.3)
PROPOXYPH UR QL: NOT DETECTED NG/ML
PROT SERPL-MCNC: 4.5 G/DL (ref 6.8–8.8)
PROT SERPL-MCNC: 4.6 G/DL (ref 6.8–8.8)
PROT SERPL-MCNC: 4.7 G/DL (ref 6.8–8.8)
PROT SERPL-MCNC: 4.7 G/DL (ref 6.8–8.8)
PROT SERPL-MCNC: 4.8 G/DL (ref 6.8–8.8)
PROT SERPL-MCNC: 5.4 G/DL (ref 6.8–8.8)
RBC # BLD AUTO: 3.61 10E12/L (ref 3.8–5.2)
RBC # BLD AUTO: 3.69 10E12/L (ref 3.8–5.2)
RBC # BLD AUTO: 3.78 10E12/L (ref 3.8–5.2)
RBC # BLD AUTO: 3.91 10E12/L (ref 3.8–5.2)
RBC # BLD AUTO: 3.98 10E12/L (ref 3.8–5.2)
RBC # BLD AUTO: 4.36 10E12/L (ref 3.8–5.2)
RBC #/AREA URNS AUTO: 3 /HPF (ref 0–2)
RVA NSP5 STL QL NAA+PROBE: NOT DETECTED
SALMONELLA SP RPOD STL QL NAA+PROBE: NOT DETECTED
SARS-COV-2 RNA RESP QL NAA+PROBE: NEGATIVE
SHIGELLA SP+EIEC IPAH STL QL NAA+PROBE: NOT DETECTED
SODIUM SERPL-SCNC: 140 MMOL/L (ref 133–144)
SODIUM SERPL-SCNC: 142 MMOL/L (ref 133–144)
SODIUM SERPL-SCNC: 142 MMOL/L (ref 133–144)
SODIUM SERPL-SCNC: 143 MMOL/L (ref 133–144)
SODIUM SERPL-SCNC: 145 MMOL/L (ref 133–144)
SODIUM SERPL-SCNC: 145 MMOL/L (ref 133–144)
SOURCE: ABNORMAL
SP GR UR STRIP: 1.03 (ref 1–1.03)
SPECIMEN SOURCE: ABNORMAL
SPECIMEN SOURCE: NORMAL
SPECIMEN SOURCE: NORMAL
TRICYCLICS UR QL SCN: NOT DETECTED NG/ML
UROBILINOGEN UR STRIP-MCNC: 0 MG/DL (ref 0–2)
V CHOL+PARA RFBL+TRKH+TNAA STL QL NAA+PR: NOT DETECTED
WBC # BLD AUTO: 0.1 10E9/L (ref 4–11)
WBC # BLD AUTO: 0.1 10E9/L (ref 4–11)
WBC # BLD AUTO: 0.4 10E9/L (ref 4–11)
WBC # BLD AUTO: 0.7 10E9/L (ref 4–11)
WBC # BLD AUTO: 2.3 10E9/L (ref 4–11)
WBC # BLD AUTO: 4.2 10E9/L (ref 4–11)
WBC #/AREA URNS AUTO: 11 /HPF (ref 0–5)
Y ENTERO RECN STL QL NAA+PROBE: NOT DETECTED

## 2021-01-01 PROCEDURE — 99214 OFFICE O/P EST MOD 30 MIN: CPT | Performed by: FAMILY MEDICINE

## 2021-01-01 PROCEDURE — 99233 SBSQ HOSP IP/OBS HIGH 50: CPT | Performed by: FAMILY MEDICINE

## 2021-01-01 PROCEDURE — 87086 URINE CULTURE/COLONY COUNT: CPT | Performed by: EMERGENCY MEDICINE

## 2021-01-01 PROCEDURE — 99285 EMERGENCY DEPT VISIT HI MDM: CPT | Mod: 25 | Performed by: EMERGENCY MEDICINE

## 2021-01-01 PROCEDURE — 250N000011 HC RX IP 250 OP 636: Performed by: INTERNAL MEDICINE

## 2021-01-01 PROCEDURE — 83690 ASSAY OF LIPASE: CPT | Performed by: FAMILY MEDICINE

## 2021-01-01 PROCEDURE — C9803 HOPD COVID-19 SPEC COLLECT: HCPCS | Performed by: EMERGENCY MEDICINE

## 2021-01-01 PROCEDURE — 87186 SC STD MICRODIL/AGAR DIL: CPT | Performed by: FAMILY MEDICINE

## 2021-01-01 PROCEDURE — 87088 URINE BACTERIA CULTURE: CPT | Performed by: EMERGENCY MEDICINE

## 2021-01-01 PROCEDURE — 96361 HYDRATE IV INFUSION ADD-ON: CPT

## 2021-01-01 PROCEDURE — 94660 CPAP INITIATION&MGMT: CPT

## 2021-01-01 PROCEDURE — 87506 IADNA-DNA/RNA PROBE TQ 6-11: CPT | Performed by: EMERGENCY MEDICINE

## 2021-01-01 PROCEDURE — 250N000011 HC RX IP 250 OP 636: Performed by: FAMILY MEDICINE

## 2021-01-01 PROCEDURE — 5A09357 ASSISTANCE WITH RESPIRATORY VENTILATION, LESS THAN 24 CONSECUTIVE HOURS, CONTINUOUS POSITIVE AIRWAY PRESSURE: ICD-10-PCS | Performed by: INTERNAL MEDICINE

## 2021-01-01 PROCEDURE — 999N001017 HC STATISTIC GLUCOSE BY METER IP

## 2021-01-01 PROCEDURE — G0378 HOSPITAL OBSERVATION PER HR: HCPCS

## 2021-01-01 PROCEDURE — 96361 HYDRATE IV INFUSION ADD-ON: CPT | Mod: 59 | Performed by: EMERGENCY MEDICINE

## 2021-01-01 PROCEDURE — 80048 BASIC METABOLIC PNL TOTAL CA: CPT | Performed by: FAMILY MEDICINE

## 2021-01-01 PROCEDURE — 82248 BILIRUBIN DIRECT: CPT | Performed by: FAMILY MEDICINE

## 2021-01-01 PROCEDURE — 258N000003 HC RX IP 258 OP 636: Performed by: FAMILY MEDICINE

## 2021-01-01 PROCEDURE — 96360 HYDRATION IV INFUSION INIT: CPT

## 2021-01-01 PROCEDURE — 85027 COMPLETE CBC AUTOMATED: CPT | Performed by: FAMILY MEDICINE

## 2021-01-01 PROCEDURE — 96374 THER/PROPH/DIAG INJ IV PUSH: CPT

## 2021-01-01 PROCEDURE — G0463 HOSPITAL OUTPT CLINIC VISIT: HCPCS

## 2021-01-01 PROCEDURE — 120N000001 HC R&B MED SURG/OB

## 2021-01-01 PROCEDURE — 258N000003 HC RX IP 258 OP 636: Performed by: INTERNAL MEDICINE

## 2021-01-01 PROCEDURE — 96365 THER/PROPH/DIAG IV INF INIT: CPT

## 2021-01-01 PROCEDURE — 97161 PT EVAL LOW COMPLEX 20 MIN: CPT | Mod: GP

## 2021-01-01 PROCEDURE — 999N000157 HC STATISTIC RCP TIME EA 10 MIN

## 2021-01-01 PROCEDURE — 258N000003 HC RX IP 258 OP 636: Performed by: RADIOLOGY

## 2021-01-01 PROCEDURE — 250N000013 HC RX MED GY IP 250 OP 250 PS 637: Performed by: FAMILY MEDICINE

## 2021-01-01 PROCEDURE — 85025 COMPLETE CBC W/AUTO DIFF WBC: CPT | Performed by: PHYSICIAN ASSISTANT

## 2021-01-01 PROCEDURE — 250N000013 HC RX MED GY IP 250 OP 250 PS 637: Performed by: PHYSICIAN ASSISTANT

## 2021-01-01 PROCEDURE — 85027 COMPLETE CBC AUTOMATED: CPT

## 2021-01-01 PROCEDURE — 83735 ASSAY OF MAGNESIUM: CPT | Performed by: PHYSICIAN ASSISTANT

## 2021-01-01 PROCEDURE — 80053 COMPREHEN METABOLIC PANEL: CPT | Performed by: FAMILY MEDICINE

## 2021-01-01 PROCEDURE — 250N000011 HC RX IP 250 OP 636: Performed by: EMERGENCY MEDICINE

## 2021-01-01 PROCEDURE — 99213 OFFICE O/P EST LOW 20 MIN: CPT | Performed by: NURSE PRACTITIONER

## 2021-01-01 PROCEDURE — 84132 ASSAY OF SERUM POTASSIUM: CPT | Performed by: FAMILY MEDICINE

## 2021-01-01 PROCEDURE — 80076 HEPATIC FUNCTION PANEL: CPT | Performed by: FAMILY MEDICINE

## 2021-01-01 PROCEDURE — 250N000011 HC RX IP 250 OP 636: Performed by: PHYSICIAN ASSISTANT

## 2021-01-01 PROCEDURE — 84100 ASSAY OF PHOSPHORUS: CPT | Performed by: FAMILY MEDICINE

## 2021-01-01 PROCEDURE — 74177 CT ABD & PELVIS W/CONTRAST: CPT

## 2021-01-01 PROCEDURE — 87493 C DIFF AMPLIFIED PROBE: CPT | Performed by: FAMILY MEDICINE

## 2021-01-01 PROCEDURE — 83605 ASSAY OF LACTIC ACID: CPT | Performed by: FAMILY MEDICINE

## 2021-01-01 PROCEDURE — 96376 TX/PRO/DX INJ SAME DRUG ADON: CPT | Performed by: EMERGENCY MEDICINE

## 2021-01-01 PROCEDURE — 83735 ASSAY OF MAGNESIUM: CPT | Performed by: FAMILY MEDICINE

## 2021-01-01 PROCEDURE — 87077 CULTURE AEROBIC IDENTIFY: CPT | Performed by: FAMILY MEDICINE

## 2021-01-01 PROCEDURE — 250N000009 HC RX 250: Performed by: FAMILY MEDICINE

## 2021-01-01 PROCEDURE — 96376 TX/PRO/DX INJ SAME DRUG ADON: CPT

## 2021-01-01 PROCEDURE — A9585 GADOBUTROL INJECTION: HCPCS | Performed by: RADIOLOGY

## 2021-01-01 PROCEDURE — 82565 ASSAY OF CREATININE: CPT

## 2021-01-01 PROCEDURE — 36415 COLL VENOUS BLD VENIPUNCTURE: CPT | Performed by: FAMILY MEDICINE

## 2021-01-01 PROCEDURE — 85025 COMPLETE CBC W/AUTO DIFF WBC: CPT | Performed by: FAMILY MEDICINE

## 2021-01-01 PROCEDURE — 258N000003 HC RX IP 258 OP 636: Performed by: PHYSICIAN ASSISTANT

## 2021-01-01 PROCEDURE — 94640 AIRWAY INHALATION TREATMENT: CPT

## 2021-01-01 PROCEDURE — 96374 THER/PROPH/DIAG INJ IV PUSH: CPT | Performed by: EMERGENCY MEDICINE

## 2021-01-01 PROCEDURE — 99220 PR INITIAL OBSERVATION CARE,LEVEL III: CPT | Performed by: PHYSICIAN ASSISTANT

## 2021-01-01 PROCEDURE — 80053 COMPREHEN METABOLIC PANEL: CPT | Performed by: PHYSICIAN ASSISTANT

## 2021-01-01 PROCEDURE — 99291 CRITICAL CARE FIRST HOUR: CPT | Performed by: FAMILY MEDICINE

## 2021-01-01 PROCEDURE — 250N000009 HC RX 250: Performed by: PHYSICIAN ASSISTANT

## 2021-01-01 PROCEDURE — 255N000002 HC RX 255 OP 636: Performed by: RADIOLOGY

## 2021-01-01 PROCEDURE — 80306 DRUG TEST PRSMV INSTRMNT: CPT | Performed by: NURSE PRACTITIONER

## 2021-01-01 PROCEDURE — 85018 HEMOGLOBIN: CPT | Performed by: FAMILY MEDICINE

## 2021-01-01 PROCEDURE — 94640 AIRWAY INHALATION TREATMENT: CPT | Mod: 76

## 2021-01-01 PROCEDURE — 99221 1ST HOSP IP/OBS SF/LOW 40: CPT | Performed by: SURGERY

## 2021-01-01 PROCEDURE — 200N000001 HC R&B ICU

## 2021-01-01 PROCEDURE — 250N000009 HC RX 250: Performed by: EMERGENCY MEDICINE

## 2021-01-01 PROCEDURE — 87186 SC STD MICRODIL/AGAR DIL: CPT | Performed by: EMERGENCY MEDICINE

## 2021-01-01 PROCEDURE — 72197 MRI PELVIS W/O & W/DYE: CPT

## 2021-01-01 PROCEDURE — 87040 BLOOD CULTURE FOR BACTERIA: CPT | Performed by: FAMILY MEDICINE

## 2021-01-01 PROCEDURE — 87635 SARS-COV-2 COVID-19 AMP PRB: CPT | Performed by: EMERGENCY MEDICINE

## 2021-01-01 PROCEDURE — 80170 ASSAY OF GENTAMICIN: CPT | Performed by: FAMILY MEDICINE

## 2021-01-01 PROCEDURE — 81001 URINALYSIS AUTO W/SCOPE: CPT | Performed by: EMERGENCY MEDICINE

## 2021-01-01 PROCEDURE — 258N000003 HC RX IP 258 OP 636: Performed by: EMERGENCY MEDICINE

## 2021-01-01 PROCEDURE — 87800 DETECT AGNT MULT DNA DIREC: CPT | Performed by: FAMILY MEDICINE

## 2021-01-01 PROCEDURE — 96375 TX/PRO/DX INJ NEW DRUG ADDON: CPT | Performed by: EMERGENCY MEDICINE

## 2021-01-01 PROCEDURE — 3E043XZ INTRODUCTION OF VASOPRESSOR INTO CENTRAL VEIN, PERCUTANEOUS APPROACH: ICD-10-PCS | Performed by: FAMILY MEDICINE

## 2021-01-01 RX ORDER — TORSEMIDE 5 MG/1
5 TABLET ORAL DAILY
Status: DISCONTINUED | OUTPATIENT
Start: 2021-01-01 | End: 2021-01-01

## 2021-01-01 RX ORDER — LORAZEPAM 2 MG/ML
0.5 INJECTION INTRAMUSCULAR EVERY 4 HOURS PRN
Status: DISCONTINUED | OUTPATIENT
Start: 2021-01-01 | End: 2021-01-01

## 2021-01-01 RX ORDER — HEPARIN SODIUM (PORCINE) LOCK FLUSH IV SOLN 100 UNIT/ML 100 UNIT/ML
5 SOLUTION INTRAVENOUS
Status: CANCELLED | OUTPATIENT
Start: 2021-01-01

## 2021-01-01 RX ORDER — OXYCODONE HCL 5 MG/5 ML
5-7.5 SOLUTION, ORAL ORAL EVERY 4 HOURS PRN
Status: DISCONTINUED | OUTPATIENT
Start: 2021-01-01 | End: 2021-01-01 | Stop reason: HOSPADM

## 2021-01-01 RX ORDER — PROCHLORPERAZINE 25 MG
12.5 SUPPOSITORY, RECTAL RECTAL EVERY 12 HOURS PRN
Status: DISCONTINUED | OUTPATIENT
Start: 2021-01-01 | End: 2021-01-01 | Stop reason: HOSPADM

## 2021-01-01 RX ORDER — NALOXONE HYDROCHLORIDE 0.4 MG/ML
0.2 INJECTION, SOLUTION INTRAMUSCULAR; INTRAVENOUS; SUBCUTANEOUS
Status: DISCONTINUED | OUTPATIENT
Start: 2021-01-01 | End: 2021-01-01

## 2021-01-01 RX ORDER — OXYCODONE HCL 5 MG/5 ML
5 SOLUTION, ORAL ORAL EVERY 6 HOURS PRN
Qty: 300 ML | Refills: 0 | Status: SHIPPED | OUTPATIENT
Start: 2021-01-01 | End: 2021-01-01

## 2021-01-01 RX ORDER — ONDANSETRON 2 MG/ML
4 INJECTION INTRAMUSCULAR; INTRAVENOUS EVERY 6 HOURS PRN
Status: DISCONTINUED | OUTPATIENT
Start: 2021-01-01 | End: 2021-01-01

## 2021-01-01 RX ORDER — LOSARTAN POTASSIUM 25 MG/1
25 TABLET ORAL DAILY
Qty: 90 TABLET | Refills: 1 | Status: SHIPPED | OUTPATIENT
Start: 2021-01-01

## 2021-01-01 RX ORDER — LIDOCAINE 40 MG/G
CREAM TOPICAL
Status: DISCONTINUED | OUTPATIENT
Start: 2021-01-01 | End: 2021-01-01 | Stop reason: HOSPADM

## 2021-01-01 RX ORDER — ASPIRIN 81 MG/1
81 TABLET ORAL DAILY
Status: DISCONTINUED | OUTPATIENT
Start: 2021-01-01 | End: 2021-01-01

## 2021-01-01 RX ORDER — HEPARIN SODIUM (PORCINE) LOCK FLUSH IV SOLN 100 UNIT/ML 100 UNIT/ML
5 SOLUTION INTRAVENOUS
Status: DISCONTINUED | OUTPATIENT
Start: 2021-01-01 | End: 2021-01-01 | Stop reason: HOSPADM

## 2021-01-01 RX ORDER — LIDOCAINE 40 MG/G
CREAM TOPICAL EVERY 4 HOURS PRN
Status: DISCONTINUED | OUTPATIENT
Start: 2021-01-01 | End: 2021-01-01 | Stop reason: HOSPADM

## 2021-01-01 RX ORDER — MORPHINE SULFATE 15 MG/1
15 TABLET, FILM COATED, EXTENDED RELEASE ORAL EVERY 8 HOURS PRN
Qty: 90 TABLET | Refills: 0 | Status: SHIPPED | OUTPATIENT
Start: 2021-01-01

## 2021-01-01 RX ORDER — ONDANSETRON 4 MG/1
4 TABLET, ORALLY DISINTEGRATING ORAL EVERY 6 HOURS PRN
Status: DISCONTINUED | OUTPATIENT
Start: 2021-01-01 | End: 2021-01-01

## 2021-01-01 RX ORDER — IPRATROPIUM BROMIDE AND ALBUTEROL SULFATE 2.5; .5 MG/3ML; MG/3ML
1 SOLUTION RESPIRATORY (INHALATION) EVERY 4 HOURS PRN
Status: DISCONTINUED | OUTPATIENT
Start: 2021-01-01 | End: 2021-01-01 | Stop reason: HOSPADM

## 2021-01-01 RX ORDER — MORPHINE SULFATE 20 MG/ML
5-10 SOLUTION ORAL
Status: DISCONTINUED | OUTPATIENT
Start: 2021-01-01 | End: 2021-01-01 | Stop reason: HOSPADM

## 2021-01-01 RX ORDER — SILVER SULFADIAZINE 10 MG/G
CREAM TOPICAL DAILY
Qty: 85 G | Refills: 3 | Status: SHIPPED | OUTPATIENT
Start: 2021-01-01

## 2021-01-01 RX ORDER — NOREPINEPHRINE BITARTRATE 0.06 MG/ML
0.03-0.4 INJECTION, SOLUTION INTRAVENOUS CONTINUOUS
Status: DISCONTINUED | OUTPATIENT
Start: 2021-01-01 | End: 2021-01-01 | Stop reason: HOSPADM

## 2021-01-01 RX ORDER — SODIUM CHLORIDE, SODIUM LACTATE, POTASSIUM CHLORIDE, CALCIUM CHLORIDE 600; 310; 30; 20 MG/100ML; MG/100ML; MG/100ML; MG/100ML
INJECTION, SOLUTION INTRAVENOUS CONTINUOUS
Status: DISCONTINUED | OUTPATIENT
Start: 2021-01-01 | End: 2021-01-01

## 2021-01-01 RX ORDER — HEPARIN SODIUM,PORCINE 10 UNIT/ML
5-10 VIAL (ML) INTRAVENOUS EVERY 24 HOURS
Status: DISCONTINUED | OUTPATIENT
Start: 2021-01-01 | End: 2021-01-01 | Stop reason: HOSPADM

## 2021-01-01 RX ORDER — ALBUTEROL SULFATE 90 UG/1
AEROSOL, METERED RESPIRATORY (INHALATION)
Qty: 18 G | Refills: 2 | Status: SHIPPED | OUTPATIENT
Start: 2021-01-01

## 2021-01-01 RX ORDER — IOPAMIDOL 755 MG/ML
67 INJECTION, SOLUTION INTRAVASCULAR ONCE
Status: COMPLETED | OUTPATIENT
Start: 2021-01-01 | End: 2021-01-01

## 2021-01-01 RX ORDER — CARBOXYMETHYLCELLULOSE SODIUM 5 MG/ML
1-2 SOLUTION/ DROPS OPHTHALMIC EVERY 8 HOURS PRN
Status: DISCONTINUED | OUTPATIENT
Start: 2021-01-01 | End: 2021-01-01 | Stop reason: HOSPADM

## 2021-01-01 RX ORDER — TORSEMIDE 5 MG/1
5 TABLET ORAL DAILY
Qty: 90 TABLET | Refills: 1 | Status: SHIPPED | OUTPATIENT
Start: 2021-01-01

## 2021-01-01 RX ORDER — GADOBUTROL 604.72 MG/ML
6.5 INJECTION INTRAVENOUS ONCE
Status: COMPLETED | OUTPATIENT
Start: 2021-01-01 | End: 2021-01-01

## 2021-01-01 RX ORDER — AZTREONAM 2 G/1
2 INJECTION, POWDER, LYOPHILIZED, FOR SOLUTION INTRAMUSCULAR; INTRAVENOUS EVERY 6 HOURS
Status: DISCONTINUED | OUTPATIENT
Start: 2021-01-01 | End: 2021-01-01

## 2021-01-01 RX ORDER — IOPAMIDOL 755 MG/ML
92 INJECTION, SOLUTION INTRAVASCULAR ONCE
Status: COMPLETED | OUTPATIENT
Start: 2021-01-01 | End: 2021-01-01

## 2021-01-01 RX ORDER — POTASSIUM CHLORIDE 7.45 MG/ML
10 INJECTION INTRAVENOUS
Status: COMPLETED | OUTPATIENT
Start: 2021-01-01 | End: 2021-01-01

## 2021-01-01 RX ORDER — FUROSEMIDE 10 MG/ML
40 INJECTION INTRAMUSCULAR; INTRAVENOUS ONCE
Status: COMPLETED | OUTPATIENT
Start: 2021-01-01 | End: 2021-01-01

## 2021-01-01 RX ORDER — LIDOCAINE HYDROCHLORIDE 20 MG/ML
JELLY TOPICAL EVERY 4 HOURS PRN
Status: DISCONTINUED | OUTPATIENT
Start: 2021-01-01 | End: 2021-01-01 | Stop reason: HOSPADM

## 2021-01-01 RX ORDER — OXYCODONE AND ACETAMINOPHEN 5; 325 MG/1; MG/1
1-2 TABLET ORAL EVERY 4 HOURS PRN
Status: DISCONTINUED | OUTPATIENT
Start: 2021-01-01 | End: 2021-01-01

## 2021-01-01 RX ORDER — LORAZEPAM 2 MG/ML
1 INJECTION INTRAMUSCULAR
Status: DISCONTINUED | OUTPATIENT
Start: 2021-01-01 | End: 2021-01-01 | Stop reason: HOSPADM

## 2021-01-01 RX ORDER — PROCHLORPERAZINE MALEATE 5 MG
5 TABLET ORAL EVERY 6 HOURS PRN
Status: DISCONTINUED | OUTPATIENT
Start: 2021-01-01 | End: 2021-01-01 | Stop reason: HOSPADM

## 2021-01-01 RX ORDER — LOSARTAN POTASSIUM 25 MG/1
25 TABLET ORAL DAILY
Status: DISCONTINUED | OUTPATIENT
Start: 2021-01-01 | End: 2021-01-01

## 2021-01-01 RX ORDER — NALOXONE HYDROCHLORIDE 0.4 MG/ML
0.4 INJECTION, SOLUTION INTRAMUSCULAR; INTRAVENOUS; SUBCUTANEOUS
Status: DISCONTINUED | OUTPATIENT
Start: 2021-01-01 | End: 2021-01-01

## 2021-01-01 RX ORDER — NALOXONE HYDROCHLORIDE 0.4 MG/ML
0.2 INJECTION, SOLUTION INTRAMUSCULAR; INTRAVENOUS; SUBCUTANEOUS
Status: DISCONTINUED | OUTPATIENT
Start: 2021-01-01 | End: 2021-01-01 | Stop reason: HOSPADM

## 2021-01-01 RX ORDER — LOPERAMIDE HCL 2 MG
2 CAPSULE ORAL 3 TIMES DAILY
Status: DISCONTINUED | OUTPATIENT
Start: 2021-01-01 | End: 2021-01-01 | Stop reason: HOSPADM

## 2021-01-01 RX ORDER — ONDANSETRON 4 MG/1
4 TABLET, ORALLY DISINTEGRATING ORAL EVERY 6 HOURS PRN
Status: DISCONTINUED | OUTPATIENT
Start: 2021-01-01 | End: 2021-01-01 | Stop reason: HOSPADM

## 2021-01-01 RX ORDER — SILVER SULFADIAZINE 10 MG/G
CREAM TOPICAL 2 TIMES DAILY
Status: DISCONTINUED | OUTPATIENT
Start: 2021-01-01 | End: 2021-01-01 | Stop reason: HOSPADM

## 2021-01-01 RX ORDER — BISACODYL 10 MG
10 SUPPOSITORY, RECTAL RECTAL
Status: DISCONTINUED | OUTPATIENT
Start: 2021-04-13 | End: 2021-01-01 | Stop reason: HOSPADM

## 2021-01-01 RX ORDER — IBUPROFEN 600 MG/1
600 TABLET, FILM COATED ORAL EVERY 6 HOURS PRN
Status: DISCONTINUED | OUTPATIENT
Start: 2021-01-01 | End: 2021-01-01 | Stop reason: HOSPADM

## 2021-01-01 RX ORDER — ACETAMINOPHEN 650 MG/1
650 SUPPOSITORY RECTAL EVERY 4 HOURS PRN
Status: DISCONTINUED | OUTPATIENT
Start: 2021-01-01 | End: 2021-01-01 | Stop reason: HOSPADM

## 2021-01-01 RX ORDER — AMLODIPINE BESYLATE 10 MG/1
10 TABLET ORAL DAILY
Status: DISCONTINUED | OUTPATIENT
Start: 2021-01-01 | End: 2021-01-01

## 2021-01-01 RX ORDER — NALOXONE HYDROCHLORIDE 0.4 MG/ML
0.4 INJECTION, SOLUTION INTRAMUSCULAR; INTRAVENOUS; SUBCUTANEOUS
Status: DISCONTINUED | OUTPATIENT
Start: 2021-01-01 | End: 2021-01-01 | Stop reason: HOSPADM

## 2021-01-01 RX ORDER — HYDROMORPHONE HYDROCHLORIDE 1 MG/ML
.3-.5 INJECTION, SOLUTION INTRAMUSCULAR; INTRAVENOUS; SUBCUTANEOUS
Status: DISCONTINUED | OUTPATIENT
Start: 2021-01-01 | End: 2021-01-01 | Stop reason: HOSPADM

## 2021-01-01 RX ORDER — ACETAMINOPHEN 325 MG/1
650 TABLET ORAL EVERY 4 HOURS PRN
Status: DISCONTINUED | OUTPATIENT
Start: 2021-01-01 | End: 2021-01-01

## 2021-01-01 RX ORDER — ATROPINE SULFATE 10 MG/ML
1-2 SOLUTION/ DROPS OPHTHALMIC
Status: DISCONTINUED | OUTPATIENT
Start: 2021-01-01 | End: 2021-01-01 | Stop reason: HOSPADM

## 2021-01-01 RX ORDER — SODIUM CHLORIDE 9 MG/ML
INJECTION, SOLUTION INTRAVENOUS CONTINUOUS
Status: DISCONTINUED | OUTPATIENT
Start: 2021-01-01 | End: 2021-01-01

## 2021-01-01 RX ORDER — LIDOCAINE HYDROCHLORIDE 20 MG/ML
JELLY TOPICAL ONCE
Status: COMPLETED | OUTPATIENT
Start: 2021-01-01 | End: 2021-01-01

## 2021-01-01 RX ORDER — ALBUTEROL SULFATE 90 UG/1
AEROSOL, METERED RESPIRATORY (INHALATION)
Qty: 16 G | Refills: 0 | Status: SHIPPED | OUTPATIENT
Start: 2021-01-01 | End: 2021-01-01

## 2021-01-01 RX ORDER — HEPARIN SODIUM,PORCINE 10 UNIT/ML
5-10 VIAL (ML) INTRAVENOUS
Status: DISCONTINUED | OUTPATIENT
Start: 2021-01-01 | End: 2021-01-01 | Stop reason: HOSPADM

## 2021-01-01 RX ORDER — LACTOBACILLUS RHAMNOSUS GG 10B CELL
1 CAPSULE ORAL 2 TIMES DAILY
Status: DISCONTINUED | OUTPATIENT
Start: 2021-01-01 | End: 2021-01-01

## 2021-01-01 RX ORDER — VANCOMYCIN HYDROCHLORIDE 1 G/200ML
1000 INJECTION, SOLUTION INTRAVENOUS EVERY 24 HOURS
Status: DISCONTINUED | OUTPATIENT
Start: 2021-01-01 | End: 2021-01-01

## 2021-01-01 RX ORDER — HYDROMORPHONE HYDROCHLORIDE 1 MG/ML
0.3 INJECTION, SOLUTION INTRAMUSCULAR; INTRAVENOUS; SUBCUTANEOUS ONCE
Status: COMPLETED | OUTPATIENT
Start: 2021-01-01 | End: 2021-01-01

## 2021-01-01 RX ORDER — ONDANSETRON 2 MG/ML
4 INJECTION INTRAMUSCULAR; INTRAVENOUS EVERY 6 HOURS PRN
Status: DISCONTINUED | OUTPATIENT
Start: 2021-01-01 | End: 2021-01-01 | Stop reason: HOSPADM

## 2021-01-01 RX ORDER — NICOTINE POLACRILEX 4 MG
15-30 LOZENGE BUCCAL
Status: DISCONTINUED | OUTPATIENT
Start: 2021-01-01 | End: 2021-01-01 | Stop reason: HOSPADM

## 2021-01-01 RX ORDER — ALBUTEROL SULFATE 90 UG/1
1-2 AEROSOL, METERED RESPIRATORY (INHALATION) EVERY 6 HOURS PRN
Status: DISCONTINUED | OUTPATIENT
Start: 2021-01-01 | End: 2021-01-01

## 2021-01-01 RX ORDER — ACETAMINOPHEN 325 MG/1
650 TABLET ORAL EVERY 4 HOURS PRN
Status: DISCONTINUED | OUTPATIENT
Start: 2021-01-01 | End: 2021-01-01 | Stop reason: HOSPADM

## 2021-01-01 RX ORDER — LORAZEPAM 1 MG/1
1 TABLET ORAL
Status: DISCONTINUED | OUTPATIENT
Start: 2021-01-01 | End: 2021-01-01 | Stop reason: HOSPADM

## 2021-01-01 RX ORDER — OXYCODONE HCL 5 MG/5 ML
5-7.5 SOLUTION, ORAL ORAL EVERY 4 HOURS PRN
Qty: 500 ML | Refills: 0 | Status: SHIPPED | OUTPATIENT
Start: 2021-01-01 | End: 2021-04-11

## 2021-01-01 RX ORDER — EZETIMIBE 10 MG/1
10 TABLET ORAL DAILY
Status: DISCONTINUED | OUTPATIENT
Start: 2021-01-01 | End: 2021-01-01

## 2021-01-01 RX ORDER — IPRATROPIUM BROMIDE AND ALBUTEROL SULFATE 2.5; .5 MG/3ML; MG/3ML
1 SOLUTION RESPIRATORY (INHALATION) EVERY 6 HOURS PRN
Status: DISCONTINUED | OUTPATIENT
Start: 2021-01-01 | End: 2021-01-01

## 2021-01-01 RX ORDER — DEXTROSE MONOHYDRATE 25 G/50ML
25-50 INJECTION, SOLUTION INTRAVENOUS
Status: DISCONTINUED | OUTPATIENT
Start: 2021-01-01 | End: 2021-01-01 | Stop reason: HOSPADM

## 2021-01-01 RX ORDER — NYSTATIN 100000/ML
1000000 SUSPENSION, ORAL (FINAL DOSE FORM) ORAL 4 TIMES DAILY
Status: DISCONTINUED | OUTPATIENT
Start: 2021-01-01 | End: 2021-01-01

## 2021-01-01 RX ORDER — MEROPENEM 1 G/1
1 INJECTION, POWDER, FOR SOLUTION INTRAVENOUS EVERY 8 HOURS
Status: DISCONTINUED | OUTPATIENT
Start: 2021-01-01 | End: 2021-01-01

## 2021-01-01 RX ORDER — ONDANSETRON 2 MG/ML
4 INJECTION INTRAMUSCULAR; INTRAVENOUS ONCE
Status: COMPLETED | OUTPATIENT
Start: 2021-01-01 | End: 2021-01-01

## 2021-01-01 RX ORDER — MORPHINE SULFATE 15 MG/1
15 TABLET, FILM COATED, EXTENDED RELEASE ORAL EVERY 8 HOURS PRN
Status: DISCONTINUED | OUTPATIENT
Start: 2021-01-01 | End: 2021-01-01 | Stop reason: HOSPADM

## 2021-01-01 RX ORDER — EZETIMIBE 10 MG/1
10 TABLET ORAL DAILY
Qty: 90 TABLET | Refills: 1 | Status: SHIPPED | OUTPATIENT
Start: 2021-01-01

## 2021-01-01 RX ORDER — FLUOCINONIDE TOPICAL SOLUTION USP, 0.05% 0.5 MG/ML
SOLUTION TOPICAL
Qty: 60 ML | Refills: 4 | Status: SHIPPED | OUTPATIENT
Start: 2021-01-01

## 2021-01-01 RX ORDER — SODIUM CHLORIDE, SODIUM LACTATE, POTASSIUM CHLORIDE, CALCIUM CHLORIDE 600; 310; 30; 20 MG/100ML; MG/100ML; MG/100ML; MG/100ML
INJECTION, SOLUTION INTRAVENOUS CONTINUOUS
Status: DISCONTINUED | OUTPATIENT
Start: 2021-01-01 | End: 2021-01-01 | Stop reason: HOSPADM

## 2021-01-01 RX ORDER — ALBUTEROL SULFATE 0.83 MG/ML
2.5 SOLUTION RESPIRATORY (INHALATION)
Status: DISCONTINUED | OUTPATIENT
Start: 2021-01-01 | End: 2021-01-01

## 2021-01-01 RX ORDER — IPRATROPIUM BROMIDE AND ALBUTEROL SULFATE 2.5; .5 MG/3ML; MG/3ML
3 SOLUTION RESPIRATORY (INHALATION)
Status: DISCONTINUED | OUTPATIENT
Start: 2021-01-01 | End: 2021-01-01 | Stop reason: HOSPADM

## 2021-01-01 RX ORDER — NITROFURANTOIN 25; 75 MG/1; MG/1
100 CAPSULE ORAL EVERY 12 HOURS SCHEDULED
Status: DISCONTINUED | OUTPATIENT
Start: 2021-01-01 | End: 2021-01-01

## 2021-01-01 RX ORDER — MULTIPLE VITAMINS W/ MINERALS TAB 9MG-400MCG
1 TAB ORAL DAILY
Status: DISCONTINUED | OUTPATIENT
Start: 2021-01-01 | End: 2021-01-01 | Stop reason: ALTCHOICE

## 2021-01-01 RX ORDER — CETIRIZINE HYDROCHLORIDE 10 MG/1
10 TABLET ORAL DAILY
Status: DISCONTINUED | OUTPATIENT
Start: 2021-01-01 | End: 2021-01-01 | Stop reason: HOSPADM

## 2021-01-01 RX ORDER — HEPARIN SODIUM (PORCINE) LOCK FLUSH IV SOLN 100 UNIT/ML 100 UNIT/ML
5 SOLUTION INTRAVENOUS
Status: DISCONTINUED | OUTPATIENT
Start: 2021-01-01 | End: 2021-01-01

## 2021-01-01 RX ORDER — ONDANSETRON 2 MG/ML
4 INJECTION INTRAMUSCULAR; INTRAVENOUS EVERY 30 MIN PRN
Status: DISCONTINUED | OUTPATIENT
Start: 2021-01-01 | End: 2021-01-01

## 2021-01-01 RX ORDER — MORPHINE SULFATE 15 MG/1
15 TABLET, FILM COATED, EXTENDED RELEASE ORAL EVERY 12 HOURS
Qty: 60 TABLET | Refills: 0 | Status: SHIPPED | OUTPATIENT
Start: 2021-01-01 | End: 2021-01-01

## 2021-01-01 RX ORDER — AMLODIPINE BESYLATE 10 MG/1
TABLET ORAL
Qty: 90 TABLET | Refills: 1 | Status: SHIPPED | OUTPATIENT
Start: 2021-01-01

## 2021-01-01 RX ORDER — MORPHINE SULFATE 10 MG/5ML
5-10 SOLUTION ORAL
Status: DISCONTINUED | OUTPATIENT
Start: 2021-01-01 | End: 2021-01-01 | Stop reason: HOSPADM

## 2021-01-01 RX ORDER — HYDROMORPHONE HYDROCHLORIDE 1 MG/ML
.3-.5 INJECTION, SOLUTION INTRAMUSCULAR; INTRAVENOUS; SUBCUTANEOUS EVERY 4 HOURS PRN
Status: DISCONTINUED | OUTPATIENT
Start: 2021-01-01 | End: 2021-01-01

## 2021-01-01 RX ADMIN — LIDOCAINE HYDROCHLORIDE: 20 JELLY TOPICAL at 00:34

## 2021-01-01 RX ADMIN — PROCHLORPERAZINE EDISYLATE 5 MG: 5 INJECTION INTRAMUSCULAR; INTRAVENOUS at 07:46

## 2021-01-01 RX ADMIN — HYDROMORPHONE HYDROCHLORIDE 0.5 MG: 1 INJECTION, SOLUTION INTRAMUSCULAR; INTRAVENOUS; SUBCUTANEOUS at 04:32

## 2021-01-01 RX ADMIN — LOPERAMIDE HYDROCHLORIDE 2 MG: 2 CAPSULE ORAL at 19:37

## 2021-01-01 RX ADMIN — SODIUM CHLORIDE, POTASSIUM CHLORIDE, SODIUM LACTATE AND CALCIUM CHLORIDE: 600; 310; 30; 20 INJECTION, SOLUTION INTRAVENOUS at 17:19

## 2021-01-01 RX ADMIN — HYDROMORPHONE HYDROCHLORIDE 0.5 MG: 1 INJECTION, SOLUTION INTRAMUSCULAR; INTRAVENOUS; SUBCUTANEOUS at 10:32

## 2021-01-01 RX ADMIN — SODIUM PHOSPHATE, MONOBASIC, MONOHYDRATE 9 MMOL: 276; 142 INJECTION, SOLUTION INTRAVENOUS at 13:11

## 2021-01-01 RX ADMIN — GADOBUTROL 6.5 ML: 604.72 INJECTION INTRAVENOUS at 14:09

## 2021-01-01 RX ADMIN — SODIUM CHLORIDE 50 ML: 9 INJECTION, SOLUTION INTRAVENOUS at 14:08

## 2021-01-01 RX ADMIN — SODIUM CHLORIDE 1000 ML: 9 INJECTION, SOLUTION INTRAVENOUS at 15:50

## 2021-01-01 RX ADMIN — LOPERAMIDE HYDROCHLORIDE 2 MG: 2 CAPSULE ORAL at 07:46

## 2021-01-01 RX ADMIN — SODIUM CHLORIDE, POTASSIUM CHLORIDE, SODIUM LACTATE AND CALCIUM CHLORIDE: 600; 310; 30; 20 INJECTION, SOLUTION INTRAVENOUS at 22:28

## 2021-01-01 RX ADMIN — SODIUM CHLORIDE, POTASSIUM CHLORIDE, SODIUM LACTATE AND CALCIUM CHLORIDE: 600; 310; 30; 20 INJECTION, SOLUTION INTRAVENOUS at 20:57

## 2021-01-01 RX ADMIN — HYDROMORPHONE HYDROCHLORIDE 0.5 MG: 1 INJECTION, SOLUTION INTRAMUSCULAR; INTRAVENOUS; SUBCUTANEOUS at 14:12

## 2021-01-01 RX ADMIN — LIDOCAINE HYDROCHLORIDE: 20 JELLY TOPICAL at 18:30

## 2021-01-01 RX ADMIN — OXYCODONE HYDROCHLORIDE 5 MG: 5 SOLUTION ORAL at 16:36

## 2021-01-01 RX ADMIN — BENZOCAINE AND MENTHOL 1 LOZENGE: 15; 3.6 LOZENGE ORAL at 04:54

## 2021-01-01 RX ADMIN — ONDANSETRON 4 MG: 2 INJECTION INTRAMUSCULAR; INTRAVENOUS at 21:50

## 2021-01-01 RX ADMIN — ACETAMINOPHEN 650 MG: 325 TABLET, FILM COATED ORAL at 19:48

## 2021-01-01 RX ADMIN — Medication 15 ML: at 07:58

## 2021-01-01 RX ADMIN — SODIUM CHLORIDE, POTASSIUM CHLORIDE, SODIUM LACTATE AND CALCIUM CHLORIDE: 600; 310; 30; 20 INJECTION, SOLUTION INTRAVENOUS at 06:56

## 2021-01-01 RX ADMIN — Medication 15 ML: at 08:54

## 2021-01-01 RX ADMIN — OXYCODONE HYDROCHLORIDE 5 MG: 5 SOLUTION ORAL at 01:41

## 2021-01-01 RX ADMIN — Medication 1 CAPSULE: at 08:55

## 2021-01-01 RX ADMIN — LORAZEPAM 0.5 MG: 2 INJECTION INTRAMUSCULAR; INTRAVENOUS at 03:27

## 2021-01-01 RX ADMIN — LOPERAMIDE HYDROCHLORIDE 2 MG: 2 CAPSULE ORAL at 14:11

## 2021-01-01 RX ADMIN — SODIUM CHLORIDE: 9 INJECTION, SOLUTION INTRAVENOUS at 20:58

## 2021-01-01 RX ADMIN — PROCHLORPERAZINE EDISYLATE 5 MG: 5 INJECTION INTRAMUSCULAR; INTRAVENOUS at 19:56

## 2021-01-01 RX ADMIN — SODIUM CHLORIDE 57 ML: 9 INJECTION, SOLUTION INTRAVENOUS at 14:50

## 2021-01-01 RX ADMIN — SODIUM CHLORIDE, POTASSIUM CHLORIDE, SODIUM LACTATE AND CALCIUM CHLORIDE: 600; 310; 30; 20 INJECTION, SOLUTION INTRAVENOUS at 04:21

## 2021-01-01 RX ADMIN — TORSEMIDE 5 MG: 5 TABLET ORAL at 08:52

## 2021-01-01 RX ADMIN — VANCOMYCIN HYDROCHLORIDE 1000 MG: 1 INJECTION, SOLUTION INTRAVENOUS at 17:17

## 2021-01-01 RX ADMIN — VASOPRESSIN 2.4 UNITS/HR: 20 INJECTION INTRAVENOUS at 06:33

## 2021-01-01 RX ADMIN — ACETAMINOPHEN 650 MG: 325 TABLET, FILM COATED ORAL at 19:37

## 2021-01-01 RX ADMIN — NITROFURANTOIN MONOHYDRATE/MACROCRYSTALLINE 100 MG: 25; 75 CAPSULE ORAL at 10:19

## 2021-01-01 RX ADMIN — NITROFURANTOIN MONOHYDRATE/MACROCRYSTALLINE 100 MG: 25; 75 CAPSULE ORAL at 19:48

## 2021-01-01 RX ADMIN — HEPARIN, PORCINE (PF) 10 UNIT/ML INTRAVENOUS SYRINGE 5 ML: at 09:08

## 2021-01-01 RX ADMIN — NYSTATIN 1000000 UNITS: 100000 SUSPENSION ORAL at 07:48

## 2021-01-01 RX ADMIN — ONDANSETRON 4 MG: 2 INJECTION INTRAMUSCULAR; INTRAVENOUS at 17:25

## 2021-01-01 RX ADMIN — TORSEMIDE 5 MG: 5 TABLET ORAL at 08:00

## 2021-01-01 RX ADMIN — ONDANSETRON 4 MG: 2 INJECTION INTRAMUSCULAR; INTRAVENOUS at 08:35

## 2021-01-01 RX ADMIN — LOPERAMIDE HYDROCHLORIDE 2 MG: 2 CAPSULE ORAL at 08:51

## 2021-01-01 RX ADMIN — NYSTATIN 1000000 UNITS: 100000 SUSPENSION ORAL at 08:30

## 2021-01-01 RX ADMIN — IOPAMIDOL 92 ML: 755 INJECTION, SOLUTION INTRAVENOUS at 13:18

## 2021-01-01 RX ADMIN — LOPERAMIDE HYDROCHLORIDE 2 MG: 2 CAPSULE ORAL at 19:48

## 2021-01-01 RX ADMIN — LOPERAMIDE HYDROCHLORIDE 2 MG: 2 CAPSULE ORAL at 13:32

## 2021-01-01 RX ADMIN — PROCHLORPERAZINE EDISYLATE 5 MG: 5 INJECTION INTRAMUSCULAR; INTRAVENOUS at 22:10

## 2021-01-01 RX ADMIN — SODIUM CHLORIDE, POTASSIUM CHLORIDE, SODIUM LACTATE AND CALCIUM CHLORIDE: 600; 310; 30; 20 INJECTION, SOLUTION INTRAVENOUS at 10:36

## 2021-01-01 RX ADMIN — GENTAMICIN SULFATE 310 MG: 40 INJECTION, SOLUTION INTRAMUSCULAR; INTRAVENOUS at 09:09

## 2021-01-01 RX ADMIN — HYDROMORPHONE HYDROCHLORIDE 0.5 MG: 1 INJECTION, SOLUTION INTRAMUSCULAR; INTRAVENOUS; SUBCUTANEOUS at 15:02

## 2021-01-01 RX ADMIN — LOPERAMIDE HYDROCHLORIDE 2 MG: 2 CAPSULE ORAL at 08:37

## 2021-01-01 RX ADMIN — IOPAMIDOL 67 ML: 755 INJECTION, SOLUTION INTRAVENOUS at 14:50

## 2021-01-01 RX ADMIN — SILVER SULFADIAZINE: 10 CREAM TOPICAL at 21:08

## 2021-01-01 RX ADMIN — SILVER SULFADIAZINE: 10 CREAM TOPICAL at 19:50

## 2021-01-01 RX ADMIN — SODIUM CHLORIDE 1000 ML: 9 INJECTION, SOLUTION INTRAVENOUS at 12:09

## 2021-01-01 RX ADMIN — ONDANSETRON 4 MG: 2 INJECTION INTRAMUSCULAR; INTRAVENOUS at 15:05

## 2021-01-01 RX ADMIN — SILVER SULFADIAZINE: 10 CREAM TOPICAL at 13:21

## 2021-01-01 RX ADMIN — CEFEPIME HYDROCHLORIDE 2 G: 2 INJECTION, POWDER, FOR SOLUTION INTRAVENOUS at 12:41

## 2021-01-01 RX ADMIN — HYDROMORPHONE HYDROCHLORIDE 0.5 MG: 1 INJECTION, SOLUTION INTRAMUSCULAR; INTRAVENOUS; SUBCUTANEOUS at 18:41

## 2021-01-01 RX ADMIN — LOSARTAN POTASSIUM 25 MG: 25 TABLET, FILM COATED ORAL at 08:51

## 2021-01-01 RX ADMIN — NITROFURANTOIN MONOHYDRATE/MACROCRYSTALLINE 100 MG: 25; 75 CAPSULE ORAL at 20:58

## 2021-01-01 RX ADMIN — POTASSIUM CHLORIDE 10 MEQ: 7.46 INJECTION, SOLUTION INTRAVENOUS at 19:35

## 2021-01-01 RX ADMIN — LIDOCAINE 4%: 4 CREAM TOPICAL at 03:44

## 2021-01-01 RX ADMIN — Medication 5 ML: at 12:20

## 2021-01-01 RX ADMIN — SODIUM CHLORIDE, POTASSIUM CHLORIDE, SODIUM LACTATE AND CALCIUM CHLORIDE 1000 ML: 600; 310; 30; 20 INJECTION, SOLUTION INTRAVENOUS at 17:22

## 2021-01-01 RX ADMIN — ASPIRIN 81 MG: 81 TABLET ORAL at 08:51

## 2021-01-01 RX ADMIN — PROCHLORPERAZINE EDISYLATE 5 MG: 5 INJECTION INTRAMUSCULAR; INTRAVENOUS at 22:09

## 2021-01-01 RX ADMIN — ONDANSETRON 4 MG: 2 INJECTION INTRAMUSCULAR; INTRAVENOUS at 13:32

## 2021-01-01 RX ADMIN — SODIUM CHLORIDE, POTASSIUM CHLORIDE, SODIUM LACTATE AND CALCIUM CHLORIDE: 600; 310; 30; 20 INJECTION, SOLUTION INTRAVENOUS at 07:49

## 2021-01-01 RX ADMIN — PROCHLORPERAZINE EDISYLATE 5 MG: 5 INJECTION INTRAMUSCULAR; INTRAVENOUS at 14:11

## 2021-01-01 RX ADMIN — ONDANSETRON 4 MG: 2 INJECTION INTRAMUSCULAR; INTRAVENOUS at 18:49

## 2021-01-01 RX ADMIN — HYDROMORPHONE HYDROCHLORIDE 0.5 MG: 1 INJECTION, SOLUTION INTRAMUSCULAR; INTRAVENOUS; SUBCUTANEOUS at 06:28

## 2021-01-01 RX ADMIN — OXYCODONE HYDROCHLORIDE 7.5 MG: 5 SOLUTION ORAL at 05:34

## 2021-01-01 RX ADMIN — IPRATROPIUM BROMIDE AND ALBUTEROL SULFATE 3 ML: .5; 3 SOLUTION RESPIRATORY (INHALATION) at 20:36

## 2021-01-01 RX ADMIN — LOPERAMIDE HYDROCHLORIDE 2 MG: 2 CAPSULE ORAL at 08:56

## 2021-01-01 RX ADMIN — SODIUM CHLORIDE, POTASSIUM CHLORIDE, SODIUM LACTATE AND CALCIUM CHLORIDE 1000 ML: 600; 310; 30; 20 INJECTION, SOLUTION INTRAVENOUS at 15:55

## 2021-01-01 RX ADMIN — LIDOCAINE HYDROCHLORIDE: 20 JELLY TOPICAL at 14:33

## 2021-01-01 RX ADMIN — SODIUM CHLORIDE, POTASSIUM CHLORIDE, SODIUM LACTATE AND CALCIUM CHLORIDE 1000 ML: 600; 310; 30; 20 INJECTION, SOLUTION INTRAVENOUS at 17:56

## 2021-01-01 RX ADMIN — SODIUM CHLORIDE, POTASSIUM CHLORIDE, SODIUM LACTATE AND CALCIUM CHLORIDE: 600; 310; 30; 20 INJECTION, SOLUTION INTRAVENOUS at 06:35

## 2021-01-01 RX ADMIN — LIDOCAINE HYDROCHLORIDE: 20 JELLY TOPICAL at 14:58

## 2021-01-01 RX ADMIN — LOPERAMIDE HYDROCHLORIDE 2 MG: 2 CAPSULE ORAL at 13:33

## 2021-01-01 RX ADMIN — Medication 15 ML: at 08:50

## 2021-01-01 RX ADMIN — Medication 5 ML: at 13:13

## 2021-01-01 RX ADMIN — Medication 1 CAPSULE: at 20:58

## 2021-01-01 RX ADMIN — SODIUM CHLORIDE 1000 ML: 9 INJECTION, SOLUTION INTRAVENOUS at 11:15

## 2021-01-01 RX ADMIN — Medication 5 ML: at 15:15

## 2021-01-01 RX ADMIN — ONDANSETRON 4 MG: 2 INJECTION INTRAMUSCULAR; INTRAVENOUS at 15:51

## 2021-01-01 RX ADMIN — AMLODIPINE BESYLATE 10 MG: 10 TABLET ORAL at 07:59

## 2021-01-01 RX ADMIN — OXYCODONE HYDROCHLORIDE 5 MG: 5 SOLUTION ORAL at 12:09

## 2021-01-01 RX ADMIN — LIDOCAINE HYDROCHLORIDE: 20 JELLY TOPICAL at 13:20

## 2021-01-01 RX ADMIN — LIDOCAINE HYDROCHLORIDE: 20 JELLY TOPICAL at 06:28

## 2021-01-01 RX ADMIN — EZETIMIBE 10 MG: 10 TABLET ORAL at 08:56

## 2021-01-01 RX ADMIN — BENZOCAINE AND MENTHOL 1 LOZENGE: 15; 3.6 LOZENGE ORAL at 19:57

## 2021-01-01 RX ADMIN — LOPERAMIDE HYDROCHLORIDE 2 MG: 2 CAPSULE ORAL at 19:56

## 2021-01-01 RX ADMIN — CEFEPIME HYDROCHLORIDE 2 G: 2 INJECTION, POWDER, FOR SOLUTION INTRAVENOUS at 20:51

## 2021-01-01 RX ADMIN — DEXAMETHASONE SODIUM PHOSPHATE 10 MG: 10 INJECTION, SOLUTION INTRAMUSCULAR; INTRAVENOUS at 12:03

## 2021-01-01 RX ADMIN — NYSTATIN 1000000 UNITS: 100000 SUSPENSION ORAL at 01:38

## 2021-01-01 RX ADMIN — SILVER SULFADIAZINE: 10 CREAM TOPICAL at 19:39

## 2021-01-01 RX ADMIN — PROCHLORPERAZINE EDISYLATE 5 MG: 5 INJECTION INTRAMUSCULAR; INTRAVENOUS at 13:51

## 2021-01-01 RX ADMIN — AMLODIPINE BESYLATE 10 MG: 10 TABLET ORAL at 08:51

## 2021-01-01 RX ADMIN — HYDROMORPHONE HYDROCHLORIDE 0.5 MG: 1 INJECTION, SOLUTION INTRAMUSCULAR; INTRAVENOUS; SUBCUTANEOUS at 11:37

## 2021-01-01 RX ADMIN — ALBUTEROL SULFATE 2.5 MG: 2.5 SOLUTION RESPIRATORY (INHALATION) at 17:38

## 2021-01-01 RX ADMIN — TORSEMIDE 5 MG: 5 TABLET ORAL at 08:56

## 2021-01-01 RX ADMIN — ALBUTEROL SULFATE 2.5 MG: 2.5 SOLUTION RESPIRATORY (INHALATION) at 00:38

## 2021-01-01 RX ADMIN — EZETIMIBE 10 MG: 10 TABLET ORAL at 08:00

## 2021-01-01 RX ADMIN — SODIUM CHLORIDE 1000 ML: 9 INJECTION, SOLUTION INTRAVENOUS at 11:22

## 2021-01-01 RX ADMIN — NYSTATIN 1000000 UNITS: 100000 SUSPENSION ORAL at 22:27

## 2021-01-01 RX ADMIN — PROCHLORPERAZINE EDISYLATE 5 MG: 5 INJECTION INTRAMUSCULAR; INTRAVENOUS at 08:48

## 2021-01-01 RX ADMIN — Medication 1 CAPSULE: at 21:45

## 2021-01-01 RX ADMIN — Medication 15 ML: at 10:15

## 2021-01-01 RX ADMIN — NITROFURANTOIN MONOHYDRATE/MACROCRYSTALLINE 100 MG: 25; 75 CAPSULE ORAL at 08:55

## 2021-01-01 RX ADMIN — NYSTATIN 1000000 UNITS: 100000 SUSPENSION ORAL at 10:32

## 2021-01-01 RX ADMIN — SODIUM CHLORIDE, POTASSIUM CHLORIDE, SODIUM LACTATE AND CALCIUM CHLORIDE 1000 ML: 600; 310; 30; 20 INJECTION, SOLUTION INTRAVENOUS at 17:04

## 2021-01-01 RX ADMIN — POTASSIUM CHLORIDE 10 MEQ: 7.46 INJECTION, SOLUTION INTRAVENOUS at 11:42

## 2021-01-01 RX ADMIN — PROCHLORPERAZINE EDISYLATE 5 MG: 5 INJECTION INTRAMUSCULAR; INTRAVENOUS at 18:02

## 2021-01-01 RX ADMIN — HYDROMORPHONE HYDROCHLORIDE 0.5 MG: 1 INJECTION, SOLUTION INTRAMUSCULAR; INTRAVENOUS; SUBCUTANEOUS at 12:45

## 2021-01-01 RX ADMIN — ONDANSETRON 4 MG: 2 INJECTION INTRAMUSCULAR; INTRAVENOUS at 10:54

## 2021-01-01 RX ADMIN — HYDROMORPHONE HYDROCHLORIDE 0.5 MG: 1 INJECTION, SOLUTION INTRAMUSCULAR; INTRAVENOUS; SUBCUTANEOUS at 05:20

## 2021-01-01 RX ADMIN — DEXAMETHASONE SODIUM PHOSPHATE 10 MG: 10 INJECTION, SOLUTION INTRAMUSCULAR; INTRAVENOUS at 12:37

## 2021-01-01 RX ADMIN — ASPIRIN 81 MG: 81 TABLET ORAL at 07:59

## 2021-01-01 RX ADMIN — SILVER SULFADIAZINE: 10 CREAM TOPICAL at 10:07

## 2021-01-01 RX ADMIN — POTASSIUM CHLORIDE 10 MEQ: 7.46 INJECTION, SOLUTION INTRAVENOUS at 18:03

## 2021-01-01 RX ADMIN — POTASSIUM CHLORIDE 10 MEQ: 7.46 INJECTION, SOLUTION INTRAVENOUS at 12:23

## 2021-01-01 RX ADMIN — PROCHLORPERAZINE EDISYLATE 5 MG: 5 INJECTION INTRAMUSCULAR; INTRAVENOUS at 23:02

## 2021-01-01 RX ADMIN — OXYCODONE HYDROCHLORIDE 5 MG: 5 SOLUTION ORAL at 20:51

## 2021-01-01 RX ADMIN — HYDROMORPHONE HYDROCHLORIDE 0.5 MG: 1 INJECTION, SOLUTION INTRAMUSCULAR; INTRAVENOUS; SUBCUTANEOUS at 13:33

## 2021-01-01 RX ADMIN — POTASSIUM CHLORIDE 10 MEQ: 7.46 INJECTION, SOLUTION INTRAVENOUS at 17:26

## 2021-01-01 RX ADMIN — FILGRASTIM 300 MCG: 300 INJECTION, SOLUTION INTRAVENOUS; SUBCUTANEOUS at 20:47

## 2021-01-01 RX ADMIN — POTASSIUM CHLORIDE 10 MEQ: 7.46 INJECTION, SOLUTION INTRAVENOUS at 10:11

## 2021-01-01 RX ADMIN — LOPERAMIDE HYDROCHLORIDE 2 MG: 2 CAPSULE ORAL at 15:08

## 2021-01-01 RX ADMIN — METRONIDAZOLE 500 MG: 500 INJECTION, SOLUTION INTRAVENOUS at 18:31

## 2021-01-01 RX ADMIN — OXYCODONE HYDROCHLORIDE 7.5 MG: 5 SOLUTION ORAL at 01:30

## 2021-01-01 RX ADMIN — PROCHLORPERAZINE EDISYLATE 5 MG: 5 INJECTION INTRAMUSCULAR; INTRAVENOUS at 15:59

## 2021-01-01 RX ADMIN — SODIUM CHLORIDE 100 ML: 9 INJECTION, SOLUTION INTRAVENOUS at 13:18

## 2021-01-01 RX ADMIN — HYDROMORPHONE HYDROCHLORIDE 0.5 MG: 1 INJECTION, SOLUTION INTRAMUSCULAR; INTRAVENOUS; SUBCUTANEOUS at 21:18

## 2021-01-01 RX ADMIN — HYDROMORPHONE HYDROCHLORIDE 0.5 MG: 1 INJECTION, SOLUTION INTRAMUSCULAR; INTRAVENOUS; SUBCUTANEOUS at 01:11

## 2021-01-01 RX ADMIN — LIDOCAINE HYDROCHLORIDE: 20 JELLY TOPICAL at 00:26

## 2021-01-01 RX ADMIN — HYDROMORPHONE HYDROCHLORIDE 0.5 MG: 1 INJECTION, SOLUTION INTRAMUSCULAR; INTRAVENOUS; SUBCUTANEOUS at 08:49

## 2021-01-01 RX ADMIN — HYDROMORPHONE HYDROCHLORIDE 0.5 MG: 1 INJECTION, SOLUTION INTRAMUSCULAR; INTRAVENOUS; SUBCUTANEOUS at 23:37

## 2021-01-01 RX ADMIN — HYDROMORPHONE HYDROCHLORIDE 0.3 MG: 1 INJECTION, SOLUTION INTRAMUSCULAR; INTRAVENOUS; SUBCUTANEOUS at 15:52

## 2021-01-01 RX ADMIN — NYSTATIN 1000000 UNITS: 100000 SUSPENSION ORAL at 08:56

## 2021-01-01 RX ADMIN — OXYCODONE HYDROCHLORIDE 5 MG: 5 SOLUTION ORAL at 01:46

## 2021-01-01 RX ADMIN — HYDROMORPHONE HYDROCHLORIDE 0.5 MG: 1 INJECTION, SOLUTION INTRAMUSCULAR; INTRAVENOUS; SUBCUTANEOUS at 09:07

## 2021-01-01 RX ADMIN — Medication 0.4 MCG/KG/MIN: at 06:29

## 2021-01-01 RX ADMIN — LIDOCAINE HYDROCHLORIDE: 20 JELLY TOPICAL at 11:01

## 2021-01-01 RX ADMIN — FUROSEMIDE 40 MG: 10 INJECTION, SOLUTION INTRAMUSCULAR; INTRAVENOUS at 14:12

## 2021-01-01 RX ADMIN — LOPERAMIDE HYDROCHLORIDE 2 MG: 2 CAPSULE ORAL at 20:58

## 2021-01-01 RX ADMIN — HYDROMORPHONE HYDROCHLORIDE 0.5 MG: 1 INJECTION, SOLUTION INTRAMUSCULAR; INTRAVENOUS; SUBCUTANEOUS at 21:50

## 2021-01-01 RX ADMIN — ACETAMINOPHEN 650 MG: 325 TABLET, FILM COATED ORAL at 11:56

## 2021-01-01 RX ADMIN — OXYCODONE HYDROCHLORIDE 5 MG: 5 SOLUTION ORAL at 06:39

## 2021-01-01 RX ADMIN — CETIRIZINE HYDROCHLORIDE 10 MG: 10 TABLET, FILM COATED ORAL at 08:55

## 2021-01-01 RX ADMIN — HYDROMORPHONE HYDROCHLORIDE 0.5 MG: 1 INJECTION, SOLUTION INTRAMUSCULAR; INTRAVENOUS; SUBCUTANEOUS at 23:03

## 2021-01-01 RX ADMIN — SODIUM CHLORIDE 1000 ML: 9 INJECTION, SOLUTION INTRAVENOUS at 11:25

## 2021-01-01 RX ADMIN — EZETIMIBE 10 MG: 10 TABLET ORAL at 08:52

## 2021-01-01 RX ADMIN — LORAZEPAM 0.5 MG: 2 INJECTION INTRAMUSCULAR; INTRAVENOUS at 05:46

## 2021-01-01 RX ADMIN — NYSTATIN 1000000 UNITS: 100000 SUSPENSION ORAL at 01:44

## 2021-01-01 RX ADMIN — VASOPRESSIN 2.4 UNITS/HR: 20 INJECTION INTRAVENOUS at 20:42

## 2021-01-01 RX ADMIN — SODIUM CHLORIDE 1000 ML: 9 INJECTION, SOLUTION INTRAVENOUS at 11:17

## 2021-01-01 RX ADMIN — AMLODIPINE BESYLATE 10 MG: 10 TABLET ORAL at 08:39

## 2021-01-01 RX ADMIN — SODIUM CHLORIDE, POTASSIUM CHLORIDE, SODIUM LACTATE AND CALCIUM CHLORIDE: 600; 310; 30; 20 INJECTION, SOLUTION INTRAVENOUS at 01:45

## 2021-01-01 RX ADMIN — BENZOCAINE AND MENTHOL 1 LOZENGE: 15; 3.6 LOZENGE ORAL at 13:51

## 2021-01-01 RX ADMIN — POTASSIUM CHLORIDE 10 MEQ: 7.46 INJECTION, SOLUTION INTRAVENOUS at 11:17

## 2021-01-01 RX ADMIN — POTASSIUM CHLORIDE 10 MEQ: 7.46 INJECTION, SOLUTION INTRAVENOUS at 16:01

## 2021-01-01 RX ADMIN — CEFEPIME HYDROCHLORIDE 2 G: 2 INJECTION, POWDER, FOR SOLUTION INTRAVENOUS at 04:22

## 2021-01-01 RX ADMIN — HYDROMORPHONE HYDROCHLORIDE 0.5 MG: 1 INJECTION, SOLUTION INTRAMUSCULAR; INTRAVENOUS; SUBCUTANEOUS at 08:25

## 2021-01-01 RX ADMIN — SODIUM CHLORIDE, POTASSIUM CHLORIDE, SODIUM LACTATE AND CALCIUM CHLORIDE: 600; 310; 30; 20 INJECTION, SOLUTION INTRAVENOUS at 21:02

## 2021-01-01 RX ADMIN — SODIUM CHLORIDE, POTASSIUM CHLORIDE, SODIUM LACTATE AND CALCIUM CHLORIDE: 600; 310; 30; 20 INJECTION, SOLUTION INTRAVENOUS at 10:55

## 2021-01-01 RX ADMIN — BENZOCAINE AND MENTHOL 1 LOZENGE: 15; 3.6 LOZENGE ORAL at 10:16

## 2021-01-01 RX ADMIN — GENTAMICIN SULFATE 310 MG: 40 INJECTION, SOLUTION INTRAMUSCULAR; INTRAVENOUS at 10:12

## 2021-01-01 RX ADMIN — SILVER SULFADIAZINE: 10 CREAM TOPICAL at 08:50

## 2021-01-01 RX ADMIN — DEXAMETHASONE SODIUM PHOSPHATE 10 MG: 10 INJECTION, SOLUTION INTRAMUSCULAR; INTRAVENOUS at 11:50

## 2021-01-01 RX ADMIN — LOSARTAN POTASSIUM 25 MG: 25 TABLET, FILM COATED ORAL at 07:59

## 2021-01-01 RX ADMIN — ASPIRIN 81 MG: 81 TABLET ORAL at 08:55

## 2021-01-01 RX ADMIN — HYDROMORPHONE HYDROCHLORIDE 0.5 MG: 1 INJECTION, SOLUTION INTRAMUSCULAR; INTRAVENOUS; SUBCUTANEOUS at 23:44

## 2021-01-01 RX ADMIN — TORSEMIDE 5 MG: 5 TABLET ORAL at 10:15

## 2021-01-01 RX ADMIN — PROCHLORPERAZINE EDISYLATE 5 MG: 5 INJECTION INTRAMUSCULAR; INTRAVENOUS at 08:25

## 2021-01-01 RX ADMIN — Medication 0.03 MCG/KG/MIN: at 17:14

## 2021-01-01 RX ADMIN — SODIUM CHLORIDE 1000 ML: 9 INJECTION, SOLUTION INTRAVENOUS at 13:17

## 2021-01-01 RX ADMIN — BENZOCAINE AND MENTHOL 1 LOZENGE: 15; 3.6 LOZENGE ORAL at 07:47

## 2021-01-01 RX ADMIN — PROCHLORPERAZINE EDISYLATE 5 MG: 5 INJECTION INTRAMUSCULAR; INTRAVENOUS at 06:05

## 2021-01-01 RX ADMIN — OXYCODONE HYDROCHLORIDE 5 MG: 5 SOLUTION ORAL at 13:32

## 2021-01-01 RX ADMIN — NYSTATIN 1000000 UNITS: 100000 SUSPENSION ORAL at 16:33

## 2021-01-01 RX ADMIN — LOSARTAN POTASSIUM 25 MG: 25 TABLET, FILM COATED ORAL at 08:39

## 2021-01-01 RX ADMIN — METRONIDAZOLE 500 MG: 500 INJECTION, SOLUTION INTRAVENOUS at 23:39

## 2021-01-01 RX ADMIN — ONDANSETRON 4 MG: 2 INJECTION INTRAMUSCULAR; INTRAVENOUS at 13:17

## 2021-01-01 RX ADMIN — LIDOCAINE HYDROCHLORIDE: 20 JELLY TOPICAL at 17:15

## 2021-01-01 ASSESSMENT — ACTIVITIES OF DAILY LIVING (ADL)
ADLS_ACUITY_SCORE: 15
ADLS_ACUITY_SCORE: 15
ADLS_ACUITY_SCORE: 19
ADLS_ACUITY_SCORE: 15
EQUIPMENT_CURRENTLY_USED_AT_HOME: CANE, QUAD
DIFFICULTY_EATING/SWALLOWING: NO
ADLS_ACUITY_SCORE: 15
FALL_HISTORY_WITHIN_LAST_SIX_MONTHS: YES
ADLS_ACUITY_SCORE: 15
DRESSING/BATHING_DIFFICULTY: NO
ADLS_ACUITY_SCORE: 16
TOILETING_ISSUES: NO
ADLS_ACUITY_SCORE: 18
ADLS_ACUITY_SCORE: 15
WHICH_OF_THE_ABOVE_FUNCTIONAL_RISKS_HAD_A_RECENT_ONSET_OR_CHANGE?: FALL HISTORY
ADLS_ACUITY_SCORE: 19
ADLS_ACUITY_SCORE: 15
ADLS_ACUITY_SCORE: 15
ADLS_ACUITY_SCORE: 16
ADLS_ACUITY_SCORE: 15
ADLS_ACUITY_SCORE: 15
DIFFICULTY_COMMUNICATING: NO
ADLS_ACUITY_SCORE: 16
ADLS_ACUITY_SCORE: 15
ADLS_ACUITY_SCORE: 15
ADLS_ACUITY_SCORE: 17
NUMBER_OF_TIMES_PATIENT_HAS_FALLEN_WITHIN_LAST_SIX_MONTHS: 4

## 2021-01-01 ASSESSMENT — MIFFLIN-ST. JEOR
SCORE: 1189.63
SCORE: 1160.88
SCORE: 1204.63
SCORE: 1193.63
SCORE: 1168.59

## 2021-01-01 ASSESSMENT — ANXIETY QUESTIONNAIRES
1. FEELING NERVOUS, ANXIOUS, OR ON EDGE: NOT AT ALL
3. WORRYING TOO MUCH ABOUT DIFFERENT THINGS: SEVERAL DAYS
2. NOT BEING ABLE TO STOP OR CONTROL WORRYING: SEVERAL DAYS
GAD7 TOTAL SCORE: 2
7. FEELING AFRAID AS IF SOMETHING AWFUL MIGHT HAPPEN: NOT AT ALL
5. BEING SO RESTLESS THAT IT IS HARD TO SIT STILL: NOT AT ALL
GAD7 TOTAL SCORE: 2
6. BECOMING EASILY ANNOYED OR IRRITABLE: NOT AT ALL

## 2021-01-01 ASSESSMENT — PAIN SCALES - GENERAL
PAINLEVEL: NO PAIN (0)
PAINLEVEL: MODERATE PAIN (4)

## 2021-01-01 ASSESSMENT — PATIENT HEALTH QUESTIONNAIRE - PHQ9
SUM OF ALL RESPONSES TO PHQ QUESTIONS 1-9: 5
5. POOR APPETITE OR OVEREATING: NOT AT ALL

## 2021-01-01 ASSESSMENT — ASTHMA QUESTIONNAIRES
QUESTION_3 LAST FOUR WEEKS HOW OFTEN DID YOUR ASTHMA SYMPTOMS (WHEEZING, COUGHING, SHORTNESS OF BREATH, CHEST TIGHTNESS OR PAIN) WAKE YOU UP AT NIGHT OR EARLIER THAN USUAL IN THE MORNING: TWO OR THREE NIGHTS A WEEK
QUESTION_1 LAST FOUR WEEKS HOW MUCH OF THE TIME DID YOUR ASTHMA KEEP YOU FROM GETTING AS MUCH DONE AT WORK, SCHOOL OR AT HOME: SOME OF THE TIME
ACT_TOTALSCORE: 12
QUESTION_4 LAST FOUR WEEKS HOW OFTEN HAVE YOU USED YOUR RESCUE INHALER OR NEBULIZER MEDICATION (SUCH AS ALBUTEROL): ONE OR TWO TIMES PER DAY
ACT_TOTALSCORE: 12
QUESTION_2 LAST FOUR WEEKS HOW OFTEN HAVE YOU HAD SHORTNESS OF BREATH: ONCE A DAY
QUESTION_5 LAST FOUR WEEKS HOW WOULD YOU RATE YOUR ASTHMA CONTROL: SOMEWHAT CONTROLLED

## 2021-01-12 NOTE — TELEPHONE ENCOUNTER
Left message to call back, need pharmacy   Principal Discharge DX:	Screening for viral disease  Secondary Diagnosis:	Headache

## 2021-01-20 NOTE — PROGRESS NOTES
[facility]  [address] [place] [id number]  Phone: [phone]  Fax: [phone]  Primary Provider: Sana Olivo  Pre-op Performing Provider: LOWELL SPARKS    PREOPERATIVE EVALUATION:  Today's date: 1/20/2021    Aminata Gale is a 70 year old female who presents for a preoperative evaluation.    Surgical Information:  Surgery/Procedure: Biopsy of anal mass  Surgery Location: [facility]   Surgeon: Dr. Aguilar   Surgery Date: 01/22/2021  Time of Surgery: TBD  Where patient plans to recover: At home with family  Fax number for surgical facility: [phone]    Type of Anesthesia Anticipated: to be determined    Subjective     HPI related to upcoming procedure:   70-year-old female presents for a preop exam.  Patient is scheduled to have biopsy of anal mass on January 22, 2021.  She requires evaluation and anesthesia risk assessment prior to undergoing surgery/procedure.  Patient denies any fever, chills, sore throat, cough, shortness of breath, chest pain, palpitation, diarrhea, constipation or other relevant systemic symptoms, able to perform 4 METS of activity without any difficulty.      Preop Questions 1/20/2021   1. Have you ever had a heart attack or stroke? No   2. Have you ever had surgery on your heart or blood vessels, such as a stent placement, a coronary artery bypass, or surgery on an artery in your head, neck, heart, or legs? No   3. Do you have chest pain with activity? No   4. Do you have a history of  heart failure? No   5. Do you currently have a cold, bronchitis or symptoms of other infection? No   6. Do you have a cough, shortness of breath, or wheezing? YES - smoker cough, COPD/asthma   7. Do you or anyone in your family have previous history of blood clots? No   8. Do you or does anyone in your family have a serious bleeding problem such as prolonged bleeding following surgeries or cuts? No   9. Have you ever had problems with anemia or  been told to take iron pills? No   10. Have you had any abnormal blood loss such as black, tarry or bloody stools, or abnormal vaginal bleeding? YES - black stools in past    11. Have you ever had a blood transfusion? No   12. Are you willing to have a blood transfusion if it is medically needed before, during, or after your surgery? Yes   13. Have you or any of your relatives ever had problems with anesthesia? No   14. Do you have sleep apnea, excessive snoring or daytime drowsiness? YES - EDUAR   14a. Do you have a CPAP machine? No   15. Do you have any artifical heart valves or other implanted medical devices like a pacemaker, defibrillator, or continuous glucose monitor? No   16. Do you have artificial joints? No   17. Are you allergic to latex? YES:    18. Is there any chance that you may be pregnant? -         Preoperative Review of :   reviewed - no record of controlled substances prescribed.      Review of Systems  Constitutional, neuro, ENT, endocrine, pulmonary, cardiac, gastrointestinal, genitourinary, musculoskeletal, integument and psychiatric systems are negative, except as otherwise noted.    Patient Active Problem List    Diagnosis Date Noted     Tubular adenoma of colon 11/06/2019     Priority: Medium     Collected: 10/31/2019   Received: 11/1/2019   Reported: 11/4/2019 18:13   Ordering Phy(s): MAKSIM LOGAN     For improved result formatting, select 'View Enhanced Report Format' under    Linked Documents section.     SPECIMEN(S):   Colon polyp, ascending     FINAL DIAGNOSIS:   Right colon, mucosal biopsy:   - Tubular adenoma.   - Negative for high-grade dysplasia and malignancy.        Colon polyp 11/06/2019     Priority: Medium     Tubular adenoma polyp       Nonocclusive coronary atherosclerosis of native coronary artery 06/20/2019     Priority: Medium     Diastolic dysfunction 06/20/2019     Priority: Medium     Type 2 diabetes mellitus with microalbuminuria, without long-term current use of  insulin (H) 03/11/2019     Priority: Medium     Type 2 diabetes mellitus with hyperglycemia, without long-term current use of insulin (H) 02/24/2019     Priority: Medium     NEW DIAGNOSIS 2/2019       TMJ (temporomandibular joint syndrome) 02/02/2018     Priority: Medium     Acute cholecystitis 07/01/2017     Priority: Medium     Centrilobular emphysema (H) 02/10/2017     Priority: Medium     Grief reaction 12/02/2016     Priority: Medium     Refused influenza vaccine 01/08/2016     Priority: Medium     Anxiety 05/12/2015     Priority: Medium     Moderate persistent asthma 09/20/2013     Priority: Medium     Reports she was diagnosed with PFT's.         Chronic pain 09/20/2013     Priority: Medium     Pain Eval- Muscle pain related to Lupus.  Location of Pain: Forearms and legs  Duration of Pain: started age 36  Rating of Pain: 8-9/10  Pain is better with: Pain meds and warm water help.   Pain is worse with:Cold, change in the weather   Treatment so far consists of: Ibuprofen and Warmth.     Followed by   Sana Olivo [facility]  [phone]  [address] [place] [address]    [address] lasts about a year       :         HTN, goal below 140/90 09/20/2013     Priority: Medium     Acne 02/25/2013     Priority: Medium     SK (seborrheic keratosis) 02/25/2013     Priority: Medium     Lentigo 02/25/2013     Priority: Medium     Angioma 02/25/2013     Priority: Medium     Advanced directives, counseling/discussion 10/13/2011     Priority: Medium     Advance Directive Problem List Overview:   Name Relationship Phone    Primary Health Care Agent            Alternative Health Care Agent          Discussed advance care planning with patient; information given to patient to review. 10/13/2011     Anjelica Fitzgerald CMA, HC Facilitator           Hiatal hernia 05/04/2011     Priority: Medium     Osteoporosis 10/18/2010     Priority: Medium     10/10 - severe osteopenia in femoral necks, mild osteopenia  in spine  (Problem list name updated by automated process. Provider to review and confirm.)       Hyperlipidemia LDL goal <130 09/22/2010     Priority: Medium     Recent Labs   Lab Test 9/22/10 0908 10/12/06 0939     CHOL 214* 256*     HDL 31* 42*     * 186*     TRIG 149 143     CHOLHDLRATIO 7.0* 6.0*     4/8/11 - dobutamine echo showed NO infarct or ischemia LV 60-65%, normal exercise response.          Smoker 06/24/2008     Priority: Medium     Other nonspecific abnormal result of function study of brain and central nervous system 11/14/2006     Priority: Medium     Essential hypertension, benign 10/17/2006     Priority: Medium     Allergic rhinitis 10/17/2006     Priority: Medium     Problem list name updated by automated process. Provider to review       Polycythemia, secondary 10/17/2006     Priority: Medium     Due to smoking       Female stress incontinence 10/17/2006     Priority: Medium     Esophageal reflux 04/03/2006     Priority: Medium     Referred to: Date of Consult:  12/11/2006  Manish Knowles MD  [facility]  [phone]    Reccomendations:  Upper GI endoscopy. Possible canidate for antireflux surgery.       Systemic lupus erythematosus (H) 12/07/2005     Priority: Medium     Diagnosed in 1980's; history of pericarditis; was previously treated with mtx; not on active treatment; no hx of renal dz from the lupus.         Past Medical History:   Diagnosis Date     Acute pericarditis, unspecified      Chronic airway obstruction 12/13/2005    PFTs - 1/02 - mild COPD Problem list name updated by automated process. Provider to review     Dysuria      Esophageal reflux      GERD (gastroesophageal reflux disease) 9/20/2013     Hiatal hernia 5/4/2011     HTN, goal below 140/90 9/20/2013     Hyperlipidemia LDL goal <130 9/22/2010    Recent Labs  Lab Test 9/22/10 0908 10/12/06 0939    CHOL 214* 256*    HDL 31* 42*    * 186*    TRIG 149 143    CHOLHDLRATIO 7.0* 6.0*   4/8/11 -  dobutamine echo showed NO infarct or ischemia LV 60-65%, normal exercise response.        Lupus (H)      Osteoporosis 10/18/2010    10/10 - severe osteopenia in femoral necks, mild osteopenia in spine  (Problem list name updated by automated process. Provider to review and confirm.)     Pure hypercholesterolemia      SECONDARY POLYCYTHEMIA 10/17/2006    Due to smoking     Smoker 2008     Systemic lupus erythematosus 2005    Diagnosed in ; history of pericarditis; was previously treated with mtx; not on active treatment; no hx of renal dz from the lupus.       Tobacco use disorder      Type 2 diabetes mellitus with hyperglycemia, without long-term current use of insulin (H) 2019    NEW DIAGNOSIS 2019     Unspecified essential hypertension      Past Surgical History:   Procedure Laterality Date     C APPENDECTOMY       C REPAIR GUM      Cathy Dontal surgery     C/SECTION, LOW TRANSVERSE      , Low Transverse     COLONOSCOPY  2009     D & C      X 5     HC RECONSTR NOSE+DAVE SEPTAL REPAIR       HYSTERECTOMY, BRI      Hx of dysplasia     LAPAROSCOPIC CHOLECYSTECTOMY N/A 2017    Procedure: LAPAROSCOPIC CHOLECYSTECTOMY;  Laparoscopic Cholecystectomy;  Surgeon: Tami Barney MD;  Location: [facility]     SINUS SURGERY      Reconstruction     Current Outpatient Medications   Medication Sig Dispense Refill     albuterol (PROAIR HFA/PROVENTIL HFA/VENTOLIN HFA) 108 (90 Base) MCG/ACT inhaler Inhale 2 puffs into the lungs every 6 hours 54 g 0     alcohol swab prep pads Use to swab area of injection/raffi as directed. 100 each 3     amLODIPine (NORVASC) 10 MG tablet Take 1 tablet (10 mg) by mouth daily For high blood pressure 90 tablet 0     aspirin (ASA) 81 MG tablet Take 1 tablet (81 mg) by mouth daily       blood glucose (MILLIE MICROLET 2) lancing device Device to be used with lancets 2 times a day. 1 each 0     cetirizine (ZYRTEC) 10 MG tablet Take 10 mg by mouth daily        ezetimibe (ZETIA) 10 MG tablet Take 1 tablet (10 mg) by mouth daily 90 tablet 0     fluocinonide (LIDEX) 0.05 % external solution Apply twice daily on scalp as needed. 60 mL 4     fluticasone-vilanterol (BREO ELLIPTA) 200-25 MCG/INH inhaler INHALE 1 PUFF INTO THE LUNGS DAILY 3 Inhaler 0     Ibuprofen (IBU-200 PO) Take  by mouth.       ipratropium - albuterol 0.5 mg/2.5 mg/3 mL (DUONEB) 0.5-2.5 (3) MG/3ML neb solution Take 1 vial (3 mLs) by nebulization every 6 hours as needed for shortness of breath / dyspnea or wheezing 1 Box 3     ketoconazole (NIZORAL) 2 % external shampoo Use to wash scalp, leave on for 5 minutes. Use 2-3 times a week. 120 mL 4     loratadine (CLARITIN) 10 MG tablet Take 10 mg by mouth daily       losartan (COZAAR) 25 MG tablet Take 1 tablet (25 mg) by mouth daily 30 tablet 0     STATIN NOT PRESCRIBED (INTENTIONAL) Please choose reason not prescribed, below       tiotropium (SPIRIVA RESPIMAT) 2.5 MCG/ACT inhaler Inhale 2 puffs into the lungs daily 12 g 0     torsemide (DEMADEX) 5 MG tablet Take 1 tablet (5 mg) by mouth daily 90 tablet 0       Allergies   Allergen Reactions     Amoxicillin Difficulty breathing and Rash     Ampicillin Difficulty breathing and Rash     Cipro [Ciprofloxacin] Difficulty breathing and Rash     Keflex [Cephalexin Monohydrate] Difficulty breathing and Rash     Penicillin [Penicillins] Difficulty breathing and Rash     Sulfa Drugs Difficulty breathing and Rash     Tetracycline Difficulty breathing and Rash     Biaxin [Clarithromycin] Rash     Ceclor [Cefaclor] Hives and Swelling     Advair Diskus Hives     Hives on face,neck and chest     Egg White      Fish Shortness Of Breath     Mustard [Allyl Isothiocyanate]      Throat swelling        Social History     Tobacco Use     Smoking status: Current Every Day Smoker     Packs/day: 0.01     Years: 42.00     Pack years: 0.42     Types: Cigarettes     Smokeless tobacco: Never Used     Tobacco comment: Has chantix at home, but  hasn't started yet. 5/9/19.   Substance Use Topics     Alcohol use: Yes     Alcohol/week: 0.0 standard drinks     Comment: Occ. wine     Family History   Problem Relation Age of Onset     Diabetes Mother      Gastrointestinal Disease Mother         Gallbladder disease     Heart Disease Mother      Cancer Father         lung     Alcohol/Drug Father      Allergies Father      Heart Disease Father      Gastrointestinal Disease Father         Liver disease     Skin Cancer Father      Heart Disease Maternal Grandfather      Arthritis Sister      Osteoporosis Sister      Thyroid Disease Sister      Allergies Son      Alcohol/Drug Sister      Alcohol/Drug Sister      Melanoma No family hx of      History   Drug Use No         Objective     /70 (BP Location: Right arm, Patient Position: Sitting, Cuff Size: Adult Large)   Pulse 90   Temp 97.8  F (36.6  C) (Tympanic)   Resp 18   Wt 66.6 kg (146 lb 12.8 oz)   BMI 28.67 kg/m      Physical Exam    GENERAL APPEARANCE: alert, active and no distress     EYES: EOMI, PERRL     HENT: ear canals and TM's normal and nose and mouth without ulcers or lesions     NECK: no adenopathy, no asymmetry, masses, or scars and thyroid normal to palpation     RESP: lungs clear to auscultation - no rales, rhonchi or wheezes     CV: regular rates and rhythm, normal S1 S2, no S3 or S4 and no murmur, click or rub     ABDOMEN:  soft, nontender, no HSM or masses and bowel sounds normal     MS: extremities normal- no gross deformities noted, no evidence of inflammation in joints, FROM in all extremities.     SKIN: no suspicious lesions or rashes     NEURO: Normal strength and tone, sensory exam grossly normal, mentation intact and speech normal     PSYCH: mentation appears normal. and affect normal/bright     LYMPHATICS: No cervical adenopathy    Recent Labs   Lab Test 07/17/20  1142 02/05/20  1340 02/04/20  0911   HGB 16.6* 16.7*  --     301  --     140  --    POTASSIUM 4.3 3.4  --     CR 0.76 0.89  --    A1C 6.1*  --  6.6*      Results for orders placed or performed in visit on 01/20/21   Hemoglobin     Status: Abnormal   Result Value Ref Range    Hemoglobin 16.5 (H) 11.7 - 15.7 g/dL       Diagnostics:  No labs were ordered during this visit.   No EKG required for low risk surgery (cataract, skin procedure, breast biopsy, etc).    Revised Cardiac Risk Index (RCRI):  The patient has the following serious cardiovascular risks for perioperative complications:   - No serious cardiac risks = 0 points     RCRI Interpretation: 0 points: Class I (very low risk - 0.4% complication rate)         Assessment & Plan   The proposed surgical procedure is considered LOW risk.      ICD-10-CM    1. Preop general physical exam  Z01.818    2. Mass of anus  K62.89 Hemoglobin   3. Type 2 diabetes mellitus with microalbuminuria, without long-term current use of insulin (H)  E11.29     R80.9    4. Centrilobular emphysema (H)  J43.2    5. Moderate persistent asthma without complication  J45.40    6. HTN, goal below 140/90  I10    7. Age-related osteoporosis without current pathological fracture  M81.0    8. Smoker  Z72.0    9. Polycythemia, secondary  D75.1    10. Diastolic dysfunction  I51.89    11. Eczema, unspecified type  L30.9 fluocinonide (LIDEX) 0.05 % external solution         Risks and Recommendations:  The patient has the following additional risks and recommendations for perioperative complications:   - No identified additional risk factors other than previously addressed   - Stressed on smoking cessation    Medication Instructions:  Patient is to take all scheduled medications on the day of surgery    RECOMMENDATION:  APPROVAL GIVEN to proceed with proposed procedure, without further diagnostic evaluation.    Signed Electronically by: Hemant Tavera MD    Copy of this evaluation report is provided to requesting physician.    [facility]    Revised Cardiac Risk  Index

## 2021-02-03 NOTE — PROGRESS NOTES
Department of Radiation Oncology  Radiation Therapy Center  [facility]  [address], [address]  [place] [address]  [phone]       Consultation Note    Name: Aminata Gale MRN: [id number]   : 1950   Date of Service: 2021 Referring: Aggie Philip MD  [facility]  [address] [place] [address]  [address]  [place] [address]     Reason for consultation: Anal cancer    History of Present Illness     Ms. Gale is a 70 year old female with PMHx significant for tobacco abuse, COPD (chronic dyspnea on exersion, has PRN home O2), lupus, and previous CAD and diastolic dysfunction. She has been having lower abdominal/pelvic pain and constipation with occasional blood in stool due to straining. She has noticed a new hemorrhoid since the symptoms started. She had a CT A/P on 20, which found a perianal mass that was partially visualized at the bottom of the imaging field. Her symptom worsened. A repeat CT A/P was done on 20 to again evaluate the etiology of her pain, which re-demonstrated the partially viewed perianal mass along the right lateral aspects of the anus, measuring 3 x 2.7 cm, slightly enlarged from prior. She was referred to see GI Surgery, whom commented on a mass on JENNIFER in the right lateral/posterolateral quadrant. There was no skin tag, and her sphincter tone was normal. The patient underwent EUA and biopsy of the 5 cm mass on 21, which found evidence for moderately differentiated squamous cell carcinoma. Given this finding, she is referred to us and Medical Oncology for consultation. Of note, she saw Dr. Wong for initial consultation at [facility]. She reports a plan for port placement (21), follow up (2/10/21) and PET/CT (21) later this week.    Mr. Gale presents today, accompanied by her daughter. She is doing good overall. She endorses intermittent abdominal pain and rectal bleeding. Her constipation is  helped with laxative, and she denies bowel obstruction, BRBPR, or hematochezia. ROS positive for fatigue, but negative for weight loss, loss of appetite or change in urinary habits.      Past Medical History:   Past Medical History:   Diagnosis Date     Acute pericarditis, unspecified      Cancer (H)      Chronic airway obstruction 2005    PFTs -  - mild COPD Problem list name updated by automated process. Provider to review     Dysuria      Esophageal reflux      GERD (gastroesophageal reflux disease) 2013     Hiatal hernia 2011     HTN, goal below 140/90 2013     Hyperlipidemia LDL goal <130 2010    Recent Labs  Lab Test 9/22/10 0908 10/12/06 0939    CHOL 214* 256*    HDL 31* 42*    * 186*    TRIG 149 143    CHOLHDLRATIO 7.0* 6.0*   11 - dobutamine echo showed NO infarct or ischemia LV 60-65%, normal exercise response.        Lupus (H)      Osteoporosis 10/18/2010    10/10 - severe osteopenia in femoral necks, mild osteopenia in spine  (Problem list name updated by automated process. Provider to review and confirm.)     Pure hypercholesterolemia      SECONDARY POLYCYTHEMIA 10/17/2006    Due to smoking     Smoker 2008     Systemic lupus erythematosus 2005    Diagnosed in ; history of pericarditis; was previously treated with mtx; not on active treatment; no hx of renal dz from the lupus.       Tobacco use disorder      Type 2 diabetes mellitus with hyperglycemia, without long-term current use of insulin (H) 2019    NEW DIAGNOSIS 2019     Unspecified essential hypertension      Past Surgical History:   Past Surgical History:   Procedure Laterality Date     C APPENDECTOMY       C REPAIR GUM      Cathy Dontal surgery     C/SECTION, LOW TRANSVERSE      , Low Transverse     COLONOSCOPY  2009     D & C      X 5     HC RECONSTR NOSE+DAVE SEPTAL REPAIR       HYSTERECTOMY, BRI      Hx of dysplasia     LAPAROSCOPIC CHOLECYSTECTOMY N/A 2017     Procedure: LAPAROSCOPIC CHOLECYSTECTOMY;  Laparoscopic Cholecystectomy;  Surgeon: Tami Barney MD;  Location: [facility]     SINUS SURGERY      Reconstruction     Chemotherapy History:  None    Radiation History:  None    Pregnant: s/p hesterectomy  Implanted Cardiac Devices: No    Medications:  Current Outpatient Medications   Medication     albuterol (PROAIR HFA/PROVENTIL HFA/VENTOLIN HFA) 108 (90 Base) MCG/ACT inhaler     alcohol swab prep pads     amLODIPine (NORVASC) 10 MG tablet     aspirin (ASA) 81 MG tablet     blood glucose (MILLIE MICROLET 2) lancing device     cetirizine (ZYRTEC) 10 MG tablet     ezetimibe (ZETIA) 10 MG tablet     fluocinonide (LIDEX) 0.05 % external solution     fluticasone-vilanterol (BREO ELLIPTA) 200-25 MCG/INH inhaler     Ibuprofen (IBU-200 PO)     ipratropium - albuterol 0.5 mg/2.5 mg/3 mL (DUONEB) 0.5-2.5 (3) MG/3ML neb solution     ketoconazole (NIZORAL) 2 % external shampoo     loratadine (CLARITIN) 10 MG tablet     losartan (COZAAR) 25 MG tablet     STATIN NOT PRESCRIBED (INTENTIONAL)     tiotropium (SPIRIVA RESPIMAT) 2.5 MCG/ACT inhaler     torsemide (DEMADEX) 5 MG tablet     No current facility-administered medications for this visit.          Allergies:     Allergies   Allergen Reactions     Amoxicillin Difficulty breathing and Rash     Ampicillin Difficulty breathing and Rash     Cipro [Ciprofloxacin] Difficulty breathing and Rash     Keflex [Cephalexin Monohydrate] Difficulty breathing and Rash     Penicillin [Penicillins] Difficulty breathing and Rash     Sulfa Drugs Difficulty breathing and Rash     Tetracycline Difficulty breathing and Rash     Biaxin [Clarithromycin] Rash     Ceclor [Cefaclor] Hives and Swelling     Advair Diskus Hives     Hives on face,neck and chest     Egg White      Fish Shortness Of Breath     Mustard [Allyl Isothiocyanate]      Throat swelling       Social History:  Tobacco: chronic and current smoker in small amount, reportedly 0.5  pack-year.  Alcohol: occasional wine use.  Employment: Retired  , lives in [place].    Family History:  Family History   Problem Relation Age of Onset     Diabetes Mother      Gastrointestinal Disease Mother         Gallbladder disease     Heart Disease Mother      Cancer Father         lung     Alcohol/Drug Father      Allergies Father      Heart Disease Father      Gastrointestinal Disease Father         Liver disease     Skin Cancer Father      Heart Disease Maternal Grandfather      Arthritis Sister      Osteoporosis Sister      Thyroid Disease Sister      Allergies Son      Alcohol/Drug Sister      Alcohol/Drug Sister      Melanoma No family hx of        Review of Systems   A 10-point review of systems was performed. Pertinent findings are noted in the HPI.    Physical Exam   ECOG Status: 0    Vitals:  /77 (BP Location: Left arm, Cuff Size: Adult Large)   Pulse 87   Resp 16   Wt 66.7 kg (147 lb)   SpO2 94%   BMI 28.71 kg/m      Gen: Alert, in NAD  Head: NC/AT  Eyes: PERRL, EOMI, sclera anicteric  Ears: No external auricular lesions  Nose/sinus: No rhinorrhea or epistaxis  Neck: Full ROM, supple, no palpable adenopathy  Pulm: No wheezing, stridor or respiratory distress  CV: Extremities are warm and well-perfused, no cyanosis, no pedal edema  Abdominal: Normal bowel sounds, soft, nontender, no masses  Musculoskeletal: Normal bulk and tone  Skin: Normal color and turgor  Neuro: A/Ox3, CN II-XII intact, normal gait    Imaging/Path/Labs   Imaging: reviewed    Path: reviewed     Labs: no recent laboratory studies    Assessment    Ms. Gale is a 70 year old female with newly diagnosed, locally advanced anal cancer. She is undergoing staging evaluation, but upon independent review of her imaging and per chart review we think her disease is estimated to be at least T2-3N0-1.      Plan   We discussed the natural history of her disease and her most likely management option of definitive chemoradiation  per standard of care, but our finalized plan of care may change after she complete her staging evaluation. As of now, we proceed with our discussion on the assumption that her disease is locally advanced, and to our estimate can be T2-3N0-1. We will tentatively plan to treat her to 30 fractions, targeting her anal primary mass and any additional gross disease, as well as electively covering the regional pelvic lymph nodes at risk.     The technical aspects and logistics to radiation therapy were discussed in detail. Her acute side effects could include, but are not limited to, perianal skin reactions, rectal ulceration or bleeding; constipation or obstruction vs. loose stools or diarrhea; urinary symptoms such as frequency, urgency, weak stream, dysuria, incontinence, or obstruction; nausea/vomiting and clinically significant cytopenia. The late side effects may include bowel obstruction or perforation and second malignancy. She is aware that the side effects of radiation therapy may be severe and permanent, although we expect that such risks would be low and that they are outweighed by the benefit of treatment. She was given the opportunity to ask questions, which were answered.     At the end of our discussion, an agreement was reached to further evaluation and follow up with the patient as follow:    -We will reach out to Dr. Wong for oncology consultation/progress notes, as well as any imaging studies done, especially the upcoming PET/CT.  -We will tentatively order a pelvic MRI to better delineate the soft tissue extent of her primary mass and any additional gross disease.   -We will tentatively plan for definitive chemoradiation, and will aim to see Ms. Baljinder zepeda for follow up and simulation on 2/15/21 barring any significant findings upon completion of staging.    The patient was seen and discussed with staff, Dr. Soto.    Estrella Patterson MD  Resident, PGY-3  Department of Radiation Oncology  [facility]  [place]      I was present with the resident during the visit. I discussed the case with the resident and agree with the note as documented by the resident.    Cosme Soto M.D.  Department of Radiation Oncology  [facility]

## 2021-02-04 PROBLEM — C21.0 ANAL SQUAMOUS CELL CARCINOMA (H): Status: ACTIVE | Noted: 2021-01-01

## 2021-02-05 NOTE — TELEPHONE ENCOUNTER
Patient called with questions regarding call placed to her yesterday from Sana Olivo. Message forwarded to Sana.  Valorie MCCURDY RN

## 2021-02-08 NOTE — NURSING NOTE
REASON FOR APPOINTMENT   Newly diagnosed anal cancer, s/p biopsy. Here to discuss radiation therapy. Has seen gi and colo-rectal surgeon. Planning concurrent therapy.  Working with Sherry Devlin/[facility]  Port 2/9, med onc consult 2/10, PET 2/11    PERSONAL HISTORY OF CANCER   Previous Cancer ? no   Prior Radiation ? no   Prior Chemotherapy ? no   Prior Hormonal Therapy ? no     REFERRALS NEEDED  May need dietary, social work support    VITALS  /77 (BP Location: Left arm, Cuff Size: Adult Large)   Pulse 87   Resp 16   Wt 66.7 kg (147 lb)   SpO2 94%   BMI 28.71 kg/m      PACEMAKER/IMPLANTED CARDIAC DEVICE : No    PAIN  Sharp shooting pain in the rectal area    PSYCHOSOCIAL  Marital Status:   Patient lives in [place]   Number of children: one son-lives nearby, one daughter from [place] (here for consult)  Working status: retired  Do you feel safe in your home? Yes    REVIEW OF SYSTEMS  Skin: negative  Eyes: glasses  Ears/Nose/Throat: negative  Respiratory: some sob w/exertion, smokers cough. Still smoking some, trying to quit. Has emotional-triggered asthma.   Cardiovascular: negative  Gastrointestinal: rectal bleeding, diarrhea/constipation, abdominal discomfort  Genitourinary: incontinence  Musculoskeletal: back pain and joint stiffness  Neurologic: negative  Psychiatric: feeling anxious  Hematologic/Lymphatic/Immunologic: negative  Endocrine: diabetes, hx lupus, treated with methotrexate in the 1980's for 1 year. Sx controlled without meds currently.    WOMEN ONLY  Any chance you may be pregnant: No  Radiation Oncology Patient Teaching    Person involved with teaching: Patient and Daughter Teresa/[place]  Patient asked Questions: Yes  Patient was cooperative: Yes  Patient was receptive (willing to accept information given): Yes    Education Assessment  Comprehension ability: High  Knowledge level: Medium  Factors affecting teaching: None    Education Materials Given  Caring for Your  Skin During Radiation ...  Mouth Care for Radiation Therapy  Disease Specific Booklet  Eating Hints  Diet and Nutrition Information    Educational Topics Discussed  Disease process, Side effects, Medications, Pain management, Wound care, Activity, Nutrition, Adjustment to illness and Community resources    Response To Teaching  Verbalizes understanding    GYN Only  Vaginal Dilator-given and educated: N/A    Do you have an advanced directive or living will? no  Are you DNR/DNI? no

## 2021-02-08 NOTE — LETTER
2021         RE: Aminata Gale  [address]   [facility] [address]        Dear Colleague,    Thank you for referring your patient, Aminata Gale, to the RADIATION THERAPY CENTER. Please see a copy of my visit note below.       Department of Radiation Oncology  Radiation Therapy Center  [facility]  [address], [address]  [place] [address]  [phone]       Consultation Note    Name: Aminata Gale MRN: [id number]   : 1950   Date of Service: 2021 Referring: Aggie Philip MD  [facility]  [address] [place] [address]  [address]  [place] [address]     Reason for consultation: Anal cancer    History of Present Illness     Ms. Gale is a 70 year old female with PMHx significant for tobacco abuse, COPD (chronic dyspnea on exersion, has PRN home O2), lupus, and previous CAD and diastolic dysfunction. She has been having lower abdominal/pelvic pain and constipation with occasional blood in stool due to straining. She has noticed a new hemorrhoid since the symptoms started. She had a CT A/P on 20, which found a perianal mass that was partially visualized at the bottom of the imaging field. Her symptom worsened. A repeat CT A/P was done on 20 to again evaluate the etiology of her pain, which re-demonstrated the partially viewed perianal mass along the right lateral aspects of the anus, measuring 3 x 2.7 cm, slightly enlarged from prior. She was referred to see GI Surgery, whom commented on a mass on JENNIFER in the right lateral/posterolateral quadrant. There was no skin tag, and her sphincter tone was normal. The patient underwent EUA and biopsy of the 5 cm mass on 21, which found evidence for moderately differentiated squamous cell carcinoma. Given this finding, she is referred to us and Medical Oncology for consultation. Of note, she saw Dr. Wong for initial consultation at [facility]. She reports a plan for port  Lab called with a ptt of greater than 100 on pt. I stopped the heparin drip and asked for a redraw.    placement (2/9/21), follow up (2/10/21) and PET/CT (2/11/21) later this week.    Mr. Gale presents today, accompanied by her daughter. She is doing good overall. She endorses intermittent abdominal pain and rectal bleeding. Her constipation is helped with laxative, and she denies bowel obstruction, BRBPR, or hematochezia. ROS positive for fatigue, but negative for weight loss, loss of appetite or change in urinary habits.      Past Medical History:   Past Medical History:   Diagnosis Date     Acute pericarditis, unspecified      Cancer (H)      Chronic airway obstruction 12/13/2005    PFTs - 1/02 - mild COPD Problem list name updated by automated process. Provider to review     Dysuria      Esophageal reflux      GERD (gastroesophageal reflux disease) 9/20/2013     Hiatal hernia 5/4/2011     HTN, goal below 140/90 9/20/2013     Hyperlipidemia LDL goal <130 9/22/2010    Recent Labs  Lab Test 9/22/10 0908 10/12/06 0939    CHOL 214* 256*    HDL 31* 42*    * 186*    TRIG 149 143    CHOLHDLRATIO 7.0* 6.0*   4/8/11 - dobutamine echo showed NO infarct or ischemia LV 60-65%, normal exercise response.        Lupus (H)      Osteoporosis 10/18/2010    10/10 - severe osteopenia in femoral necks, mild osteopenia in spine  (Problem list name updated by automated process. Provider to review and confirm.)     Pure hypercholesterolemia      SECONDARY POLYCYTHEMIA 10/17/2006    Due to smoking     Smoker 6/24/2008     Systemic lupus erythematosus 12/7/2005    Diagnosed in 1980's; history of pericarditis; was previously treated with mtx; not on active treatment; no hx of renal dz from the lupus.       Tobacco use disorder      Type 2 diabetes mellitus with hyperglycemia, without long-term current use of insulin (H) 2/24/2019    NEW DIAGNOSIS 2/2019     Unspecified essential hypertension      Past Surgical History:   Past Surgical History:   Procedure Laterality Date     C APPENDECTOMY       C REPAIR GUM      Catyh Dontal surgery      C/SECTION, LOW TRANSVERSE      , Low Transverse     COLONOSCOPY  2009     D & C      X 5     HC RECONSTR NOSE+DAVE SEPTAL REPAIR       HYSTERECTOMY, BRI      Hx of dysplasia     LAPAROSCOPIC CHOLECYSTECTOMY N/A 2017    Procedure: LAPAROSCOPIC CHOLECYSTECTOMY;  Laparoscopic Cholecystectomy;  Surgeon: Tami Barney MD;  Location: [facility]     SINUS SURGERY      Reconstruction     Chemotherapy History:  None    Radiation History:  None    Pregnant: s/p hesterectomy  Implanted Cardiac Devices: No    Medications:  Current Outpatient Medications   Medication     albuterol (PROAIR HFA/PROVENTIL HFA/VENTOLIN HFA) 108 (90 Base) MCG/ACT inhaler     alcohol swab prep pads     amLODIPine (NORVASC) 10 MG tablet     aspirin (ASA) 81 MG tablet     blood glucose (MILLIE MICROLET 2) lancing device     cetirizine (ZYRTEC) 10 MG tablet     ezetimibe (ZETIA) 10 MG tablet     fluocinonide (LIDEX) 0.05 % external solution     fluticasone-vilanterol (BREO ELLIPTA) 200-25 MCG/INH inhaler     Ibuprofen (IBU-200 PO)     ipratropium - albuterol 0.5 mg/2.5 mg/3 mL (DUONEB) 0.5-2.5 (3) MG/3ML neb solution     ketoconazole (NIZORAL) 2 % external shampoo     loratadine (CLARITIN) 10 MG tablet     losartan (COZAAR) 25 MG tablet     STATIN NOT PRESCRIBED (INTENTIONAL)     tiotropium (SPIRIVA RESPIMAT) 2.5 MCG/ACT inhaler     torsemide (DEMADEX) 5 MG tablet     No current facility-administered medications for this visit.          Allergies:     Allergies   Allergen Reactions     Amoxicillin Difficulty breathing and Rash     Ampicillin Difficulty breathing and Rash     Cipro [Ciprofloxacin] Difficulty breathing and Rash     Keflex [Cephalexin Monohydrate] Difficulty breathing and Rash     Penicillin [Penicillins] Difficulty breathing and Rash     Sulfa Drugs Difficulty breathing and Rash     Tetracycline Difficulty breathing and Rash     Biaxin [Clarithromycin] Rash     Ceclor [Cefaclor] Hives and Swelling     Advair  Diskus Hives     Hives on face,neck and chest     Egg White      Fish Shortness Of Breath     Mustard [Allyl Isothiocyanate]      Throat swelling       Social History:  Tobacco: chronic and current smoker in small amount, reportedly 0.5 pack-year.  Alcohol: occasional wine use.  Employment: Retired  , lives in [place].    Family History:  Family History   Problem Relation Age of Onset     Diabetes Mother      Gastrointestinal Disease Mother         Gallbladder disease     Heart Disease Mother      Cancer Father         lung     Alcohol/Drug Father      Allergies Father      Heart Disease Father      Gastrointestinal Disease Father         Liver disease     Skin Cancer Father      Heart Disease Maternal Grandfather      Arthritis Sister      Osteoporosis Sister      Thyroid Disease Sister      Allergies Son      Alcohol/Drug Sister      Alcohol/Drug Sister      Melanoma No family hx of        Review of Systems   A 10-point review of systems was performed. Pertinent findings are noted in the HPI.    Physical Exam   ECOG Status: 0    Vitals:  /77 (BP Location: Left arm, Cuff Size: Adult Large)   Pulse 87   Resp 16   Wt 66.7 kg (147 lb)   SpO2 94%   BMI 28.71 kg/m      Gen: Alert, in NAD  Head: NC/AT  Eyes: PERRL, EOMI, sclera anicteric  Ears: No external auricular lesions  Nose/sinus: No rhinorrhea or epistaxis  Neck: Full ROM, supple, no palpable adenopathy  Pulm: No wheezing, stridor or respiratory distress  CV: Extremities are warm and well-perfused, no cyanosis, no pedal edema  Abdominal: Normal bowel sounds, soft, nontender, no masses  Musculoskeletal: Normal bulk and tone  Skin: Normal color and turgor  Neuro: A/Ox3, CN II-XII intact, normal gait    Imaging/Path/Labs   Imaging: reviewed    Path: reviewed     Labs: no recent laboratory studies    Assessment    Ms. Gale is a 70 year old female with newly diagnosed, locally advanced anal cancer. She is undergoing staging evaluation, but upon  independent review of her imaging and per chart review we think her disease is estimated to be at least T2-3N0-1.      Plan   We discussed the natural history of her disease and her most likely management option of definitive chemoradiation per standard of care, but our finalized plan of care may change after she complete her staging evaluation. As of now, we proceed with our discussion on the assumption that her disease is locally advanced, and to our estimate can be T2-3N0-1. We will tentatively plan to treat her to 30 fractions, targeting her anal primary mass and any additional gross disease, as well as electively covering the regional pelvic lymph nodes at risk.     The technical aspects and logistics to radiation therapy were discussed in detail. Her acute side effects could include, but are not limited to, perianal skin reactions, rectal ulceration or bleeding; constipation or obstruction vs. loose stools or diarrhea; urinary symptoms such as frequency, urgency, weak stream, dysuria, incontinence, or obstruction; nausea/vomiting and clinically significant cytopenia. The late side effects may include bowel obstruction or perforation and second malignancy. She is aware that the side effects of radiation therapy may be severe and permanent, although we expect that such risks would be low and that they are outweighed by the benefit of treatment. She was given the opportunity to ask questions, which were answered.     At the end of our discussion, an agreement was reached to further evaluation and follow up with the patient as follow:    -We will reach out to Dr. Wong for oncology consultation/progress notes, as well as any imaging studies done, especially the upcoming PET/CT.  -We will tentatively order a pelvic MRI to better delineate the soft tissue extent of her primary mass and any additional gross disease.   -We will tentatively plan for definitive chemoradiation, and will aim to see MsJayson Baljinder katelyn for  follow up and simulation on 2/15/21 barring any significant findings upon completion of staging.    The patient was seen and discussed with staff, Dr. Soto.    Estrella Patterson MD  Resident, PGY-3  Department of Radiation Oncology  [facility]        Again, thank you for allowing me to participate in the care of your patient.        Sincerely,        Cosme Soto MD

## 2021-02-09 NOTE — TELEPHONE ENCOUNTER
Phone call placed to patient  Support given.  To call or MyChart the care team if we can be of assistance.  Thanks Sana Olivo [facility]

## 2021-02-11 NOTE — PROGRESS NOTES
Department of Radiation Oncology  Radiation Therapy Center  [facility] [place] [address]  [phone]       Radiation Oncology Follow-up Visit  2/15/2021      Aminata Gale  MRN: [id number]   : 1950     SUBJECTIVE:     Aminata Gale is a 70 year old female with a PMH significant for newly diagnosed, locally advanced anal cancer, likely cT2-3N0-1. She saw us for initial consultation on 21, and is planned for definitive chemoradiotherapy with the concurrent regimen of 5-FU/MMC under the care of Dr. Devlin with [facility]. Since that time, the patient underwent staging PET/CT on 21, which demonstrated a markedly hypermetabolic anal mass eccentric to the right, consistent with the known malignancy (SUVmax 20). There was no FDG avid lymphadenopathy or evidence of distant disease. MRI pelvis was undertaken on 21, which demonstrated a 3.2 x 2.1 x 4.7 cm mildly T2 hyperintense enhancing lesion centered along the right side of the anal canal. There were a few prominent but not significantly enlarged bilateral inguinal lymph nodes, but otherwise no suspicious pelvic lymphadenopathy.    Ms. Gale presents today for follow up, consent, and treatment planning. She has no pressing symptoms or concerns.    PHYSICAL EXAM:  There were no vitals taken for this visit.  Gen: Alert, in NAD  Eyes: PERRL, EOMI, sclera anicteric  HENT     Head: NC/AT     Ears: No external auricular lesions     Nose/sinus: No rhinorrhea or epistaxis  Neck: Supple, full ROM, no LAD  Pulm: No wheezing, stridor or respiratory distress  CV: Well-perfused, no cyanosis, no pedal edema  Abdominal: Soft, nontender, nondistended, no hepatomegaly  Back: No step-offs or pain to palpation along the thoracolumbar spine, no CVA tenderness  Rectal: A 2 cm or so, exophytic, well demarcated anal mass in the right anal verge. There is no visible skin erythema or firmness on palpation suggestive of soft tissue  extension laterally. There is also no sign of anterior disease extension into the external genitalia, which appears grossly normal without discoloration, rash, lesion, or discharge.   Musculoskeletal: Normal bulk and tone   Skin: Normal color and turgor  Neurologic: A/Ox3, CN II-XII intact  Psychiatric: Appropriate mood and affect    LABS AND IMAGING:  Reviewed. PET/CT 2/11/21, right-sided anal mass; no definitive evidence for jermain/distant disease spread.      MRI 2/12/21         IMPRESSION:   Ms. Gale is a 70 year old female with newly diagnosed, locally advanced anal cancer, [id number] as the primary lesion, visible at the anal verge, right to the midline, has a maximal dimension of 4.7 cm on MRI. She is planned for definitive chemoradiation, concurrent with 5-Fluorouracil/Mitomycin-C.     PLAN:   We reviewed the imaging findings with Ms. Gale and her family, and discussed our finalized plan to treat her to 30 fractions and a total dose of 5400 cGy. We favored a dose typical for a T3 lesion given her primary lesion is at a size bordering T2/3 (5 cm cutoff). Given the location of her disease, we will electively cover the common, external, internal, and inguinal jermain regions bilaterally to 4500 cGy, and may offer 5040 cGy to any suspicious regional node upon reviewing of her simulation CT.     We re-iterated the side effects associated with the treatment, and specifically highlighted the challenges expected in managing skin, GI and hematological toxicities associated with treatment of this kind. We will work with Dr. Devlin and close monitor her on treatment and afterwards. We also reviewed the late side effects may include bowel obstruction or perforation and second malignancy. She is aware that the side effects of radiation therapy may be severe and permanent, although we expect that such risks would be low and that they are outweighed by the benefit of treatment. With good understanding, she signed informed  consent and underwent treatment simulation afterwards. We will aim to start her on treatment in 7-10 days, and will coordinate with Dr. Devlin' office to start chemotherapy concurrently.    The patient was seen and discussed with staff, Dr. Soto.    Estrella Patterson MD  Resident, PGY-3  Department of Radiation Oncology  [facility]    I was present with the resident during the visit. I discussed the case with the resident and agree with the note as documented by the resident.    Cosme Soto M.D.  Department of Radiation Oncology  [facility]

## 2021-02-15 NOTE — LETTER
2/15/2021         RE: Aminata Gale  [address]   [place] [facility] [address]        Dear Colleague,    Thank you for referring your patient, Aminata Gale, to the RAD[facility]ATION TH[facility]APY CENTER. Please see a copy of my visit note below.    Radiation Oncology Follow-up Visit  2/15/2021      Aminata Gale  MRN: [id number]   : 1950     SUBJECTIVE:     Aminata Gale is a 70 year old female with a PMH significant for newly diagnosed, locally advanced anal cancer, likely cT2-3N0-1. She saw us for initial consultation on 21, and is planned for definitive chemoradiotherapy with the concurrent regimen of 5-FU/MMC under the care of Dr. Devlin with [facility]. Since that time, the patient underwent staging PET/CT on 21, which demonstrated a markedly hypermetabolic anal mass eccentric to the right, consistent with the known malignancy (SUVmax 20). There was no FDG avid lymphadenopathy or evidence of distant disease. MRI pelvis was undertaken on 21, which demonstrated a 3.2 x 2.1 x 4.7 cm mildly T2 hyperintense enhancing lesion centered along the right side of the anal canal. There were a few prominent but not significantly enlarged bilateral inguinal lymph nodes, but otherwise no suspicious pelvic lymphadenopathy.    Ms. Gale presents today for follow up, consent, and treatment planning. She has no pressing symptoms or concerns.    PHYSICAL EXAM:  There were no vitals taken for this visit.  Gen: Alert, in NAD  Eyes: PERRL, EOMI, sclera anicteric  HENT     Head: NC/AT     Ears: No external auricular lesions     Nose/sinus: No rhinorrhea or epistaxis  Neck: Supple, full ROM, no LAD  Pulm: No wheezing, stridor or respiratory distress  CV: Well-perfused, no cyanosis, no pedal edema  Abdominal: Soft, nontender, nondistended, no hepatomegaly  Back: No step-offs or pain to palpation along the thoracolumbar spine, no CVA tenderness  Rectal: A 2 cm or so, exophytic, well demarcated anal mass in  the right anal verge. There is no visible skin erythema or firmness on palpation suggestive of soft tissue extension laterally. There is also no sign of anterior disease extension into the external genitalia, which appears grossly normal without discoloration, rash, lesion, or discharge.   Musculoskeletal: Normal bulk and tone   Skin: Normal color and turgor  Neurologic: A/Ox3, CN II-XII intact  Psychiatric: Appropriate mood and affect    LABS AND IMAGING:  Reviewed. PET/CT 2/11/21, right-sided anal mass; no definitive evidence for jermain/distant disease spread.      MRI 2/12/21         IMPRESSION:   Ms. Gale is a 70 year old female with newly diagnosed, locally advanced anal cancer, [id number] as the primary lesion, visible at the anal verge, right to the midline, has a maximal dimension of 4.7 cm on MRI. She is planned for definitive chemoradiation, concurrent with 5-Fluorouracil/Mitomycin-C.     PLAN:   We reviewed the imaging findings with Ms. Gale and her family, and discussed our finalized plan to treat her to 30 fractions and a total dose of 5400 cGy. We favored a dose typical for a T3 lesion given her primary lesion is at a size bordering T2/3 (5 cm cutoff). Given the location of her disease, we will electively cover the common, external, internal, and inguinal jermain regions bilaterally to 4500 cGy, and may offer 5040 cGy to any suspicious regional node upon reviewing of her simulation CT.     We re-iterated the side effects associated with the treatment, and specifically highlighted the challenges expected in managing skin, GI and hematological toxicities associated with treatment of this kind. We will work with Dr. Devlin and close monitor her on treatment and afterwards. We also reviewed the late side effects may include bowel obstruction or perforation and second malignancy. She is aware that the side effects of radiation therapy may be severe and permanent, although we expect that such risks would be low  and that they are outweighed by the benefit of treatment. With good understanding, she signed informed consent and underwent treatment simulation afterwards. We will aim to start her on treatment in 7-10 days, and will coordinate with Dr. Devlin' office to start chemotherapy concurrently.    The patient was seen and discussed with staff, Dr. Soto.    Estrella Patterson MD  Resident, PGY-3  Department of Radiation Oncology  [facility]    I was present with the resident during the visit. I discussed the case with the resident and agree with the note as documented by the resident.    Cosme Soto M.D.  Department of Radiation Oncology  [facility]

## 2021-02-15 NOTE — PROGRESS NOTES
The patient underwent CT simulation.     Cosme Soto M.D.  Department of Radiation Oncology  [facility]

## 2021-02-15 NOTE — LETTER
2/15/2021         RE: Aminata Gale  [address]   [place] [place] [facility] [address]        Dear Colleague,    Thank you for referring your patient, Aminata Gale, to the RADIATION TH[facility]APY CENTER. Please see a copy of my visit note below.    The patient underwent CT simulation.     Cosme Soto M.D.  Department of Radiation Oncology  [facility]           Again, thank you for allowing me to participate in the care of your patient.        Sincerely,        Cosme Soto MD

## 2021-02-24 NOTE — LETTER
2021      RE: Aminata Gale  [address]   [place] [address]       [facility]  SPECIALIZING IN BREAKTHROUGHS  Radiation Oncology    On Treatment Visit Note      Aminata Gale      Date: 2021   MRN: [id number]   : 1950  Diagnosis: Anal cancer      Reason for Visit:  On Radiation Treatment Visit     Treatment Summary to Date  Treatment Site: pelvis Current Dose: 180/5400 cGy Fractions:       Chemotherapy  Chemo concurrent with radx?: Yes  Oncologist: Dr. Sherry Devlin  Drug Name/Frequency 1: 5 FU/mitomycin    Subjective:   Doing well. No acute complaints. No bleeding per rectum. Will start chemo today.    Nursing ROS:   Nutrition Alteration  Diet Type: Patient's Preference  Skin  Skin Reaction: 0 - No changes     ENT and Mouth Exam  ENT/Mouth Note: no issues  Cardiovascular  Respiratory effort: 1 - Normal - without distress  Gastrointestinal  GI Note: no issues  Genitourinary  Urinary Status: 0 - Normal     Pain Assessment  0-10 Pain Scale: 0      Objective:   /68   Pulse 87   Wt 65.9 kg (145 lb 3.2 oz)   BMI 28.36 kg/m    NAD  No skin desquamation in radha-anal area. +  Exophytic mass protruding from anal canal (R > L)    Labs:  CBC RESULTS:   Recent Labs   Lab Test 21  1634 20  1142   WBC  --  11.1*   RBC  --  5.50*   HGB 16.5* 16.6*   HCT  --  50.2*   MCV  --  91   MCH  --  30.2   MCHC  --  33.1   RDW  --  14.0   PLT  --  285     ELECTROLYTES:  Recent Labs   Lab Test 20  1142      POTASSIUM 4.3   CHLORIDE 104   ELY 9.2   CO2 33*   BUN 24   CR 0.76   GLC 99       Assessment:  Ms. Gale is a 70 year old female with newly diagnosed, locally advanced anal cancer, cT2/3N0M0 as the primary lesion, visible at the anal verge, right to the midline, has a maximal dimension of 4.7 cm on MRI. She is undergoingdefinitive chemoradiation, concurrent with 5-Fluorouracil/Mitomycin-C.     Tolerating radiation therapy well.  All questions and concerns  addressed.    Plan:   1. Continue current therapy.   2. Continue skin care. Aquaphor. Sitz baths. Squirt bottle for irrigation.   3. Cancer related pain. Oxycodone PRN pain. Morphine topical gel, will send to pharmacy.        Mosaiq chart and setup information reviewed  Ports checked    Medication Review  Med list reviewed with patient?: Yes           Cosme Soto MD

## 2021-02-24 NOTE — LETTER
2021         RE: Aminata Gale  [address]   [place] [place] [facility] [address]        Dear Colleague,    Thank you for referring your patient, Aminata Gale, to the RAD[facility]ATION THERAPY CENTER. Please see a copy of my visit note below.    [facility]  SPECIALIZING IN BREAKTHROUGHS  Radiation Oncology    On Treatment Visit Note      Aminata Gale      Date: 2021   MRN: [id number]   : 1950  Diagnosis: Anal cancer      Reason for Visit:  On Radiation Treatment Visit     Treatment Summary to Date  Treatment Site: pelvis Current Dose: 180/5400 cGy Fractions:       Chemotherapy  Chemo concurrent with radx?: Yes  Oncologist: Dr. Sherry Devlin  Drug Name/Frequency 1: 5 FU/mitomycin    Subjective:   Doing well. No acute complaints. No bleeding per rectum. Will start chemo today.    Nursing ROS:   Nutrition Alteration  Diet Type: Patient's Preference  Skin  Skin Reaction: 0 - No changes     ENT and Mouth Exam  ENT/Mouth Note: no issues  Cardiovascular  Respiratory effort: 1 - Normal - without distress  Gastrointestinal  GI Note: no issues  Genitourinary  Urinary Status: 0 - Normal     Pain Assessment  0-10 Pain Scale: 0      Objective:   /68   Pulse 87   Wt 65.9 kg (145 lb 3.2 oz)   BMI 28.36 kg/m    NAD  No skin desquamation in radha-anal area. +  Exophytic mass protruding from anal canal (R > L)    Labs:  CBC RESULTS:   Recent Labs   Lab Test 21  1634 20  1142   WBC  --  11.1*   RBC  --  5.50*   HGB 16.5* 16.6*   HCT  --  50.2*   MCV  --  91   MCH  --  30.2   MCHC  --  33.1   RDW  --  14.0   PLT  --  285     ELECTROLYTES:  Recent Labs   Lab Test 20  1142      POTASSIUM 4.3   CHLORIDE 104   ELY 9.2   CO2 33*   BUN 24   CR 0.76   GLC 99       Assessment:  Ms. Gale is a 70 year old female with newly diagnosed, locally advanced anal cancer, cT2/3N0M0 as the primary lesion, visible at the anal verge, right to the midline, has a maximal dimension of 4.7 cm on MRI.  She is undergoingdefinitive chemoradiation, concurrent with 5-Fluorouracil/Mitomycin-C.     Tolerating radiation therapy well.  All questions and concerns addressed.    Plan:   1. Continue current therapy.   2. Continue skin care. Aquaphor. Sitz baths. Squirt bottle for irrigation.   3. Cancer related pain. Oxycodone PRN pain. Morphine topical gel, will send to pharmacy.        Mosaiq chart and setup information reviewed  Ports checked    Medication Review  Med list reviewed with patient?: Yes           Cosme Soto MD          Again, thank you for allowing me to participate in the care of your patient.        Sincerely,        Cosme Soto MD

## 2021-02-24 NOTE — PROGRESS NOTES
[facility]  SPECIALIZING IN BREAKTHROUGHS  Radiation Oncology    On Treatment Visit Note      Aminata Gale      Date: 2021   MRN: [id number]   : 1950  Diagnosis: Anal cancer      Reason for Visit:  On Radiation Treatment Visit     Treatment Summary to Date  Treatment Site: pelvis Current Dose: 180/5400 cGy Fractions:       Chemotherapy  Chemo concurrent with radx?: Yes  Oncologist: Dr. Sherry Devlin  Drug Name/Frequency 1: 5 FU/mitomycin    Subjective:   Doing well. No acute complaints. No bleeding per rectum. Will start chemo today.    Nursing ROS:   Nutrition Alteration  Diet Type: Patient's Preference  Skin  Skin Reaction: 0 - No changes     ENT and Mouth Exam  ENT/Mouth Note: no issues  Cardiovascular  Respiratory effort: 1 - Normal - without distress  Gastrointestinal  GI Note: no issues  Genitourinary  Urinary Status: 0 - Normal     Pain Assessment  0-10 Pain Scale: 0      Objective:   /68   Pulse 87   Wt 65.9 kg (145 lb 3.2 oz)   BMI 28.36 kg/m    NAD  No skin desquamation in radha-anal area. +  Exophytic mass protruding from anal canal (R > L)    Labs:  CBC RESULTS:   Recent Labs   Lab Test 21  1634 20  1142   WBC  --  11.1*   RBC  --  5.50*   HGB 16.5* 16.6*   HCT  --  50.2*   MCV  --  91   MCH  --  30.2   MCHC  --  33.1   RDW  --  14.0   PLT  --  285     ELECTROLYTES:  Recent Labs   Lab Test 20  1142      POTASSIUM 4.3   CHLORIDE 104   ELY 9.2   CO2 33*   BUN 24   CR 0.76   GLC 99       Assessment:  Ms. Gale is a 70 year old female with newly diagnosed, locally advanced anal cancer, cT2/3N0M0 as the primary lesion, visible at the anal verge, right to the midline, has a maximal dimension of 4.7 cm on MRI. She is undergoingdefinitive chemoradiation, concurrent with 5-Fluorouracil/Mitomycin-C.     Tolerating radiation therapy well.  All questions and concerns addressed.    Plan:   1. Continue current therapy.   2. Continue skin care. Aquaphor. Sitz  baths. Squirt bottle for irrigation.   3. Cancer related pain. Oxycodone PRN pain. Morphine topical gel, will send to pharmacy.        Mosaiq chart and setup information reviewed  Ports checked    Medication Review  Med list reviewed with patient?: Yes           Cosme Soto MD

## 2021-03-03 NOTE — LETTER
3/3/2021         RE: Aminata Gale  [address]   [place] [facility] [address]        Dear Colleague,    Thank you for referring your patient, Aminata Gale, to the RAD[facility]ATION THERAPY CENTER. Please see a copy of my visit note below.    [facility]  SPECIALIZING IN BREAKTHROUGHS  Radiation Oncology    On Treatment Visit Note      Aminata Gale      Date: 3/3/2021   MRN: [id number]   : 1950  Diagnosis: Anal cancer      Reason for Visit:  On Radiation Treatment Visit     Treatment Summary to Date  Treatment Site: pelvis Current Dose: 1080/5400 cGy Fractions:       Chemotherapy  Chemo concurrent with radx?: Yes  Oncologist: Dr. Sherry Devlin  Drug Name/Frequency 1: 5 FU/mitomycin    Subjective:   Doing well.  No bleeding per rectum. Mild diarrhea. Will trial imodium.  Applying skin creams. Using baby wipes and Sitz baths. Mild increase in radha-anal discomfort.     Nursing ROS:   Nutrition Alteration  Diet Type: Patient's Preference  Skin  Skin Reaction: 0 - No changes     ENT and Mouth Exam  ENT/Mouth Note: no issues  Cardiovascular  Respiratory effort: 1 - Normal - without distress  Gastrointestinal  GI Note: no issues  Genitourinary  Urinary Status: 0 - Normal     Pain Assessment  0-10 Pain Scale: 0      Objective:   /53   Pulse 98   Wt 67 kg (147 lb 9.6 oz)   BMI 28.83 kg/m    NAD  Mild erythema without  desquamation in radha-anal area. +  Exophytic mass protruding from anal canal (R > L)    Labs:  CBC RESULTS:   Recent Labs   Lab Test 21  1634 20  1142   WBC  --  11.1*   RBC  --  5.50*   HGB 16.5* 16.6*   HCT  --  50.2*   MCV  --  91   MCH  --  30.2   MCHC  --  33.1   RDW  --  14.0   PLT  --  285     ELECTROLYTES:  Recent Labs   Lab Test 20  1142      POTASSIUM 4.3   CHLORIDE 104   ELY 9.2   CO2 33*   BUN 24   CR 0.76   GLC 99       Assessment:  Ms. Gale is a 70 year old female with newly diagnosed, locally advanced anal cancer, cT2/3N0M0 as the primary  lesion, visible at the anal verge, right to the midline, has a maximal dimension of 4.7 cm on MRI. She is undergoingdefinitive chemoradiation, concurrent with 5-Fluorouracil/Mitomycin-C.     Tolerating radiation therapy well.  All questions and concerns addressed.    Plan:   1. Continue current therapy.   2. Continue skin care. Aquaphor. Sitz baths. Squirt bottle for irrigation.   3. Cancer related pain. Oxycodone PRN pain. Morphine topical gel. Silvadene for potential areas of desquamation.  4. GI bother. Imodium PRN.      Mosaiq chart and setup information reviewed  Ports checked    Medication Review  Med list reviewed with patient?: Yes           Cosme Soto MD

## 2021-03-03 NOTE — PROGRESS NOTES
[facility]  SPECIALIZING IN BREAKTHROUGHS  Radiation Oncology    On Treatment Visit Note      Aminata Gale      Date: 3/3/2021   MRN: [id number]   : 1950  Diagnosis: Anal cancer      Reason for Visit:  On Radiation Treatment Visit     Treatment Summary to Date  Treatment Site: pelvis Current Dose: 1080/5400 cGy Fractions:       Chemotherapy  Chemo concurrent with radx?: Yes  Oncologist: Dr. Sherry Devlin  Drug Name/Frequency 1: 5 FU/mitomycin    Subjective:   Doing well.  No bleeding per rectum. Mild diarrhea. Will trial imodium.  Applying skin creams. Using baby wipes and Sitz baths. Mild increase in radha-anal discomfort.     Nursing ROS:   Nutrition Alteration  Diet Type: Patient's Preference  Skin  Skin Reaction: 0 - No changes     ENT and Mouth Exam  ENT/Mouth Note: no issues  Cardiovascular  Respiratory effort: 1 - Normal - without distress  Gastrointestinal  GI Note: no issues  Genitourinary  Urinary Status: 0 - Normal     Pain Assessment  0-10 Pain Scale: 0      Objective:   /53   Pulse 98   Wt 67 kg (147 lb 9.6 oz)   BMI 28.83 kg/m    NAD  Mild erythema without  desquamation in radha-anal area. +  Exophytic mass protruding from anal canal (R > L)    Labs:  CBC RESULTS:   Recent Labs   Lab Test 21  1634 20  1142   WBC  --  11.1*   RBC  --  5.50*   HGB 16.5* 16.6*   HCT  --  50.2*   MCV  --  91   MCH  --  30.2   MCHC  --  33.1   RDW  --  14.0   PLT  --  285     ELECTROLYTES:  Recent Labs   Lab Test 20  1142      POTASSIUM 4.3   CHLORIDE 104   ELY 9.2   CO2 33*   BUN 24   CR 0.76   GLC 99       Assessment:  Ms. Gale is a 70 year old female with newly diagnosed, locally advanced anal cancer, cT2/3N0M0 as the primary lesion, visible at the anal verge, right to the midline, has a maximal dimension of 4.7 cm on MRI. She is undergoingdefinitive chemoradiation, concurrent with 5-Fluorouracil/Mitomycin-C.     Tolerating radiation therapy well.  All questions and  concerns addressed.    Plan:   1. Continue current therapy.   2. Continue skin care. Aquaphor. Sitz baths. Squirt bottle for irrigation.   3. Cancer related pain. Oxycodone PRN pain. Morphine topical gel. Silvadene for potential areas of desquamation.  4. GI bother. Imodium PRN.      Mosaiq chart and setup information reviewed  Ports checked    Medication Review  Med list reviewed with patient?: Yes           Cosme Soto MD

## 2021-03-10 NOTE — PROGRESS NOTES
[facility]  SPECIALIZING IN BREAKTHROUGHS  Radiation Oncology    On Treatment Visit Note      Aminata Gale      Date: 3/10/2021   MRN: [id number]   : 1950  Diagnosis: Anal cancer      Reason for Visit:  On Radiation Treatment Visit     Treatment Summary to Date  Treatment Site: pelvis Current Dose: 1980/5400 cGy Fractions:       Chemotherapy  Chemo concurrent with radx?: Yes  Oncologist: Dr. Sherry Devlin  Drug Name/Frequency 1: 5 FU/mitomycin    Subjective:   Doing well.  No bleeding per rectum. No loose stool.  Applying skin creams. Using baby wipes and Sitz baths. Mild increase in radha-anal discomfort. Using oxycodone 4-5mg at night, ibuprofen PRN during day, and morphine topical gel. Receiving IVF.     Nursing ROS:   Nutrition Alteration  Diet Type: Patient's Preference  Skin  Skin Reaction: 0 - No changes     ENT and Mouth Exam  ENT/Mouth Note: no issues  Cardiovascular  Respiratory effort: 1 - Normal - without distress  Gastrointestinal  GI Note: no issues  Genitourinary  Urinary Status: 0 - Normal     Pain Assessment  0-10 Pain Scale: 0      Objective:   /76   Pulse 109   Wt 67.2 kg (148 lb 3.2 oz)   BMI 28.94 kg/m    NAD  Moderate erythema with small areas of  desquamation in radha-anal area. +  Exophytic mass protruding from anal canal (R > L)      Assessment:  Ms. Gale is a 70 year old female with newly diagnosed, locally advanced anal cancer, cT2/3N0M0 as the primary lesion, visible at the anal verge, right to the midline, has a maximal dimension of 4.7 cm on MRI. She is undergoingdefinitive chemoradiation, concurrent with 5-Fluorouracil/Mitomycin-C.     Tolerating radiation therapy well.  All questions and concerns addressed.    Plan:   1. Continue current therapy.   2. Continue skin care. Aquaphor. Sitz baths. Squirt bottle for irrigation.   3. Cancer related pain. Oxycodone PRN pain. Morphine topical gel. Silvadene for areas of desquamation.  4. GI bother. Imodium  PRN.      Mosaiq chart and setup information reviewed  Ports checked    Medication Review  Med list reviewed with patient?: Yes           Cosme Soto MD

## 2021-03-10 NOTE — LETTER
3/10/2021         RE: Aminata Gale  [address]   [facility] [address]        Dear Colleague,    Thank you for referring your patient, Aminata Gale, to the [facility]. Please see a copy of my visit note below.    [facility]  SPECIALIZING IN BREAKTHROUGHS  Radiation Oncology    On Treatment Visit Note      Aminata Gale      Date: 3/10/2021   MRN: [id number]   : 1950  Diagnosis: Anal cancer      Reason for Visit:  On Radiation Treatment Visit     Treatment Summary to Date  Treatment Site: pelvis Current Dose: 1980/5400 cGy Fractions:       Chemotherapy  Chemo concurrent with radx?: Yes  Oncologist: Dr. Sherry Devlin  Drug Name/Frequency 1: 5 FU/mitomycin    Subjective:   Doing well.  No bleeding per rectum. No loose stool.  Applying skin creams. Using baby wipes and Sitz baths. Mild increase in radha-anal discomfort. Using oxycodone 4-5mg at night, ibuprofen PRN during day, and morphine topical gel. Receiving IVF.     Nursing ROS:   Nutrition Alteration  Diet Type: Patient's Preference  Skin  Skin Reaction: 0 - No changes     ENT and Mouth Exam  ENT/Mouth Note: no issues  Cardiovascular  Respiratory effort: 1 - Normal - without distress  Gastrointestinal  GI Note: no issues  Genitourinary  Urinary Status: 0 - Normal     Pain Assessment  0-10 Pain Scale: 0      Objective:   /76   Pulse 109   Wt 67.2 kg (148 lb 3.2 oz)   BMI 28.94 kg/m    NAD  Moderate erythema with small areas of  desquamation in radha-anal area. +  Exophytic mass protruding from anal canal (R > L)      Assessment:  Ms. Gale is a 70 year old female with newly diagnosed, locally advanced anal cancer, cT2/3N0M0 as the primary lesion, visible at the anal verge, right to the midline, has a maximal dimension of 4.7 cm on MRI. She is undergoingdefinitive chemoradiation, concurrent with 5-Fluorouracil/Mitomycin-C.     Tolerating radiation therapy well.  All questions and concerns  addressed.    Plan:   1. Continue current therapy.   2. Continue skin care. Aquaphor. Sitz baths. Squirt bottle for irrigation.   3. Cancer related pain. Oxycodone PRN pain. Morphine topical gel. Silvadene for areas of desquamation.  4. GI bother. Imodium PRN.      Mosaiq chart and setup information reviewed  Ports checked    Medication Review  Med list reviewed with patient?: Yes  Cosme Soto MD

## 2021-03-17 NOTE — PROGRESS NOTES
[facility]  SPECIALIZING IN BREAKTHROUGHS  Radiation Oncology    On Treatment Visit Note      Aminata Gale      Date: 3/17/2021   MRN: [id number]   : 1950  Diagnosis: Anal cancer      Reason for Visit:  On Radiation Treatment Visit     Treatment Summary to Date  Treatment Site: pelvis Current Dose: 2880/5400 cGy Fractions:       Chemotherapy  Chemo concurrent with radx?: Yes  Oncologist: Dr. Sherry Devlin  Drug Name/Frequency 1: 5 FU/mitomycin    Subjective:   Doing well.  No bleeding per rectum. No loose stool.  Applying skin creams. Using baby wipes and Sitz baths.  Increase in radha-anal discomfort, but reasonable controlled. Using morphine 15mg BID and oxycodone 4-5mg at night, ibuprofen PRN during day, and morphine topical gel. Receiving IVF.     Nursing ROS:   Nutrition Alteration  Diet Type: Patient's Preference  Skin  Skin Reaction: 0 - No changes     ENT and Mouth Exam  ENT/Mouth Note: no issues  Cardiovascular  Respiratory effort: 1 - Normal - without distress  Gastrointestinal  GI Note: no issues  Genitourinary  Urinary Status: 0 - Normal     Pain Assessment  0-10 Pain Scale: 0      Objective:   /66   Pulse 91   Resp 18   Wt 64 kg (141 lb 3.2 oz)   SpO2 97%   BMI 27.58 kg/m      NAD  Moderate erythema with moderate areas of  desquamation in radha-anal area. +  Exophytic mass protruding from anal canal (R > L)      Assessment:  Ms. Gale is a 70 year old female with newly diagnosed, locally advanced anal cancer, cT2/3N0M0 as the primary lesion, visible at the anal verge, right to the midline, has a maximal dimension of 4.7 cm on MRI. She is undergoingdefinitive chemoradiation, concurrent with 5-Fluorouracil/Mitomycin-C.     Tolerating radiation therapy well.  All questions and concerns addressed.    Plan:   1. Continue current therapy.   2. Continue skin care. Aquaphor. Sitz baths. Squirt bottle for irrigation.   3. Cancer related pain. Morphine 15mg BID. Oxycodone PRN pain.  Morphine topical gel. Silvadene for areas of desquamation.  4. GI bother. Imodium PRN.      Mosaiq chart and setup information reviewed  Ports checked    Medication Review  Med list reviewed with patient?: Yes           Cosme Soto MD

## 2021-03-17 NOTE — LETTER
3/17/2021         RE: Aminata Gale  [address]   [facility] [address]        Dear Colleague,    Thank you for referring your patient, Aminata Gale, to the [facility]. Please see a copy of my visit note below.    [facility]  SPECIALIZING IN BREAKTHROUGHS  Radiation Oncology    On Treatment Visit Note      Aminata Gale      Date: 3/17/2021   MRN: [id number]   : 1950  Diagnosis: Anal cancer      Reason for Visit:  On Radiation Treatment Visit     Treatment Summary to Date  Treatment Site: pelvis Current Dose: 2880/5400 cGy Fractions:       Chemotherapy  Chemo concurrent with radx?: Yes  Oncologist: Dr. Sherry Devlin  Drug Name/Frequency 1: 5 FU/mitomycin    Subjective:   Doing well.  No bleeding per rectum. No loose stool.  Applying skin creams. Using baby wipes and Sitz baths.  Increase in radha-anal discomfort, but reasonable controlled. Using morphine 15mg BID and oxycodone 4-5mg at night, ibuprofen PRN during day, and morphine topical gel. Receiving IVF.     Nursing ROS:   Nutrition Alteration  Diet Type: Patient's Preference  Skin  Skin Reaction: 0 - No changes     ENT and Mouth Exam  ENT/Mouth Note: no issues  Cardiovascular  Respiratory effort: 1 - Normal - without distress  Gastrointestinal  GI Note: no issues  Genitourinary  Urinary Status: 0 - Normal     Pain Assessment  0-10 Pain Scale: 0      Objective:   /66   Pulse 91   Resp 18   Wt 64 kg (141 lb 3.2 oz)   SpO2 97%   BMI 27.58 kg/m      NAD  Moderate erythema with moderate areas of  desquamation in radha-anal area. +  Exophytic mass protruding from anal canal (R > L)      Assessment:  Ms. Gale is a 70 year old female with newly diagnosed, locally advanced anal cancer, cT2/3N0M0 as the primary lesion, visible at the anal verge, right to the midline, has a maximal dimension of 4.7 cm on MRI. She is undergoingdefinitive chemoradiation, concurrent with 5-Fluorouracil/Mitomycin-C.     Tolerating  radiation therapy well.  All questions and concerns addressed.    Plan:   1. Continue current therapy.   2. Continue skin care. Aquaphor. Sitz baths. Squirt bottle for irrigation.   3. Cancer related pain. Morphine 15mg BID. Oxycodone PRN pain. Morphine topical gel. Silvadene for areas of desquamation.  4. GI bother. Imodium PRN.      Mosaiq chart and setup information reviewed  Ports checked    Medication Review  Med list reviewed with patient?: Yes           Cosme Soto MD

## 2021-03-22 PROBLEM — T45.1X5A CHEMOTHERAPY INDUCED NAUSEA AND VOMITING: Status: ACTIVE | Noted: 2021-01-01

## 2021-03-22 PROBLEM — E86.0 DEHYDRATION: Status: ACTIVE | Noted: 2021-01-01

## 2021-03-22 PROBLEM — R11.2 CHEMOTHERAPY INDUCED NAUSEA AND VOMITING: Status: ACTIVE | Noted: 2021-01-01

## 2021-03-22 NOTE — TELEPHONE ENCOUNTER
Message left on patient's answering machine to call the [facility] RN back.  Gilmar Mcbride RN

## 2021-03-23 NOTE — PROGRESS NOTES
Infusion Nursing Note:  Aminata Gale presents today for IVF.    Patient seen by provider today: No   present during visit today: Not Applicable.    Note: N/A.  Patient did meet criteria for an asymptomatic covid-19 PCR test in infusion today. Patient declined the covid-19 test.    Intravenous Access:  Implanted Port.    Treatment Conditions:  Not Applicable.      Post Infusion Assessment:  Patient tolerated infusion without incident.  Blood return noted pre and post infusion.  Site patent and intact, free from redness, edema or discomfort.  Access discontinued per protocol.       Discharge Plan:   Patient discharged in stable condition accompanied by: self.  Departure Mode: Ambulatory. Pt will return 3/26/2021.     Fransisco Wong RN

## 2021-03-24 NOTE — LETTER
3/24/2021         RE: Aminata Gale  [address]   [facility] [address]        Dear Colleague,    Thank you for referring your patient, Aminata Gale, to the [facility]Y [facility]. Please see a copy of my visit note below.    [facility]  SPECIALIZING IN BREAKTHROUGHS  Radiation Oncology    On Treatment Visit Note      Aminata Gale      Date: 3/24/2021   MRN: [id number]   : 1950  Diagnosis: Anal cancer      Reason for Visit:  On Radiation Treatment Visit     Treatment Summary to Date  Treatment Site: pelvis Current Dose: 3780/5400 cGy Fractions:       Chemotherapy  Chemo concurrent with radx?: Yes  Oncologist: Dr. Sherry Devlin  Drug Name/Frequency 1: 5 FU/mitomycin    Subjective:   Doing well.  No bleeding per rectum.  No consistent loose stool.  Applying skin creams. Using baby wipes and Sitz baths.  Increase in radha-anal discomfort, but reasonably controlled. Using morphine 15mg BID and oxycodone 5mg q4-6 hours, ibuprofen PRN during day, and morphine topical gel. Receiving IVF.     Nursing ROS:   Nutrition Alteration  Diet Type: Patient's Preference  Skin  Skin Reaction: 0 - No changes     ENT and Mouth Exam  ENT/Mouth Note: no issues  Cardiovascular  Respiratory effort: 1 - Normal - without distress  Gastrointestinal  GI Note: no issues  Genitourinary  Urinary Status: 0 - Normal     Pain Assessment  0-10 Pain Scale: 0      Objective:   /79   Pulse 87   Resp 18   Wt 63.9 kg (140 lb 12.8 oz)   SpO2 94%   BMI 27.50 kg/m    NAD  Moderate diffuse erythema with areas of  desquamation in radha-anal area and bilateral groin.      Assessment:  Ms. Gale is a 70 year old female with newly diagnosed, locally advanced anal cancer, cT2/3N0M0 as the primary lesion, visible at the anal verge, right to the midline, has a maximal dimension of 4.7 cm on MRI. She is undergoingdefinitive chemoradiation, concurrent with 5-Fluorouracil/Mitomycin-C.     Tolerating radiation therapy  well.  All questions and concerns addressed.    Plan:   1. Continue current therapy.   2. Continue skin care. Proshield.  Sitz baths. Squirt bottle for irrigation.   3. Cancer related pain. Morphine 15mg TID. Oxycodone PRN pain. Morphine topical gel. Silvadene for areas of desquamation.  4. GI bother. Imodium PRN.      Mosaiq chart and setup information reviewed  Ports checked    Medication Review  Med list reviewed with patient?: Yes           Cosme Soto MD

## 2021-03-24 NOTE — PROGRESS NOTES
[facility]  SPECIALIZING IN BREAKTHROUGHS  Radiation Oncology    On Treatment Visit Note      Aminata Gale      Date: 3/24/2021   MRN: [id number]   : 1950  Diagnosis: Anal cancer      Reason for Visit:  On Radiation Treatment Visit     Treatment Summary to Date  Treatment Site: pelvis Current Dose: 3780/5400 cGy Fractions:       Chemotherapy  Chemo concurrent with radx?: Yes  Oncologist: Dr. Sherry Devlin  Drug Name/Frequency 1: 5 FU/mitomycin    Subjective:   Doing well.  No bleeding per rectum.  No consistent loose stool.  Applying skin creams. Using baby wipes and Sitz baths.  Increase in radha-anal discomfort, but reasonably controlled. Using morphine 15mg BID and oxycodone 5mg q4-6 hours, ibuprofen PRN during day, and morphine topical gel. Receiving IVF.     Nursing ROS:   Nutrition Alteration  Diet Type: Patient's Preference  Skin  Skin Reaction: 0 - No changes     ENT and Mouth Exam  ENT/Mouth Note: no issues  Cardiovascular  Respiratory effort: 1 - Normal - without distress  Gastrointestinal  GI Note: no issues  Genitourinary  Urinary Status: 0 - Normal     Pain Assessment  0-10 Pain Scale: 0      Objective:   /79   Pulse 87   Resp 18   Wt 63.9 kg (140 lb 12.8 oz)   SpO2 94%   BMI 27.50 kg/m    NAD  Moderate diffuse erythema with areas of  desquamation in radha-anal area and bilateral groin.      Assessment:  Ms. Gale is a 70 year old female with newly diagnosed, locally advanced anal cancer, cT2/3N0M0 as the primary lesion, visible at the anal verge, right to the midline, has a maximal dimension of 4.7 cm on MRI. She is undergoingdefinitive chemoradiation, concurrent with 5-Fluorouracil/Mitomycin-C.     Tolerating radiation therapy well.  All questions and concerns addressed.    Plan:   1. Continue current therapy.   2. Continue skin care. Proshield.  Sitz baths. Squirt bottle for irrigation.   3. Cancer related pain. Morphine 15mg TID. Oxycodone PRN pain. Morphine topical  gel. Silvadene for areas of desquamation.  4. GI bother. Imodium PRN.      Mosaiq chart and setup information reviewed  Ports checked    Medication Review  Med list reviewed with patient?: Yes           Cosme Soto MD

## 2021-03-26 NOTE — PROGRESS NOTES
Infusion Nursing Note:  Aminata Gale presents today for IV fluids.    Patient seen by provider today: No   present during visit today: Not Applicable.    Note: N/A.  Patient m,et criteria for an asymptomatic covid-19 PCR test in infusion today. Patient declined the covid-19 test.    Intravenous Access:  Implanted port.    Treatment Conditions:  Not Applicable.      Post Infusion Assessment:  Patient tolerated infusion of fluids and dexamethasone without incident.  Access discontinued per protocol.       Discharge Plan:   Patient discharged in stable condition accompanied by: self.  Departure Mode: Ambulatory.  Pt is scheduled to have chemo on Monday 3/29 at [facility]      Teresa Bauer RN

## 2021-03-30 NOTE — PROGRESS NOTES
Assessment & Plan     Moderate persistent asthma without complication  COPD   Refilled meds today   - albuterol (PROAIR HFA/PROVENTIL HFA/VENTOLIN HFA) 108 (90 Base) MCG/ACT inhaler; INHALE TWO PUFFS BY MOUTH EVERY 6 HOURS  - fluticasone-vilanterol (BREO ELLIPTA) 200-25 MCG/INH inhaler; INHALE 1 PUFF INTO THE LUNGS DAILY  - tiotropium (SPIRIVA RESPIMAT) 2.5 MCG/ACT inhaler; Inhale 2 puffs into the lungs daily    Cancer related pain  CSA reviewed.   Labs done today   Follow up with Dr Soto as planned.   - Drug Confirmation Panel Urine with Creat      Type 2 diabetes mellitus with hyperglycemia, without long-term current use of insulin (H)  Lab orders placed   - HEMOGLOBIN A1C; Future    Hyperlipidemia LDL goal <130  Labs orders placed.   - Lipid panel reflex to direct LDL Fasting; Future    Other chronic pain    - Drug Abuse Screen Panel 13, Urine (Pain Care Package)      25 minutes spent on the date of the encounter doing chart review, history and exam, documentation and further activities per the note    Call or return to the clinic with any worsening of symptoms or no resolution. Patient/Parent verbalized understanding and is in agreement. Medication side effects reviewed.   Current Outpatient Medications   Medication Sig Dispense Refill     albuterol (PROAIR HFA/PROVENTIL HFA/VENTOLIN HFA) 108 (90 Base) MCG/ACT inhaler INHALE TWO PUFFS BY MOUTH EVERY 6 HOURS 18 g 2     alcohol swab prep pads Use to swab area of injection/raffi as directed. 100 each 3     amLODIPine (NORVASC) 10 MG tablet TAKE ONE TABLET BY MOUTH ONCE DAILY FOR HIGH BLOOD PRESSURE 90 tablet 1     aspirin (ASA) 81 MG tablet Take 1 tablet (81 mg) by mouth daily       blood glucose (MILLIE MICROLET 2) lancing device Device to be used with lancets 2 times a day. 1 each 0     ezetimibe (ZETIA) 10 MG tablet TAKE 1 TABLET (10 MG) BY MOUTH DAILY 90 tablet 1     fluticasone-vilanterol (BREO ELLIPTA) 200-25 MCG/INH inhaler INHALE 1 PUFF INTO THE LUNGS  DAILY 28 each 11     Ibuprofen (IBU-200 PO) Take  by mouth.       ipratropium - albuterol 0.5 mg/2.5 mg/3 mL (DUONEB) 0.5-2.5 (3) MG/3ML neb solution Take 1 vial (3 mLs) by nebulization every 6 hours as needed for shortness of breath / dyspnea or wheezing 1 Box 3     losartan (COZAAR) 25 MG tablet TAKE 1 TABLET (25 MG) BY MOUTH DAILY 90 tablet 1     morphine (MS CONTIN) 15 MG CR tablet Take 1 tablet (15 mg) by mouth every 12 hours maximum 2 tablet(s) per day 60 tablet 0     morphine 0.1% in intrasite topical gel Place 1-2 g onto the skin 4 times daily as needed for pain 100 g 0     silver sulfADIAZINE (SILVADENE) 1 % external cream Apply topically daily 85 g 3     tiotropium (SPIRIVA RESPIMAT) 2.5 MCG/ACT inhaler Inhale 2 puffs into the lungs daily 12 g 11     torsemide (DEMADEX) 5 MG tablet TAKE 1 TABLET (5 MG) BY MOUTH DAILY 90 tablet 1     cetirizine (ZYRTEC) 10 MG tablet Take 10 mg by mouth daily       fluocinonide (LIDEX) 0.05 % external solution Apply twice daily on scalp as needed. 60 mL 4     ketoconazole (NIZORAL) 2 % external shampoo Use to wash scalp, leave on for 5 minutes. Use 2-3 times a week. 120 mL 4     loratadine (CLARITIN) 10 MG tablet Take 10 mg by mouth daily       magic mouthwash (ENTER INGREDIENTS IN COMMENTS) suspension Swish, gargle, and spit one to two teaspoonfuls every six hours as needed. 300 mL 3     STATIN NOT PRESCRIBED (INTENTIONAL) Please choose reason not prescribed, below       Chart documentation with Dragon Voice recognition Software. Although reviewed after completion, some words and grammatical errors may remain.    PEDRO Dominguez CNP  [facility]    Subjective   Pat is a 70 year old who presents for the following health issues     HPI     Hypertension Follow-up      Do you check your blood pressure regularly outside of the clinic? Yes     Are you following a low salt diet? No    Are your blood pressures ever more than 140 on the top  number (systolic) OR more   than 90 on the bottom number (diastolic), for example 140/90? Yes    Asthma Follow-Up    Was ACT completed today?    Yes    ACT Total Scores 3/30/2021   ACT TOTAL SCORE -   ASTHMA ER VISITS -   ASTHMA HOSPITALIZATIONS -   ACT TOTAL SCORE (Goal Greater than or Equal to 20) 12   In the past 12 months, how many times did you visit the emergency room for your asthma without being admitted to the hospital? 0   In the past 12 months, how many times were you hospitalized overnight because of your asthma? 0       How many days per week do you miss taking your asthma controller medication?  0    Please describe any recent triggers for your asthma: Patient is unaware of triggers    Have you had any Emergency Room Visits, Urgent Care Visits, or Hospital Admissions since your last office visit?  No      Where in your body do you have pain? Rectal CANCER being treated with radiation. Burning rectal area.   Pain meds being managed by her radiology team  Needs Urine Drug Screen and CSA done today  Will sign as witness and then forward it to her Rad Onc team for signature. Dr Soto  How has your pain affected your ability to work? Not currently working - unrelated to pain  Which of these pain treatments have you tried since your last clinic visit? Other: CHEMO ad Radiation   How well are you sleeping? Poor  How has your mood been since your last visit? Slightly worse  Have you had a significant life event? Other: rectal cancer   Other aggravating factors: none  Taking medication as directed? Yes    Increased morphine to every 8 hours recently with some better pain control    PHQ-9 SCORE 2/13/2018 2/22/2019 3/30/2021   PHQ-9 Total Score - - -   PHQ-9 Total Score MyChart - 9 (Mild depression) -   PHQ-9 Total Score 3 9 5     JOHNNY-7 SCORE 1/30/2018 2/22/2019 3/30/2021   Total Score - 6 (mild anxiety) -   Total Score 11 6 2     No flowsheet data found.  Encounter-Level CSA:    There are no encounter-level  csa.     Patient-Level CSA:    There are no patient-level csa.             Review of Systems   Constitutional, HEENT, cardiovascular, pulmonary, GI, , musculoskeletal, neuro, skin, endocrine and psych systems are negative, except as otherwise noted.      Objective    /56   Pulse 94   Temp 97.3  F (36.3  C) (Tympanic)   Resp 14   Wt 62.6 kg (138 lb)   SpO2 94%   BMI 26.95 kg/m    Body mass index is 26.95 kg/m .  Physical Exam   GENERAL: alert, elderly and fatigued  EYES: Eyes grossly normal to inspection, PERRL and conjunctivae and sclerae normal  HENT: ear canals and TM's normal, nose and mouth without ulcers or lesions  NECK: no adenopathy, no asymmetry, masses, or scars and thyroid normal to palpation  RESP: lungs clear to auscultation - no rales, rhonchi or wheezes  CV: regular rate and rhythm, normal S1 S2, no S3 or S4, no murmur, click or rub, no peripheral edema and peripheral pulses strong  ABDOMEN: soft, nontender, no hepatosplenomegaly, no masses and bowel sounds normal  MS: no gross musculoskeletal defects noted, no edema  NEURO: Normal strength and tone, mentation intact and speech normal  PSYCH: mentation appears normal, affect normal/[facility] Outpatient Visit on 02/12/2021   Component Date Value Ref Range Status     Creatinine 02/12/2021 0.8  0.52 - 1.04 mg/dL Final     GFR Estimate 02/12/2021 71  >60 mL/min/[1.73_m2] Final     GFR Estimate If Black 02/12/2021 85  >60 mL/min/[1.73_m2] Final

## 2021-03-30 NOTE — PROGRESS NOTES
Infusion Nursing Note:  Aminata Gale presents today for IVF and dexamethasone.    Patient seen by provider today: No   present during visit today: Not Applicable.    Note: Patient is feeling weak, nauseated and fatigued.     Intravenous Access:  Implanted Port. accessed at other facility; chemotherapy infusing    Treatment Conditions:  Not Applicable.    Post Infusion Assessment:  Patient tolerated infusion without incident.  Blood return noted pre and post infusion.  Site patent and intact, free from redness, edema or discomfort.  No evidence of extravasations.  Access discontinued per protocol.     Discharge Plan:   Patient and/or family verbalized understanding of discharge instructions and all questions answered.  AVS to patient via [employer].  Patient will return 4/2/2021 for pump discharge and IVF for next appointment.   Patient discharged in stable condition accompanied by: self.  Departure Mode: Ambulatory.    Lottie Ryan RN

## 2021-03-30 NOTE — LETTER
Opioid / Opioid Plus Controlled Substance Agreement    This is an agreement between you and your provider about the safe and appropriate use of controlled substance/opioids prescribed by your care team. Controlled substances are medicines that can cause physical and mental dependence (abuse).    There are strict laws about having and using these medicines. We here at [facility] are committing to working with you in your efforts to get better. To support you in this work, we ll help you schedule regular office appointments for medicine refills. If we must cancel or change your appointment for any reason, we ll make sure you have enough medicine to last until your next appointment.     As a Provider, I will:    Listen carefully to your concerns and treat you with respect.     Recommend a treatment plan that I believe is in your best interest. This plan may involve therapies other than opioid pain medication.     Talk with you often about the possible benefits, and the risk of harm of any medicine that we prescribe for you.     Provide a plan on how to taper (discontinue or go off) using this medicine if the decision is made to stop its use.    As a Patient, I understand that opioid(s):     Are a controlled substance prescribed by my care team to help me function or work and manage my condition(s).     Are strong medicines and can cause serious side effects such as:    Drowsiness, which can seriously affect my driving ability    A lower breathing rate, enough to cause death    Harm to my thinking ability     Depression     Abuse of and addiction to this medicine    Need to be taken exactly as prescribed. Combining opioids with certain medicines or chemicals (such as illegal drugs, sedatives, sleeping pills, and benzodiazepines) can be dangerous or even fatal. If I stop opioids suddenly, I may have severe withdrawal symptoms.    Do not work for all types of pain nor for all patients. If they re not helpful, I may  be asked to stop them.        The risks, benefits and side effects of these medicine(s) were explained to me. I agree that:  1. I will take part in other treatments as advised by my care team. This may be psychiatry or counseling, physical therapy, behavioral therapy, group treatment or a referral to a specialist.     2. I will keep all my appointments. I understand that this is part of the monitoring of opioids. My care team may require an office visit for EVERY opioid/controlled substance refill. If I miss appointments or don t follow instructions, my care team may stop my medicine.    3. I will take my medicines as prescribed. I will not change the dose or schedule unless my care team tells me to. There will be no refills if I run out early.     4. I may be asked to come to the clinic and complete a urine drug test or complete a pill count at any time. If I don t give a urine sample or participate in a pill count, the care team may stop my medicine.    5. I will only receive prescriptions from this clinic for chronic pain. If I am treated by another provider for acute pain issues, I will tell them that I am taking opioid pain medication for chronic pain and that I have a treatment agreement with this provider. I will inform my [facility] care team within one business day if I am given a prescription for any pain medication by another healthcare provider. My [facility] care team can contact other providers and pharmacists about my use of any medicines.    6. It is up to me to make sure that I don t run out of my medicines on weekends or holidays. If my care team is willing to refill my opioid prescription without a visit, I must request refills only during office hours. Refills may take up to 3 business days to process. I will use one pharmacy to fill all my opioid and other controlled substance prescriptions. I will notify the clinic about any changes to my insurance or medication  availability.    7. I am responsible for my prescriptions. If the medicine/prescription is lost, stolen or destroyed, it will not be replaced. I also agree not to share controlled substance medicines with anyone.    8. I am aware I should not use any illegal or recreational drugs. I agree not to drink alcohol unless my care team says I can.       9. If I enroll in the [place] [facility] Cann[facility] program, I will tell my care team prior to my next refill.     10. I will tell my care team right away if I become pregnant, have a new medical problem treated outside of my regular clinic, or have a change in my medications.    11. I understand that this medicine can affect my thinking, judgment and reaction time. Alcohol and drugs affect the brain and body, which can affect the safety of my driving. Being under the influence of alcohol or drugs can affect my decision-making, behaviors, personal safety, and the safety of others. Driving while impaired (DWI) can occur if a person is driving, operating, or in physical control of a car, motorcycle, boat, snowmobile, ATV, motorbike, off-road vehicle, or any other motor vehicle ([place] Statute 169A.20). I understand the risk if I choose to drive or operate any vehicle or machinery.    I understand that if I do not follow any of the conditions above, my prescriptions or treatment may be stopped or changed.          Opioids  What You Need to Know    What are opioids?   Opioids are pain medicines that must be prescribed by a doctor. They are also known as narcotics.     Examples are:   1. morphine (MS Contin, Christine)  2. oxycodone (Oxycontin)  3. oxycodone and acetaminophen (Percocet)  4. hydrocodone and acetaminophen (Vicodin, Norco)   5. fentanyl patch (Duragesic)   6. hydromorphone (Dilaudid)   7. methadone  8. codeine (Tylenol #3)     What do opioids do well?   Opioids are best for severe short-term pain such as after a surgery or injury. They may work well for cancer pain. They may  help some people with long-lasting (chronic) pain.     What do opioids NOT do well?   Opioids never get rid of pain entirely, and they don t work well for most patients with chronic pain. Opioids don t reduce swelling, one of the causes of pain.                                    Other ways to manage chronic pain and improve function include:       Treat the health problem that may be causing pain    Anti-inflammation medicines, which reduce swelling and tenderness, such as ibuprofen (Advil, Motrin) or naproxen (Aleve)    Acetaminophen (Tylenol)    Antidepressants and anti-seizure medicines, especially for nerve pain    Topical treatments such as patches or creams    Injections or nerve blocks    Chiropractic or osteopathic treatment    Acupuncture, massage, deep breathing, meditation, visual imagery, aromatherapy    Use heat or ice at the pain site    Physical therapy     Exercise    Stop smoking    Take part in therapy       Risks and side effects     Talk to your doctor before you start or decide to keep taking opioids. Possible side effects include:      Lowering your breathing rate enough to cause death    Overdose, including death, especially if taking higher than prescribed doses    Worse depression symptoms; less pleasure in things you usually enjoy    Feeling tired or sluggish    Slower thoughts or cloudy thinking    Being more sensitive to pain over time; pain is harder to control    Trouble sleeping or restless sleep    Changes in hormone levels (for example, less testosterone)    Changes in sex drive or ability to have sex    Constipation    Unsafe driving    Itching and sweating    Dizziness    Nausea, throwing up and dry mouth    What else should I know about opioids?    Opioids may lead to dependence, tolerance, or addiction.      Dependence means that if you stop or reduce the medicine too quickly, you will have withdrawal symptoms. These include loose poop (diarrhea), jitters, flu-like symptoms,  nervousness and tremors. Dependence is not the same as addiction.                       Tolerance means needing higher doses over time to get the same effect. This may increase the chance of serious side effects.      Addiction is when people improperly use a substance that harms their body, their mind or their relations with others. Use of opiates can cause a relapse of addiction if you have a history of drug or alcohol abuse.      People who have used opioids for a long time may have a lower quality of life, worse depression, higher levels of pain and more visits to doctors.    You can overdose on opioids. Take these steps to lower your risk of overdose:    1. Recognize the signs:  Signs of overdose include decrease or loss of consciousness (blackout), slowed breathing, trouble waking up and blue lips. If someone is worried about overdose, they should call 911.    2. Talk to your doctor about Narcan (naloxone).   If you are at risk for overdose, you may be given a prescription for Narcan. This medicine very quickly reverses the effects of opioids.   If you overdose, a friend or family member can give you Narcan while waiting for the ambulance. They need to know the signs of overdose and how to give Narcan.     3. Don't use alcohol or street drugs.   Taking them with opioids can cause death.    4. Do not take any of these medicines unless your doctor says it s OK. Taking these with opioids can cause death:    Benzodiazepines, such as lorazepam (Ativan), alprazolam (Xanax) or diazepam (Valium)    Muscle relaxers, such as cyclobenzaprine (Flexeril)    Sleeping pills like zolpidem (Ambien)     Other opioids      How to keep you and other people safe while taking opioids:    1. Never share your opioids with others.  Opioid medicines are regulated by the Drug Enforcement Agency ([place]). Selling or sharing medications is a criminal act.    2. Be sure to store opioids in a secure place, locked up if possible. Young children  can easily swallow them and overdose.    3. When you are traveling with your medicines, keep them in the original bottles. If you use a pill box, be sure you also carry a copy of your medicine list from your clinic or pharmacy.    4. Safe disposal of opioids    Most pharmacies have places to get rid of medicine, called disposal kiosks. Medicine disposal options are also available in every [facility]. Search your county and  medication disposal  to find more options. You can find more details at:  [email or web address]     I agree that my provider, clinic care team, and pharmacy may work with any city, state or federal law enforcement agency that investigates the misuse, sale, or other diversion of my controlled medicine. I will allow my provider to discuss my care with, or share a copy of, this agreement with any other treating provider, pharmacy or emergency room where I receive care.    I have read this agreement and have asked questions about anything I did not understand.    _______________________________________________________  Patient Signature - Aminata KHAN West _____________________                   Date     _______________________________________________________  Provider Signature - PEDRO Dominguez CNP   _____________________                   Date     _______________________________________________________  Witness Signature (required if provider not present while patient signing)   _____________________                   Date

## 2021-03-31 NOTE — LETTER
3/31/2021         RE: Aminata Gale  [address]   [place] [facility] [address]        Dear Colleague,    Thank you for referring your patient, Aminata Gale, to the [facility]. Please see a copy of my visit note below.    [facility]  SPECIALIZING IN BREAKTHROUGHS  Radiation Oncology    On Treatment Visit Note      Aminata Gale      Date: 3/31/2021   MRN: [id number]   : 1950  Diagnosis: Anal cancer      Reason for Visit:  On Radiation Treatment Visit     Treatment Summary to Date  Treatment Site: pelvis Current Dose: 4680/5400 cGy Fractions:       Chemotherapy  Chemo concurrent with radx?: Yes  Oncologist: Dr. Sherry Devlin  Drug Name/Frequency 1: 5 FU/mitomycin    Subjective:   Doing okay. No bleeding per rectum.  No consistent loose stool.  Applying skin creams. Using baby wipes and Sitz baths.  Increase in radha-anal discomfort, but reasonably controlled. Using morphine 15mg TID and oxycodone 5mg q4-6 hours, ibuprofen PRN during day, and morphine topical gel. Receiving IVF.     Nursing ROS:   Nutrition Alteration  Diet Type: Patient's Preference  Skin  Skin Reaction: 0 - No changes     ENT and Mouth Exam  ENT/Mouth Note: no issues  Cardiovascular  Respiratory effort: 1 - Normal - without distress  Gastrointestinal  GI Note: no issues  Genitourinary  Urinary Status: 0 - Normal     Pain Assessment  0-10 Pain Scale: 0      Objective:   /71   Pulse 94   Resp 18   Wt 63 kg (139 lb)   SpO2 95%   BMI 27.15 kg/m    NAD  Moderate diffuse erythema with areas of  desquamation in radha-anal area and bilateral groin.      Assessment:  Ms. Gale is a 70 year old female with newly diagnosed, locally advanced anal cancer, cT2/3N0M0 as the primary lesion, visible at the anal verge, right to the midline, has a maximal dimension of 4.7 cm on MRI. She is undergoingdefinitive chemoradiation, concurrent with 5-Fluorouracil/Mitomycin-C.     Tolerating radiation therapy well.  All  questions and concerns addressed.    Plan:   1. Continue current therapy.   2. Continue skin care. Proshield.  Sitz baths. Squirt bottle for irrigation.   3. Cancer related pain. Morphine 15mg TID. Oxycodone PRN pain. Morphine topical gel. Silvadene for areas of desquamation.  4. GI bother. Imodium PRN.      Mosaiq chart and setup information reviewed  Ports checked    Medication Review  Med list reviewed with patient?: Yes           Cosme Soto MD

## 2021-03-31 NOTE — PROGRESS NOTES
[facility]  SPECIALIZING IN BREAKTHROUGHS  Radiation Oncology    On Treatment Visit Note      Aminata Gale      Date: 3/31/2021   MRN: [id number]   : 1950  Diagnosis: Anal cancer      Reason for Visit:  On Radiation Treatment Visit     Treatment Summary to Date  Treatment Site: pelvis Current Dose: 4680/5400 cGy Fractions:       Chemotherapy  Chemo concurrent with radx?: Yes  Oncologist: Dr. Sherry Devlin  Drug Name/Frequency 1: 5 FU/mitomycin    Subjective:   Doing okay. No bleeding per rectum.  No consistent loose stool.  Applying skin creams. Using baby wipes and Sitz baths.  Increase in radha-anal discomfort, but reasonably controlled. Using morphine 15mg TID and oxycodone 5mg q4-6 hours, ibuprofen PRN during day, and morphine topical gel. Receiving IVF.     Nursing ROS:   Nutrition Alteration  Diet Type: Patient's Preference  Skin  Skin Reaction: 0 - No changes     ENT and Mouth Exam  ENT/Mouth Note: no issues  Cardiovascular  Respiratory effort: 1 - Normal - without distress  Gastrointestinal  GI Note: no issues  Genitourinary  Urinary Status: 0 - Normal     Pain Assessment  0-10 Pain Scale: 0      Objective:   /71   Pulse 94   Resp 18   Wt 63 kg (139 lb)   SpO2 95%   BMI 27.15 kg/m    NAD  Moderate diffuse erythema with areas of  desquamation in radha-anal area and bilateral groin.      Assessment:  Ms. Gale is a 70 year old female with newly diagnosed, locally advanced anal cancer, cT2/3N0M0 as the primary lesion, visible at the anal verge, right to the midline, has a maximal dimension of 4.7 cm on MRI. She is undergoingdefinitive chemoradiation, concurrent with 5-Fluorouracil/Mitomycin-C.     Tolerating radiation therapy well.  All questions and concerns addressed.    Plan:   1. Continue current therapy.   2. Continue skin care. Proshield.  Sitz baths. Squirt bottle for irrigation.   3. Cancer related pain. Morphine 15mg TID. Oxycodone PRN pain. Morphine topical gel.  Silvadene for areas of desquamation.  4. GI bother. Imodium PRN.      Mosaiq chart and setup information reviewed  Ports checked    Medication Review  Med list reviewed with patient?: Yes           Cosme Soto MD

## 2021-04-02 NOTE — PROGRESS NOTES
Infusion Nursing Note:  Aminata Gale presents today for IVF's.    Patient seen by provider today: No   present during visit today: Not Applicable.    Note: Patient did meet criteria for an asymptomatic covid-19 PCR test in infusion today. Patient declined the covid-19 test.    Intravenous Access:  Implanted Port; already accessed, with 5FU pump infusing.    Treatment Conditions:  Not Applicable.      Post Infusion Assessment:  Patient tolerated infusion without incident.  Blood return noted pre and post infusion.  5FU pump in continuous mode - to be removed tomorrow.       Discharge Plan:   Patient discharged in stable condition accompanied by: self.  Departure Mode: Ambulatory.    Blanka Pablo RN

## 2021-04-02 NOTE — PROGRESS NOTES
Infusion Nursing Note:  Aminata Gale presents today for IVF's, 5FU pump d/c.    Patient seen by provider today: No   present during visit today: Not Applicable.    Note:   Patient did meet criteria for an asymptomatic covid-19 PCR test in infusion today. Patient declined the covid-19 test.    Intravenous Access:  Implanted Port; pt arrived with it accessed - chemo infusing.     Post Infusion Assessment:  Patient tolerated infusion without incident.  Blood return noted pre and post infusion.  Site patent and intact, free from redness, edema or discomfort.  No evidence of extravasations.  Access discontinued per protocol.       Discharge Plan:   Patient discharged in stable condition accompanied by: cousin.  Departure Mode: Wheelchair.    Blanka Pablo RN

## 2021-04-04 PROBLEM — C21.1 MALIGNANT NEOPLASM OF ANAL CANAL (H): Status: ACTIVE | Noted: 2021-01-01

## 2021-04-04 PROBLEM — L98.499: Status: ACTIVE | Noted: 2021-01-01

## 2021-04-04 PROBLEM — I51.89 DIASTOLIC DYSFUNCTION: Status: ACTIVE | Noted: 2019-06-20

## 2021-04-04 PROBLEM — I25.10 NONOCCLUSIVE CORONARY ATHEROSCLEROSIS OF NATIVE CORONARY ARTERY: Status: ACTIVE | Noted: 2019-06-20

## 2021-04-04 PROBLEM — M62.81 GENERALIZED MUSCLE WEAKNESS: Status: ACTIVE | Noted: 2021-01-01

## 2021-04-04 PROBLEM — R19.7 DIARRHEA, UNSPECIFIED TYPE: Status: ACTIVE | Noted: 2021-01-01

## 2021-04-04 NOTE — ED PROVIDER NOTES
History     Chief Complaint   Patient presents with     Diarrhea     chemo Friday and diarrhea since then     HPI  Aminata Gale is a 70 year old female with a past medical history significant for anal squamous cell carcinoma on chemotherapy undergoing radiation.  Last chemotherapy was on Friday with 5-FU being given patient states since Friday she has had nonstop diarrhea she is had watery loose stools and cannot keep the pain meds on her rectum.  She denies any fevers or chills has not had any chest pain or shortness of breath.  She states she has had some crampy lower abdominal pain.  Has not had any chest pain or shortness of breath denies focal numbness weakness in her extremities.  She states her bottom is raw and she is having trouble sleeping currently rates her pain a 2 out of 10.  She has not had a headache.  She is moderately nauseated.  Has not vomited.  Other than rash per rectum no other rash.    Allergies:  Allergies   Allergen Reactions     Amoxicillin Difficulty breathing and Rash     Ampicillin Difficulty breathing and Rash     Cipro [Ciprofloxacin] Difficulty breathing and Rash     Keflex [Cephalexin Monohydrate] Difficulty breathing and Rash     Penicillin [Penicillins] Difficulty breathing and Rash     Sulfa Drugs Difficulty breathing and Rash     Tetracycline Difficulty breathing and Rash     Biaxin [Clarithromycin] Rash     Ceclor [Cefaclor] Hives and Swelling     Advair Diskus Hives     Hives on face,neck and chest     Egg White      Fish Shortness Of Breath     Mustard [Allyl Isothiocyanate]      Throat swelling       Problem List:    Patient Active Problem List    Diagnosis Date Noted     Dehydration 03/22/2021     Priority: Medium     Chemotherapy induced nausea and vomiting 03/22/2021     Priority: Medium     Anal squamous cell carcinoma (H) 02/04/2021     Priority: Medium     Tubular adenoma of colon 11/06/2019     Priority: Medium     Collected: 10/31/2019   Received: 11/1/2019    Reported: 11/4/2019 18:13   Ordering Phy(s): MAKSIM LOGAN     For improved result formatting, select 'View Enhanced Report Format' under    Linked Documents section.     SPECIMEN(S):   Colon polyp, ascending     FINAL DIAGNOSIS:   Right colon, mucosal biopsy:   - Tubular adenoma.   - Negative for high-grade dysplasia and malignancy.        Colon polyp 11/06/2019     Priority: Medium     Tubular adenoma polyp       Nonocclusive coronary atherosclerosis of native coronary artery 06/20/2019     Priority: Medium     Diastolic dysfunction 06/20/2019     Priority: Medium     Type 2 diabetes mellitus with microalbuminuria, without long-term current use of insulin (H) 03/11/2019     Priority: Medium     Type 2 diabetes mellitus with hyperglycemia, without long-term current use of insulin (H) 02/24/2019     Priority: Medium     NEW DIAGNOSIS 2/2019       TMJ (temporomandibular joint syndrome) 02/02/2018     Priority: Medium     Acute cholecystitis 07/01/2017     Priority: Medium     Centrilobular emphysema (H) 02/10/2017     Priority: Medium     Grief reaction 12/02/2016     Priority: Medium     Refused influenza vaccine 01/08/2016     Priority: Medium     Anxiety 05/12/2015     Priority: Medium     Moderate persistent asthma 09/20/2013     Priority: Medium     Reports she was diagnosed with PFT's.         Chronic pain 09/20/2013     Priority: Medium     Pain Eval- Muscle pain related to Lupus.  Location of Pain: Forearms and legs  Duration of Pain: started age 36  Rating of Pain: 8-9/10  Pain is better with: Pain meds and warm water help.   Pain is worse with:Cold, change in the weather   Treatment so far consists of: Ibuprofen and Warmth.     Followed by   [facility]  [phone]  [address] [address]    [address] lasts about a year       :         HTN, goal below 140/90 09/20/2013     Priority: Medium     Acne 02/25/2013     Priority: Medium     SK (seborrheic  keratosis) 02/25/2013     Priority: Medium     Lentigo 02/25/2013     Priority: Medium     Angioma 02/25/2013     Priority: Medium     Advanced directives, counseling/discussion 10/13/2011     Priority: Medium     Advance Directive Problem List Overview:   Name Relationship Phone    Primary Health Care Agent            Alternative Health Care Agent          Discussed advance care planning with patient; information given to patient to review. 10/13/2011     Anjelica Fitzgerald, CMA, HC Facilitator           Hiatal hernia 05/04/2011     Priority: Medium     Osteoporosis 10/18/2010     Priority: Medium     10/10 - severe osteopenia in femoral necks, mild osteopenia in spine  (Problem list name updated by automated process. Provider to review and confirm.)       Hyperlipidemia LDL goal <130 09/22/2010     Priority: Medium     Recent Labs   Lab Test 9/22/10 0908 10/12/06 0939     CHOL 214* 256*     HDL 31* 42*     * 186*     TRIG 149 143     CHOLHDLRATIO 7.0* 6.0*     4/8/11 - dobutamine echo showed NO infarct or ischemia LV 60-65%, normal exercise response.          Smoker 06/24/2008     Priority: Medium     Other nonspecific abnormal result of function study of brain and central nervous system 11/14/2006     Priority: Medium     Essential hypertension, benign 10/17/2006     Priority: Medium     Allergic rhinitis 10/17/2006     Priority: Medium     Problem list name updated by automated process. Provider to review       Polycythemia, secondary 10/17/2006     Priority: Medium     Due to smoking       Female stress incontinence 10/17/2006     Priority: Medium     Esophageal reflux 04/03/2006     Priority: Medium     Referred to: Date of Consult:  12/11/2006  Manish Knowles MD  [facility]  [phone]    Reccomendations:  Upper GI endoscopy. Possible canidate for antireflux surgery.       Systemic lupus erythematosus (H) 12/07/2005     Priority: Medium     Diagnosed in 1980's; history of pericarditis;  was previously treated with mtx; not on active treatment; no hx of renal dz from the lupus.           Past Medical History:    Past Medical History:   Diagnosis Date     Acute pericarditis, unspecified      Cancer (H)      Chronic airway obstruction 2005     Dysuria      Esophageal reflux      GERD (gastroesophageal reflux disease) 2013     Hiatal hernia 2011     HTN, goal below 140/90 2013     Hyperlipidemia LDL goal <130 2010     Lupus (H)      Osteoporosis 10/18/2010     Pure hypercholesterolemia      SECONDARY POLYCYTHEMIA 10/17/2006     Smoker 2008     Systemic lupus erythematosus 2005     Tobacco use disorder      Type 2 diabetes mellitus with hyperglycemia, without long-term current use of insulin (H) 2019     Unspecified essential hypertension        Past Surgical History:    Past Surgical History:   Procedure Laterality Date     C APPENDECTOMY       C REPAIR GUM      Cathy Dontal surgery     C/SECTION, LOW TRANSVERSE      , Low Transverse     COLONOSCOPY  2009     D & C      X 5     HC RECONSTR NOSE+DAVE SEPTAL REPAIR       HYSTERECTOMY, BRI      Hx of dysplasia     LAPAROSCOPIC CHOLECYSTECTOMY N/A 2017    Procedure: LAPAROSCOPIC CHOLECYSTECTOMY;  Laparoscopic Cholecystectomy;  Surgeon: Tami Barney MD;  Location: [facility]     SINUS SURGERY      Reconstruction       Family History:    Family History   Problem Relation Age of Onset     Diabetes Mother      Gastrointestinal Disease Mother         Gallbladder disease     Heart Disease Mother      Cancer Father         lung     Alcohol/Drug Father      Allergies Father      Heart Disease Father      Gastrointestinal Disease Father         Liver disease     Skin Cancer Father      Heart Disease Maternal Grandfather      Arthritis Sister      Osteoporosis Sister      Thyroid Disease Sister      Allergies Son      Alcohol/Drug Sister      Alcohol/Drug Sister      Melanoma No family hx of   "      Social History:  Marital Status:   [5]  Social History     Tobacco Use     Smoking status: Current Every Day Smoker     Packs/day: 0.01     Years: 42.00     Pack years: 0.42     Types: Cigarettes     Smokeless tobacco: Never Used     Tobacco comment: Has chantix at home, but hasn't started yet. 5/9/19.   Substance Use Topics     Alcohol use: Yes     Alcohol/week: 0.0 standard drinks     Comment: Occ. wine     Drug use: No        Medications:    albuterol (PROAIR HFA/PROVENTIL HFA/VENTOLIN HFA) 108 (90 Base) MCG/ACT inhaler  alcohol swab prep pads  amLODIPine (NORVASC) 10 MG tablet  aspirin (ASA) 81 MG tablet  blood glucose (MILLIE MICROLET 2) lancing device  cetirizine (ZYRTEC) 10 MG tablet  ezetimibe (ZETIA) 10 MG tablet  fluocinonide (LIDEX) 0.05 % external solution  fluticasone-vilanterol (BREO ELLIPTA) 200-25 MCG/INH inhaler  Ibuprofen (IBU-200 PO)  ipratropium - albuterol 0.5 mg/2.5 mg/3 mL (DUONEB) 0.5-2.5 (3) MG/3ML neb solution  ketoconazole (NIZORAL) 2 % external shampoo  loratadine (CLARITIN) 10 MG tablet  losartan (COZAAR) 25 MG tablet  magic mouthwash (ENTER INGREDIENTS IN COMMENTS) suspension  morphine (MS CONTIN) 15 MG CR tablet  morphine 0.1% in intrasite topical gel  oxyCODONE (ROXICODONE) 5 MG/5ML solution  silver sulfADIAZINE (SILVADENE) 1 % external cream  STATIN NOT PRESCRIBED (INTENTIONAL)  tiotropium (SPIRIVA RESPIMAT) 2.5 MCG/ACT inhaler  torsemide (DEMADEX) 5 MG tablet          Review of Systems  All systems were reviewed and other than pertinent positives negatives in HPI all other systems are negative.  Physical Exam   BP: 114/62  Pulse: 101  Temp: 96.7  F (35.9  C)  Resp: 18  Height: 170.2 cm (5' 7\")  Weight: 62.6 kg (138 lb)      Physical Exam  Vitals signs and nursing note reviewed.   Constitutional:       Appearance: Normal appearance. She is ill-appearing. She is not toxic-appearing or diaphoretic.      Comments: Mild distress.   HENT:      Head: Normocephalic and " atraumatic.      Nose: Nose normal.      Mouth/Throat:      Mouth: Mucous membranes are moist.      Pharynx: No oropharyngeal exudate or posterior oropharyngeal erythema.   Eyes:      Conjunctiva/sclera: Conjunctivae normal.   Neck:      Musculoskeletal: Normal range of motion and neck supple.   Cardiovascular:      Rate and Rhythm: Normal rate and regular rhythm.      Pulses: Normal pulses.      Heart sounds: Normal heart sounds. No murmur.   Pulmonary:      Effort: Pulmonary effort is normal.      Breath sounds: Normal breath sounds. No wheezing, rhonchi or rales.      Comments: Right-sided port in place no erythema edema present.  Chest:      Chest wall: No tenderness.   Abdominal:      General: Abdomen is flat. Bowel sounds are normal. There is no distension.      Palpations: Abdomen is soft.      Tenderness: There is no abdominal tenderness. There is no right CVA tenderness or left CVA tenderness.   Genitourinary:     Comments: There is significant sloughing of skin and radiation injury to the patient's rectum and surrounding region rectum is very tender to palpation heme negative  stool normal tone.  Musculoskeletal: Normal range of motion.      Right lower leg: No edema.      Left lower leg: No edema.   Skin:     General: Skin is warm and dry.      Findings: No rash.   Neurological:      General: No focal deficit present.      Mental Status: She is alert and oriented to person, place, and time.      Motor: No weakness.      Coordination: Coordination normal.   Psychiatric:         Mood and Affect: Mood normal.         ED Course        Procedures               Critical Care time:  none               Results for orders placed or performed during the hospital encounter of 04/04/21 (from the past 24 hour(s))   CBC with platelets differential   Result Value Ref Range    WBC 4.2 4.0 - 11.0 10e9/L    RBC Count 4.36 3.8 - 5.2 10e12/L    Hemoglobin 13.4 11.7 - 15.7 g/dL    Hematocrit 40.9 35.0 - 47.0 %    MCV 94 78 -  100 fl    MCH 30.7 26.5 - 33.0 pg    MCHC 32.8 31.5 - 36.5 g/dL    RDW 15.8 (H) 10.0 - 15.0 %    Platelet Count 113 (L) 150 - 450 10e9/L    Diff Method Automated Method     % Neutrophils 90.0 %    % Lymphocytes 2.6 %    % Monocytes 1.4 %    % Eosinophils 4.3 %    % Basophils 0.5 %    % Immature Granulocytes 1.2 %    Nucleated RBCs 0 0 /100    Absolute Neutrophil 3.8 1.6 - 8.3 10e9/L    Absolute Lymphocytes 0.1 (L) 0.8 - 5.3 10e9/L    Absolute Monocytes 0.1 0.0 - 1.3 10e9/L    Absolute Eosinophils 0.2 0.0 - 0.7 10e9/L    Absolute Basophils 0.0 0.0 - 0.2 10e9/L    Abs Immature Granulocytes 0.1 0 - 0.4 10e9/L    Absolute Nucleated RBC 0.0    Basic metabolic panel   Result Value Ref Range    Sodium 140 133 - 144 mmol/L    Potassium 3.6 3.4 - 5.3 mmol/L    Chloride 106 94 - 109 mmol/L    Carbon Dioxide 28 20 - 32 mmol/L    Anion Gap 6 3 - 14 mmol/L    Glucose 122 (H) 70 - 99 mg/dL    Urea Nitrogen 25 7 - 30 mg/dL    Creatinine 0.78 0.52 - 1.04 mg/dL    GFR Estimate 76 >60 mL/min/[1.73_m2]    GFR Estimate If Black 88 >60 mL/min/[1.73_m2]    Calcium 8.2 (L) 8.5 - 10.1 mg/dL         Results for orders placed or performed during the hospital encounter of 04/04/21   CT Abdomen Pelvis w Contrast    Narrative    CT ABDOMEN AND PELVIS WITH CONTRAST  4/4/2021 3:06 PM    CLINICAL HISTORY: Abdominal pain, acute, nonlocalized.    TECHNIQUE: CT scan of the abdomen and pelvis was performed following  injection of IV contrast. Multiplanar reformats were obtained. Dose  reduction techniques were used.  CONTRAST: 67 mL Isovue-370    COMPARISON: CT abdomen and pelvis 12/21/2020.    FINDINGS:   LOWER CHEST: Normal.    HEPATOBILIARY: No acute abnormality. No focal lesion identified.  Status post cholecystectomy.    PANCREAS: Normal.    SPLEEN: Normal.    ADRENAL GLANDS: Normal.    KIDNEYS/BLADDER: Atrophy of the upper right kidney again identified. A  few renal cysts not requiring specific imaging follow-up. However,  there is a stable  hypodense rounded lesion that is complex measuring 1  cm right mid kidney, series 5 image 87. There are a few nonobstructing  intrarenal stones bilaterally appearing stable. No hydronephrosis. No  acute bladder abnormality.    BOWEL: Some wall thickening of the colon diffusely appears mild. Small  fluid distends a few distal small bowel loops. No abscess or free air.  Anal canal mass towards the right is smaller in size measuring 2.8 x  1.6, previously 3 x 2.7 cm, image 200.    PELVIC ORGANS: No acute abnormality. Unremarkable right ovary. Small  left ovarian cyst is stable measuring 1.4 cm, image 125.    ADDITIONAL FINDINGS: Vascular calcifications. Stable infrarenal  abdominal aortic aneurysm that is 3.7 cm, image 93. No adjacent  hematoma. No new enlarged lymph nodes identified.    MUSCULOSKELETAL: No aggressive bony lesion. L5-S1 degenerative disc  disease.      Impression    IMPRESSION:   1.  Some wall thickening of the colon may be a mild colitis. No  abscess or free air.  2.  Smaller size of the right anal canal mass.  3.  Stable infrarenal abdominal aortic aneurysm that currently  measures 3.7 cm.  4.  Stable but indeterminate hypodense rounded nodule at the right  kidney that could be a complex cyst versus solid lesion. Recommend  nonurgent renal MRI for further characterization.  5.  Bilateral nonobstructing intrarenal stones.    BNE BRO MD     I can get my way on today  Assessments & Plan (with Medical Decision Making) records were reviewed.  Labs were obtained.  Patient was given IV fluids.  She was given hydromorphone for pain.  CBC was significant for a platelet count decreased at 113.  This is decreased from previous.  Basic metabolic panel without significant abnormality.  Hepatic panel with an ALT slightly elevated at 87 lipase was 31.  Urinalysis with 20 ketones small blood positive nitrate trace leukocyte Estrace 11 WBCs.  A urine culture was sent.  Due to mild lower abdominal pain and  diarrhea a CT scan of the abdomen pelvis was obtained.  This revealed some mild thickening of the colon which may be colitis no other acute abnormalities noted.  Due to patient's continued rectal pain and diarrhea I gave the patient lidocaine numbing gel.  In discussion with patient she is very weak and tired and with the significant diarrhea I think she will be more comfortable being admitted for observation with cares and work-up of the diarrhea and weakness.  She feels good about this decision I therefore discussed the case with Hien LA[place] with hospitalist service and she is in agreement with admission of the patient for further evaluation and care.     I have reviewed the nursing notes.    I have reviewed the findings, diagnosis, plan and need for follow up with the patient.       New Prescriptions    No medications on file       Final diagnoses:   Generalized muscle weakness   Diarrhea, unspecified type   Malignant neoplasm of anal canal (H)   Radiation skin ulcer (H)       4/4/2021   [facility] EMERGENCY DEPT     Los Byrnes MD  04/06/21 [id number]

## 2021-04-05 PROBLEM — K52.9 COLITIS: Status: ACTIVE | Noted: 2021-01-01

## 2021-04-05 NOTE — PLAN OF CARE
Observation Brochure and Video    Patient and family informed of observation status based on provider's order.  Observation brochure was offered, but state they already received brochure.   Hermelinda Wick RN

## 2021-04-05 NOTE — PROGRESS NOTES
"[facility] Nurse Inpatient Wound Assessment   Reason for consultation: Evaluate and treat radiation dermatitis to perineal and perianal area  Assessment  Radiation dermatitis/burns with resulting denudements in perineal and perianal area   Status: initial assessment    Treatment Plan  Skin Cares to Radiation Dermatitis  Patient to lead frequency of cares and order of topical ointment application.  Recommending with toileting that patient cleanse area using warm water and radha bottle to flush area and disposable wash cloths with warm water to pat. OK to use no rinse cleanser that patient has from home with cleansing.    Apply patient's topical products per her direction - she has silvadene ointment, Proshield, and Morphine gel. She also has lidocaine ointment available through the pharmacy. Products to be used to help soothe and heal area. If any are causing burning or pain, omit use. Proshield is applied last over other products to help \"seal\" them in.    Leave brief off and area open to air.    Apply ointments with toileting per patient's direction.    Orders Written  Recommended provider order: None, at this time  [facility] Nurse follow-up plan:prn  Nursing to notify the Provider(s) and re-consult the [facility] Nurse if wound(s) deteriorates or new skin concern.    Patient History  According to provider note(s):   Aminata Gale is a 70 year old female admitted on 4/4/2021. She presented to the emergency department for evaluation of diarrhea and in the setting of known generalized weakness anal cancer on chemo and radiation for which she is being admitted for further evaluation and treatment.    Anal squamous cell carcinoma  Malignant neoplasm of anal canal   Cancer / radiation related chronic pain  Rectal skin sloughing / Radiation skin ulcer  Diagnosed January 2021. Follows with [facility] and recently completed her chemo course, but receives radiation with Dr. Soto at [facility] (2 doses left on 4/5 and 4/6). Patient " has rectal skin sloughing and maceration that is very painful, made worse by diarrhea. Has been unable to swallow her pills recently, leading to poor pain control. Managed prior to admission with prn MS contin 15 mg q 8 hours prn, oxycodone 5-7.5 mg q 4 hours prn, topical morphine, and barrier cream (Pro Shield).  - Patient has 11:30 appointment for radiation with Dr. Soto on 4/5, continue  - Continue prior to admission MS contin and oxycodone prn, with IV dilaudid available for breakthrough pain or when unable to take oral  - Continue topical morphine if patient has her home cream with her and verified by pharmacy  - Prn topical lidocaine gel available  - Continue with barrier cream - patient has been using Pro Shield (unable to tolerate Aquaphor)  - Wound cares each shift    Objective Data  Containment of urine/stool: Continent of bladder and Continent of bowel; patient was wearing brief because of fecal incontinence with diarrhea; brief removed this morning which is optimal for allowing area to heal    Active Diet Order  Orders Placed This Encounter      Advance Diet as Tolerated: Clear Liquid Diet      Output:   I/O last 3 completed shifts:  In: 152.5 [I.V.:152.5]  Out: -     Risk Assessment:   Sensory Perception: 4-->no impairment  Moisture: 2-->very moist  Activity: 3-->walks occasionally  Mobility: 3-->slightly limited  Nutrition: 2-->probably inadequate  Friction and Shear: 1-->problem  Ted Score: 15                          Labs:   Recent Labs   Lab 04/05/21  0400   ALBUMIN 2.1*   HGB 12.2   WBC 2.3*       Physical Exam  Areas of skin assessed: focused perineal and perianal    Wound Base: bright pink denuded areas surrounded by darker brown intact skin     Drainage: moderate     Description of drainage: area appears glossy from moisture     Measurements (length x width x depth, in cm)  area challenging to measure due to location; dermatitis noted on vulva/full perineum, bilateral groin folds, and  perirectal area; denudements on right groin fold extend 13 cm in length and and laterally 5-6 cm; area along vulva to perianal area extends 15 cm; denudements on left groin fold extend 12 cm in length and extend approximately 6 cm laterally       Tunneling N/A     Undermining N/A  Periwound skin: intact      Color: brown, darker than patient's skin coloring      Temperature: normal   Odor: none  Pain: yes; patient with significant pain to area; bedside nurse managing pain with oral medications; during cares, patient had burning with cleansing product so gently patted area with warm water/disposable wash cloth to get product off area which decreased the burning sensation    Interventions  Visual inspection and assessment completed  Wound Care Rationale: patient has been using topical products at home with relief until recent episode of diarrhea almost hourly in addition to nausea; plan is to resume topical  ointments with hope that now with nausea able to be controlled and Imodium in use that stool frequency will decrease allowing area to heal. Provided peribottle for rinsing area with warm water with toileting and disposable wash cloths to be used with warm water for cleansing. Ointments to then be applied per patient's direction.  Wound Care: trialed Belinda Cleanse and Protect which caused burning sensation; patted the area to remove product using warm wet disposable wash cloth which relieved the burning; bedside nurse will come in to apply ointments per patient's insturction  Supplies: placed at the bedside  (disposable white washcloths and peribottle)  Current off-loading measures: using cushion from home in bed to offload area  Current support surface: Standard  Atmos Air mattress  Education provided to: plan of care  Discussed plan of care with Patient, Family and Nurse    Beverly Perry RN  [phone]

## 2021-04-05 NOTE — PROGRESS NOTES
[facility]      Subjective:  Last chemo Friday-done with chemo  Radiation today and 4/6/21.  No fever.  Diarrhea every 90 min overnight.  Has nausea.  Pain is from perirectal area    Review of Systems:  CONSTITUTIONAL:nausea  INTEGUMENTARY/SKIN: perirectal skin breakdown  EYES: NEGATIVE for vision changes or irritation  ENT/MOUTH: NEGATIVE for ear, mouth and throat problems  RESP: NEGATIVE for significant cough or SOB  BREAST: NEGATIVE for masses, tenderness or discharge  CV: NEGATIVE for chest pain, palpitations or peripheral edema  GI: nausea , diarrhea  : NEGATIVE for frequency, dysuria, or hematuria   female: perirectal skin breakdown  MUSCULOSKELETAL: NEGATIVE for significant arthralgias or myalgia  NEURO: NEGATIVE for weakness, dizziness or paresthesias  ENDOCRINE: NEGATIVE for temperature intolerance, skin/hair changes  HEME: NEGATIVE for bleeding problems  PSYCHIATRIC: NEGATIVE for changes in mood or affect    Physical Exam:  Vitals Were Reviewed    Patient Vitals for the past 16 hrs:   BP Temp Temp src Pulse Resp SpO2   04/05/21 0703 125/54 98.4  F (36.9  C) Oral 101 16 90 %   04/05/21 0437 135/65 98.2  F (36.8  C) Oral 99 18 91 %   04/04/21 2034 125/70 97.8  F (36.6  C) Oral 98 18 94 %         Intake/Output Summary (Last 24 hours) at 4/5/2021 0800  Last data filed at 4/4/2021 2300  Gross per 24 hour   Intake 152.5 ml   Output --   Net 152.5 ml       GENERAL APPEARANCE: healthy, alert and no distress  RESP: lungs clear to auscultation - no rales, rhonchi or wheezes  CV: regular rate and rhythm, normal S1 S2, no S3 or S4 and no murmur, click or rub   ABDOMEN: soft, nontender, no HSM or masses and bowel sounds normal   (male): radiation burn to perirectal and perineum/groin  MS: no clubbing, cyanosis; no edema  SKIN: perirectal area with mucousal breakdown    Lab:  Recent Labs   Lab Test 04/05/21  0400 04/04/21  1315   * 140   POTASSIUM 3.4 3.6   CHLORIDE 113* 106   CO2 27  28   ANIONGAP 5 6   GLC 90 122*   BUN 14 25   CR 0.59 0.78   ELY 7.6* 8.2*     CBC RESULTS:   Recent Labs   Lab Test 04/05/21  0400 04/04/21  1315   WBC 2.3* 4.2   RBC 3.98 4.36   HGB 12.2 13.4   HCT 36.9 40.9   * 113*       Results for orders placed or performed during the hospital encounter of 04/04/21 (from the past 24 hour(s))   CBC with platelets differential   Result Value Ref Range    WBC 4.2 4.0 - 11.0 10e9/L    RBC Count 4.36 3.8 - 5.2 10e12/L    Hemoglobin 13.4 11.7 - 15.7 g/dL    Hematocrit 40.9 35.0 - 47.0 %    MCV 94 78 - 100 fl    MCH 30.7 26.5 - 33.0 pg    MCHC 32.8 31.5 - 36.5 g/dL    RDW 15.8 (H) 10.0 - 15.0 %    Platelet Count 113 (L) 150 - 450 10e9/L    Diff Method Automated Method     % Neutrophils 90.0 %    % Lymphocytes 2.6 %    % Monocytes 1.4 %    % Eosinophils 4.3 %    % Basophils 0.5 %    % Immature Granulocytes 1.2 %    Nucleated RBCs 0 0 /100    Absolute Neutrophil 3.8 1.6 - 8.3 10e9/L    Absolute Lymphocytes 0.1 (L) 0.8 - 5.3 10e9/L    Absolute Monocytes 0.1 0.0 - 1.3 10e9/L    Absolute Eosinophils 0.2 0.0 - 0.7 10e9/L    Absolute Basophils 0.0 0.0 - 0.2 10e9/L    Abs Immature Granulocytes 0.1 0 - 0.4 10e9/L    Absolute Nucleated RBC 0.0    Basic metabolic panel   Result Value Ref Range    Sodium 140 133 - 144 mmol/L    Potassium 3.6 3.4 - 5.3 mmol/L    Chloride 106 94 - 109 mmol/L    Carbon Dioxide 28 20 - 32 mmol/L    Anion Gap 6 3 - 14 mmol/L    Glucose 122 (H) 70 - 99 mg/dL    Urea Nitrogen 25 7 - 30 mg/dL    Creatinine 0.78 0.52 - 1.04 mg/dL    GFR Estimate 76 >60 mL/min/[1.73_m2]    GFR Estimate If Black 88 >60 mL/min/[1.73_m2]    Calcium 8.2 (L) 8.5 - 10.1 mg/dL   Hepatic panel   Result Value Ref Range    Bilirubin Direct 0.2 0.0 - 0.2 mg/dL    Bilirubin Total 0.6 0.2 - 1.3 mg/dL    Albumin 2.4 (L) 3.4 - 5.0 g/dL    Protein Total 5.4 (L) 6.8 - 8.8 g/dL    Alkaline Phosphatase 77 40 - 150 U/L    ALT 87 (H) 0 - 50 U/L    AST 28 0 - 45 U/L   Lipase   Result Value Ref Range     Lipase 31 (L) 73 - 393 U/L   CT Abdomen Pelvis w Contrast    Narrative    CT ABDOMEN AND PELVIS WITH CONTRAST  4/4/2021 3:06 PM    CLINICAL HISTORY: Abdominal pain, acute, nonlocalized.    TECHNIQUE: CT scan of the abdomen and pelvis was performed following  injection of IV contrast. Multiplanar reformats were obtained. Dose  reduction techniques were used.  CONTRAST: 67 mL Isovue-370    COMPARISON: CT abdomen and pelvis 12/21/2020.    FINDINGS:   LOWER CHEST: Normal.    HEPATOBILIARY: No acute abnormality. No focal lesion identified.  Status post cholecystectomy.    PANCREAS: Normal.    SPLEEN: Normal.    ADRENAL GLANDS: Normal.    KIDNEYS/BLADDER: Atrophy of the upper right kidney again identified. A  few renal cysts not requiring specific imaging follow-up. However,  there is a stable hypodense rounded lesion that is complex measuring 1  cm right mid kidney, series 5 image 87. There are a few nonobstructing  intrarenal stones bilaterally appearing stable. No hydronephrosis. No  acute bladder abnormality.    BOWEL: Some wall thickening of the colon diffusely appears mild. Small  fluid distends a few distal small bowel loops. No abscess or free air.  Anal canal mass towards the right is smaller in size measuring 2.8 x  1.6, previously 3 x 2.7 cm, image 200.    PELVIC ORGANS: No acute abnormality. Unremarkable right ovary. Small  left ovarian cyst is stable measuring 1.4 cm, image 125.    ADDITIONAL FINDINGS: Vascular calcifications. Stable infrarenal  abdominal aortic aneurysm that is 3.7 cm, image 93. No adjacent  hematoma. No new enlarged lymph nodes identified.    MUSCULOSKELETAL: No aggressive bony lesion. L5-S1 degenerative disc  disease.      Impression    IMPRESSION:   1.  Some wall thickening of the colon may be a mild colitis. No  abscess or free air.  2.  Smaller size of the right anal canal mass.  3.  Stable infrarenal abdominal aortic aneurysm that currently  measures 3.7 cm.  4.  Stable but  indeterminate hypodense rounded nodule at the right  kidney that could be a complex cyst versus solid lesion. Recommend  nonurgent renal MRI for further characterization.  5.  Bilateral nonobstructing intrarenal stones.    BEN BRO MD   UA reflex to Microscopic   Result Value Ref Range    Color Urine Yellow     Appearance Urine Clear     Glucose Urine Negative NEG^Negative mg/dL    Bilirubin Urine Negative NEG^Negative    Ketones Urine 20 (A) NEG^Negative mg/dL    Specific Gravity Urine 1.030 1.003 - 1.035    Blood Urine Small (A) NEG^Negative    pH Urine 5.0 5.0 - 7.0 pH    Protein Albumin Urine Negative NEG^Negative mg/dL    Urobilinogen mg/dL 0.0 0.0 - 2.0 mg/dL    Nitrite Urine Positive (A) NEG^Negative    Leukocyte Esterase Urine Trace (A) NEG^Negative    Source Unspecified Urine     RBC Urine 3 (H) 0 - 2 /HPF    WBC Urine 11 (H) 0 - 5 /HPF    Bacteria Urine Few (A) NEG^Negative /HPF    Mucous Urine Present (A) NEG^Negative /LPF   Urine Culture Aerobic Bacterial    Specimen: Midstream Urine   Result Value Ref Range    Specimen Description Midstream Urine     Special Requests Specimen received in preservative     Culture Micro PENDING    Asymptomatic SARS-CoV-2 COVID-19 Virus (Coronavirus) by PCR    Specimen: Nasopharyngeal   Result Value Ref Range    SARS-CoV-2 Virus Specimen Source Nasopharyngeal     SARS-CoV-2 PCR Result NEGATIVE     SARS-CoV-2 PCR Comment (Note)    Magnesium   Result Value Ref Range    Magnesium 2.1 1.6 - 2.3 mg/dL   Comprehensive metabolic panel   Result Value Ref Range    Sodium 145 (H) 133 - 144 mmol/L    Potassium 3.4 3.4 - 5.3 mmol/L    Chloride 113 (H) 94 - 109 mmol/L    Carbon Dioxide 27 20 - 32 mmol/L    Anion Gap 5 3 - 14 mmol/L    Glucose 90 70 - 99 mg/dL    Urea Nitrogen 14 7 - 30 mg/dL    Creatinine 0.59 0.52 - 1.04 mg/dL    GFR Estimate >90 >60 mL/min/[1.73_m2]    GFR Estimate If Black >90 >60 mL/min/[1.73_m2]    Calcium 7.6 (L) 8.5 - 10.1 mg/dL    Bilirubin Total 0.4 0.2 -  1.3 mg/dL    Albumin 2.1 (L) 3.4 - 5.0 g/dL    Protein Total 4.7 (L) 6.8 - 8.8 g/dL    Alkaline Phosphatase 68 40 - 150 U/L    ALT 81 (H) 0 - 50 U/L    AST 29 0 - 45 U/L   CBC with platelets differential   Result Value Ref Range    WBC 2.3 (L) 4.0 - 11.0 10e9/L    RBC Count 3.98 3.8 - 5.2 10e12/L    Hemoglobin 12.2 11.7 - 15.7 g/dL    Hematocrit 36.9 35.0 - 47.0 %    MCV 93 78 - 100 fl    MCH 30.7 26.5 - 33.0 pg    MCHC 33.1 31.5 - 36.5 g/dL    RDW 15.8 (H) 10.0 - 15.0 %    Platelet Count 100 (L) 150 - 450 10e9/L    Diff Method Automated Method     % Neutrophils 82.3 %    % Lymphocytes 5.3 %    % Monocytes 2.7 %    % Eosinophils 8.4 %    % Basophils 0.4 %    % Immature Granulocytes 0.9 %    Nucleated RBCs 0 0 /100    Absolute Neutrophil 1.9 1.6 - 8.3 10e9/L    Absolute Lymphocytes 0.1 (L) 0.8 - 5.3 10e9/L    Absolute Monocytes 0.1 0.0 - 1.3 10e9/L    Absolute Eosinophils 0.2 0.0 - 0.7 10e9/L    Absolute Basophils 0.0 0.0 - 0.2 10e9/L    Abs Immature Granulocytes 0.0 0 - 0.4 10e9/L    Absolute Nucleated RBC 0.0        Assessment and Plan:    Aminata Gale is a 70 year old female admitted on 4/4/2021. She presented to the emergency department for evaluation of diarrhea and in the setting of known generalized weakness anal cancer on chemo and radiation for which she is being admitted for further evaluation and treatment.     Generalized muscle weakness  Secondary to recent chemo and radiation, poor oral intake, and losses from diarrhea. No focal deficits. Difficulty managing at home due to frequent bathroom needs and weakness.   - PT/OT evaluations  - Care Transitions consult - may need additional help at home vs TCU     Diarrhea, likely chemo / radiation induced  Radiation induced colitis  Onset 3 days prior to admission, progressively worsening. Estimated 20+ diarrhea episodes daily, non-bloody. CT abdomen & pelvis shows evidence of mild colitis, is likely chemo / radiation induced but cannot rule out infectious  source (although less likely).  - Enteric stool pending  - No imodium unless enteric testing is negative  - Clear liquid diet, advance as tolerated to consistent carbohydrate      Anal squamous cell carcinoma  Malignant neoplasm of anal canal   Cancer / radiation related chronic pain  Rectal skin sloughing / Radiation dermatitis  Diagnosed January 2021. Follows with [facility] and recently completed her chemo course, but receives radiation with Dr. Soto at [facility] (2 doses left on 4/5 and 4/6). Patient has rectal skin sloughing and maceration that is very painful, made worse by diarrhea. Has been unable to swallow her pills recently, leading to poor pain control. Managed prior to admission with prn MS contin 15 mg q 8 hours prn, oxycodone 5-7.5 mg q 4 hours prn, topical morphine, and barrier cream (Pro Shield).  - Patient has 11:30 appointment for radiation with Dr. Soto on 4/5, continue  - Continue prior to admission MS contin and oxycodone prn, with IV dilaudid available for breakthrough pain or when unable to take oral  - Continue topical morphine if patient has her home cream with her and verified by pharmacy  - Prn topical lidocaine gel available  - Continue with barrier cream - patient has been using Pro Shield (unable to tolerate Aquaphor)  - Wound cares each shift    Will ask for wound nurse consult     Thrush  Possible chemo inducted mucositis  New in the past few days prior to admission, patient having a lot of pain with oral intake and some difficulty swallowing. Has been using H2O2 rinses at home with some relief.  - Nystatin swish & swallow qid  - May continue H2O2 swish & spit at patient's request  - Encourage swish & spit after inhaler use     Abnormal UA  UA positive for nitrites and trace leukocyte esterase, 11 WBCs. Afebrile, no leukocytosis. Patient with many antibiotics intolerances / allergies.  - No antibiotics at this time, await urine culture and initiate treatment as  appropriate    UC is pending     Renal nodule, right kidney  Noted on CT abdomen & pelvis - hypodense nodule of right kidney - likely complex cyst vs solid lesion. Patient was aware of this abnormality.  - Radiology recommending renal MRI for further characterization  - Defer further work-up to PCP    Mild pancytopenia-suspect chemo related  Wbc 2.3, hgb 12.2, platelets 100 k--follow      AAA   Noted on CT abdomen & pelvis - stable infrarenal AAA measuring 3.7 cm. Patient was aware of this and is aware it is stable on admission CT.  - Defer to outpatient surveillance      Type 2 diabetes mellitus with hyperglycemia, without long-term current use of insulin  Noted per problem list. Most recent A1c = 6.1. Appears to be diet controlled, is not on diabetic medications.  - Patient will need consistent carbohydrate diet when diet is advanced  - No scheduled accuchecks unless persistently elevated on BMP     Diastolic dysfunction  Essential hypertension, benign  Nonocclusive coronary atherosclerosis of native coronary artery  Hyperlipidemia LDL goal <130  Lexiscan stress test June 2019 with apical hypokinesis, EF 62%. Appears compensated on admission. Managed prior to admission with aspirin 81 mg daily, amlodipine 10 mg daily, losartan 25 mg daily, torsemide 5 mg daily, and Zetia 10 mg daily.  - Continue aspirin, amlodipine, losartan, torsemide, and Zetia     Centrilobular emphysema  Moderate persistent asthma  Stable respiratory status, no wheezing or abnormalities noted on admission exam. Managed prior to admission with Breo, Spiriva, prn albuterol, and prn DuoNebs.  - Continue Breo and Spiriva if patient has her home inhalers - may use if verified by pharmacy  - Continue prn albuterol and DuoNebs  - Ensure patient swishes & spits her mouth out after inhaler use due to thrush     Esophageal reflux  Noted on problem list, does not appear to be on PPI or H2 blocker. Not significantly symptomatic on admission.  - No  treatment unless patient is symptomatic     Systemic lupus erythematosus  Noted per problem list. Formerly treated with methotrexate, currently not on active treatment.  - Continue with outpatient management     Allergic rhinitis  Managed prior to admission with prn Zyrtec or Claritin.  - Continue Zyrtec     COVID status - negative  Tested as asymptomatic COVID screen based on hospital admission criteria. No concern for active COVID infection on admission.  - COVID PCR negative 4/4  - No indication for COVID precautions or repeat testing     Diet: Advance Diet as Tolerated: Clear Liquid Diet  DVT Prophylaxis: None - initiate if patient stays more than 24 hours  Lu Catheter: not present  Code Status: Full Code  - discussed with patient on admission, she is not sure what she would like her CODE STATUS to be.  Will defer to full code for now.        plan- wound nurse consult, continued fluids, proceed with radiation. Likely will be here through the radiation (two more nights). Pt changed to admit status.. Pt has follow-up oncology apmnt today in [place]. Will need to cancel. Call placed to Dr Christensen.    8:37 AM  Discussed with Dr Christensen.  He suggests allowing pt to recover. Possibly resuming radiation Wednesday or later.  He will come up and see pt.    10:59 AM  Enteric panel neg  immodium tid scheduled

## 2021-04-05 NOTE — H&P
[facility]    History and Physical - Hospitalist Service       Date of Admission:  4/4/2021    Assessment & Plan   Aminata Gale is a 70 year old female admitted on 4/4/2021. She presented to the emergency department for evaluation of diarrhea and in the setting of known generalized weakness anal cancer on chemo and radiation for which she is being admitted for further evaluation and treatment.    Generalized muscle weakness  Secondary to recent chemo and radiation, poor oral intake, and losses from diarrhea. No focal deficits. Difficulty managing at home due to frequent bathroom needs and weakness.   - PT/OT evaluations  - Care Transitions consult - may need additional help at home vs TCU    Diarrhea, likely chemo / radiation induced  Onset 3 days prior to admission, progressively worsening. Estimated 20+ diarrhea episodes daily, non-bloody. CT abdomen & pelvis shows evidence of mild colitis, is likely chemo / radiation induced but cannot rule out infectious source (although less likely).  - Enteric stool pending  - No imodium unless enteric testing is negative  - Clear liquid diet, advance as tolerated to consistent carbohydrate     Anal squamous cell carcinoma  Malignant neoplasm of anal canal   Cancer / radiation related chronic pain  Rectal skin sloughing / Radiation skin ulcer  Diagnosed January 2021. Follows with [facility] and recently completed her chemo course, but receives radiation with Dr. Soto at [facility] (2 doses left on 4/5 and 4/6). Patient has rectal skin sloughing and maceration that is very painful, made worse by diarrhea. Has been unable to swallow her pills recently, leading to poor pain control. Managed prior to admission with prn MS contin 15 mg q 8 hours prn, oxycodone 5-7.5 mg q 4 hours prn, topical morphine, and barrier cream (Pro Shield).  - Patient has 11:30 appointment for radiation with Dr. Soto on 4/5, continue  - Continue prior to admission  MS contin and oxycodone prn, with IV dilaudid available for breakthrough pain or when unable to take oral  - Continue topical morphine if patient has her home cream with her and verified by pharmacy  - Prn topical lidocaine gel available  - Continue with barrier cream - patient has been using Pro Shield (unable to tolerate Aquaphor)  - Wound cares each shift    Thrush  New in the past few days prior to admission, patient having a lot of pain with oral intake and some difficulty swallowing. Has been using H2O2 rinses at home with some relief.  - Nystatin swish & swallow qid  - May continue H2O2 swish & spit at patient's request  - Encourage swish & spit after inhaler use    Abnormal UA  UA positive for nitrites and trace leukocyte esterase, 11 WBCs. Afebrile, no leukocytosis. Patient with many antibiotics intolerances / allergies.  - No antibiotics at this time, await urine culture and initiate treatment as appropriate    Renal nodule, right kidney  Noted on CT abdomen & pelvis - hypodense nodule of right kidney - likely complex cyst vs solid lesion. Patient was aware of this abnormality.  - Radiology recommending renal MRI for further characterization  - Defer further work-up to PCP    AAA   Noted on CT abdomen & pelvis - stable infrarenal AAA measuring 3.7 cm. Patient was aware of this and is aware it is stable on admission CT.  - Defer to outpatient surveillance     Type 2 diabetes mellitus with hyperglycemia, without long-term current use of insulin  Noted per problem list. Most recent A1c = 6.1. Appears to be diet controlled, is not on diabetic medications.  - Patient will need consistent carbohydrate diet when diet is advanced  - No scheduled accuchecks unless persistently elevated on BMP    Diastolic dysfunction  Essential hypertension, benign  Nonocclusive coronary atherosclerosis of native coronary artery  Hyperlipidemia LDL goal <130  Lexiscan stress test June 2019 with apical hypokinesis, EF 62%. Appears  compensated on admission. Managed prior to admission with aspirin 81 mg daily, amlodipine 10 mg daily, losartan 25 mg daily, torsemide 5 mg daily, and Zetia 10 mg daily.  - Continue aspirin, amlodipine, losartan, torsemide, and Zetia    Centrilobular emphysema  Moderate persistent asthma  Stable respiratory status, no wheezing or abnormalities noted on admission exam. Managed prior to admission with Breo, Spiriva, prn albuterol, and prn DuoNebs.  - Continue Breo and Spiriva if patient has her home inhalers - may use if verified by pharmacy  - Continue prn albuterol and DuoNebs  - Ensure patient swishes & spits her mouth out after inhaler use due to thrush    Esophageal reflux  Noted on problem list, does not appear to be on PPI or H2 blocker. Not significantly symptomatic on admission.  - No treatment unless patient is symptomatic    Systemic lupus erythematosus  Noted per problem list. Formerly treated with methotrexate, currently not on active treatment.  - Continue with outpatient management    Allergic rhinitis  Managed prior to admission with prn Zyrtec or Claritin.  - Continue Zyrtec    COVID status - negative  Tested as asymptomatic COVID screen based on hospital admission criteria. No concern for active COVID infection on admission.  - COVID PCR negative 4/4  - No indication for COVID precautions or repeat testing          Diet: Advance Diet as Tolerated: Clear Liquid Diet  DVT Prophylaxis: None - initiate if patient stays more than 24 hours  Lu Catheter: not present  Code Status: Full Code  - discussed with patient on admission, she is not sure what she would like her CODE STATUS to be.  Will defer to full code for now.         Disposition Plan   Expected discharge: 1-2 days, recommended to Home versus TCU once Clinically improving, tolerating oral intake, improvement in diarrhea, improvement in pain and managing with oral pain medications.  Entered: Hien Ng PA-C 04/05/2021, 12:09 AM    "  The patient's care was discussed with the Attending Physician, Dr. Nile Hale, Bedside Nurse and Patient.    Hien Ng PA-C  [facility]  Contact information available via [facility] Paging/Directory      ______________________________________________________________________    Chief Complaint   \" I have been having a lot of diarrhea, a lot of rectal pain, and now difficulty eating and drinking.\"    History is obtained from the patient, review of EMR, and emergency department sign out from Dr. Scott Byrnes.    History of Present Illness   Aminata Gale is a 70 year old female who presented to the emergency department for evaluation of diarrhea, rectal pain, and poor oral intake.    Patient has known rectal cancer diagnosed in January 2021.  She follows with oncology through [facility] clinic, but is receiving ongoing radiation at [facility] with Dr. Soot.  She recently completed her chemotherapy course, but has two radiation treatments left on 4/5 and 4/6.    Three days ago she developed some diarrhea that has increased and worsened since onset.  In addition, due to her radiation she has known skin breakdown and tenderness in her rectal area.    Over the past few days she has noticed some thrush in her mouth and has noticed pain with any oral intake or swallowing.  Because of this she has been unable to take many of her oral pills and has had poor nutrition and hydration.    Due to this combination of problems she presented to the emergency department for evaluation.  She is having difficulty managing at home due to the frequency of her diarrhea and her progressing generalized weakness.  She lives at home by herself but has had family members helping recently.    Regarding the diarrhea, it is very loose and non bloody.  She estimates that she has a diarrhea episode about once every hour for the past 3 days.  She has had very poor sleep because of the frequency of her " diarrhea that persists overnight.  She has been feeling lightheaded and dizzy, but no falls or syncope.  She has some abdominal cramping and bloating.  She is frequently nauseated, has vomited one time since onset.    She has a lot of pain in her rectal area due to her skin breakdown.  She has oral MS Contin and oxycodone available at home, but because of her thrush she has not been able to take her medications consistently.  She also uses a topical morphine cream, but due to the frequency of her diarrhea this gets wiped off and is not effective for long periods of time.  She uses a barrier cream, but again this frequently gets wiped off.  Pro shield has been the most effective cream.    She has pain in her mouth and throat from the thrush.    She has difficulty swallowing due to the pain.  She feels as if she is unable to swallow at times because liquids get backed up and coming back up.  She feels she is losing weight.    The remainder review of systems is negative.    Review of Systems    The 10 point Review of Systems is negative other than noted in the HPI or here.     Past Medical History    I have reviewed this patient's medical history and updated it with pertinent information if needed.   Past Medical History:   Diagnosis Date     Acute pericarditis, unspecified      Cancer (H)      Chronic airway obstruction 12/13/2005    PFTs - 1/02 - mild COPD Problem list name updated by automated process. Provider to review     Dysuria      Esophageal reflux      GERD (gastroesophageal reflux disease) 9/20/2013     Hiatal hernia 5/4/2011     HTN, goal below 140/90 9/20/2013     Hyperlipidemia LDL goal <130 9/22/2010    Recent Labs  Lab Test 9/22/10 0908 10/12/06 0939    CHOL 214* 256*    HDL 31* 42*    * 186*    TRIG 149 143    CHOLHDLRATIO 7.0* 6.0*   4/8/11 - dobutamine echo showed NO infarct or ischemia LV 60-65%, normal exercise response.        Lupus (H)      Osteoporosis 10/18/2010    10/10 - severe  osteopenia in femoral necks, mild osteopenia in spine  (Problem list name updated by automated process. Provider to review and confirm.)     Pure hypercholesterolemia      SECONDARY POLYCYTHEMIA 10/17/2006    Due to smoking     Smoker 2008     Systemic lupus erythematosus 2005    Diagnosed in ; history of pericarditis; was previously treated with mtx; not on active treatment; no hx of renal dz from the lupus.       Tobacco use disorder      Type 2 diabetes mellitus with hyperglycemia, without long-term current use of insulin (H) 2019    NEW DIAGNOSIS 2019     Unspecified essential hypertension        Past Surgical History   I have reviewed this patient's surgical history and updated it with pertinent information if needed.  Past Surgical History:   Procedure Laterality Date     C APPENDECTOMY       C REPAIR GUM      Cathy Dontal surgery     C/SECTION, LOW TRANSVERSE      , Low Transverse     COLONOSCOPY  2009     D & C      X 5     HC RECONSTR NOSE+DAVE SEPTAL REPAIR       HYSTERECTOMY, BRI      Hx of dysplasia     LAPAROSCOPIC CHOLECYSTECTOMY N/A 2017    Procedure: LAPAROSCOPIC CHOLECYSTECTOMY;  Laparoscopic Cholecystectomy;  Surgeon: Tami Barney MD;  Location: [facility] OR     SINUS SURGERY      Reconstruction       Social History   I have reviewed this patient's social history and updated it with pertinent information if needed.  Social History     Tobacco Use     Smoking status: Current Every Day Smoker     Packs/day: 0.01     Years: 42.00     Pack years: 0.42     Types: Cigarettes     Smokeless tobacco: Never Used     Tobacco comment: Has chantix at home, but hasn't started yet. 19.   Substance Use Topics     Alcohol use: Yes     Alcohol/week: 0.0 standard drinks     Comment: Occ. wine     Drug use: No       Family History   I have reviewed this patient's family history and updated it with pertinent information if needed.  Family History   Problem Relation  Age of Onset     Diabetes Mother      Gastrointestinal Disease Mother         Gallbladder disease     Heart Disease Mother      Cancer Father         lung     Alcohol/Drug Father      Allergies Father      Heart Disease Father      Gastrointestinal Disease Father         Liver disease     Skin Cancer Father      Heart Disease Maternal Grandfather      Arthritis Sister      Osteoporosis Sister      Thyroid Disease Sister      Allergies Son      Alcohol/Drug Sister      Alcohol/Drug Sister      Melanoma No family hx of        Prior to Admission Medications   Prior to Admission Medications   Prescriptions Last Dose Informant Patient Reported? Taking?   Ibuprofen (IBU-200 PO) 4/3/2021 at Unknown time  Yes Yes   Sig: Take  by mouth.   STATIN NOT PRESCRIBED (INTENTIONAL)   No No   Sig: Please choose reason not prescribed, below   albuterol (PROAIR HFA/PROVENTIL HFA/VENTOLIN HFA) 108 (90 Base) MCG/ACT inhaler 4/4/2021 at AM  No Yes   Sig: INHALE TWO PUFFS BY MOUTH EVERY 6 HOURS   alcohol swab prep pads   No No   Sig: Use to swab area of injection/raffi as directed.   amLODIPine (NORVASC) 10 MG tablet 4/3/2021 at AM  No Yes   Sig: TAKE ONE TABLET BY MOUTH ONCE DAILY FOR HIGH BLOOD PRESSURE   aspirin (ASA) 81 MG tablet 4/3/2021 at Unknown time  Yes Yes   Sig: Take 1 tablet (81 mg) by mouth daily   blood glucose (MILLIE MICROLET 2) lancing device   No No   Sig: Device to be used with lancets 2 times a day.   cetirizine (ZYRTEC) 10 MG tablet 4/3/2021 at Unknown time  Yes Yes   Sig: Take 10 mg by mouth daily   ezetimibe (ZETIA) 10 MG tablet 4/3/2021 at Unknown time  No Yes   Sig: TAKE 1 TABLET (10 MG) BY MOUTH DAILY   fluocinonide (LIDEX) 0.05 % external solution Past Month at Unknown time  No Yes   Sig: Apply twice daily on scalp as needed.   fluticasone-vilanterol (BREO ELLIPTA) 200-25 MCG/INH inhaler 4/3/2021 at Unknown time  No Yes   Sig: INHALE 1 PUFF INTO THE LUNGS DAILY   ipratropium - albuterol 0.5 mg/2.5 mg/3 mL  (DUONEB) 0.5-2.5 (3) MG/3ML neb solution Past Week at Unknown time  No Yes   Sig: Take 1 vial (3 mLs) by nebulization every 6 hours as needed for shortness of breath / dyspnea or wheezing   ketoconazole (NIZORAL) 2 % external shampoo Past Week at Unknown time  No Yes   Sig: Use to wash scalp, leave on for 5 minutes. Use 2-3 times a week.   loratadine (CLARITIN) 10 MG tablet   Yes No   Sig: Take 10 mg by mouth daily   losartan (COZAAR) 25 MG tablet 4/3/2021 at Unknown time  No Yes   Sig: TAKE 1 TABLET (25 MG) BY MOUTH DAILY   magic mouthwash (ENTER INGREDIENTS IN COMMENTS) suspension 4/3/2021 at Unknown time  No Yes   Sig: Swish, gargle, and spit one to two teaspoonfuls every six hours as needed.   morphine (MS CONTIN) 15 MG CR tablet 4/4/2021 at AM  No Yes   Sig: Take 1 tablet (15 mg) by mouth every 8 hours as needed for severe pain   morphine 0.1% in intrasite topical gel   No No   Sig: Place 1-2 g onto the skin 4 times daily as needed for pain   oxyCODONE (ROXICODONE) 5 MG/5ML solution   No No   Sig: Take 5-7.5 mLs (5-7.5 mg) by mouth every 4 hours as needed for pain   silver sulfADIAZINE (SILVADENE) 1 % external cream   No No   Sig: Apply topically daily   tiotropium (SPIRIVA RESPIMAT) 2.5 MCG/ACT inhaler 4/3/2021 at Unknown time  No Yes   Sig: Inhale 2 puffs into the lungs daily   torsemide (DEMADEX) 5 MG tablet 4/3/2021 at Unknown time  No Yes   Sig: TAKE 1 TABLET (5 MG) BY MOUTH DAILY      Facility-Administered Medications: None     Allergies   Allergies   Allergen Reactions     Amoxicillin Difficulty breathing and Rash     Ampicillin Difficulty breathing and Rash     Cipro [Ciprofloxacin] Difficulty breathing and Rash     Keflex [Cephalexin Monohydrate] Difficulty breathing and Rash     Penicillin [Penicillins] Difficulty breathing and Rash     Sulfa Drugs Difficulty breathing and Rash     Tetracycline Difficulty breathing and Rash     Biaxin [Clarithromycin] Rash     Ceclor [Cefaclor] Hives and Swelling      Advair Diskus Hives     Hives on face,neck and chest     Egg White      Fish Shortness Of Breath     Mustard [Allyl Isothiocyanate]      Throat swelling       Physical Exam   Vital Signs: Temp: 97.8  F (36.6  C) Temp src: Oral BP: 125/70 Pulse: 98   Resp: 18 SpO2: 94 % O2 Device: None (Room air)    Weight: 138 lbs 0 oz    Constitutional: Alert, oriented, cooperative. Appears uncomfortable and at times mildly distressed but nontoxic. Speaking in full coherent sentences.    Eyes: Eyes are clear, pupils are reactive. No scleral icterus.    HEENT: White patches / plaques present on tongue, but not on the posterior oropharynx / soft palate / buccal region. Normocephalic, no evidence of cranial trauma.      Cardiovascular: Regular rhythm and rate, normal S1 and S2. No murmur, rubs, or gallops. Peripheral pulses intact bilaterally. No lower extremity edema.    Respiratory: Lung sounds are clear to auscultation bilaterally without wheezes, rhonchi, or crackles.    GI: Soft, non-distended. Minimally tender to palpation of lower bilateral quadrants, no rebound or guarding. No hepatosplenomegaly or masses appreciated. Normal bowel sounds.     Musculoskeletal: Without obvious deformity, normal range of motion. Normal muscle bulk and tone. Distal CMS intact.      Skin: Warm and dry, no ecchymoses. No mottling of skin. Skin on rectum and labia is macerated and ulcerated, but not bleeding.      Neurologic: Patient moves all extremities.  is symmetric. Gross strength and sensation are equal bilaterally.    Genitourinary: See above regarding skin exam      Data   Data reviewed today: I reviewed all medications, new labs and imaging results over the last 24 hours. I personally reviewed no images or EKG's today.    Recent Labs   Lab 04/04/21  1315   WBC 4.2   HGB 13.4   MCV 94   *      POTASSIUM 3.6   CHLORIDE 106   CO2 28   BUN 25   CR 0.78   ANIONGAP 6   ELY 8.2*   *   ALBUMIN 2.4*   PROTTOTAL 5.4*    BILITOTAL 0.6   ALKPHOS 77   ALT 87*   AST 28   LIPASE 31*     Recent Results (from the past 24 hour(s))   CT Abdomen Pelvis w Contrast    Narrative    CT ABDOMEN AND PELVIS WITH CONTRAST  4/4/2021 3:06 PM    CLINICAL HISTORY: Abdominal pain, acute, nonlocalized.    TECHNIQUE: CT scan of the abdomen and pelvis was performed following  injection of IV contrast. Multiplanar reformats were obtained. Dose  reduction techniques were used.  CONTRAST: 67 mL Isovue-370    COMPARISON: CT abdomen and pelvis 12/21/2020.    FINDINGS:   LOWER CHEST: Normal.    HEPATOBILIARY: No acute abnormality. No focal lesion identified.  Status post cholecystectomy.    PANCREAS: Normal.    SPLEEN: Normal.    ADRENAL GLANDS: Normal.    KIDNEYS/BLADDER: Atrophy of the upper right kidney again identified. A  few renal cysts not requiring specific imaging follow-up. However,  there is a stable hypodense rounded lesion that is complex measuring 1  cm right mid kidney, series 5 image 87. There are a few nonobstructing  intrarenal stones bilaterally appearing stable. No hydronephrosis. No  acute bladder abnormality.    BOWEL: Some wall thickening of the colon diffusely appears mild. Small  fluid distends a few distal small bowel loops. No abscess or free air.  Anal canal mass towards the right is smaller in size measuring 2.8 x  1.6, previously 3 x 2.7 cm, image 200.    PELVIC ORGANS: No acute abnormality. Unremarkable right ovary. Small  left ovarian cyst is stable measuring 1.4 cm, image 125.    ADDITIONAL FINDINGS: Vascular calcifications. Stable infrarenal  abdominal aortic aneurysm that is 3.7 cm, image 93. No adjacent  hematoma. No new enlarged lymph nodes identified.    MUSCULOSKELETAL: No aggressive bony lesion. L5-S1 degenerative disc  disease.      Impression    IMPRESSION:   1.  Some wall thickening of the colon may be a mild colitis. No  abscess or free air.  2.  Smaller size of the right anal canal mass.  3.  Stable infrarenal  abdominal aortic aneurysm that currently  measures 3.7 cm.  4.  Stable but indeterminate hypodense rounded nodule at the right  kidney that could be a complex cyst versus solid lesion. Recommend  nonurgent renal MRI for further characterization.  5.  Bilateral nonobstructing intrarenal stones.    BEN BRO MD

## 2021-04-05 NOTE — PROGRESS NOTES
Reviewing chart due to high probability of Admit to Med/Surg. Eduard Raphael RN on 4/4/2021 at 7:52 PM

## 2021-04-05 NOTE — PROGRESS NOTES
"Pt has had several loose stools, is up to bedside commode. Family is at bedside. Pt is on clear liquids but has been continuously nauseous throughout the day. Compazine last given at 1400. Was given alcohol swabs to smell, sea bands for wrists and essential oils, which seems to be helping, was able to rest for a few hours.   BP (!) 140/72   Pulse 105   Temp 97.9  F (36.6  C) (Oral)   Resp 18   Ht 1.702 m (5' 7\")   Wt 62.6 kg (138 lb)   SpO2 90%   BMI 21.61 kg/m    Palak Bonilla RN BSN    "

## 2021-04-05 NOTE — PLAN OF CARE
[place] [facility] [place]    Patient admitted to room 2215 at approximately 2030 via wheel chair from emergency room. Patient was accompanied by other:cousin.     Verbal SBAR report received from Leonila prior to patient arrival.     Patient ambulated to bed with stand-by assist. Patient alert and oriented X 3. Pain is controlled with current analgesics.  Medication(s) being used: narcotic analgesics including hydromorphone (Dilaudid).  . Admission vital signs: Blood pressure 125/70, pulse 98, temperature 97.8  F (36.6  C), temperature source Oral, resp. rate 18, height 1.702 m (5' 7\"), weight 62.6 kg (138 lb), SpO2 94 %, not currently breastfeeding. Patient was oriented to plan of care, call light, bed controls, tv, telephone, bathroom, and visiting hours.     Risk Assessment    The following safety risks were identified during admission: fall. Yellow risk band applied: YES.     Skin Initial Assessment    This writer admitted this patient and completed a full skin assessment and Ted score in the Adult PCS flowsheet. Appropriate interventions initiated as needed.     Secondary skin check completed by patient declined due to pain and fatigue.    Ted Risk Assessment  Sensory Perception: 4-->no impairment  Moisture: 3-->occasionally moist  Activity: 3-->walks occasionally  Mobility: 3-->slightly limited  Nutrition: 2-->probably inadequate  Friction and Shear: 2-->potential problem  Ted Score: 17  Moisture Interventions: Incontinence pad, Encourage regular toileting, Perineal cleanser  Nutrition Interventions: Maintain adequate hydration  Friction/Shear Interventions: HOB 30 degrees or less  Mattress: Standard Hospital Mattress (Foam)  Bed Frame: Standard width and length    Education    Patient has a [place] to Observation order: Yes  Observation education completed and documented: Yes      Hermelinda Wick RN      "

## 2021-04-05 NOTE — PLAN OF CARE
"Patient alert/oriented. SBA with walker. Up to bathroom and bedside commode with sudden episodes of diarrhea. Incontinent episodes. Intermittent nausea and dry-heaving. States Zofran doesn't help. Dilaudid and compazine given prn. Order for lidocaine to apply to red,excoriated perineum, rectum, coccyx, and labia.    Patient states it hurts to swallow, has mucosal lesions. Prefers IV meds.  Has patient care order for hydrogen peroxide to swish and spit--in [place].   Magic mouthwash doesn't help per patient. Placed on enteric precautions--enteric panel pending.   /70   Pulse 98   Temp 97.8  F (36.6  C) (Oral)   Resp 18   Ht 1.702 m (5' 7\")   Wt 62.6 kg (138 lb)   SpO2 94%   BMI 21.61 kg/m    Hermelinda Wick RN on 4/4/2021 at 11:45 PM    "

## 2021-04-05 NOTE — CONSULTS
Care Management Initial Consult    General Information  Assessment completed with: Patient, Pat  Type of CM/SW Visit: Initial Assessment    Primary Care Provider verified and updated as needed: Yes   Readmission within the last 30 days: no previous admission in last 30 days         Advance Care Planning: Advance Care Planning Reviewed: no concerns identified          Communication Assessment  Patient's communication style: spoken language (English or Bilingual)    Hearing Difficulty or Deaf: no   Wear Glasses or Blind: yes    Cognitive  Cognitive/Neuro/Behavioral: WDL                      Living Environment:   People in home: alone     Current living Arrangements: house      Able to return to prior arrangements: yes       Family/Social Support:  Care provided by: self  Provides care for: no one     Children, Sibling(s), Other (specify)(Cousin)          Description of Support System: Supportive, Involved    Support Assessment: Adequate family and caregiver support    Current Resources:   Patient receiving home care services: No  Community Resources: None  Equipment currently used at home: cane, quad  Supplies currently used at home: None     Financial Concerns: No concerns identified           Lifestyle & Psychosocial Needs:        Socioeconomic History     Marital status:      Spouse name: Javier Gale     Number of children: 2     Years of education: 12     Highest education level: Not on file   Occupational History     Employer: DISABLED     Tobacco Use     Smoking status: Current Every Day Smoker     Packs/day: 0.01     Years: 42.00     Pack years: 0.42     Types: Cigarettes     Smokeless tobacco: Never Used     Tobacco comment: Has chantix at home, but hasn't started yet. 5/9/19.   Substance and Sexual Activity     Alcohol use: Yes     Alcohol/week: 0.0 standard drinks     Comment: Occ. wine     Drug use: No     Sexual activity: Yes     Partners: Male     Birth control/protection: Surgical     Comment:  Hyst        Mental Health Status:  Mental Health Status: No Current Concerns       Chemical Dependency Status:  Chemical Dependency Status: No Current Concerns             Values/Beliefs:  Spiritual, Cultural Beliefs, Confucianism Practices, Values that affect care:            Values/Beliefs Comment: (Not discussed)    Additional Information:    Met with the Pt for discharge planning.    The Pt lives alone independently in the community in a home.  Her son lives next door and is available to assist if needed.       The Pt has a history of anal cancer and had her last dose of chemotherapy 4/2.  She is currently receiving radiation.  She is admitted with generalized weakness and diarrhea.  Her cousin is staying with her through 4/19, then her sister will be staying with her.  She declined the need for home care.    Plan:  Home with family support      Lucero Salcedo RN

## 2021-04-05 NOTE — PLAN OF CARE
"Pt vitally stable.    /79   Pulse 101   Temp 98.6  F (37  C) (Oral)   Resp 15   Ht 1.702 m (5' 7\")   Wt 62.6 kg (138 lb)   SpO2 90%   BMI 21.61 kg/m      Pt stated 7/10 pain, PRN Dilaudid 0.5 given with significant relief. Comfortable pain is 4-5/10. PRN Lidocaine and Morphine gel applied to radha-area with relief. Incontinent intermittently of stool with frequent tolieting. Intermittent nausea, PRN Compazine given with relief. Pt was able to take small sips of clear liquids and keep down one dose of PO Imodium after enteric panel resulted negative. Oncologist cancelled patient's scheduled radiation treatment until at least Wednesday (04/07).    Plan: IV fluids and Pain/Nausea management.     Ghada Fairbanks RN on 4/5/2021 at 2:43 PM    "

## 2021-04-05 NOTE — PROGRESS NOTES
Cathy area and buttocks area remain excoriated, red, and painful.  Topical Lidocaine applied with mild improvement.  Pt hopeful to have home stock of Morphine gel brought from home to use.  Oxy 5mg given x1 for throat and bottom pain.  Nausea considerable issue and was unable to take HS PO meds.  Calling appropriately and cooperative.  Monitoring closely for changes.

## 2021-04-05 NOTE — PROGRESS NOTES
CLINICAL NUTRITION SERVICES - ASSESSMENT NOTE     Nutrition Prescription    RECOMMENDATIONS FOR MDs/PROVIDERS TO ORDER:  None    Malnutrition Status:    Unable to determine due to limited diet recall and no physical assessment.    Recommendations already ordered by Registered Dietitian (RD):  -multivitamin/mineral due to suspected poor intake.  -updated weight when able.    Future/Additional Recommendations:  -daily weights as ordered.  -RD to follow-up with patient later for diet recall, education, and nutrition interventions (nutrition supplements, addition of fiber to help reduce diarrhea).     REASON FOR ASSESSMENT  Aminata Gale is a/an 70 year old female assessed by the dietitian for admission MST score of 3: positive  for wt loss of unsure amount and positive  for poor PO intake R/t decreased appetite.    -Admitted on 4/4/2021. She presented to the emergency department for evaluation of diarrhea (started 3 days prior to admission) and in the setting of known generalized weakness anal cancer on chemo and radiation. Recently completed chemotherapy, but has 2 remaining radiation treatments.  -PMH: cancer dx January 2021, type 2 diabetes diet controlled most recent A1C was 6.1, HTN, hyperlipidemia, nonocclusive coronary atherosclerosis of native coronary artery, esophageal reflux.  -lives alone.  -allergies: fish, egg white, and mustard.  -enteric panel negative. Wound RN consulted for radiation burns.    NUTRITION HISTORY  Obtained from electronic medical record and Karen, patient's cousin at bedside. Karen recently moved in with patient to help care for her. Patient asleep for most of encounter, but did wake up briefly for nursing staff. RD was able to introduce self and role, and explain available fiber supplements to patient. Then patient requested to keep resting. RD will re-visit with patient later.  -pain with oral intake and some difficulty swallowing therefore having poor pain control. Has been using  "H2O2 rinses at home with some relief.  -Karen reports mouth sores and pain since Friday. Ate a mashed banana Friday night but otherwise not much that day. She ate nothing Saturday and Sunday, then was admitted. Although unable to provide a detailed diet recall, Karen reports patient's intake has declined since chemo/radiation started in February. She was eating a lot of yogurt. She did start drinking Boost/Ensure, one per day at most.   -Karen reports that patient has told her she normally eats small meals throughout the day. Unclear if this is still the case to some extent.     CURRENT NUTRITION ORDERS  Diet: Orders Placed This Encounter      Advance Diet as Tolerated: Clear Liquid Diet    Intake/Tolerance: sips at meals.     LABS  Na 145 (H)  K+ 3.4 (WNL)  Mag 2.1 (WNL)    MEDICATIONS  Loperamide  Torsemide  IVF: LR @ 100 mL/hr  Compazine PRN    ANTHROPOMETRICS  Height: 170.2 cm (5' 7\")  Most Recent Weight: 62.6 kg (138 lb)    IBW: 61.4 kg (102% IBW)  BMI: Normal BMI  Weight History:   Wt Readings from Last 10 Encounters:   04/04/21 62.6 kg (138 lb)   03/31/21 63 kg (139 lb) clinic weight   03/30/21 62.6 kg (138 lb)   03/24/21 63.9 kg (140 lb 12.8 oz)   03/17/21 64 kg (141 lb 3.2 oz)   03/10/21 67.2 kg (148 lb 3.2 oz)   03/03/21 67 kg (147 lb 9.6 oz)   02/24/21 65.9 kg (145 lb 3.2 oz)   02/08/21 66.7 kg (147 lb)   01/20/21 66.6 kg (146 lb 12.8 oz)   -Weight appeared stable from January until middle of March, but is now trending down. Patient has lost at least 9 lbs (6.1% body weight) in the last 1 month, which is clinically significant. Due to poor intake over the past 3 days and hypermetabolism with cancer, it is possible that patient has lost more weight since 3/31.    Current encounter:  Vitals:    04/04/21 1200   Weight: 62.6 kg (138 lb)   -RD asked RN for updated weight since current weight may have been copied from 3/30 or reported on admit. Daily weights are ordered.    Dosing Weight: 63 kg (current " weight)    ASSESSED NUTRITION NEEDS  Estimated Energy Needs: 3685-1659 kcals/day (30 - 35 kcals/kg )  Justification: Increased needs  Estimated Protein Needs: 76-95 grams protein/day (1.2 - 1.5 grams of pro/kg)  Justification: Increased needs  Estimated Fluid Needs: (1 mL/kcal)   Justification: Increased needs    PHYSICAL FINDINGS  Unable to perform due to patient sleeping most of encounter, and requesting to rest shortly after waking up for nursing cares.  Thrush per H&P.    MALNUTRITION  % Intake: Unable to assess due to limited diet recall.  % Weight Loss: > 5% in 1 month (severe)  Subcutaneous Fat Loss: Unable to assess  Muscle Loss: Unable to assess  Fluid Accumulation/Edema: None noted  Malnutrition Diagnosis: Unable to determine due to limited diet recall and no physical assessment.    NUTRITION DIAGNOSIS  Inadequate oral intake related to mouth sores, pain, diarrhea and poor appetite secondary to cancer treatment as evidenced by chart review, minimal oral intake over at least the past 3 days, and weight loss of 9 lbs (6.1%) in the last 1 month.      INTERVENTIONS  Implementation  Nutrition Education: Unable to complete due to patient asleep. Briefly discussed handouts with Karen, but RD will return at a later time for diet recall, education, and nutrition interventions (nutrition supplements, addition of fiber to help reduce diarrhea). Patient wanted to give it another day for her mouth sores to heal before discussing supplements and fiber. The following handouts were provided: Diarrhea Nutrition Therapy, High-Calorie, High-Protein Recipes, Suggestions for Increasing Calories and Protein, Coping with Mouth and Throat Sores and Nausea and Vomiting Nutrition Therapy.    Daily weights as ordered, including updated weight.  Collaboration with other providers: patient plan of care discussed during IDT rounds this morning.  Multivitamin/mineral supplement therapy: ordered MVITM due to suspected poor  intake.    Goals  Patient to consume % of nutritionally adequate meal trays TID, or the equivalent with supplements/snacks.     Monitoring/Evaluation  Progress toward goals will be monitored and evaluated per protocol.    Coleen Almanzar RDN, LD  Clinical Dietitian  Office: [phone]  Saturday/Sunday Pager: [phone]

## 2021-04-05 NOTE — UTILIZATION REVIEW
[facility]   Admission Status; Secondary Review Determination   Admission date:  4/4/2021 12:01 PM    Under the authority of the Utilization Management Committee, the utilization review process indicated a secondary review on the above patient. The review outcome is based on review of the medical records, discussions with staff, and applying clinical experience noted on the date of the review.     (x) Inpatient Status Appropriate - This patient's medical care is consistent with medical management for inpatient care and reasonable inpatient medical practice.     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  This is thought due to her advanced malignancy and active chemotherapy with radiation.  PT and OT have been consulted.  Enteric panel is pending and she is on IVF.  She is requiring narcotics for pain management as she has rectal skin sloughing due to radiation.  Wound team has also been consulted.  She remains pancytopenic as well.  This patient is complicated, has multiple ongoing issues, is requiring IV fluids and pain management, is at high risk for clinical deterioration and immunocompromised, is expected to require several day hospitalization, is appropriate for inpatient hospitalization at the time of this review.  The severity of illness, intensity of service provided, expected LOS and risk for adverse outcome make the care complex, high risk and appropriate for hospital admission.    At the time of admission with the information available to the attending physician more than 2 nights Hospital complex care was anticipated, based on patient risk of adverse outcome if treated as outpatient and complex care required.  Inpatient admission is appropriate based on the Medicare guidelines.      The  information on this document is developed by the utilization review team in order for the business office to ensure compliance. This only denotes the appropriateness of proper admission status and does not reflect the quality of care rendered.   The definitions of Inpatient Status and Observation Status used in making the determination above are those provided in the CMS Coverage Manual, Chapter 1 and Chapter 6, section 70.4.     Sincerely,   Nathanael Oconnor DO MPH   Physician Advisor  Utilization Review  [facility]

## 2021-04-06 NOTE — PROGRESS NOTES
[facility]      Subjective:  Still having diarrhea 1 time per hour.  Perineal area continues to be sore.  Lots of trouble with soreness of the mouth.  No fever or chills.  No UTI symptoms    Review of Systems:  CONSTITUTIONAL:weak  INTEGUMENTARY/SKIN: radiation burns perineum  EYES: NEGATIVE for vision changes or irritation  ENT/MOUTH: oral sores  RESP: NEGATIVE for significant cough or SOB  BREAST: NEGATIVE for masses, tenderness or discharge  CV: NEGATIVE for chest pain, palpitations or peripheral edema  GI: diarrhea  : NEGATIVE for frequency, dysuria, or hematuria  MUSCULOSKELETAL: NEGATIVE for significant arthralgias or myalgia  NEURO: NEGATIVE for weakness, dizziness or paresthesias  ENDOCRINE: NEGATIVE for temperature intolerance, skin/hair changes  HEME: NEGATIVE for bleeding problems  PSYCHIATRIC: NEGATIVE for changes in mood or affect    Physical Exam:  Vitals Were Reviewed    Patient Vitals for the past 16 hrs:   BP Temp Temp src Pulse Resp SpO2   04/06/21 0708 138/67 97.6  F (36.4  C) Oral 110 18 93 %   04/06/21 0256 (!) 151/75 -- -- 117 18 92 %   04/05/21 2256 137/59 97  F (36.1  C) Oral 111 18 92 %   04/05/21 1937 (!) 151/73 97.1  F (36.2  C) Oral 100 16 91 %         Intake/Output Summary (Last 24 hours) at 4/6/2021 0844  Last data filed at 4/5/2021 1000  Gross per 24 hour   Intake 1200 ml   Output --   Net 1200 ml       GENERAL APPEARANCE: Alert  EYES: conjunctiva clear, eyes grossly normal  HENT: Oral with some small ulcerations and erythema-I do not see a lot of obvious yeast changes today although I did see some yesterday  RESP: lungs clear to auscultation - no rales, rhonchi or wheezes  CV: regular rate and rhythm, normal S1 S2, no S3 or S4 and no murmur, click or rub   ABDOMEN: soft, nontender, no HSM or masses and bowel sounds normal  MS: Perineum is unchanged    Lab:  Recent Labs   Lab Test 04/06/21  0557 04/05/21  0400   * 145*   POTASSIUM 3.1* 3.4   CHLORIDE  110* 113*   CO2 27 27   ANIONGAP 8 5   GLC 93 90   BUN 10 14   CR 0.60 0.59   ELY 7.2* 7.6*     CBC RESULTS:   Recent Labs   Lab Test 04/06/21  0557 04/05/21  0400   WBC 0.7* 2.3*   RBC 3.91 3.98   HGB 12.0 12.2   HCT 35.7 36.9   PLT 70* 100*       Results for orders placed or performed during the hospital encounter of 04/04/21 (from the past 24 hour(s))   Care Management / Social Work IP Consult    Narrative    Lucero Salcedo RN     4/5/2021 11:54 AM  Care Management Initial Consult    General Information  Assessment completed with: Patient, Pat  Type of CM/SW Visit: Initial Assessment    Primary Care Provider verified and updated as needed: Yes   Readmission within the last 30 days: no previous admission in   last 30 days         Advance Care Planning: Advance Care Planning Reviewed: no   concerns identified          Communication Assessment  Patient's communication style: spoken language (English or   Bilingual)    Hearing Difficulty or Deaf: no   Wear Glasses or Blind: yes    Cognitive  Cognitive/Neuro/Behavioral: WDL                      Living Environment:   People in home: alone     Current living Arrangements: house      Able to return to prior arrangements: yes       Family/Social Support:  Care provided by: self  Provides care for: no one     Children, Sibling(s), Other (specify)(Cousin)          Description of Support System: Supportive, Involved    Support Assessment: Adequate family and caregiver support    Current Resources:   Patient receiving home care services: No  Community Resources: None  Equipment currently used at home: cane, quad  Supplies currently used at home: None     Financial Concerns: No concerns identified           Lifestyle & Psychosocial Needs:        Socioeconomic History     Marital status:      Spouse name: Javier Gale     Number of children: 2     Years of education: 12     Highest education level: Not on file   Occupational History     Employer: DISABLED     Tobacco  Use     Smoking status: Current Every Day Smoker     Packs/day: 0.01     Years: 42.00     Pack years: 0.42     Types: Cigarettes     Smokeless tobacco: Never Used     Tobacco comment: Has chantix at home, but hasn't started yet.   5/9/19.   Substance and Sexual Activity     Alcohol use: Yes     Alcohol/week: 0.0 standard drinks     Comment: Occ. wine     Drug use: No     Sexual activity: Yes     Partners: Male     Birth control/protection: Surgical     Comment: Hyst        Mental Health Status:  Mental Health Status: No Current Concerns       Chemical Dependency Status:  Chemical Dependency Status: No Current Concerns             Values/Beliefs:  Spiritual, Cultural Beliefs, Sikh Practices, Values that   affect care:            Values/Beliefs Comment: (Not discussed)    Additional Information:    Met with the Pt for discharge planning.    The Pt lives alone independently in the community in a home.  Her   son lives next door and is available to assist if needed.       The Pt has a history of anal cancer and had her last dose of   chemotherapy 4/2.  She is currently receiving radiation.  She is   admitted with generalized weakness and diarrhea.  Her cousin is   staying with her through 4/19, then her sister will be staying   with her.  She declined the need for home care.    Plan:  Home with family support      Lucero Salcedo RN         Lactic acid whole blood   Result Value Ref Range    Lactic Acid 0.5 (L) 0.7 - 2.0 mmol/L   CBC with platelets differential   Result Value Ref Range    WBC 0.7 (LL) 4.0 - 11.0 10e9/L    RBC Count 3.91 3.8 - 5.2 10e12/L    Hemoglobin 12.0 11.7 - 15.7 g/dL    Hematocrit 35.7 35.0 - 47.0 %    MCV 91 78 - 100 fl    MCH 30.7 26.5 - 33.0 pg    MCHC 33.6 31.5 - 36.5 g/dL    RDW 15.5 (H) 10.0 - 15.0 %    Platelet Count 70 (L) 150 - 450 10e9/L    Diff Method Automated Method     % Neutrophils 74.0 %    % Lymphocytes 14.5 %    % Monocytes 5.8 %    % Eosinophils 4.3 %    % Basophils 1.4 %     % Immature Granulocytes 0.0 %    Nucleated RBCs 0 0 /100    Absolute Neutrophil 0.5 (L) 1.6 - 8.3 10e9/L    Absolute Lymphocytes 0.1 (L) 0.8 - 5.3 10e9/L    Absolute Monocytes 0.0 0.0 - 1.3 10e9/L    Absolute Eosinophils 0.0 0.0 - 0.7 10e9/L    Absolute Basophils 0.0 0.0 - 0.2 10e9/L    Abs Immature Granulocytes 0.0 0 - 0.4 10e9/L    Absolute Nucleated RBC 0.0     Anisocytosis Slight     Platelet Estimate       Automated count confirmed.  Platelet morphology is normal.   Hepatic panel   Result Value Ref Range    Bilirubin Direct 0.2 0.0 - 0.2 mg/dL    Bilirubin Total 0.6 0.2 - 1.3 mg/dL    Albumin 2.0 (L) 3.4 - 5.0 g/dL    Protein Total 4.6 (L) 6.8 - 8.8 g/dL    Alkaline Phosphatase 62 40 - 150 U/L    ALT 85 (H) 0 - 50 U/L    AST 32 0 - 45 U/L   Basic metabolic panel   Result Value Ref Range    Sodium 145 (H) 133 - 144 mmol/L    Potassium 3.1 (L) 3.4 - 5.3 mmol/L    Chloride 110 (H) 94 - 109 mmol/L    Carbon Dioxide 27 20 - 32 mmol/L    Anion Gap 8 3 - 14 mmol/L    Glucose 93 70 - 99 mg/dL    Urea Nitrogen 10 7 - 30 mg/dL    Creatinine 0.60 0.52 - 1.04 mg/dL    GFR Estimate >90 >60 mL/min/[1.73_m2]    GFR Estimate If Black >90 >60 mL/min/[1.73_m2]    Calcium 7.2 (L) 8.5 - 10.1 mg/dL       Assessment and Plan:    Aminata Gale is a 70 year old female admitted on 4/4/2021. She presented to the emergency department for evaluation of diarrhea and in the setting of known generalized weakness anal cancer on chemo and radiation for which she is being admitted for further evaluation and treatment.     Generalized muscle weakness  Secondary to recent chemo and radiation, poor oral intake, and losses from diarrhea. No focal deficits. Difficulty managing at home due to frequent bathroom needs and weakness.   - PT/OT evaluations  - Care Transitions consult - may need additional help at home vs TCU     Diarrhea, likely chemo / radiation induced  Radiation induced colitis  Onset 3 days prior to admission, progressively  worsening. Estimated 20+ diarrhea episodes daily, non-bloody. CT abdomen & pelvis shows evidence of mild colitis, is likely chemo / radiation induced but cannot rule out infectious source (although less likely).  - Enteric stool pending  - No imodium unless enteric testing is negative  - Clear liquid diet, advance as tolerated to consistent carbohydrate      Anal squamous cell carcinoma  Malignant neoplasm of anal canal   Cancer / radiation related chronic pain  Rectal skin sloughing / Radiation dermatitis  Diagnosed January 2021. Follows with [facility] and recently completed her chemo course, but receives radiation with Dr. Soto at [facility] (2 doses left on 4/5 and 4/6). Patient has rectal skin sloughing and maceration that is very painful, made worse by diarrhea. Has been unable to swallow her pills recently, leading to poor pain control. Managed prior to admission with prn MS contin 15 mg q 8 hours prn, oxycodone 5-7.5 mg q 4 hours prn, topical morphine, and barrier cream (Pro Shield).  - Continue prior to admission MS contin and oxycodone prn, with IV dilaudid available for breakthrough pain or when unable to take oral  - Continue topical morphine if patient has her home cream with her and verified by pharmacy  - Prn topical lidocaine gel available  - Continue with barrier cream - patient has been using Pro Shield (unable to tolerate Aquaphor)  - Wound cares each shift    Discussed with Dr Christensen 4/5/21 -radiation was held 4/5/21 and will be held 04/06/21 also.       Thrush  Possible chemo inducted mucositis  New in the past few days prior to admission, patient having a lot of pain with oral intake and some difficulty swallowing. Has been using H2O2 rinses at home with some relief.  - Nystatin swish & swallow qid  - May continue H2O2 swish & spit at patient's request  - Encourage swish & spit after inhaler use    Severe protein calorie malnutrition     E coli uti  UA positive for nitrites and trace  leukocyte esterase, 11 WBCs. Afebrile, no leukocytosis. Patient with many antibiotics intolerances / allergies.  UC with e coli, sens pending, pt with significant allergy to cephalosporin.  Will start IV gent after discussing with pharmacy.    Renal nodule, right kidney  Noted on CT abdomen & pelvis - hypodense nodule of right kidney - likely complex cyst vs solid lesion. Patient was aware of this abnormality.  - Radiology recommending renal MRI for further characterization  - Defer further work-up to PCP     Mild pancytopenia-suspect chemo related  Wbc 2.3--0.7  hgb 12.2--12  platelets 100 k--70  ANC 0.5    Will discuss with her  oncologist--Dr Sherry Devlin, call placed.     AAA   Noted on CT abdomen & pelvis - stable infrarenal AAA measuring 3.7 cm. Patient was aware of this and is aware it is stable on admission CT.  - Defer to outpatient surveillance      Type 2 diabetes mellitus with hyperglycemia, without long-term current use of insulin  Noted per problem list. Most recent A1c = 6.1. Appears to be diet controlled, is not on diabetic medications.  - Patient will need consistent carbohydrate diet when diet is advanced  - No scheduled accuchecks unless persistently elevated on BMP    Hypokalemia due to decreased p.o. intake  Replace     Diastolic dysfunction  Essential hypertension, benign  Nonocclusive coronary atherosclerosis of native coronary artery  Hyperlipidemia LDL goal <130  Lexiscan stress test June 2019 with apical hypokinesis, EF 62%. Appears compensated on admission. Managed prior to admission with aspirin 81 mg daily, amlodipine 10 mg daily, losartan 25 mg daily, torsemide 5 mg daily, and Zetia 10 mg daily.  - Continue aspirin, amlodipine, losartan, torsemide, and Zetia     Centrilobular emphysema  Moderate persistent asthma  Stable respiratory status, no wheezing or abnormalities noted on admission exam. Managed prior to admission with Breo, Spiriva, prn albuterol, and prn DuoNebs.  - Continue  Breo and Spiriva if patient has her home inhalers - may use if verified by pharmacy  - Continue prn albuterol and DuoNebs  - Ensure patient swishes & spits her mouth out after inhaler use due to thrush     Esophageal reflux  Noted on problem list, does not appear to be on PPI or H2 blocker. Not significantly symptomatic on admission.  - No treatment unless patient is symptomatic     Systemic lupus erythematosus  Noted per problem list. Formerly treated with methotrexate, currently not on active treatment.  - Continue with outpatient management     Allergic rhinitis  Managed prior to admission with prn Zyrtec or Claritin.  - Continue Zyrtec     COVID status - negative  Tested as asymptomatic COVID screen based on hospital admission criteria. No concern for active COVID infection on admission.  - COVID PCR negative 4/4  - No indication for COVID precautions or repeat testing      Diet: Advance Diet as Tolerated: Clear Liquid Diet  DVT Prophylaxis: None - initiate if patient stays more than 24 hours  Lu Catheter: not present  Code Status: Full Code  - discussed with patient on admission, she is not sure what she would like her CODE STATUS to be.  Will defer to full code for now.        plan- sending Clostridia difficile, start gent after discussion with pharm, will discuss blood counts with her oncology, ? Neupogen?.  Replace potassium.  Continue IV fluids.  Follow daily labs.     2:27 PM no call leandro from Sherry Devlin oncologist this AM-re paged.

## 2021-04-06 NOTE — PLAN OF CARE
AOx4, transfers to commode at bedside independently. Watery diarrhea continues, but improved per patient, scheduled imodium. Nausea improved with compazine x 1, dilaudid 0.5mg x 1. Significant pain to buttocks and rectal area, applying creams PRN. LR @ 100ml/hr, VSS, rested intermittently throughout shift.

## 2021-04-06 NOTE — PROGRESS NOTES
CLINICAL NUTRITION SERVICES  -  BRIEF NOTE     Nutrition Prescription    RECOMMENDATIONS FOR MDs/PROVIDERS TO ORDER:  Continue ADAT    Malnutrition Status:    Severe malnutrition in the context of acute illness.    Recommendations already ordered by Registered Dietitian (RD):  -pudding with dinner tonight (ok with RN) - vanilla mixed with Banatrol Plus (banana flakes, soluble fiber with prebiotic to help with loose stools).  -Boost Breeze Wild Berry TID with meals.  -Banatrol Plus mixed into 4 oz of apple juice at each meal.     Future/Additional Recommendations:  -daily weights as ordered.       NUTRITION HISTORY  Obtained from sister Destinee at bedside (patient asleep but participating in conversation at times):  -normal intake was small frequent meals throughout the day. For the last 2-3 weeks she ate bites at meals due to poor appetite and mouth/throat pain. Intake got worse until last Friday 4/2 and hasn't had much since.  -weight: last weighed 138 lbs at home (date unknown).   -egg white allergy: patient does eat whole eggs at home, and uses whole eggs in a custard. She does report an allergy to purple grapes. Recommend follow-up with PCP to put this allergy into the electronic medical record.    Intake this admission: drank juice at breakfast and lunch. No appetite, and having nausea. Discussed adding fiber to fluids for diarrhea management. Destinee believes patient will like Banatrol Plus (banana flakes, soluble fiber with prebiotic) mixed into apple juice as she likes bananas. Patient willing to try Boost Breeze berry with meals.      PHYSICAL FINDINGS  See malnutrition section below.       MALNUTRITION  % Intake: </=75% for >/= 1 month (severe)  % Weight Loss: > 5% in 1 month (severe)  Subcutaneous Fat Loss: Facial region:  Orbital - at least mild  Muscle Loss: Temporal:  At least mild and Posterior calf:  At least mild  Fluid Accumulation/Edema: None noted  Malnutrition Diagnosis: Severe malnutrition in the  context of acute illness.      INTERVENTIONS  Implementation  Nutrition education on nutrition-related medication management: discussed fiber supplementation to help with stools.  Collaboration with other providers: patient plan of care discussed during IDT rounds this morning. Received verbal permission from RASTA Bolanos to send pudding at dinner tonight for patient to try (diet is ADAT).  Medical food supplement therapy: Boost Breeze Wild Berry TID with meals.  Pudding with dinner tonight - vanilla mixed with Banatrol Plus (banana flakes, soluble fiber with prebiotic to help with loose stools). Kitchen to mix Banatrol Plus into 4 oz of apple juice at each meal.       Monitoring/Evaluation  Progress toward goals will be monitored and evaluated per protocol.        Coleen Almanzar RDN, LD  Clinical Dietitian  Office: [phone]  Saturday/Sunday Pager: [phone]

## 2021-04-06 NOTE — PHARMACY-AMINOGLYCOSIDE DOSING SERVICE
Pharmacy Aminoglycoside Initial Note  Date of Service 2021  Patient's  1950  70 year old, female    Weight (Actual):  62.6 kg    Indication: Urinary Tract Infection    Current estimated CrCl = Estimated Creatinine Clearance: 83.8 mL/min (based on SCr of 0.6 mg/dL).    Creatinine for last 3 days  2021:  1:15 PM Creatinine 0.78 mg/dL  2021:  4:00 AM Creatinine 0.59 mg/dL  2021:  5:57 AM Creatinine 0.60 mg/dL     Nephrotoxins and other renal medications (From now, onward)    Start     Dose/Rate Route Frequency Ordered Stop    21 0930  gentamicin (GARAMYCIN) 310 mg in sodium chloride 0.9 % 50 mL intermittent infusion      5 mg/kg × 62.6 kg  over 60 Minutes Intravenous EVERY 24 HOURS 21 0921      21 0800  torsemide (DEMADEX) tablet 5 mg      5 mg Oral DAILY 21 2243      21 2242  ibuprofen (ADVIL/MOTRIN) tablet 600 mg      600 mg Oral EVERY 6 HOURS PRN 21 2243            Contrast Orders - past 72 hours (72h ago, onward)    Start     Dose/Rate Route Frequency Ordered Stop    21 1435  iopamidol (ISOVUE-370) solution 67 mL      67 mL Intravenous ONCE 21 1432 21 1450          Aminoglycoside Levels - past 2 days  No results found for requested labs within last 48 hours.    Aminoglycosides IV Administrations (past 72 hours)      No aminoglycosides orders with administrations in past 72 hours.                    Plan:  1.  Start Gentamicin 310 mg (5 mg/kg) IV q24h.   2.  Target goals based on extended interval dosing  3.  Goal peak level: 17-24 mg/L  4.  Goal trough level: <0.5mg/L  5.  Pharmacy will continue to follow and check levels as appropriate in 1-3 Days      Naomi Carpio, PharmD intern       never used

## 2021-04-06 NOTE — PROGRESS NOTES
DATE:  4/6/2021   TIME OF RECEIPT FROM LAB:  0823    LAB TEST:  WBC  LAB VALUE:  0.7  RESULTS GIVEN WITH READ-BACK TO (PROVIDER):  Nathanael Alexandre MD  TIME LAB VALUE REPORTED TO PROVIDER:   0863

## 2021-04-06 NOTE — PLAN OF CARE
Compazine, zofran and essential oils have been effective in managing intermittent n/v.  She has taken sips of clear liquids and will try some pudding with dinner tonight.  Creams applied to buttucks and c difficile specimen sent to lab.  Potassium replaced and remains low, second round of replacement initiated.  Cepastat ordered for her oral/throat pain which has been effective.  She received one dose of liquid oxycodone around lunchtime with relief.

## 2021-04-07 NOTE — PROGRESS NOTES
Penicillin/Cephalosporin Allergy Assessment    Antimicrobial Stewardship Program - A joint venture between [facility] and  Physicians to optimize antibiotic management.     Aminata Gale was identified for allergy assessment during this admission due to a documented allergy to a penicillin or cephalosporin antibiotic.  A detailed allergy history interview was completed on 04/07/21 to assess the appropriateness of the documented allergy.    Allergy History:   Question Response   What was the name of agent which caused the event? Amoxicillin, Ampicillin, Penicillin, Cephalexin, Cefaclor   What is the medication's indication? Patient cannot remember why she was prescribed these.   How was the medication given/taken? unknown   How long ago was the reaction? 5-10 years ago(The patient cannot remember when she had these last, when asked if it was more than 10 yrs ago she stated that she was sure she has had them more recently than that. I could not find any documented scripts or admins for any of these meds.)   What was the symptoms of the event? rash, SOB, trouble breathing, throat closure   How long after taking the medication did it take for the symptoms to begin?  within 30 minutes   How was the reaction treated?   other (comment), hospitalization Patient is unsure what she recieved. She said it ws treated based on severity.   Did you ever experience symptoms like this before? yes   Have you received the medication again without a reaction? no   Have you received a similar medication without a reaction? no   Can you name antibiotics that you've tolerated? Azithromycin, Gentamicin, Nitrofurantoin   Dates tolerated (if applicable): Gentamicin April 2021, Nitrofurantoin March 2020, Azithromycin October 2020   Has the patient met [facility] Penicillin Skin Test Protocol for Penicillin Allergy Skin Testing?  NA     Information Source: patient     Additional Information:  I  reviewed Epic to see if any of these medications have been ordered or administered within the last 10 years. Per the information provided, it does not appear so. These allergies were added to in Epic 7/25/2005.    Recommendation:  The reaction the patient described is a Type I IgE mediated reaction. Patient has not tried and tolerated any other beta-lactams. Would consider an outpatient penicillin skin test if possible in the future. Would recommend avoiding penicillins and 1st/2nd generation cephalosporins at this time until a skin test could be performed. Patient may tolerate a 3rd/4th/5th generation cephalosporin but would recommend close monitoring for the first dose if the patient were to try one of these medications.       Please contact a pharmacist with any further allergy-related questions or concerns.  Naomi Carpio, PharmD Student

## 2021-04-07 NOTE — PROGRESS NOTES
CLINICAL NUTRITION SERVICES - BRIEF NOTE       Nutrition Prescription    RECOMMENDATIONS FOR MDs/PROVIDERS TO ORDER:  -advance diet to full liquids and phos lab add-on from this morning, per discretion.    Recommendations already ordered by Registered Dietitian (RD):  -strawberry Boost Plus with lunch today.  -2 apple juice per tray - one with Banatrol Plus, one without.    Future/Additional Recommendations:  -continue to monitor and encourage oral intake.  -monitor labs and weights.       Checked-in with patient and RN Cici this morning. She liked the apple juice mixed with Banatrol Plus, the vanilla pudding mixed with Banatrol, and the Wild Berry Boost Breeze. Still only taking in sips, but is slowly improving. She is willing to try full liquids for lunch today, and wants 2 apple juice on each tray - one with Banatrol Plus and one without. She wants to try strawberry Boost Plus with lunch today.    Labs: K+ 3.3 (L) - has required replacements since admit. Mag was 2.1 (WNL) on 4/5. Phos has not been checked this admission - would recommend due to moderate risk for refeeding syndrome.      Recommendations:  -MD to order Phos lab per discretion.  -trial of full liquids for lunch, per MD discretion.  -snacks/supplements as above.  -monitor oral intake, labs, and weights.      Coleen Almanzar RDN, LD  Clinical Dietitian  Office: [phone]  Saturday/Sunday Pager: [phone]

## 2021-04-07 NOTE — PHARMACY-AMINOGLYCOSIDE DOSING SERVICE
Pharmacy Aminoglycoside Follow-Up Note  Date of Service 2021  Patient's  1950   70 year old, female    Weight (Actual): 62.6 kg    Indication: Urinary Tract Infection  Current Gentamicin regimen:  310 mg IV q24h  Day of therapy: 1    Target goals based on extended interval dosing  Goal Peak level: 17-24 mg/L  Goal Trough level: <0.5mg/L    Current estimated CrCl: Estimated Creatinine Clearance: 83.8 mL/min (based on SCr of 0.6 mg/dL).    Creatinine for last 3 days  2021:  1:15 PM Creatinine 0.78 mg/dL  2021:  4:00 AM Creatinine 0.59 mg/dL  2021:  5:57 AM Creatinine 0.60 mg/dL    Nephrotoxins and other renal medications (From now, onward)    Start     Dose/Rate Route Frequency Ordered Stop    21 0930  gentamicin (GARAMYCIN) 310 mg in sodium chloride 0.9 % 50 mL intermittent infusion      5 mg/kg × 62.6 kg  over 60 Minutes Intravenous EVERY 24 HOURS 21 0921      21 0800  torsemide (DEMADEX) tablet 5 mg      5 mg Oral DAILY 21 2243      21 2242  ibuprofen (ADVIL/MOTRIN) tablet 600 mg      600 mg Oral EVERY 6 HOURS PRN 21 2243            Contrast Orders - past 72 hours (72h ago, onward)    Start     Dose/Rate Route Frequency Ordered Stop    21 1435  iopamidol (ISOVUE-370) solution 67 mL      67 mL Intravenous ONCE 21 1432 21 1450          Aminoglycoside Levels - past 2 days  2021: 10:30 PM Gentamicin Level 1.5 mg/L    Aminoglycosides IV Administrations (past 72 hours)                   gentamicin (GARAMYCIN) 310 mg in sodium chloride 0.9 % 50 mL intermittent infusion (mg) 310 mg New Bag 21 1012                Pharmacokinetic Analysis  Post-dose level drawn 11.25 hours after infusion, evaluated per Urban/Brett Nomogram      Interpretation of levels and current regimen:  Aminoglycoside levels are within goal range    Has serum creatinine changed greater than 50% in the last 72 hours: No    Urine output:  unable to  determine    Renal function: Stable    Plan  1. Continue current dose    2.  Method of evaluation: Sarwat/Brett    3. Pharmacy will continue to follow and check levels  as appropriate in 1-3 Days    Santiago Ace RPH

## 2021-04-07 NOTE — PROGRESS NOTES
Antimicrobial Stewardship Team Note    Antimicrobial Stewardship Program - A joint venture between [facility] and  Physicians to optimize antibiotic management.  NOT a formal consult - Restricted Antimicrobial Review     Patient: Aminata Gale  MRN: [id number]  Allergies: Amoxicillin, Ampicillin, Cipro [ciprofloxacin], Keflex [cephalexin monohydrate], Penicillin [penicillins], Sulfa drugs, Tetracycline, Biaxin [clarithromycin], Ceclor [cefaclor], Advair diskus, Egg white, Fish, and Mustard [allyl isothiocyanate]    Brief Summary: Latoya Gale is a 70 year old female with a PMHx significant for anal SCC (dx 2/2021, on definitive chemoradiotherapy with 5-fluorouracil and mitomycin, last dose 4/2), T2DM, HTN, HLD, emphysema/asthma, and GERD who was admitted on 4/4/2021 with generalized weakness and diarrhea.     History of Present Illness: Since her last chemotherapy session, she has had diarrhea and watery loose stools. She endorsed lower abdominal pain and nausea, but denied any fevers, chills, or vomiting. On presentation, she was afebrile, mildly tachycardic (HR 100s), and otherwise vitally stable. Physical exam documentation was notable for significant sloughing of skin and radiation injury to the patient's rectum and surrounding region with the rectum being very tender to palpation. Initial labs were largely unrevealing, noting WBC within normal limits (4.2), platelets 113, mild ALT elevation (87), and lipase of 31. 4/4 CT a/p w/ contrast showed some wall thickening of the colon diffusely appears mild, no abscess or free air, anal canal mass towards the right is smaller in size (2.8 x 1.6 cm), stable infrarenal abdominal aortic aneurysm, and hypodense R kidney nodule. COVID pcr negative. Midstream UA with 11 WBCs, trace LE, and positive nitrites. Urine culture with >100k Citrobacter koseri and 50-100k E.coli. Both pan-sensitive. C.diff pcr and enteric panel are negative. Patient was started on  gentamicin IV yesterday in response to urine culture.          Active Anti-infective Medications   (From admission, onward)                 Start     Stop    04/06/21 0930  gentamicin (GARAMYCIN) 40 mg/mL IM  5 mg/kg,   Intravenous,   EVERY 24 HOURS     Urinary Tract Infection        --                  Assessment: Asymptomatic bacteruria in the setting of neutropenia   Per reports, patient continues to have diarrhea, possibly improving, along with mouth sores; all likely attributable to chemotherapy. Patient's WBC count today is 0.4 with platelets of 63. Vitally, patient has been afebrile with no antipyretic use and has been persistently tachycardic. There is no documentation of localized urinary symptoms (e.g., dysuria, urinary frequency, urgency) that would be suggestive of a UTI. Generalized weakness on presentation aligns more with dehydration associated with diarrhea and chemotherapy. Urine culture is also more suggestive of colonization rather than true infection with more than one organism isolated. Would ultimately favor stopping gentamicin and closely monitoring the patient for any signs or symptoms of UTI. Neutropenia does not equate to targeting growth in urine culture. Per chart review documentation, anticipate patient being close to nessa.     If there is a desire to treat a presumed UTI, a single gentamicin dose is adequate in lower-UTI, non-septic patients (Sheree et al, Antimicrob Agents Chemother, 2019). Aminoglycosides are excreted in high concentrations in the urine, exceeding plasma concentrations by up to 100-fold within an hour of IV administration, far exceeding the peak-to-GWENDOLYN ratio of 10 to 12 that is recommend for efficacy. Additionally, after a single dose, concentrations remain above therapeutic levels for most uropathogens for ~72 hours or longer. Aminoglycosides also confer post-antibiotic effects.      For future use, please note it appears patient has tolerated oral cefuroxime,  which may help broaden available antibiotics on account of listed allergies.     Recommendations:  Stop gentamicin    Discussed with ID Staff MD Teresa Hernandez, PharmD, [facility]  Pager: [phone]    Vital Signs/Clinical Features:  Vitals         04/05 0700  -  04/06 0659 04/06 0700  -  04/07 0659 04/07 0700  -  04/07 1159   Most Recent    Temp ( F) 97 -  98.6    97.6 -  99.1    97.1 -  98.8     97.1 (36.2)    Pulse 98 -  117    93 -  110    101 -  103     103    Resp 15 -  18    16 -  18      16     16    /69 -  151/75    101/49 -  138/67    108/48 -  118/62     108/48    SpO2 (%) 90 -  92    90 -  93    89 -  91     91            Labs  Estimated Creatinine Clearance: 69.3 mL/min (based on SCr of 0.75 mg/dL).  Recent Labs   Lab Test 02/05/20  1340 07/17/20  1142 04/04/21  1315 04/05/21  0400 04/06/21  0557 04/07/21  0645   CR 0.89 0.76 0.78 0.59 0.60 0.75       Recent Labs   Lab Test 02/22/19  1128 03/04/19  1854 03/04/19  1854 02/05/20  1340 07/17/20  1142 01/20/21  1634 04/04/21  1315 04/05/21  0400 04/06/21  0557 04/07/21  0645   WBC 16.4* 12.3*   < > 10.3 11.1*  --  4.2 2.3* 0.7* 0.4*   ANEU 10.2* 6.8  --  5.8  --   --  3.8 1.9 0.5*  --    ALYM 4.5 4.1  --  3.1  --   --  0.1* 0.1* 0.1*  --    DEJUAN 1.3 1.2  --  1.2  --   --  0.1 0.1 0.0  --    AEOS 0.2 0.2  --  0.1  --   --  0.2 0.2 0.0  --    HGB 16.5* 16.0*   < > 16.7* 16.6* 16.5* 13.4 12.2 12.0 11.6*   HCT 48.5* 49.8*   < > 50.8* 50.2*  --  40.9 36.9 35.7 34.5*   MCV 97 93   < > 91 91  --  94 93 91 91    213   < > 301 285  --  113* 100* 70* 63*    < > = values in this interval not displayed.       Recent Labs   Lab Test 02/22/19  1128 07/02/19  1211 02/05/20  1340 04/04/21  1315 04/05/21  0400 04/06/21  0557 04/07/21  0645   BILITOTAL 0.6  --  0.4 0.6 0.4 0.6 0.6   ALKPHOS 118  --  119 77 68 62 59   ALBUMIN 3.5 3.9 3.8 2.4* 2.1* 2.0* 1.9*   AST 34  --  17 28 29 32 26   ALT 70*  --  22 87* 81* 85* 88*       Recent Labs   Lab Test  06/30/17  2350 04/05/21  1325   LACT  --  0.5*   CRP 6.2  --        Recent Labs   Lab Test 04/06/21  2230   GENT 1.5       Culture Results:  7-Day Micro Results       Procedure Component Value Units Date/Time    Clostridium difficile toxin B PCR [[id number]] Collected: 04/06/21 1222    Order Status: Completed Lab Status: Final result Updated: 04/06/21 1556    Specimen: Feces      Specimen Description Feces     C Diff Toxin B PCR Negative     Comment: Negative: C. difficile target DNA sequences NOT detected, presumed negative   for C.difficile toxin B or the number of bacteria present may be below the   limit of detection for the test.  FDA approved assay performed using [employer] real-time PCR.  A negative result does not exclude actual disease due to C. difficile and may   be due to improper collection, handling and storage of the specimen or the   number of organisms in the specimen is below the detection limit of the assay.         Urine Culture Aerobic Bacterial [[id number]]  (Abnormal)  (Susceptibility) Collected: 04/04/21 1621    Order Status: Completed Lab Status: Final result Updated: 04/06/21 2328    Specimen: Midstream Urine      Specimen Description Midstream Urine     Special Requests Specimen received in preservative     Culture Micro >100,000 colonies/mL  Citrobacter koseri        50,000 to 100,000 colonies/mL  Escherichia coli      Susceptibility       Citrobacter koseri (1)       Antibiotic Interpretation Sensitivity Method Status    CEFAZOLIN Sensitive <=4 ug/mL GWENDOLYN Final     Cefazolin GWENDOLYN breakpoints are for the treatment of uncomplicated urinary tract   infections.  For the treatment of systemic infections, please contact the   laboratory for additional testing.    CEFOXITIN Sensitive <=4 ug/mL GWENDOLYN Final    CEFTAZIDIME Sensitive <=1 ug/mL GWENDOLYN Final    CEFTRIAXONE Sensitive <=1 ug/mL GWENDOLYN Final    CIPROFLOXACIN Sensitive <=0.25 ug/mL GWENDOLYN Final    GENTAMICIN Sensitive <=1 ug/mL GWENDOLYN Final     LEVOFLOXACIN Sensitive <=0.12 ug/mL GWENDOLYN Final    NITROFURANTOIN Sensitive 32 ug/mL GWENDOLYN Final    TOBRAMYCIN Sensitive <=1 ug/mL GWENDOLYN Final    Trimethoprim/Sulfa Sensitive <=1/19 ug/mL GWENDOLYN Final    Piperacillin/Tazo Sensitive <=4 ug/mL GWENDOLYN Final    CEFEPIME Sensitive <=1 ug/mL GWENDOLYN Final    MEROPENEM [*]  Sensitive <=0.25 ug/mL GWENDOLYN Final              Escherichia coli (2)       Antibiotic Interpretation Sensitivity Method Status    AMPICILLIN Sensitive <=2 ug/mL GWENDOLYN Final    CEFAZOLIN Sensitive <=4 ug/mL GWENDOLYN Final     Cefazolin GWENDOLYN breakpoints are for the treatment of uncomplicated urinary tract   infections.  For the treatment of systemic infections, please contact the   laboratory for additional testing.    CEFOXITIN Sensitive <=4 ug/mL GWENDOLYN Final    CEFTAZIDIME Sensitive <=1 ug/mL GWENDOLYN Final    CEFTRIAXONE Sensitive <=1 ug/mL GWENDOLYN Final    CIPROFLOXACIN Sensitive <=0.25 ug/mL GWENDOLYN Final    GENTAMICIN Sensitive <=1 ug/mL GWENDOLYN Final    LEVOFLOXACIN Sensitive <=0.12 ug/mL GWENDOLYN Final    NITROFURANTOIN Sensitive <=16 ug/mL GWENDOLYN Final    TOBRAMYCIN Sensitive <=1 ug/mL GWENDOLYN Final    Trimethoprim/Sulfa Sensitive <=1/19 ug/mL GWENDOLYN Final    AMPICILLIN/SULBACTAM Sensitive <=2 ug/mL GWENDOLYN Final    Piperacillin/Tazo Sensitive <=4 ug/mL GWENDOLYN Final    CEFEPIME Sensitive <=1 ug/mL GWENDOLYN Final    MEROPENEM [*]  Sensitive <=0.25 ug/mL GWENDOLYN Final    Ext Spect B Lac Prod [*]  Sensitive Negative  GWENDOLYN Final               [*]   Suppressed Antibiotic                   Enteric Bacteria and Virus Panel by [employer] Stool [[id number]] Collected: 04/04/21 1600    Order Status: Completed Lab Status: Final result Updated: 04/05/21 0853    Specimen: Feces      Campylobacter group by MAXWELL Not Detected     Salmonella species by MAXWELL Not Detected     Shigella species by MAXWELL Not Detected     Vibrio group by MAXWELL Not Detected     Rotavirus A by MAXWELL Not Detected     Shiga toxin 1 gene by MAXWELL Not Detected     Shiga toxin 2 gene by MAXWELL Not Detected     Norovirus I and II by [facility]X[employer]LL Not  Detected     Yersinia enterocolitica by MAXWELL Not Detected     Enteric pathogen comment --     Testing performed by multiplexed, qualitative PCR using the [employer] Enteric   Pathogens Nucleic Acid Test. Results should not be used as the sole basis for diagnosis,   treatment, or other patient management decisions.       Comment: Positive results do not rule out co-infection with other organisms that are   not detected by this test, and may not be the sole or definitive cause of   patient illness.   Negative results in the setting of clinical illness compatible with   gastroenteritis may be due to infection by pathogens that are not detected by   this test or non-infectious causes such as ulcerative colitis, irritable bowel   syndrome, or Crohn's disease.   Note: Shiga toxin producing E. coli (STEC) typically harbor one or both genes   that encode for Shiga toxins 1 and 2.                 Recent Labs   Lab Test 02/12/14  1319 07/01/17  0300 03/02/20  1437 12/18/20  1029 04/04/21  1621   URINEPH 6.0 7.0 5.5 6.0 5.0   NITRITE Negative Positive* Positive* Negative Positive*   LEUKEST Small* Negative Trace* Negative Trace*   WBCU 5-10* 2 5-10*  --  11*                   Recent Labs   Lab Test 04/06/21  1222   CDBPCT Negative       Imaging: Ct Abdomen Pelvis W Contrast    Result Date: 4/4/2021  CT ABDOMEN AND PELVIS WITH CONTRAST  4/4/2021 3:06 PM CLINICAL HISTORY: Abdominal pain, acute, nonlocalized. TECHNIQUE: CT scan of the abdomen and pelvis was performed following injection of IV contrast. Multiplanar reformats were obtained. Dose reduction techniques were used. CONTRAST: 67 mL Isovue-370 COMPARISON: CT abdomen and pelvis 12/21/2020. FINDINGS: LOWER CHEST: Normal. HEPATOBILIARY: No acute abnormality. No focal lesion identified. Status post cholecystectomy. PANCREAS: Normal. SPLEEN: Normal. ADRENAL GLANDS: Normal. KIDNEYS/BLADDER: Atrophy of the upper right kidney again identified. A few renal cysts not  requiring specific imaging follow-up. However, there is a stable hypodense rounded lesion that is complex measuring 1 cm right mid kidney, series 5 image 87. There are a few nonobstructing intrarenal stones bilaterally appearing stable. No hydronephrosis. No acute bladder abnormality. BOWEL: Some wall thickening of the colon diffusely appears mild. Small fluid distends a few distal small bowel loops. No abscess or free air. Anal canal mass towards the right is smaller in size measuring 2.8 x 1.6, previously 3 x 2.7 cm, image 200. PELVIC ORGANS: No acute abnormality. Unremarkable right ovary. Small left ovarian cyst is stable measuring 1.4 cm, image 125. ADDITIONAL FINDINGS: Vascular calcifications. Stable infrarenal abdominal aortic aneurysm that is 3.7 cm, image 93. No adjacent hematoma. No new enlarged lymph nodes identified. MUSCULOSKELETAL: No aggressive bony lesion. L5-S1 degenerative disc disease.     IMPRESSION: 1.  Some wall thickening of the colon may be a mild colitis. No abscess or free air. 2.  Smaller size of the right anal canal mass. 3.  Stable infrarenal abdominal aortic aneurysm that currently measures 3.7 cm. 4.  Stable but indeterminate hypodense rounded nodule at the right kidney that could be a complex cyst versus solid lesion. Recommend nonurgent renal MRI for further characterization. 5.  Bilateral nonobstructing intrarenal stones. BEN BRO MD

## 2021-04-07 NOTE — PROGRESS NOTES
[facility]      Subjective:  Maybe less diarrhea-up 5 times overnight    Review of Systems:  CONSTITUTIONAL: NEGATIVE for fever, chills, change in weight  INTEGUMENTARY/SKIN: perirectal skin breakdown  EYES: NEGATIVE for vision changes or irritation  ENT/MOUTH: NEGATIVE for ear, mouth and throat problems  RESP: NEGATIVE for significant cough or SOB  BREAST: NEGATIVE for masses, tenderness or discharge  CV: NEGATIVE for chest pain, palpitations or peripheral edema  GI: nausea, diarrhea  : NEGATIVE for frequency, dysuria, or hematuria  MUSCULOSKELETAL: NEGATIVE for significant arthralgias or myalgia  NEURO: NEGATIVE for weakness, dizziness or paresthesias  ENDOCRINE: NEGATIVE for temperature intolerance, skin/hair changes  HEME: NEGATIVE for bleeding problems  PSYCHIATRIC: NEGATIVE for changes in mood or affect    Physical Exam:  Vitals Were Reviewed    Patient Vitals for the past 16 hrs:   BP Temp Temp src Pulse Resp SpO2 Weight   04/07/21 0715 118/62 98.8  F (37.1  C) Oral 101 16 (!) 89 % --   04/07/21 0455 -- -- -- -- -- -- 64.7 kg (142 lb 10.2 oz)   04/07/21 0241 120/60 99.1  F (37.3  C) Oral 108 16 90 % --   04/06/21 2352 128/61 98.8  F (37.1  C) Oral 101 16 90 % --   04/06/21 1944 128/64 99.1  F (37.3  C) Oral 109 16 91 % --         Intake/Output Summary (Last 24 hours) at 4/7/2021 0908  Last data filed at 4/7/2021 0700  Gross per 24 hour   Intake 2314.33 ml   Output 450 ml   Net 1864.33 ml       GENERAL APPEARANCE: alert  EYES: conjunctiva clear, eyes grossly normal  RESP: lungs clear to auscultation - no rales, rhonchi or wheezes  CV: regular rate and rhythm, normal S1 S2, no S3 or S4 and no murmur, click or rub   ABDOMEN: soft, nontender, no HSM or masses and bowel sounds normal  MS: no clubbing, cyanosis; no edema  SKIN: large area of epidermal breakdown and redness around rectum    Lab:  Recent Labs   Lab Test 04/07/21  0645 04/06/21  1910 04/06/21  0557 04/06/21  0557     --    --  145*   POTASSIUM 3.3* 3.6   < > 3.1*   CHLORIDE 110*  --   --  110*   CO2 29  --   --  27   ANIONGAP 4  --   --  8   *  --   --  93   BUN 12  --   --  10   CR 0.75  --   --  0.60   ELY 7.2*  --   --  7.2*    < > = values in this interval not displayed.     CBC RESULTS:   Recent Labs   Lab Test 04/07/21  0645 04/06/21  0557   WBC 0.4* 0.7*   RBC 3.78* 3.91   HGB 11.6* 12.0   HCT 34.5* 35.7   PLT 63* 70*       Results for orders placed or performed during the hospital encounter of 04/04/21 (from the past 24 hour(s))   Clostridium difficile toxin B PCR    Specimen: Feces   Result Value Ref Range    Specimen Description Feces     C Diff Toxin B PCR Negative NEG^Negative   Potassium   Result Value Ref Range    Potassium 3.2 (L) 3.4 - 5.3 mmol/L   Lactic acid level STAT   Result Value Ref Range    Lactate for Sepsis Protocol 1.1 0.7 - 2.0 mmol/L   Potassium   Result Value Ref Range    Potassium 3.6 3.4 - 5.3 mmol/L   Gentamicin level   Result Value Ref Range    Gentamicin Level 1.5 mg/L   Hepatic panel   Result Value Ref Range    Bilirubin Direct 0.2 0.0 - 0.2 mg/dL    Bilirubin Total 0.6 0.2 - 1.3 mg/dL    Albumin 1.9 (L) 3.4 - 5.0 g/dL    Protein Total 4.7 (L) 6.8 - 8.8 g/dL    Alkaline Phosphatase 59 40 - 150 U/L    ALT 88 (H) 0 - 50 U/L    AST 26 0 - 45 U/L   CBC with platelets differential   Result Value Ref Range    WBC 0.4 (LL) 4.0 - 11.0 10e9/L    RBC Count 3.78 (L) 3.8 - 5.2 10e12/L    Hemoglobin 11.6 (L) 11.7 - 15.7 g/dL    Hematocrit 34.5 (L) 35.0 - 47.0 %    MCV 91 78 - 100 fl    MCH 30.7 26.5 - 33.0 pg    MCHC 33.6 31.5 - 36.5 g/dL    RDW 15.6 (H) 10.0 - 15.0 %    Platelet Count 63 (L) 150 - 450 10e9/L    Diff Method Canceled, Test credited    Basic metabolic panel   Result Value Ref Range    Sodium 143 133 - 144 mmol/L    Potassium 3.3 (L) 3.4 - 5.3 mmol/L    Chloride 110 (H) 94 - 109 mmol/L    Carbon Dioxide 29 20 - 32 mmol/L    Anion Gap 4 3 - 14 mmol/L    Glucose 128 (H) 70 - 99 mg/dL    Urea  Nitrogen 12 7 - 30 mg/dL    Creatinine 0.75 0.52 - 1.04 mg/dL    GFR Estimate 79 >60 mL/min/[1.73_m2]    GFR Estimate If Black >90 >60 mL/min/[1.73_m2]    Calcium 7.2 (L) 8.5 - 10.1 mg/dL       Assessment and Plan:      Aminata Gale is a 70 year old female admitted on 4/4/2021. She presented to the emergency department for evaluation of diarrhea and in the setting of known generalized weakness anal cancer on chemo and radiation for which she is being admitted for further evaluation and treatment.     Generalized muscle weakness  Secondary to recent chemo and radiation, poor oral intake, and losses from diarrhea. No focal deficits. Difficulty managing at home due to frequent bathroom needs and weakness.   - PT/OT evaluations  - Care Transitions consult - may need additional help at home vs TCU     Diarrhea, likely chemo / radiation induced  Radiation induced colitis  Onset 3 days prior to admission, progressively worsening. Estimated 20+ diarrhea episodes daily, non-bloody. CT abdomen & pelvis shows evidence of mild colitis, is likely chemo / radiation induced but cannot rule out infectious source (although less likely).    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 Follows with [facility] and recently completed her chemo course, but receives radiation with Dr. Soto at [facility] (2 doses left on 4/5 and 4/6). Patient has rectal skin sloughing and maceration that is very painful, made worse by diarrhea. Has been unable to swallow her pills recently, leading to poor pain control. Managed prior to admission with prn MS contin 15 mg q 8 hours prn, oxycodone 5-7.5 mg q 4 hours prn, topical morphine, and barrier cream (Pro Shield).  - Continue prior to admission MS contin and oxycodone prn, with IV dilaudid  available for breakthrough pain or when unable to take oral  - Prn topical lidocaine gel available  - Continue with barrier cream -silvadine bid  - Wound cares each shift    Discussed with Dr Christensen 4/6---radiation is on hold until pt recovers further.       Thrush  Possible chemo inducted mucositis  New in the past few days prior to admission, patient having a lot of pain with oral intake and some difficulty swallowing. Has been using H2O2 rinses at home with some relief.  - Nystatin swish & swallow qid  - May continue H2O2 swish & spit at patient's request  - Encourage swish & spit after inhaler use    Severe protein calorie malnutrition     E coli and citrobacter  uti  UA positive for nitrites and trace leukocyte esterase, 11 WBCs. Afebrile, no leukocytosis. Patient with many antibiotics intolerances / allergies.  UC with e coli and citrobacter. Both pan sensitive. Leave on gent for now (problematic allergy profile). Ultimately might be able to transition to nitrofurantoin.    Renal nodule, right kidney  Noted on CT abdomen & pelvis - hypodense nodule of right kidney - likely complex cyst vs solid lesion. Patient was aware of this abnormality.  - Radiology recommending renal MRI for further characterization  - Defer further work-up to PCP     Mild pancytopenia-chemo related  Wbc 2.3--0.7--0.4  hgb 12.2--12  platelets 100 k--70  ANC 0.5--currently too few wbc to determine (04/07/21)    Discussed with  Dr Sherry Devlin (primary oncologist) she says this should be the nessa in terms of counts. Neupogen not indicated. If we need to communicate with her-call her on her cell: Cell [phone].     Low phosporus- nutritionally deficient  Protocol    AAA   Noted on CT abdomen & pelvis - stable infrarenal AAA measuring 3.7 cm. Patient was aware of this and is aware it is stable on admission CT.  - Defer to outpatient surveillance      Type 2 diabetes mellitus with hyperglycemia, without long-term current use of  insulin  Noted per problem list. Most recent A1c = 6.1. Appears to be diet controlled, is not on diabetic medications.  - Patient will need consistent carbohydrate diet when diet is advanced  - No scheduled accuchecks unless persistently elevated on BMP    Hypokalemia due to decreased p.o. intake  Replace     Diastolic dysfunction  Essential hypertension, benign  Nonocclusive coronary atherosclerosis of native coronary artery  Hyperlipidemia LDL goal <130  Lexiscan stress test June 2019 with apical hypokinesis, EF 62%. Appears compensated on admission. Managed prior to admission with aspirin 81 mg daily, amlodipine 10 mg daily, losartan 25 mg daily, torsemide 5 mg daily, and Zetia 10 mg daily.  - Continue aspirin, amlodipine, losartan, torsemide, and Zetia     Centrilobular emphysema  Moderate persistent asthma  Stable respiratory status, no wheezing or abnormalities noted on admission exam. Managed prior to admission with Breo, Spiriva, prn albuterol, and prn DuoNebs.  - Continue Breo and Spiriva if patient has her home inhalers - may use if verified by pharmacy  - Continue prn albuterol and DuoNebs  - Ensure patient swishes & spits her mouth out after inhaler use due to thrush     Esophageal reflux  Noted on problem list, does not appear to be on PPI or H2 blocker. Not significantly symptomatic on admission.  - No treatment unless patient is symptomatic     Systemic lupus erythematosus  Noted per problem list. Formerly treated with methotrexate, currently not on active treatment.  - Continue with outpatient management     Allergic rhinitis  Managed prior to admission with prn Zyrtec or Claritin.  - Continue Zyrtec     COVID status - negative  Tested as asymptomatic COVID screen based on hospital admission criteria. No concern for active COVID infection on admission.  - COVID PCR negative 4/4  - No indication for COVID precautions or repeat testing      Diet: Advance Diet as Tolerated: Clear Liquid Diet  DVT  Prophylaxis: None - initiate if patient stays more than 24 hours  Lu Catheter: not present  Code Status: Full Code  - discussed with patient on admission, she is not sure what she would like her CODE STATUS to be.  Will defer to full code for now.        plan-continue IV gentamicin.  Continue to hold radiation.  Advance diet as tolerated.  Slow IV fluids to 50 cc an hour.  Dr. Devlin thought she was probably at her nessa in terms of blood counts.  If we need more input from her-call her on her cell phone which is listed above.  Probably 1-2 more days.      4:24 PM   ID pharmacy recommends stopping gent since uti is asymptomatic. Will discontinue it.

## 2021-04-07 NOTE — PLAN OF CARE
"Pt is A/O, able to make needs known. Blood pressure 120/60, pulse 108, temperature 99.1  F (37.3  C), temperature source Oral, resp. rate 16, height 1.702 m (5' 7\"), weight 64.7 kg (142 lb 10.2 oz), SpO2 90 %, not currently breastfeeding. Stable on room air. Up independently to bedside commode frequently with small amounts of watery stool. Pt using protective cream on perirectal area for burning sensation. Declined offer of Gabapentin gel stating that it did not work. Area reddened. IV fluids are infusing as ordered. Pt declined offer of pain medication. Nausea intermittently, no emesis. Compazine and Zofran available. Tolerating sips of fluids. Resting appropriately. Continue to assess per POC.    "

## 2021-04-07 NOTE — PLAN OF CARE
Nausea still present but improved today with continued IV compazine and zofran.  Taking in sips and small bites of full liquids.  Potassium replaced and recheck still 3.3.  Will initiate replacement again after IV phosphorus.  Still stooling, has creams ordered for buttocks, she reports she does not care for the morphine cream.  Alarms in place for safety, she does not use her call light consistently.  Medicated for oral pain and rectal 4-5/10 pain with relief.  She prefers to take IV and liquid medications whenever possible.

## 2021-04-08 NOTE — PROGRESS NOTES
Increased diarrhea, family at bedside and helping her to commode.  Asked Dr. Gagnon for probiotic as family was wondering what else could be done to assist in the diarrhea.  Morphine gel stopped the other day as she declined it.  Patient reports wanting it back into her plan of care.  Page sent to Dr. Gagnon at this time.     [id number] Aminata Hazel-patient wants morphine cream back for her buttocks, it was stopped the other day because she was not using it. thanks

## 2021-04-08 NOTE — PROGRESS NOTES
DATE:  4/8/2021   TIME OF RECEIPT FROM LAB:  0610  LAB TEST:  WBC and platelet  LAB VALUE:  WBC- 0.1, platelet- 41   RESULTS GIVEN WITH READ-BACK TO (PROVIDER):  Félix Mccormick  TIME LAB VALUE REPORTED TO PROVIDER:   0615

## 2021-04-08 NOTE — PROGRESS NOTES
Initial IP Physical Therapy Evaluation     04/08/21 1329   Quick Adds   Type of Visit Initial PT Evaluation   Living Environment   People in home alone   Current Living Arrangements house   Home Accessibility stairs to enter home   Number of Stairs, Main Entrance 3   Stair Railings, Main Entrance none   Transportation Anticipated family or friend will provide   Living Environment Comments 2 level home but lives on one level, no stairs once inside home   Self-Care   Usual Activity Tolerance moderate   Current Activity Tolerance fair   Equipment Currently Used at Home none   Activity/Exercise/Self-Care Comment Patient states she does not normally use walker or cane but has quad cane sitting at home   Disability/Function   Hearing Difficulty or Deaf no   Wear Glasses or Blind yes   Vision Management glasses   Concentrating, Remembering or Making Decisions Difficulty no   Difficulty Communicating no   Difficulty Eating/Swallowing no   Walking or Climbing Stairs Difficulty no   Dressing/Bathing Difficulty no   Toileting issues no   Doing Errands Independently Difficulty (such as shopping) no   Fall history within last six months yes   Number of times patient has fallen within last six months 4   Change in Functional Status Since Onset of Current Illness/Injury yes   General Information   Onset of Illness/Injury or Date of Surgery 04/04/21   Referring Physician Bowen Gagnon MD   Patient/Family Therapy Goals Statement (PT) Get stronger   Pertinent History of Current Problem (include personal factors and/or comorbidities that impact the POC) Aminata Gale is a 70 year old female admitted on 4/4/2021. She presented to the emergency department for evaluation of diarrhea and in the setting of known generalized weakness anal cancer on chemo and radiation for which she is being admitted for further evaluation and treatment.   Existing Precautions/Restrictions fall   Cognition   Orientation Status (Cognition) oriented x 3    Affect/Mental Status (Cognition) WNL   Follows Commands (Cognition) WNL   Cognitive Status Comments Patient slow to respond to questions, appears fatigued   Pain Assessment   Patient Currently in Pain Yes, see Vital Sign flowsheet   Integumentary/Edema   Integumentary/Edema other (describe)   Integumentary/Edema Comments 1+ edema BLE   Posture    Posture Kyphosis   Range of Motion (ROM)   ROM Comment BLE WFL   Strength   Strength Comments Deconditioned 4/5 generally major muscle groups   Bed Mobility   Comment (Bed Mobility) Supine<>sit SBA, extra time and effort   Transfers   Transfer Safety Comments Sit<>stand SBA, slow but steady with proper body mechanics. Able to stand from commode SBA and maintain low squat to perform self cares   Gait/Stairs (Locomotion)   [place] Level (Gait) contact guard   Assistive Device (Gait) other (see comments)  (holds onto furniture, declines walker)   Distance in Feet (Required for LE Total Joints) 5', 5'   Pattern (Gait) step-through   Comment (Gait/Stairs) Slow steps between bed and commode x2 during session. Able to stand on one leg to don brief with SBA. Slow selma but steady. Very low activity tolerance. Declines further walk today due to diarrhea, agreeable to try tomorrow.   Balance   Balance other (describe)   Balance Comments Unsteady with short distances, holds onto furniture   Sensory Examination   Sensory Perception WFL   Coordination   Coordination no deficits were identified   Muscle Tone   Muscle Tone no deficits were identified   Clinical Impression   Criteria for Skilled Therapeutic Intervention yes, treatment indicated   PT Diagnosis (PT) Decreased activity tolerance   Influenced by the following impairments Decreased activity tolerance, weakness, pain   Functional limitations due to impairments Mobility, transfers   Clinical Presentation Stable/Uncomplicated   Clinical Presentation Rationale clinical judgment   Clinical Decision Making (Complexity) low  complexity   Therapy Frequency (PT) 6x/week   Predicted Duration of Therapy Intervention (days/wks) 4 days   Planned Therapy Interventions (PT) balance training;gait training;home exercise program;strengthening;stair training;neuromuscular re-education;transfer training   Anticipated Equipment Needs at Discharge (PT) other (see comments)  (TBD)   Risk & Benefits of therapy have been explained evaluation/treatment results reviewed;care plan/treatment goals reviewed;risks/benefits reviewed;current/potential barriers reviewed;participants voiced agreement with care plan;participants included;patient;friend   PT Discharge Planning    PT Discharge Recommendation (DC Rec) Transitional Care Facility  (vs. home with home PT pending progress)   PT Rationale for DC Rec Ax1 with transfers at this time, limited due to diarrhea/bottom pain. TCU to progress strength for safe discharge home alone   PT Brief overview of current status  Ax1 pivot transfers, SBA sit to stand and bed mobility, declines ambulation d/t diarrhea   Total Evaluation Time   Total Evaluation Time (Minutes) 12     Leonardo Kelly, PT, DPT

## 2021-04-08 NOTE — PLAN OF CARE
Up several times to commode for stools.  Pain managed with IV dilaudid and oral oxycodone.  IV fluids infusing, longs decreased with expiratory wheeze.  Continues with poor appetite and stooling seems to occur everytime she tries to take any sips or bites.  Patient and family report this evening that she is much more fatigued and weak today vs. Yesterday.  Therapies ordered today.  Bed alarm in place for safety.  Electrolytes within normal limits today and lactic BPA within normal limits again today.  Remains on half liter with saturations 93-95%, dips to 89% on RA

## 2021-04-08 NOTE — PROGRESS NOTES
[facility] Hospitalist Progress Note           Assessment & Plan      Aminata Gale is a 70 year old female admitted on 4/4/2021. She presented to the emergency department for evaluation of diarrhea and in the setting of known generalized weakness anal cancer on chemo and radiation for which she is being admitted for further evaluation and treatment.     Generalized muscle weakness  4/4/21-4/7/21 -- Secondary to recent chemo and radiation, poor oral intake, and losses from diarrhea. No focal deficits. Difficulty managing at home due to frequent bathroom needs and weakness.   4/8/2021 -- physical therapy to see today - goal of return home, but needs assessment and encouraged mobility.       Diarrhea, likely chemo / radiation induced  Radiation induced colitis  4/4/21-4/7/21 -- Onset 3 days prior to admission, progressively worsening. Estimated 20+ diarrhea episodes daily, non-bloody. CT abdomen & pelvis shows evidence of mild colitis, is likely chemo / radiation induced but cannot rule out infectious source (although less likely).  Clostridia difficile and enteric neg  On immodium   4/8/2021 -- still 10 stools yesterday, but less frequent and smaller than on admission.  Encourage orals, following strict I/O's.      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 Follows with [facility] and recently completed her chemo course, but receives radiation with Dr. Soto at [facility] (2 doses left on 4/5 and 4/6). Patient has rectal skin sloughing and maceration that is very painful, made worse by diarrhea. Has been unable to swallow her pills recently, leading to poor pain control. Managed prior to admission with prn MS contin 15 mg q 8 hours prn, oxycodone 5-7.5 mg q 4 hours prn, topical morphine, and barrier cream (Pro Shield).  - Continue prior to admission MS contin  and oxycodone prn, with IV dilaudid available for breakthrough pain or when unable to take oral  - Prn topical lidocaine gel available  - Continue with barrier cream -silvadine bid  - Wound cares each shift  Discussed with Dr Christensen 4/6--- sounds like plan is to resume radiation on Monday 4/12        Thrush  Possible chemo inducted mucositis  4/4/21-4/7/21 -- New in the past few days prior to admission, patient having a lot of pain with oral intake and some difficulty swallowing. Has been using H2O2 rinses at home with some relief.  - Nystatin swish & swallow qid  - May continue H2O2 swish & spit at patient's request  - Encourage swish & spit after inhaler use  4/8/2021 -- improving.      Severe protein calorie malnutrition  Nutrition consult placed - see their note for details and recommendations.       E coli and citrobacter  uti  4/4/21-4/7/21 -- UA positive for nitrites and trace leukocyte esterase, 11 WBCs. Afebrile, no leukocytosis. Patient with many antibiotics intolerances / allergies.  UC with e coli and citrobacter. Both pan sensitive. Leave on gent for now (problematic allergy profile). Ultimately might be able to transition to nitrofurantoin.  4/8/2021 -- with neutropenia, resumed nitrofurantoin - likely plan for 7 days total antibiotics or can stop if neutropenia resolve prior to that.  Today is 3/7.       Renal nodule, right kidney  Noted on CT abdomen & pelvis - hypodense nodule of right kidney - likely complex cyst vs solid lesion. Patient was aware of this abnormality.  - Radiology recommending renal MRI for further characterization  - Defer further work-up to PCP     Mild pancytopenia-chemo related  Wbc 2.3--0.7--0.4  hgb 12.2--12  platelets 100 k--70  ANC 0.5--currently too few wbc to determine (04/07/21)     Discussed with  Dr Sherry Devlin (primary oncologist) she says this should be the nessa in terms of counts. Neupogen not indicated. If we need to communicate with her-call her on her cell: Cell  [phone].  4/8/2021 -- CBC pending today.      Low phosporus- nutritionally deficient  Protocol     AAA   Noted on CT abdomen & pelvis - stable infrarenal AAA measuring 3.7 cm. Patient was aware of this and is aware it is stable on admission CT.  - Defer to outpatient surveillance      Type 2 diabetes mellitus with hyperglycemia, without long-term current use of insulin  Noted per problem list. Most recent A1c = 6.1. Appears to be diet controlled, is not on diabetic medications.  - Patient will need consistent carbohydrate diet when diet is advanced  - No scheduled accuchecks unless persistently elevated on BMP     Hypokalemia due to decreased p.o. intake  Replacing per protocol.       Diastolic dysfunction  Essential hypertension, benign  Nonocclusive coronary atherosclerosis of native coronary artery  Hyperlipidemia LDL goal <130  4/4/21-4/7/21 -- Lexiscan stress test June 2019 with apical hypokinesis, EF 62%. Appears compensated on admission. Managed prior to admission with aspirin 81 mg daily, amlodipine 10 mg daily, losartan 25 mg daily, torsemide 5 mg daily, and Zetia 10 mg daily.  - Continue aspirin, amlodipine, losartan, torsemide, and Zetia  4/8/2021 -- weight up some from admission but consistent with being dehydrated prior to admission - follow.       Centrilobular emphysema  Moderate persistent asthma  4/4/21-4/7/21 -- Stable respiratory status, no wheezing or abnormalities noted on admission exam. Managed prior to admission with Breo, Spiriva, prn albuterol, and prn DuoNebs.  - Continue Breo and Spiriva - if patient has her home inhalers may use if verified by pharmacy  - Continue prn albuterol and DuoNebs  - Ensure patient swishes & spits her mouth out after inhaler use due to thrush  4/8/2021 -- on 1/2LNC which patient has needed intermittently at baseline in the past per her report - wean off as able.      Esophageal reflux  Noted on problem list, does not appear to be on PPI or H2 blocker. Not  significantly symptomatic on admission.  - No treatment unless patient is symptomatic     Systemic lupus erythematosus  Noted per problem list. Formerly treated with methotrexate, currently not on active treatment.  - Continue with outpatient management     Allergic rhinitis  Managed prior to admission with prn Zyrtec or Claritin.  - Continue Zyrtec     COVID status - negative  Tested as asymptomatic COVID screen based on hospital admission criteria. No concern for active COVID infection on admission.  - COVID PCR negative 4/4  - No indication for COVID precautions or repeat testing      Diet: Advance Diet as Tolerated: Clear Liquid Diet    DVT Prophylaxis: mechanical given pancytopenia   Lu Catheter: not present  Code Status: Full Code  - discussed with patient on admission, she is not sure what she would like her CODE STATUS to be.  Will defer to full code for now.          Diet  Orders Placed This Encounter      Advance Diet as Tolerated: Full Liquid Diet                Disposition  Hope for discharge to home in 1-2 days pending physical therapy evaluation and adequate oral intake.             Interval History:   No new concerns, intake slowly improving, taking just sips still but tolerating this well.  No fever or chills.  Pain unchanged. No new or worsening pain.  No dyspnea, diarrhea slowly improving - smaller amounts.  No other changes.  Overall feels like she's continuing to slowly improve.                Review of Systems:    ROS: 10 point ROS neg other than the symptoms noted above in the HPI.           Medications:   Current active medications and PTA medications reviewed, see medication list for details.            Physical Exam:   Vitals were reviewed  Patient Vitals for the past 24 hrs:   BP Temp Temp src Pulse Resp SpO2 Weight   04/08/21 0850 123/54 98.5  F (36.9  C) Oral 122 18 92 % --   04/08/21 0849 -- -- -- 118 -- -- --   04/08/21 0424 -- -- -- -- -- 91 % --   04/08/21 0320 104/53 98.8  F (37.1   C) Oral 102 18 90 % 65.1 kg (143 lb 8.3 oz)   21 0200 -- -- -- -- -- 93 % --   21 2209 100/50 98.7  F (37.1  C) Oral 101 18 95 % --   21 2103 -- 98.8  F (37.1  C) Oral -- -- -- --   21 1925 108/64 101.2  F (38.4  C) Oral 111 18 93 % --   21 1810 -- -- -- -- -- (!) 89 % --   21 1348 -- -- -- -- 18 -- --   21 1125 108/48 97.1  F (36.2  C) Oral 103 16 91 % --       Temperatures:  Current - Temp: 98.5  F (36.9  C); Max - Temp  Av.9  F (37.2  C)  Min: 97.1  F (36.2  C)  Max: 101.2  F (38.4  C)  Respiration range: Resp  Av.7  Min: 16  Max: 18  Pulse range: Pulse  Av.5  Min: 101  Max: 122  Blood pressure range: Systolic (24hrs), Av , Min:100 , Max:123   ; Diastolic (24hrs), Av, Min:48, Max:64    Pulse oximetry range: SpO2  Av.8 %  Min: 89 %  Max: 95 %  I/O last 3 completed shifts:  In: 2481.66 [P.O.:120; I.V.:2361.66]  Out: 500 [Urine:500]    Intake/Output Summary (Last 24 hours) at 2021 0924  Last data filed at 2021 0636  Gross per 24 hour   Intake 1288.33 ml   Output 500 ml   Net 788.33 ml     EXAM:  General: awake and alert, NAD, oriented x 3  Head: normocephalic  Neck: unremarkable, no lymphadenopathy   HEENT: oropharynx pink and moist    Heart: Regular rate and rhythm, no murmurs, rubs, or gallops  Lungs: minimal expiratory wheezes throughout with forced exhalation only, no distress, minimal crackles at bases that cleared immediately after initial breath, no other focal changes.    Abdomen: soft, non-tender, no masses or organomegaly  Extremities: no edema in lower extremities   Skin unremarkable.               Data:     Results for orders placed or performed during the hospital encounter of 21 (from the past 24 hour(s))   Potassium   Result Value Ref Range    Potassium 3.3 (L) 3.4 - 5.3 mmol/L   Lactic acid level STAT   Result Value Ref Range    Lactate for Sepsis Protocol 1.3 0.7 - 2.0 mmol/L   Potassium   Result Value Ref Range     Potassium 3.7 3.4 - 5.3 mmol/L   Hepatic panel   Result Value Ref Range    Bilirubin Direct 0.3 (H) 0.0 - 0.2 mg/dL    Bilirubin Total 0.7 0.2 - 1.3 mg/dL    Albumin 1.8 (L) 3.4 - 5.0 g/dL    Protein Total 4.5 (L) 6.8 - 8.8 g/dL    Alkaline Phosphatase 59 40 - 150 U/L    ALT 61 (H) 0 - 50 U/L    AST 12 0 - 45 U/L   Basic metabolic panel   Result Value Ref Range    Sodium 142 133 - 144 mmol/L    Potassium 3.4 3.4 - 5.3 mmol/L    Chloride 109 94 - 109 mmol/L    Carbon Dioxide 29 20 - 32 mmol/L    Anion Gap 4 3 - 14 mmol/L    Glucose 129 (H) 70 - 99 mg/dL    Urea Nitrogen 16 7 - 30 mg/dL    Creatinine 0.82 0.52 - 1.04 mg/dL    GFR Estimate 72 >60 mL/min/[1.73_m2]    GFR Estimate If Black 83 >60 mL/min/[1.73_m2]    Calcium 7.3 (L) 8.5 - 10.1 mg/dL   Phosphorus   Result Value Ref Range    Phosphorus 3.2 2.5 - 4.5 mg/dL           Attestation:  I have reviewed today's vital signs, notes, medications, labs and imaging.  Amount of time spent in direct patient care: 40 minutes.     Bowen Gagnon MD, MD

## 2021-04-09 NOTE — CODE/RAPID RESPONSE
Rapid response called on patient. BP 65/45, HR tachycardic in 110's-140's. On 4L oxygen. Triggered sepsis protocol. Lactic acid drawn and sent. New PIV access started. LR 1L bolus x 2 infusing.  Compazine given for nausea. Patient rates abdominal pain at 5/10. Abdomen distended. Transferred to ICU.

## 2021-04-09 NOTE — PROGRESS NOTES
Care Management Follow Up    Length of Stay (days): 4    Expected Discharge Date: 04/09/21     Concerns to be Addressed: all concerns addressed in this encounter     Patient plan of care discussed at interdisciplinary rounds: Yes    Anticipated Discharge Disposition: TBD; pt initially discussed that she will discharge to home with cousin support until 4-19 & then sister support after that, however, PT assessed and recommends TCU vs Home.     Anticipated Discharge Services: TBD  Anticipated Discharge DME: TBD    Referrals Placed by CM/ANTONIO: [facility] Care Coordination  Private pay costs discussed: NA    Additional Information:  SW attempted to meet with pt several times today.  Pt sleeping, pt with other providers, and pt moving to ICU.      Care Management team to follow for discharge plans & assess pt closer to discharge for care needs.    REYES Torres

## 2021-04-09 NOTE — PROGRESS NOTES
CLINICAL NUTRITION SERVICES - BRIEF NOTE       Nutrition Prescription      Recommendations already ordered by Registered Dietitian (RD):  -Boost Plus with breakfast daily.  -kitchen to divide 1 pkt of beneprotein powder (25 kcal and 6 g protein per each 0.25 oz pkt) into various items at each meal.     Future/Additional Recommendations:  -consider nutrition support if patient's intake does not improve, if aligns with plan of care.  -continue to monitor weights and intake.       Findings:  -Oral intake remains minimal. Patient only wants Boost Plus once per day rather than TID with meals. Open to have protein powder mixed into various items. Continue to mix Banatrol into apple juice.  -no loose stools yet this morning. Patient states her stooling has decreased.      Recommendations:  -Boost Plus with breakfast daily.  -kitchen to divide 1 pkt of beneprotein powder (25 kcal and 6 g protein per each 0.25 oz pkt) into various items at each meal. This would provide an additional 75 kcal and 18 g protein per day.  -consider nutrition support if patient's intake does not improve, if aligns with plan of care.  -continue to monitor intake and weights.      Coleen Almanzar RDN, LD  Clinical Dietitian  Office: [phone]  Saturday/Sunday Pager: [phone]

## 2021-04-09 NOTE — PROGRESS NOTES
[place] [facility] [place]    Patient admitted to room 1004 at approximately 1635 via cart from med surg. Patient was accompanied by other:daughter mimi sunshine.     Verbal SBAR report received from Dr. Gagnon prior to patient arrival.     Patient trasferred to bed via air gloria. Patient alert and oriented X 3. Pain is not well controlled.  Medication(s) being used: blood pressure currently to low, unstable pt.  . Admission vital signs: Blood pressure (!) 75/50, pulse 123, temperature 98.7  F (37.1  C), temperature source Oral, resp. rate 25, height 1.651 m (5' 5\"), weight 65 kg (143 lb 4.8 oz), SpO2 92 %, not currently breastfeeding. Patient was oriented to plan of care, call light, bed controls, tv, telephone, bathroom and visiting hours.     Risk Assessment    The following safety risks were identified during admission: fall. Yellow risk band applied: YES.     Skin Initial Assessment    This writer admitted this patient and completed a full skin assessment and Ted score in the Adult PCS flowsheet. Appropriate interventions initiated as needed.     Secondary skin check completed by Elen MAGDALENO.    Ted Risk Assessment  Sensory Perception: 4-->no impairment  Moisture: 3-->occasionally moist  Activity: 3-->walks occasionally  Mobility: 3-->slightly limited  Nutrition: 2-->probably inadequate  Friction and Shear: 2-->potential problem  Ted Score: 17  Sensory/Activity/Mobility Interventions: Other (comment)(donut on bottom)  Moisture Interventions: Incontinence pad, Encourage regular toileting, Perineal cleanser  Nutrition Interventions: Maintain adequate hydration  Friction/Shear Interventions: HOB 30 degrees or less  Mattress: Standard Hospital Mattress (Foam)  Bed Frame: Standard width and length    Education    Patient has a [place] to Observation order: No  Observation education completed and documented: N/A      Laura Hernandez RN    "

## 2021-04-09 NOTE — PROGRESS NOTES
[facility] Hospitalist Progress Note           Assessment & Plan      Aminata Gale is a 70 year old female admitted on 4/4/2021. She presented to the emergency department for evaluation of diarrhea and in the setting of known generalized weakness anal cancer on chemo and radiation for which she is being admitted for further evaluation and treatment.     Generalized muscle weakness  4/4/21-4/7/21 -- Secondary to recent chemo and radiation, poor oral intake, and losses from diarrhea. No focal deficits. Difficulty managing at home due to frequent bathroom needs and weakness.   4/9/2021 -- physical therapy following - goal of return home, but weak, continue to assess.  May need TCU on discharge.       Neutropenic fever with sepsis due to this - suspect due to small bowel wall erosion into portal venous system   4/9/2021 -- patient had a brief fever a couple of days ago but wasn't neutropenic at the time.  Yesterday was neutropenic but clinically stable and afebrile with expectation per oncology that we were at her nessa so no neupogen wasn't given.  Today, has persistent neutropenia and now a true fever to 101.9 this AM.  Unclear source.  Will check blood cultures, urine culture, CT chest/abd/pelvis.  Starting on empiric vanco and cefepime - patient has listed penicillin and cephalosporin allergies but reviewed with pharmacy and patient has tolerated cephalosporins multiple times since listed allergy, making cefepime a reasonable option.   Started on neupogen 4/9.  UPDATE: CT shows diffuse small bowel wall thickening with pneumatosis into mesenteric and hepatic portal veins - per discussion with radiology looks like diffuse erosion of bowel wall but no apparent complete perforation.  Suspect this is her infectious source and discussed with patient and niece that this unfortunately is a very serious problem with a poor prognosis.  Added flagyl to antibiotics.  On repeat evaluation patient has increased abdominal  tenderness although says her abdomen feels the same - repeating abdominal x-ray now to rule out clear acute perforation.  Surgery consulted and following, but of course want to avoid surgery if at all possible and as above no clear evidence of perforation at this point.  Checking lactic and will follow this.   Patient how hypotensive and tachycardic, lactic 4.5 - giving 30 ml/kg bolus LR (2L) and transferring to ICU.  Blood pressure briefly down to 65 systolic, placed in [place] and second IV started with second liter LR initiated.  Blood pressure improved but MAP Still mid-50's, starting norepinephrine.  Rechecking lactic at 18:30.  Will discuss with Tele-ICU.     UPDATE: patient evaluated by surgery and surgeon and I discussed options with patient and son/daughter in the room - prognosis is unfortunately poor either way, after discussion with surgeon patient and family elected to hold off on surgery and try to just manage this medically knowing that there is a poor prognosis.  Blood pressure still 70-80's systolic, heart rate 110's, giving another 1LLR now.  titrating up levophed.  Will add vasopressin if this is still inadequate.     Pancytopenia-chemo related  Wbc 2.3--0.7--0.4  hgb 12.2--12  platelets 100 k--70  ANC 0.5--currently too few wbc to determine (04/07/21)  Discussed with  Dr Sherry Devlin (primary oncologist) she says this should be the nessa in terms of counts. Neupogen not indicated. If we need to communicate with her-call her on her cell: Cell [phone].  4/9/2021 -- neutropenia as above, thrombocytopenia worsened but no active bleeding.  Attempting to contact patient's oncologist to confirm plan for possible Neupogen and for cut-off for platelet transfusion, likely at 10k.        Diarrhea, likely chemo / radiation induced  Radiation induced colitis  4/4/21-4/7/21 -- Onset 3 days prior to admission, progressively worsening. Estimated 20+ diarrhea episodes daily, non-bloody. CT abdomen &  pelvis shows evidence of mild colitis, is likely chemo / radiation induced but cannot rule out infectious source (although less likely).   Clostridia difficile and enteric negative   On immodium   4/8/2021 -- still 10 stools yesterday, but less frequent and smaller than on admission.  Encourage orals, following strict I/O's.   4/9/2021 -- diarrhea and abdominal pain unchanged.      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 Follows with [facility] and recently completed her chemo course, but receives radiation with Dr. Soto at [facility] (2 doses left on 4/5 and 4/6). Patient has rectal skin sloughing and maceration that is very painful, made worse by diarrhea. Has been unable to swallow her pills recently, leading to poor pain control. Managed prior to admission with prn MS contin 15 mg q 8 hours prn, oxycodone 5-7.5 mg q 4 hours prn, topical morphine, and barrier cream (Pro Shield).  - Continue prior to admission MS contin and oxycodone prn, with IV dilaudid available for breakthrough pain or when unable to take oral  - Prn topical lidocaine gel available  - Continue with barrier cream -silvadine bid  - Wound cares each shift  Discussed with Dr Christensen 4/6--- sounds like plan is to resume radiation on Monday 4/12     Thrush  Possible chemo inducted mucositis  4/4/21-4/7/21 -- New in the past few days prior to admission, patient having a lot of pain with oral intake and some difficulty swallowing. Has been using H2O2 rinses at home with some relief.  - Nystatin swish & swallow qid  - May continue H2O2 swish & spit at patient's request  - Encourage swish & spit after inhaler use  4/9/2021 -- improving.      Severe protein calorie malnutrition  Nutrition consult placed - see their note for details and recommendations.        E coli and citrobacter  uti  4/4/21-4/7/21  -- UA positive for nitrites and trace leukocyte esterase, 11 WBCs. Afebrile, no leukocytosis. Patient with many antibiotics intolerances / allergies.  UC with e coli and citrobacter. Both pan sensitive. Leave on gent for now (problematic allergy profile). Ultimately might be able to transition to nitrofurantoin.  4/8/2021 -- with neutropenia, resumed nitrofurantoin - likely plan for 7 days total antibiotics or can stop if neutropenia resolve prior to that.  4/9/2021 -- doubt this is the source of her neutropenic fever, but continue nitrofurantoin for now - on day 4/7.  Repeating UA/UCx as above.        Renal nodule, right kidney  Noted on CT abdomen & pelvis - hypodense nodule of right kidney - likely complex cyst vs solid lesion. Patient was aware of this abnormality.  - Radiology recommending renal MRI for further characterization  - Defer further work-up to PCP       Low phosporus- nutritionally deficient  Protocol     AAA   Noted on CT abdomen & pelvis - stable infrarenal AAA measuring 3.7 cm. Patient was aware of this and is aware it is stable on admission CT.  - Defer to outpatient surveillance      Type 2 diabetes mellitus with hyperglycemia, without long-term current use of insulin  Noted per problem list. Most recent A1c = 6.1. Appears to be diet controlled, is not on diabetic medications.  - Patient will need consistent carbohydrate diet when diet is advanced  - No scheduled accuchecks unless persistently elevated on BMP     Hypokalemia due to decreased p.o. intake  Replacing per protocol.       Diastolic dysfunction  Essential hypertension, benign  Nonocclusive coronary atherosclerosis of native coronary artery  Hyperlipidemia LDL goal <130  4/4/21-4/7/21 -- Lexiscan stress test June 2019 with apical hypokinesis, EF 62%. Appears compensated on admission. Managed prior to admission with aspirin 81 mg daily, amlodipine 10 mg daily, losartan 25 mg daily, torsemide 5 mg daily, and Zetia 10 mg daily.  -  Continue aspirin, amlodipine, losartan, torsemide, and Zetia  4/8/2021 -- weight up some from admission but consistent with being dehydrated prior to admission - follow.    4/9/2021 -- weight up slightly more but not obviously volume up on exam - continue torsemide, giving an additional 40mg IV lasix x 1, awaiting CT chest results.  UPDATE: with hypotension this afternoon holding torsemide.       Centrilobular emphysema  Moderate persistent asthma  4/4/21-4/7/21 -- Stable respiratory status, no wheezing or abnormalities noted on admission exam. Managed prior to admission with Breo, Spiriva, prn albuterol, and prn DuoNebs.  - Continue Breo and Spiriva - if patient has her home inhalers may use if verified by pharmacy  - Continue prn albuterol and DuoNebs  - Ensure patient swishes & spits her mouth out after inhaler use due to thrush  4/8/2021 -- on 1/2LNC which patient has needed intermittently at baseline in the past per her report  4/9/2021 -- now up to 4LNC.  Checking CT chest as above.  Not in clear exacerbation, but will schedule albuterol nebs every 4 hours while awake.  Consider prednisone if not improving and no other etiology found in the next 24h.       Esophageal reflux  Noted on problem list, does not appear to be on PPI or H2 blocker. Not significantly symptomatic on admission.  - No treatment unless patient is symptomatic     Systemic lupus erythematosus  Noted per problem list. Formerly treated with methotrexate, currently not on active treatment.  - Continue with outpatient management     Allergic rhinitis  Managed prior to admission with prn Zyrtec or Claritin.  - Continue Zyrtec     COVID status - negative  Tested as asymptomatic COVID screen based on hospital admission criteria. No concern for active COVID infection on admission.  - COVID PCR negative 4/4  - No indication for COVID precautions or repeat testing      Diet: Advance Diet as Tolerated: Clear Liquid Diet     DVT Prophylaxis: mechanical  given pancytopenia     GI prophylaxis  Started on IV protonix with admission to ICU.      Lu Catheter: not present    Code Status: Full Code  - discussed with patient on admission, she is not sure what she would like her CODE STATUS to be.  Will defer to full code for now.     Diet  Orders Placed This Encounter      Advance Diet as Tolerated: Full Liquid Diet                 Disposition  Anticipate at least 2-3 more days inpatient.              Interval History:   Not much change in symptoms.  Patient apparently had a temp to 100.5 this AM which I was not notified of.  Then a temp to 101.9 at which point I was notified of this and that patient was having increasing oxygen needs to 4LNC.  Patient says she's felt a little more short of breath this AM.  Still has abdominal pain but says this is unchanged.  No chest pain pressure or tightness.  No other new changes.    No other pain             Review of Systems:    ROS: 10 point ROS neg other than the symptoms noted above in the HPI.           Medications:   Current active medications and PTA medications reviewed, see medication list for details.            Physical Exam:   Vitals were reviewed  Patient Vitals for the past 24 hrs:   BP Temp Temp src Pulse Resp SpO2 Weight   04/09/21 1141 101/55 101.9  F (38.8  C) Oral 142 18 (!) 88 % --   04/09/21 0828 110/51 100.5  F (38.1  C) Oral 125 16 91 % --   04/09/21 0534 -- -- -- -- -- -- 66.2 kg (145 lb 15.1 oz)   04/09/21 0303 128/52 98  F (36.7  C) Oral 102 18 92 % --   04/09/21 0100 122/56 -- -- 101 -- 92 % --   04/08/21 2246 91/47 97.9  F (36.6  C) Oral 114 18 91 % --   04/08/21 2206 109/54 98.7  F (37.1  C) Oral 115 18 91 % --   04/08/21 2100 -- 99  F (37.2  C) Oral -- -- -- --   04/08/21 1924 134/63 100.3  F (37.9  C) Oral 126 16 91 % --   04/08/21 1721 -- -- -- -- 16 -- --   04/08/21 1555 -- -- -- -- 16 -- --   04/08/21 1517 -- -- -- -- 16 -- --   04/08/21 1504 123/60 99.4  F (37.4  C) Oral 123 18 95 % --        Temperatures:  Current - Temp: 101.9  F (38.8  C); Max - Temp  Av.5  F (37.5  C)  Min: 97.9  F (36.6  C)  Max: 101.9  F (38.8  C)  Respiration range: Resp  Av  Min: 16  Max: 18  Pulse range: Pulse  Av.5  Min: 101  Max: 142  Blood pressure range: Systolic (24hrs), Av , Min:91 , Max:134   ; Diastolic (24hrs), Av, Min:47, Max:63    Pulse oximetry range: SpO2  Av.4 %  Min: 88 %  Max: 95 %  I/O last 3 completed shifts:  In: 1851 [P.O.:520; I.V.:1331]  Out: 775 [Urine:775]    Intake/Output Summary (Last 24 hours) at 2021 1150  Last data filed at 2021 1000  Gross per 24 hour   Intake 2081 ml   Output 800 ml   Net 1281 ml     EXAM:  General: awake and alert, NAD, oriented x 3  Head: normocephalic  Neck: unremarkable, no lymphadenopathy   HEENT: oropharynx pink and moist    Heart: Regular rate and rhythm, no murmurs, rubs, or gallops  Lungs: somewhat decreased breath sounds throughout, no clear crackles or other focal changes on exam.  Very slight wheezing with forced exhalation.    Abdomen: soft, still mildly tender throughout - unchanged, bowel sounds present, no masses or organomegaly  Extremities: no edema in lower extremities   Skin unremarkable.               Data:     Results for orders placed or performed during the hospital encounter of 21 (from the past 24 hour(s))   Lactic acid level STAT   Result Value Ref Range    Lactate for Sepsis Protocol 1.8 0.7 - 2.0 mmol/L   CBC with platelets differential   Result Value Ref Range    WBC 0.1 (LL) 4.0 - 11.0 10e9/L    RBC Count 3.61 (L) 3.8 - 5.2 10e12/L    Hemoglobin 11.0 (L) 11.7 - 15.7 g/dL    Hematocrit 32.5 (L) 35.0 - 47.0 %    MCV 90 78 - 100 fl    MCH 30.5 26.5 - 33.0 pg    MCHC 33.8 31.5 - 36.5 g/dL    RDW 15.8 (H) 10.0 - 15.0 %    Platelet Count 27 (LL) 150 - 450 10e9/L    Diff Method Canceled, Test credited    Comprehensive metabolic panel   Result Value Ref Range    Sodium 142 133 - 144 mmol/L    Potassium 3.4 3.4  - 5.3 mmol/L    Chloride 108 94 - 109 mmol/L    Carbon Dioxide 30 20 - 32 mmol/L    Anion Gap 4 3 - 14 mmol/L    Glucose 190 (H) 70 - 99 mg/dL    Urea Nitrogen 22 7 - 30 mg/dL    Creatinine 1.00 0.52 - 1.04 mg/dL    GFR Estimate 56 (L) >60 mL/min/[1.73_m2]    GFR Estimate If Black 65 >60 mL/min/[1.73_m2]    Calcium 7.4 (L) 8.5 - 10.1 mg/dL    Bilirubin Total 0.8 0.2 - 1.3 mg/dL    Albumin 1.6 (L) 3.4 - 5.0 g/dL    Protein Total 4.8 (L) 6.8 - 8.8 g/dL    Alkaline Phosphatase 60 40 - 150 U/L    ALT 38 0 - 50 U/L    AST 5 0 - 45 U/L   Magnesium   Result Value Ref Range    Magnesium 1.9 1.6 - 2.3 mg/dL   Phosphorus   Result Value Ref Range    Phosphorus 3.5 2.5 - 4.5 mg/dL   Bilirubin direct   Result Value Ref Range    Bilirubin Direct 0.5 (H) 0.0 - 0.2 mg/dL       All imaging studies reviewed by me.    Attestation:  I have reviewed today's vital signs, notes, medications, labs and imaging.  Amount of time spent in direct patient care: 60 minutes.  UPDATE: amount of time spent providing additional ICU cares 70 minutes.       Bowen Gagnon MD, MD        Sepsis Evaluation Progress Note    I was called to see Aminata Gale due to abnormal vital signs triggering the Sepsis SIRS screening alert. She is known to have an infection.     Physical Exam   Vital Signs:  Temp: 98  F (36.7  C) Temp src: Oral BP: (!) 65/45 Pulse: 116   Resp: 24 SpO2: 92 % O2 Device: Nasal cannula Oxygen Delivery: 4 LPM     General: acutely ill appearing  Mental Status: AAOx4.     Remainder of physical exam is significant for abdomen more tender    Data   Lactic Acid   Date Value Ref Range Status   04/05/2021 0.5 (L) 0.7 - 2.0 mmol/L Final     Lactate for Sepsis Protocol   Date Value Ref Range Status   04/09/2021 4.5 (HH) 0.7 - 2.0 mmol/L Final     Comment:     Critical Value called to and read back by  REBECCA MÉNDEZ RN [facility] 4/9/21 1613 MA     04/08/2021 1.8 0.7 - 2.0 mmol/L Final       Assessment & Plan   Aminata Gale meets SIRS criteria but  does NOT have a lactate >2 or other evidence of acute organ damage.  These vital sign, lab and physical exam findings are consistent with SEPSIS.    Sepsis Time-Zero (time Sepsis diagnosis confirmed): 1600  04/09/21    Anti-infectives (From now, onward)    Start     Dose/Rate Route Frequency Ordered Stop    04/09/21 1630  metroNIDAZOLE (FLAGYL) infusion 500 mg      500 mg  over 60 Minutes Intravenous EVERY 8 HOURS 04/09/21 1559      04/09/21 1300  vancomycin (VANCOCIN) 1000 mg in dextrose 5% 200 mL PREMIX      1,000 mg  200 mL/hr over 1 Hours Intravenous EVERY 24 HOURS 04/09/21 1215      04/09/21 1230  ceFEPIme (MAXIPIME) 2 g vial to attach to  ml bag for ADULTS or 50 ml bag for PEDS      2 g  over 30 Minutes Intravenous EVERY 8 HOURS 04/09/21 1213      04/08/21 0930  nitroFURantoin macrocrystal-monohydrate (MACROBID) capsule 100 mg      100 mg Oral EVERY 12 HOURS SCHEDULED 04/08/21 0912          Current antibiotic coverage see above.     Disposition: The patient will be transferred to the ICU..  Bowen Gagnon MD, MD    Sepsis Criteria   Sepsis: 2+ SIRS criteria due to infection  Severe Sepsis: Sepsis AND 1+ new sign of acute organ dysfunction (Note: lactate >2 or acute encephalopathy each qualify as organ dysfunction)  Septic Shock: Sepsis AND hypotension despite volume resuscitation with 30 ml/kg crystalloid or lactate >=4  Note: HYPOTENSION is defined as 2 BP readings measured 3 hrs apart that have a SBP <90, MAP <65, or decrease >40 mmHg, occurring 6 hrs before or after t-zero

## 2021-04-09 NOTE — PHARMACY-VANCOMYCIN DOSING SERVICE
Pharmacy Vancomycin Initial Note  Date of Service 2021  Patient's  1950  70 year old, female    Indication: Febrile Neutropenia    Current estimated CrCl = Estimated Creatinine Clearance: 53.1 mL/min (based on SCr of 1 mg/dL).    Creatinine for last 3 days  2021:  6:45 AM Creatinine 0.75 mg/dL  2021:  5:15 AM Creatinine 0.82 mg/dL  2021:  5:15 AM Creatinine 1.00 mg/dL    Recent Vancomycin Level(s) for last 3 days  No results found for requested labs within last 72 hours.      Vancomycin IV Administrations (past 72 hours)      No vancomycin orders with administrations in past 72 hours.                Nephrotoxins and other renal medications (From now, onward)    Start     Dose/Rate Route Frequency Ordered Stop    21 1300  vancomycin (VANCOCIN) 1000 mg in dextrose 5% 200 mL PREMIX      1,000 mg  200 mL/hr over 1 Hours Intravenous EVERY 24 HOURS 21 1215      21 0800  torsemide (DEMADEX) tablet 5 mg      5 mg Oral DAILY 21 2243      21 2242  ibuprofen (ADVIL/MOTRIN) tablet 600 mg      600 mg Oral EVERY 6 HOURS PRN 21 2243            Contrast Orders - past 72 hours (72h ago, onward)    None                Plan:  1.  Start vancomycin  1000 mg IV q24h.   2.  Goal Trough Level: 10-15 mg/L   3.  Pharmacy will check trough levels as appropriate in 1-3 Days.    4. Serum creatinine levels will be ordered daily for the first week of therapy and at least twice weekly for subsequent weeks.    5. [place] method utilized to dose vancomycin therapy: Method 2, modified due to use of other nephjrotoxins    Rajni Escalante [facility]

## 2021-04-09 NOTE — PLAN OF CARE
Patient is alert and oriented x4. SBA. Using bedside commode. Having small loose stool. On 0.5 L O2 via NC with sats 91-92%. Patient had fever earlier in the evening and was given tylenol which was effective. Patient had aching pain in abdomen and was given oxy 5 mg x1 and oxy 7.5 mg x2. Also given zofran and compazine for nausea. Patient was requesting morphine cream order back so [facility] telemed paged. Did not administer cream this shift, but order is there if needed. LR @ 50 ml/hr.     Toribio Philip RN on 4/9/2021 at 6:39 AM

## 2021-04-09 NOTE — CONSULTS
70-year-old female undergoing chemo and radiation treatments for anal cancer.  Admitted a few days ago with diarrhea.  Initial CT scan showed diffuse colitis and thickening of the colon wall along with some small bowel thickening.  Patient has been tachycardic since admission.  Today she spiked a fever above 100.1 and a CT scan of her abdomen pelvis was done showing pneumatosis intestinalis along with air in the mesenteric vessels and throughout the liver.  Patient reports increasing abdominal pain and began becoming hypotensive requiring transfer to the ICU and placement on pressors.  Patient's lactate was normal yesterday but today is 4.5.    Patient Active Problem List   Diagnosis     Systemic lupus erythematosus (H)     Esophageal reflux     Essential hypertension, benign     Allergic rhinitis     Polycythemia, secondary     Female stress incontinence     Other nonspecific abnormal result of function study of brain and central nervous system     Smoker     Hyperlipidemia LDL goal <130     Osteoporosis     Hiatal hernia     Advanced directives, counseling/discussion     Acne     SK (seborrheic keratosis)     Lentigo     Angioma     Moderate persistent asthma     Chronic pain     HTN, goal below 140/90     Anxiety     Refused influenza vaccine     Grief reaction     Centrilobular emphysema (H)     Acute cholecystitis     TMJ (temporomandibular joint syndrome)     Type 2 diabetes mellitus with hyperglycemia, without long-term current use of insulin (H)     Type 2 diabetes mellitus with microalbuminuria, without long-term current use of insulin (H)     Nonocclusive coronary atherosclerosis of native coronary artery     Diastolic dysfunction     Tubular adenoma of colon     Colon polyp     Anal squamous cell carcinoma (H)     Dehydration     Chemotherapy induced nausea and vomiting     Malignant neoplasm of anal canal (H)     Generalized muscle weakness     Radiation skin ulcer (H)     Diarrhea, unspecified type      Colitis       Past Medical History:   Diagnosis Date     Acute pericarditis, unspecified      Cancer (H)      Chronic airway obstruction 2005    PFTs -  - mild COPD Problem list name updated by automated process. Provider to review     Dysuria      Esophageal reflux      GERD (gastroesophageal reflux disease) 2013     Hiatal hernia 2011     HTN, goal below 140/90 2013     Hyperlipidemia LDL goal <130 2010    Recent Labs  Lab Test 9/22/10 0908 10/12/06 0939    CHOL 214* 256*    HDL 31* 42*    * 186*    TRIG 149 143    CHOLHDLRATIO 7.0* 6.0*   11 - dobutamine echo showed NO infarct or ischemia LV 60-65%, normal exercise response.        Lupus (H)      Osteoporosis 10/18/2010    10/10 - severe osteopenia in femoral necks, mild osteopenia in spine  (Problem list name updated by automated process. Provider to review and confirm.)     Pure hypercholesterolemia      SECONDARY POLYCYTHEMIA 10/17/2006    Due to smoking     Smoker 2008     Systemic lupus erythematosus 2005    Diagnosed in ; history of pericarditis; was previously treated with mtx; not on active treatment; no hx of renal dz from the lupus.       Tobacco use disorder      Type 2 diabetes mellitus with hyperglycemia, without long-term current use of insulin (H) 2019    NEW DIAGNOSIS 2019     Unspecified essential hypertension        Past Surgical History:   Procedure Laterality Date     C APPENDECTOMY       C REPAIR GUM      Cathy Dontal surgery     C/SECTION, LOW TRANSVERSE      , Low Transverse     COLONOSCOPY  2009     D & C      X 5     HC RECONSTR NOSE+DAVE SEPTAL REPAIR       HYSTERECTOMY, BRI      Hx of dysplasia     LAPAROSCOPIC CHOLECYSTECTOMY N/A 2017    Procedure: LAPAROSCOPIC CHOLECYSTECTOMY;  Laparoscopic Cholecystectomy;  Surgeon: Tami Barney MD;  Location: [facility]     SINUS SURGERY      Reconstruction       Family History   Problem Relation Age of Onset      Diabetes Mother      Gastrointestinal Disease Mother         Gallbladder disease     Heart Disease Mother      Cancer Father         lung     Alcohol/Drug Father      Allergies Father      Heart Disease Father      Gastrointestinal Disease Father         Liver disease     Skin Cancer Father      Heart Disease Maternal Grandfather      Arthritis Sister      Osteoporosis Sister      Thyroid Disease Sister      Allergies Son      Alcohol/Drug Sister      Alcohol/Drug Sister      Melanoma No family hx of        Social History     Tobacco Use     Smoking status: Current Every Day Smoker     Packs/day: 0.01     Years: 42.00     Pack years: 0.42     Types: Cigarettes     Smokeless tobacco: Never Used     Tobacco comment: Has chantix at home, but hasn't started yet. 5/9/19.   Substance Use Topics     Alcohol use: Yes     Alcohol/week: 0.0 standard drinks     Comment: Occ. wine        History   Drug Use No       No current outpatient medications on file.       Allergies   Allergen Reactions     Amoxicillin Difficulty breathing and Rash     See Beta-Lactam Allergy Assessment noted on 4/7/21.     Ampicillin Difficulty breathing and Rash     See Beta-Lactam Allergy Assessment noted on 4/7/21.     Cipro [Ciprofloxacin] Difficulty breathing and Rash     Keflex [Cephalexin Monohydrate] Difficulty breathing and Rash     See Beta-Lactam Allergy Assessment noted on 4/7/21.     Penicillin [Penicillins] Difficulty breathing and Rash     See Beta-Lactam Allergy Assessment noted on 4/7/21.     Sulfa Drugs Difficulty breathing and Rash     Tetracycline Difficulty breathing and Rash     Biaxin [Clarithromycin] Rash     Ceclor [Cefaclor] Hives and Swelling     See Beta-Lactam Allergy Assessment noted on 4/7/21.     Advair Diskus Hives     Hives on face,neck and chest     Egg White      Fish Shortness Of Breath     Mustard [Allyl Isothiocyanate]      Throat swelling      CBC  Recent Labs   Lab Test 04/09/21  0515   WBC 0.1*   RBC 3.61*    HGB 11.0*   HCT 32.5*   MCV 90   MCH 30.5   MCHC 33.8   RDW 15.8*   PLT 27*       BMP  Recent Labs   Lab Test 04/09/21  0515      POTASSIUM 3.4   ELY 7.4*   CHLORIDE 108   CO2 30   BUN 22   CR 1.00   *       LFTs  Recent Labs   Lab Test 04/09/21  0515   PROTTOTAL 4.8*   ALBUMIN 1.6*   BILITOTAL 0.8   ALKPHOS 60   AST 5   ALT 38     Results for orders placed or performed during the hospital encounter of 04/04/21   CT Abdomen Pelvis w Contrast    Narrative    CT ABDOMEN AND PELVIS WITH CONTRAST  4/4/2021 3:06 PM    CLINICAL HISTORY: Abdominal pain, acute, nonlocalized.    TECHNIQUE: CT scan of the abdomen and pelvis was performed following  injection of IV contrast. Multiplanar reformats were obtained. Dose  reduction techniques were used.  CONTRAST: 67 mL Isovue-370    COMPARISON: CT abdomen and pelvis 12/21/2020.    FINDINGS:   LOWER CHEST: Normal.    HEPATOBILIARY: No acute abnormality. No focal lesion identified.  Status post cholecystectomy.    PANCREAS: Normal.    SPLEEN: Normal.    ADRENAL GLANDS: Normal.    KIDNEYS/BLADDER: Atrophy of the upper right kidney again identified. A  few renal cysts not requiring specific imaging follow-up. However,  there is a stable hypodense rounded lesion that is complex measuring 1  cm right mid kidney, series 5 image 87. There are a few nonobstructing  intrarenal stones bilaterally appearing stable. No hydronephrosis. No  acute bladder abnormality.    BOWEL: Some wall thickening of the colon diffusely appears mild. Small  fluid distends a few distal small bowel loops. No abscess or free air.  Anal canal mass towards the right is smaller in size measuring 2.8 x  1.6, previously 3 x 2.7 cm, image 200.    PELVIC ORGANS: No acute abnormality. Unremarkable right ovary. Small  left ovarian cyst is stable measuring 1.4 cm, image 125.    ADDITIONAL FINDINGS: Vascular calcifications. Stable infrarenal  abdominal aortic aneurysm that is 3.7 cm, image 93. No  adjacent  hematoma. No new enlarged lymph nodes identified.    MUSCULOSKELETAL: No aggressive bony lesion. L5-S1 degenerative disc  disease.      Impression    IMPRESSION:   1.  Some wall thickening of the colon may be a mild colitis. No  abscess or free air.  2.  Smaller size of the right anal canal mass.  3.  Stable infrarenal abdominal aortic aneurysm that currently  measures 3.7 cm.  4.  Stable but indeterminate hypodense rounded nodule at the right  kidney that could be a complex cyst versus solid lesion. Recommend  nonurgent renal MRI for further characterization.  5.  Bilateral nonobstructing intrarenal stones.    BEN BRO MD   CT Chest (PE) Abdomen Pelvis w Contrast    Narrative    CT CHEST PE, ABDOMEN AND PELVIS WITH CONTRAST  4/9/2021 1:40 PM    CLINICAL HISTORY: PE suspected, high probability. Acute hypoxia,  neutropenic fever. Rule out PE vs pneumonia vs other. Abdominal pain,  acute, nonlocalized.    TECHNIQUE: CT angiogram chest and routine CT abdomen pelvis with IV  contrast. Arterial phase through the chest and venous phase through  the abdomen and pelvis. 2D and 3D MIP reconstructions were preformed  by the CT technologist. Dose reduction techniques were used.     CONTRAST: 92 mL Isovue-370    COMPARISON: 4/4/2021.    FINDINGS:  ANGIOGRAM CHEST: Pulmonary arteries are normal caliber and negative  for pulmonary emboli. Thoracic aorta is negative for dissection. No CT  evidence of right heart strain.     LUNGS AND PLEURA: Volume loss is noted along the posterior aspect of  the right middle lobe. Mild background emphysematous changes are  noted. No effusion.    MEDIASTINUM/AXILLAE: Right chest port is present. No axillary  adenopathy. No significant coronary artery calcification.    HEPATOBILIARY: Extensive portal venous gas is seen at the periphery of  both lobes of the liver. No focal lesions. The portal vein is patent.  The gallbladder is absent.    PANCREAS: Normal.    SPLEEN: Normal.    ADRENAL  GLANDS: Normal.    KIDNEYS/BLADDER: Multiple small nonobstructing stones are seen through  both kidneys. A few cortical cysts are present as well. No follow-up  necessary for these. No evidence for enhancing mass. The bladder is  unremarkable.    BOWEL: The stomach is distended. The majority of the esophagus is  mildly dilated and fluid-filled. Numerous loops of small bowel show  diffuse wall thickening. This extends from the mid to distal small  bowel, and more proximal loops of small bowel are moderately dilated.  Some mild pneumatosis is noted through multiple loops of small bowel  in the left abdomen. Small amount of gas is seen within venous  structures in the central mesentery. Sigmoid wall thickening is noted.  There are multiple diverticula through the descending and sigmoid  colon.    LYMPH NODES: Normal.    PELVIC ORGANS: Trace free fluid is present within the pelvis.    OTHER: Mild mesenteric edema is present. Infrarenal AAA just above the  bifurcation is unchanged at 3.7 cm.    MUSCULOSKELETAL: Normal.      Impression    IMPRESSION:  1.  No evidence for pulmonary embolism.  2.  Diffuse small bowel wall thickening and dilation with some areas  of pneumatosis. There is also some venous gas through the central  mesentery and diffuse portal venous gas in the liver. This may be due  to an infectious or inflammatory process.  3.  Emphysematous changes through both lungs.  4.  Distended stomach and esophagus filled with fluid.  5.  Trace free fluid in the pelvis.  6.  Multiple nonobstructing stones in both kidneys.  7.  Stable AAA just above the bifurcation measuring up to 3.7 cm.    These findings were discussed with Dr. Gagnon at approximately 1410  hours on 4/9/2021.    KULWINDER TARIQ MD     ROS  Constitutional -positive for fever chills malaise and lethargy  Neuro - Denies tremors or seizures  Pulmon -occasional dyspnea  CV - Denies CP, SOB, lower extremity edema, difficulty w/ stairs, has never used  "NTG  GI - Denies hematemesis, BRBPR, melena, positive for chronic diarrhea   - Denies hematuria, difficulty voiding, h/o STDs  Hematology - Denies blood clotting disorders, chronic anemias  Rheumatology - No h/o RA  Pysch - Denies depression, bipolar d/o or schizophrenia    Exam:BP (!) 75/50   Pulse 123   Temp 98.7  F (37.1  C) (Oral)   Resp 25   Ht 1.651 m (5' 5\")   Wt 65 kg (143 lb 4.8 oz)   SpO2 92%   BMI 23.85 kg/m      General - Alert and Oriented X4, mild distress  HEENT - Normocephalic, atraumatic, PERRLA, Nose midline  Lungs -bilateral mild rhonchi  CV -tachycardic and regular.  Palpable pulses throughout  Abdomen -distended and tympanic.  Tender to palpation all 4 quadrants.  No rebound tenderness but positive guarding throughout  Groins - 2+ pulses bilaterally  Neuro -moves all extremities  Extremities - No cyanosis, clubbing or edema    Assessment and plan: 70-year-old female with pneumatosis likely secondary to infectious etiology.  Unfortunately, patient cannot mount a response to infection due to almost nonexistent white count.  Infection seems to be reaching throughout the colon and small bowel.  Air is seen in the portal system.  Patient is tachycardic hypotensive and septic.  Broad-spectrum antibiotics have been started.  Surgery is a possibility although not recommended.  Will likely be unable to find one source in the small bowel that is resectable and patient will likely not wake up from surgery.  Discussed this with the patient and she is elected to forego surgery and just accept medical treatment with pressors and antibiotics.  Patient and her family understood the grave nature of the situation.  Will continue to follow peripherally.    Javier Fernandez MD   "

## 2021-04-09 NOTE — PLAN OF CARE
"Patient alert to self and time. More lethargic today. C/o abdominal discomfort. Dilaudid given prn pain. Vitals around 1100: /55   Pulse 142   Temp 101.9  F (38.8  C) (Oral)   Resp 18   Ht 1.702 m (5' 7\")   Wt 66.2 kg (145 lb 15.1 oz)   SpO2 92%   BMI 22.86 kg/m    Patient was on 0.5 LPM. Increased to 4 LPM. Patient c/o SOB, fatigue. LS: coarse; exp wheezes this AM. Inhalers given. Scheduled neb orders received.  Notified Dr. Gagnon. CT chest/abdomen ordered and done. Blood cultures pending. IV antibx's ordered: maxipime and vanco.  Maxipime infusing.  Patient SBA to commode. No stools this shift so far.    Patient able to take pills well, not able to eat much. Patient's cousin visiting. UA/culture needs to be collected.  Hermelinda Wick RN on 4/9/2021 at 2:38 PM    "

## 2021-04-09 NOTE — PROGRESS NOTES
Pt's blood pressure 60 to 80's, levophed started and have titrated up, currently blood pressure 88/71 MAP 75 with levophed at 0.2 mcg/kg/min.  Pt is alert, aware of year, month, place, confused on date. Forgetful. BLANK. Slightly weaker grasp on LUE, pt states this is her norm.   abd soft rounded, painful, increases with palpation, BS hypoactive, states no flatus, surgery seen pt. Intermittently nauseated, with 200 ml maroon/brown liquid emesis, using yanker to assist.zofran given, dilaudid 0.5 mg given.   Voided 100 ml [place] urine in bedpan, excoriated radha and rectal area from RX, area cleaned with water and wiped with baby wipes, and lidocaine applied per pt request.   Fidel Oreilly currently with pt., multiple family members here for compassionate care, discussed with Nursing Leader on call Alaina. Informed family of hospital policy

## 2021-04-09 NOTE — PROGRESS NOTES
DATE:  4/9/2021   TIME OF RECEIPT FROM LAB:  Did not receive call   LAB TEST:  Platelet and WBC  LAB VALUE:  Platelet 27, WBC 0.1   RESULTS GIVEN WITH READ-BACK TO (PROVIDER):  Be Gagnon   TIME LAB VALUE REPORTED TO PROVIDER:   0715

## 2021-04-10 NOTE — PROGRESS NOTES
[facility] TRANSPORT NOTE  Data:   Reason for Transport:  Higher level of care (ICU)    Aminata Gale was transported to ICU via cart at 1633.  Patient was accompanied by Registered Nurse. Equipment used for transport: Oxygen  Nasal Cannula, Cardiac monitor , Pulse oximeter, Blood pressure monitor and IV pump. Family was aware of reason for transport: yes    Action:  Report: given to Laura    Response:  Patient's condition when transferred off unit was unstable.    Angela Garcia RN

## 2021-04-10 NOTE — PLAN OF CARE
Physical Therapy Discharge Summary    Reason for therapy discharge:    Change in medical status.    Progress towards therapy goal(s). See goals on Care Plan in [facility] electronic health record for goal details.  Goals not met.  Barriers to achieving goals:   Patient transitioned to comfort care.    Therapy recommendation(s):    No further therapy is recommended.  Stephanie Amaral, PT on 4/10/2021 at 11:58 AM

## 2021-04-10 NOTE — PLAN OF CARE
Pt messi Oreilly declined for pt to have labs drawn. Unable to draw from port at this time as Levophed and vasopressin are both infusing and bp has been unstable.

## 2021-04-10 NOTE — SIGNIFICANT EVENT
Significant Event Note  0100  Nursing contacted me to update on patient status  Patient with incrased abdomen distension, increased short of breath with increased oxygen needs  Beamed on and met patient  Pt is alert and oriented.    Pt reports no chest pain, c/o shortness of breat  Pt continues with abdomen pain, nausea  Chart reviewed and most recent vitals and LA noted  Pt currently on NE and vasopressin.    At this time met with patient's son Denilson  Updated him on the futility of code blue event.  Discussed in detail the clinical processes occurring and the fact that surgery reported that if taken to OR would die in the OR  He has had the past several hours to process this and at this time he would want his mother to be a DNR  Met with patient and had an in depth discussion. She is an amazing person and comprehended the ultimate outcome of death.   At this time she is okay and request that she be made a DNR  This is an incredibly difficult discussion as she is alert and cognizant of what is occurring and that she most likely will die in the next 12 hours.  In the face of such tragic news she was pleasant kind and composed.    I appreciate all of the assistance nursing provided in communicating with both Denilson and the patient.      For now will proceed with pressors, and current medications  We will deploy bipap overnight in hopes of allowing her to say her goodbyes to her daughter on her way from [place].    I will review all meds and ensure comfort meds are ordered.     Again I appreciate the assistance of all staff in this very difficult time.       Satya Tian MD

## 2021-04-10 NOTE — PLAN OF CARE
2300 pt having increased pain getting off and on bedpan. Perineum excoriated/red/edematous. Asked patient if she would like a mccracken. Dr. Tian notified at 2308 and orders were placed for mccracken.     0044 02 saturations were dropping to high 70's NC increased to 6 L. Pt was becoming more short of breath and there was no change- switched to oxymask and did improve to low 80's. Abdomen more distended, clammy, diaphorectic. Dr. Tian and RT were notified.  Denilson was present at bedside and did notify other family. Non-re breather then applied and 02 sats continued to be in the 80's.   Dr TREY barba called back via phone at 0047 and was updated on status and suggested to call tele-computer and assess pt. This then happened where he talked with patient in length about condition/outcome and also talked with son in length. Code status was discussed to be changed to DNR/DNI pt requests to be comfortable and agreeable to plan. BiPap ordered for comfort/ RT applied and pt reported relief. When BiPap is on 02 sats have been in the 90's    Dilaudid was given for increased abdomen pain at 0111.     0527 Dr. Tian notified of patients low bp. Levophed running .4 mcg/kg/min and vasopressin 2.4 units/hr are both running at the max.     0528 Dr. Tian called - and reported no changes at this time and continue with what we are doing.     Mouth cares have been done by family.

## 2021-04-10 NOTE — PLAN OF CARE
Pt's blood pressure has remained in the 80's systolic with a HR in the 140's, remains on levo 0.4 mcg/kg/min and vasopressin. Has remained unresponsive, occasionally will have flexion on LUE, this am pt has guppy breathing in the high 20's, dilaudid given for comfort. Sats in the 80's on NRB mask at 15 L.   Son and family members here, all are requesting that no meds except pain/respiratory/pressors meds be given to pt at this time, they are providing oral cares, and request pt not be turned or repositioned. Plan is that a daughter is arriving this afternoon and will changed to comfort cares, discussed comfort with turning etc., they will notify staff if they think she needs to be turned or repositioned. Notified Dr. Gagnon

## 2021-04-10 NOTE — DISCHARGE SUMMARY
[facility]  Hospitalist Service  Death Summary        Aminata Gale MRN# [id number]   YOB: 1950 Age: 70 year old     Date of Admission:  4/4/2021  Date of Death:              4/10/2021  Admitting Physician:  Nathanael Alexandre MD  Discharge Physician: Bowen Gagnon MD, MD  Discharging Service: Hospitalist     Primary Provider: Sana Olivo  Primary Care Physician Phone Number: [phone]         Cause of Death:      Neutropenic fever with sepsis and bacteremia suspect due to small bowel wall erosion into portal venous system likely secondary to radiation/chemo             Important Results:        Results for orders placed or performed during the hospital encounter of 04/04/21 (from the past 24 hour(s))   Surgery General IP Consult: Patient to be seen: Routine within 24 hrs; pneumatosis, neutropenic fever; Consultant may enter orders: Yes; Requesting provider? Hospitalist (if different from attending physician)    Narrative    Javier Fernandez MD     4/9/2021  6:42 PM  70-year-old female undergoing chemo and radiation treatments for   anal cancer.  Admitted a few days ago with diarrhea.  Initial CT   scan showed diffuse colitis and thickening of the colon wall   along with some small bowel thickening.  Patient has been   tachycardic since admission.  Today she spiked a fever above   100.1 and a CT scan of her abdomen pelvis was done showing   pneumatosis intestinalis along with air in the mesenteric vessels   and throughout the liver.  Patient reports increasing abdominal   pain and began becoming hypotensive requiring transfer to the ICU   and placement on pressors.  Patient's lactate was normal   yesterday but today is 4.5.    Patient Active Problem List   Diagnosis     Systemic lupus erythematosus (H)     Esophageal reflux     Essential hypertension, benign     Allergic rhinitis     Polycythemia, secondary     Female stress incontinence     Other nonspecific abnormal  result of function study of brain   and central nervous system     Smoker     Hyperlipidemia LDL goal <130     Osteoporosis     Hiatal hernia     Advanced directives, counseling/discussion     Acne     SK (seborrheic keratosis)     Lentigo     Angioma     Moderate persistent asthma     Chronic pain     HTN, goal below 140/90     Anxiety     Refused influenza vaccine     Grief reaction     Centrilobular emphysema (H)     Acute cholecystitis     TMJ (temporomandibular joint syndrome)     Type 2 diabetes mellitus with hyperglycemia, without long-term   current use of insulin (H)     Type 2 diabetes mellitus with microalbuminuria, without   long-term current use of insulin (H)     Nonocclusive coronary atherosclerosis of native coronary artery       Diastolic dysfunction     Tubular adenoma of colon     Colon polyp     Anal squamous cell carcinoma (H)     Dehydration     Chemotherapy induced nausea and vomiting     Malignant neoplasm of anal canal (H)     Generalized muscle weakness     Radiation skin ulcer (H)     Diarrhea, unspecified type     Colitis       Past Medical History:   Diagnosis Date     Acute pericarditis, unspecified      Cancer (H)      Chronic airway obstruction 12/13/2005    PFTs - 1/02 - mild COPD Problem list name updated by automated   process. Provider to review     Dysuria      Esophageal reflux      GERD (gastroesophageal reflux disease) 9/20/2013     Hiatal hernia 5/4/2011     HTN, goal below 140/90 9/20/2013     Hyperlipidemia LDL goal <130 9/22/2010    Recent Labs  Lab Test 9/22/10 0908 10/12/06 0939    CHOL 214*   256*    HDL 31* 42*    * 186*    TRIG 149 143      CHOLHDLRATIO 7.0* 6.0*   4/8/11 - dobutamine echo showed NO   infarct or ischemia LV 60-65%, normal exercise response.        Lupus (H)      Osteoporosis 10/18/2010    10/10 - severe osteopenia in femoral necks, mild osteopenia in   spine  (Problem list name updated by automated process. Provider   to review and confirm.)      Pure hypercholesterolemia      SECONDARY POLYCYTHEMIA 10/17/2006    Due to smoking     Smoker 2008     Systemic lupus erythematosus 2005    Diagnosed in ; history of pericarditis; was previously   treated with mtx; not on active treatment; no hx of renal dz from   the lupus.       Tobacco use disorder      Type 2 diabetes mellitus with hyperglycemia, without long-term   current use of insulin (H) 2019    NEW DIAGNOSIS 2019     Unspecified essential hypertension        Past Surgical History:   Procedure Laterality Date     C APPENDECTOMY       C REPAIR GUM      Cathy Dontal surgery     C/SECTION, LOW TRANSVERSE      , Low Transverse     COLONOSCOPY  2009     D & C      X 5     HC RECONSTR NOSE+DAVE SEPTAL REPAIR       HYSTERECTOMY, BRI      Hx of dysplasia     LAPAROSCOPIC CHOLECYSTECTOMY N/A 2017    Procedure: LAPAROSCOPIC CHOLECYSTECTOMY;  Laparoscopic   Cholecystectomy;  Surgeon: Tami Barney MD;    Location: [facility] OR     SINUS SURGERY      Reconstruction       Family History   Problem Relation Age of Onset     Diabetes Mother      Gastrointestinal Disease Mother         Gallbladder disease     Heart Disease Mother      Cancer Father         lung     Alcohol/Drug Father      Allergies Father      Heart Disease Father      Gastrointestinal Disease Father         Liver disease     Skin Cancer Father      Heart Disease Maternal Grandfather      Arthritis Sister      Osteoporosis Sister      Thyroid Disease Sister      Allergies Son      Alcohol/Drug Sister      Alcohol/Drug Sister      Melanoma No family hx of        Social History     Tobacco Use     Smoking status: Current Every Day Smoker     Packs/day: 0.01     Years: 42.00     Pack years: 0.42     Types: Cigarettes     Smokeless tobacco: Never Used     Tobacco comment: Has chantix at home, but hasn't started yet.   19.   Substance Use Topics     Alcohol use: Yes     Alcohol/week: 0.0 standard drinks      Comment: Occ. wine        History   Drug Use No       No current outpatient medications on file.       Allergies   Allergen Reactions     Amoxicillin Difficulty breathing and Rash     See Beta-Lactam Allergy Assessment noted on 4/7/21.     Ampicillin Difficulty breathing and Rash     See Beta-Lactam Allergy Assessment noted on 4/7/21.     Cipro [Ciprofloxacin] Difficulty breathing and Rash     Keflex [Cephalexin Monohydrate] Difficulty breathing and Rash     See Beta-Lactam Allergy Assessment noted on 4/7/21.     Penicillin [Penicillins] Difficulty breathing and Rash     See Beta-Lactam Allergy Assessment noted on 4/7/21.     Sulfa Drugs Difficulty breathing and Rash     Tetracycline Difficulty breathing and Rash     Biaxin [Clarithromycin] Rash     Ceclor [Cefaclor] Hives and Swelling     See Beta-Lactam Allergy Assessment noted on 4/7/21.     Advair Diskus Hives     Hives on face,neck and chest     Egg White      Fish Shortness Of Breath     Mustard [Allyl Isothiocyanate]      Throat swelling      CBC  Recent Labs   Lab Test 04/09/21  0515   WBC 0.1*   RBC 3.61*   HGB 11.0*   HCT 32.5*   MCV 90   MCH 30.5   MCHC 33.8   RDW 15.8*   PLT 27*       BMP  Recent Labs   Lab Test 04/09/21  0515      POTASSIUM 3.4   ELY 7.4*   CHLORIDE 108   CO2 30   BUN 22   CR 1.00   *       LFTs  Recent Labs   Lab Test 04/09/21  0515   PROTTOTAL 4.8*   ALBUMIN 1.6*   BILITOTAL 0.8   ALKPHOS 60   AST 5   ALT 38     Results for orders placed or performed during the hospital   encounter of 04/04/21   CT Abdomen Pelvis w Contrast    Narrative    CT ABDOMEN AND PELVIS WITH CONTRAST  4/4/2021 3:06 PM    CLINICAL HISTORY: Abdominal pain, acute, nonlocalized.    TECHNIQUE: CT scan of the abdomen and pelvis was performed   following  injection of IV contrast. Multiplanar reformats were obtained.   Dose  reduction techniques were used.  CONTRAST: 67 mL Isovue-370    COMPARISON: CT abdomen and pelvis 12/21/2020.    FINDINGS:   LOWER  CHEST: Normal.    HEPATOBILIARY: No acute abnormality. No focal lesion identified.  Status post cholecystectomy.    PANCREAS: Normal.    SPLEEN: Normal.    ADRENAL GLANDS: Normal.    KIDNEYS/BLADDER: Atrophy of the upper right kidney again   identified. A  few renal cysts not requiring specific imaging follow-up.   However,  there is a stable hypodense rounded lesion that is complex   measuring 1  cm right mid kidney, series 5 image 87. There are a few   nonobstructing  intrarenal stones bilaterally appearing stable. No   hydronephrosis. No  acute bladder abnormality.    BOWEL: Some wall thickening of the colon diffusely appears mild.   Small  fluid distends a few distal small bowel loops. No abscess or free   air.  Anal canal mass towards the right is smaller in size measuring   2.8 x  1.6, previously 3 x 2.7 cm, image 200.    PELVIC ORGANS: No acute abnormality. Unremarkable right ovary.   Small  left ovarian cyst is stable measuring 1.4 cm, image 125.    ADDITIONAL FINDINGS: Vascular calcifications. Stable infrarenal  abdominal aortic aneurysm that is 3.7 cm, image 93. No adjacent  hematoma. No new enlarged lymph nodes identified.    MUSCULOSKELETAL: No aggressive bony lesion. L5-S1 degenerative   disc  disease.      Impression    IMPRESSION:   1.  Some wall thickening of the colon may be a mild colitis. No  abscess or free air.  2.  Smaller size of the right anal canal mass.  3.  Stable infrarenal abdominal aortic aneurysm that currently  measures 3.7 cm.  4.  Stable but indeterminate hypodense rounded nodule at the   right  kidney that could be a complex cyst versus solid lesion.   Recommend  nonurgent renal MRI for further characterization.  5.  Bilateral nonobstructing intrarenal stones.    BEN BRO MD   CT Chest (PE) Abdomen Pelvis w Contrast    Narrative    CT CHEST PE, ABDOMEN AND PELVIS WITH CONTRAST  4/9/2021 1:40 PM    CLINICAL HISTORY: PE suspected, high probability. Acute hypoxia,  neutropenic  fever. Rule out PE vs pneumonia vs other. Abdominal   pain,  acute, nonlocalized.    TECHNIQUE: CT angiogram chest and routine CT abdomen pelvis with   IV  contrast. Arterial phase through the chest and venous phase   through  the abdomen and pelvis. 2D and 3D MIP reconstructions were   preformed  by the CT technologist. Dose reduction techniques were used.     CONTRAST: 92 mL Isovue-370    COMPARISON: 4/4/2021.    FINDINGS:  ANGIOGRAM CHEST: Pulmonary arteries are normal caliber and   negative  for pulmonary emboli. Thoracic aorta is negative for dissection.   No CT  evidence of right heart strain.     LUNGS AND PLEURA: Volume loss is noted along the posterior aspect   of  the right middle lobe. Mild background emphysematous changes are  noted. No effusion.    MEDIASTINUM/AXILLAE: Right chest port is present. No axillary  adenopathy. No significant coronary artery calcification.    HEPATOBILIARY: Extensive portal venous gas is seen at the   periphery of  both lobes of the liver. No focal lesions. The portal vein is   patent.  The gallbladder is absent.    PANCREAS: Normal.    SPLEEN: Normal.    ADRENAL GLANDS: Normal.    KIDNEYS/BLADDER: Multiple small nonobstructing stones are seen   through  both kidneys. A few cortical cysts are present as well. No   follow-up  necessary for these. No evidence for enhancing mass. The bladder   is  unremarkable.    BOWEL: The stomach is distended. The majority of the esophagus is  mildly dilated and fluid-filled. Numerous loops of small bowel   show  diffuse wall thickening. This extends from the mid to distal   small  bowel, and more proximal loops of small bowel are moderately   dilated.  Some mild pneumatosis is noted through multiple loops of small   bowel  in the left abdomen. Small amount of gas is seen within venous  structures in the central mesentery. Sigmoid wall thickening is   noted.  There are multiple diverticula through the descending and sigmoid  colon.    LYMPH  "NODES: Normal.    PELVIC ORGANS: Trace free fluid is present within the pelvis.    OTHER: Mild mesenteric edema is present. Infrarenal AAA just   above the  bifurcation is unchanged at 3.7 cm.    MUSCULOSKELETAL: Normal.      Impression    IMPRESSION:  1.  No evidence for pulmonary embolism.  2.  Diffuse small bowel wall thickening and dilation with some   areas  of pneumatosis. There is also some venous gas through the central  mesentery and diffuse portal venous gas in the liver. This may be   due  to an infectious or inflammatory process.  3.  Emphysematous changes through both lungs.  4.  Distended stomach and esophagus filled with fluid.  5.  Trace free fluid in the pelvis.  6.  Multiple nonobstructing stones in both kidneys.  7.  Stable AAA just above the bifurcation measuring up to 3.7 cm.    These findings were discussed with Dr. Gagnon at approximately   1410  hours on 4/9/2021.    KULWINDER TARIQ MD     ROS  Constitutional -positive for fever chills malaise and lethargy  Neuro - Denies tremors or seizures  Pulmon -occasional dyspnea  CV - Denies CP, SOB, lower extremity edema, difficulty w/ stairs,   has never used NTG  GI - Denies hematemesis, BRBPR, melena, positive for chronic   diarrhea   - Denies hematuria, difficulty voiding, h/o STDs  Hematology - Denies blood clotting disorders, chronic anemias  Rheumatology - No h/o RA  Pysch - Denies depression, bipolar d/o or schizophrenia    Exam:BP (!) 75/50   Pulse 123   Temp 98.7  F (37.1  C) (Oral)     Resp 25   Ht 1.651 m (5' 5\")   Wt 65 kg (143 lb 4.8 oz)     SpO2 92%   BMI 23.85 kg/m      General - Alert and Oriented X4, mild distress  HEENT - Normocephalic, atraumatic, PERRLA, Nose midline  Lungs -bilateral mild rhonchi  CV -tachycardic and regular.  Palpable pulses throughout  Abdomen -distended and tympanic.  Tender to palpation all 4   quadrants.  No rebound tenderness but positive guarding   throughout  Groins - 2+ pulses " bilaterally  Neuro -moves all extremities  Extremities - No cyanosis, clubbing or edema    Assessment and plan: 70-year-old female with pneumatosis likely   secondary to infectious etiology.  Unfortunately, patient cannot   mount a response to infection due to almost nonexistent white   count.  Infection seems to be reaching throughout the colon and   small bowel.  Air is seen in the portal system.  Patient is   tachycardic hypotensive and septic.  Broad-spectrum antibiotics   have been started.  Surgery is a possibility although not   recommended.  Will likely be unable to find one source in the   small bowel that is resectable and patient will likely not wake   up from surgery.  Discussed this with the patient and she is   elected to forego surgery and just accept medical treatment with   pressors and antibiotics.  Patient and her family understood the   grave nature of the situation.  Will continue to follow   peripherally.    Javier Fernandez MD    Lactic acid level STAT   Result Value Ref Range    Lactate for Sepsis Protocol 4.5 (HH) 0.7 - 2.0 mmol/L   Glucose by meter   Result Value Ref Range    Glucose 145 (H) 70 - 99 mg/dL   Lactic acid whole blood   Result Value Ref Range    Lactic Acid 2.4 (H) 0.7 - 2.0 mmol/L               Discharge Diagnoses/Problem Oriented Hospital Course (Providers):    Aminata Gale was admitted on 4/4/2021 by Nathanael Alexandre MD and I would refer you to their history and physical.  The following problems were addressed during her hospitalization:    Aminata Gale is a 70 year old female admitted on 4/4/2021. She presented to the emergency department for evaluation of diarrhea and in the setting of known generalized weakness anal cancer on chemo and radiation for which she is being admitted for further evaluation and treatment.     Comfort cares  4/10/2021 -- patient now obviously dying from sepsis as below, decision overnight to moderate cares but continue pressors with goal to  help patient survive until daughter arrived this AM.  Daughter here now and has been able to see her mom.  Family now wants to transition to comfort cares which I agree is appropriate.  Blood pressures now down to 40-50's systolic even on pressors.  Patient non-responsive and hypoxic despite oxygen at 15L.  Stopping pressors and antibiotics (already held this AM per family request) and started comfort care order set.  No labs today given comfort goals and clear clinical picture.  Anticipate death will be eminent, likely less than 24 hours.  Appreciate family involvement/support for patient.    patient passed away peacefully about 1 hour after withdrawing pressor support, passed comfortably per family.  They expressed appreciation for our care.       Generalized muscle weakness  4/4/21-4/7/21 -- Secondary to recent chemo and radiation, poor oral intake, and losses from diarrhea. No focal deficits. Difficulty managing at home due to frequent bathroom needs and weakness.   4/9/2021 -- physical therapy following - goal of return home, but weak, continue to assess.  May need TCU on discharge.       Neutropenic fever with sepsis and bacteremia suspect due to small bowel wall erosion into portal venous system likely secondary to radiation/chemo  Hypotensive shock due to above   4/9/2021 -- patient had a brief fever a couple of days ago but wasn't neutropenic at the time.  Yesterday was neutropenic but clinically stable and afebrile with expectation per oncology that we were at her nessa so no neupogen wasn't given.  Today, has persistent neutropenia and now a true fever to 101.9 this AM.  Unclear source.  Will check blood cultures, urine culture, CT chest/abd/pelvis.  Starting on empiric vanco and cefepime - patient has listed penicillin and cephalosporin allergies but reviewed with pharmacy and patient has tolerated cephalosporins multiple times since listed allergy, making cefepime a reasonable option.   Started on  neupogen 4/9.  UPDATE: CT shows diffuse small bowel wall thickening with pneumatosis into mesenteric and hepatic portal veins - per discussion with radiology looks like diffuse erosion of bowel wall but no apparent complete perforation.  Suspect this is her infectious source and discussed with patient and niece that this unfortunately is a very serious problem with a poor prognosis.  Added flagyl to antibiotics.  On repeat evaluation patient has increased abdominal tenderness although says her abdomen feels the same - repeating abdominal x-ray now to rule out clear acute perforation.  Surgery consulted and following, but of course want to avoid surgery if at all possible and as above no clear evidence of perforation at this point.  Checking lactic and will follow this.   Patient how hypotensive and tachycardic, lactic 4.5 - giving 30 ml/kg bolus LR (2L) and transferring to ICU.  Blood pressure briefly down to 65 systolic, placed in [place] and second IV started with second liter LR initiated.  Blood pressure improved but MAP Still mid-50's, starting norepinephrine.  Rechecking lactic at 18:30.  Will discuss with Tele-ICU.     UPDATE: patient evaluated by surgery and surgeon and I discussed options with patient and son/daughter in the room - prognosis is unfortunately poor either way, after discussion with surgeon patient and family elected to hold off on surgery and try to just manage this medically knowing that there is a poor prognosis.  Blood pressure still 70-80's systolic, heart rate 110's, giving another 1LLR now.  titrating up levophed.  Will add vasopressin if this is still inadequate.      Pancytopenia-chemo related  Wbc 2.3--0.7--0.4  hgb 12.2--12  platelets 100 k--70  ANC 0.5--currently too few wbc to determine (04/07/21)  Discussed with  Dr Sherry Devlin (primary oncologist) she says this should be the nessa in terms of counts. Neupogen not indicated. If we need to communicate with her-call her on  her cell: Cell [phone].  4/9/2021 -- neutropenia as above, thrombocytopenia worsened but no active bleeding.  Attempting to contact patient's oncologist to confirm plan for possible Neupogen and for cut-off for platelet transfusion, likely at 10k.        Diarrhea, likely chemo / radiation induced  Radiation induced colitis  4/4/21-4/7/21 -- Onset 3 days prior to admission, progressively worsening. Estimated 20+ diarrhea episodes daily, non-bloody. CT abdomen & pelvis shows evidence of mild colitis, is likely chemo / radiation induced but cannot rule out infectious source (although less likely).   Clostridia difficile and enteric negative   On immodium   4/8/2021 -- still 10 stools yesterday, but less frequent and smaller than on admission.  Encourage orals, following strict I/O's.   4/9/2021 -- diarrhea and abdominal pain unchanged.       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 Follows with [facility] and recently completed her chemo course, but receives radiation with Dr. Soto at [facility] (2 doses left on 4/5 and 4/6). Patient has rectal skin sloughing and maceration that is very painful, made worse by diarrhea. Has been unable to swallow her pills recently, leading to poor pain control. Managed prior to admission with prn MS contin 15 mg q 8 hours prn, oxycodone 5-7.5 mg q 4 hours prn, topical morphine, and barrier cream (Pro Shield).  - Continue prior to admission MS contin and oxycodone prn, with IV dilaudid available for breakthrough pain or when unable to take oral  - Prn topical lidocaine gel available  - Continue with barrier cream -silvadine bid  - Wound cares each shift  Discussed with Dr Christensen 4/6--- sounds like plan is to resume radiation on Monday 4/12     Thrush  Possible chemo inducted mucositis  4/4/21-4/7/21 -- New in the past few days  prior to admission, patient having a lot of pain with oral intake and some difficulty swallowing. Has been using H2O2 rinses at home with some relief.  - Nystatin swish & swallow qid  - May continue H2O2 swish & spit at patient's request  - Encourage swish & spit after inhaler use  4/9/2021 -- improving.      Severe protein calorie malnutrition  Nutrition consult placed - see their note for details and recommendations.        E coli and citrobacter  uti  4/4/21-4/7/21 -- UA positive for nitrites and trace leukocyte esterase, 11 WBCs. Afebrile, no leukocytosis. Patient with many antibiotics intolerances / allergies.  UC with e coli and citrobacter. Both pan sensitive. Leave on gent for now (problematic allergy profile). Ultimately might be able to transition to nitrofurantoin.  4/8/2021 -- with neutropenia, resumed nitrofurantoin - likely plan for 7 days total antibiotics or can stop if neutropenia resolve prior to that.  4/9/2021 -- doubt this is the source of her neutropenic fever, but continue nitrofurantoin for now - on day 4/7.  Repeating UA/UCx as above.        Renal nodule, right kidney  Noted on CT abdomen & pelvis - hypodense nodule of right kidney - likely complex cyst vs solid lesion. Patient was aware of this abnormality.  - Radiology recommending renal MRI for further characterization  - Defer further work-up to PCP        Low phosporus- nutritionally deficient  Protocol     AAA   Noted on CT abdomen & pelvis - stable infrarenal AAA measuring 3.7 cm. Patient was aware of this and is aware it is stable on admission CT.  - Defer to outpatient surveillance      Type 2 diabetes mellitus with hyperglycemia, without long-term current use of insulin  Noted per problem list. Most recent A1c = 6.1. Appears to be diet controlled, is not on diabetic medications.  - Patient will need consistent carbohydrate diet when diet is advanced  - No scheduled accuchecks unless persistently elevated on BMP     Hypokalemia due  to decreased p.o. intake  Replacing per protocol.       Diastolic dysfunction  Essential hypertension, benign  Nonocclusive coronary atherosclerosis of native coronary artery  Hyperlipidemia LDL goal <130  4/4/21-4/7/21 -- Lexiscan stress test June 2019 with apical hypokinesis, EF 62%. Appears compensated on admission. Managed prior to admission with aspirin 81 mg daily, amlodipine 10 mg daily, losartan 25 mg daily, torsemide 5 mg daily, and Zetia 10 mg daily.  - Continue aspirin, amlodipine, losartan, torsemide, and Zetia  4/8/2021 -- weight up some from admission but consistent with being dehydrated prior to admission - follow.    4/9/2021 -- weight up slightly more but not obviously volume up on exam - continue torsemide, giving an additional 40mg IV lasix x 1, awaiting CT chest results.  UPDATE: with hypotension this afternoon holding torsemide.       Centrilobular emphysema  Moderate persistent asthma  4/4/21-4/7/21 -- Stable respiratory status, no wheezing or abnormalities noted on admission exam. Managed prior to admission with Breo, Spiriva, prn albuterol, and prn DuoNebs.  - Continue Breo and Spiriva - if patient has her home inhalers may use if verified by pharmacy  - Continue prn albuterol and DuoNebs  - Ensure patient swishes & spits her mouth out after inhaler use due to thrush  4/8/2021 -- on 1/2LNC which patient has needed intermittently at baseline in the past per her report  4/9/2021 -- now up to 4LNC.  Checking CT chest as above.  Not in clear exacerbation, but will schedule albuterol nebs every 4 hours while awake.  Consider prednisone if not improving and no other etiology found in the next 24h.       Esophageal reflux  Noted on problem list, does not appear to be on PPI or H2 blocker. Not significantly symptomatic on admission.  - No treatment unless patient is symptomatic     Systemic lupus erythematosus  Noted per problem list. Formerly treated with methotrexate, currently not on active  treatment.  - Continue with outpatient management     Allergic rhinitis  Managed prior to admission with prn Zyrtec or Claritin.  - Continue Zyrtec     COVID status - negative  Tested as asymptomatic COVID screen based on hospital admission criteria. No concern for active COVID infection on admission.  - COVID PCR negative 4/4  - No indication for COVID precautions or repeat testing      Diet: Advance Diet as Tolerated: Clear Liquid Diet     DVT Prophylaxis: mechanical given pancytopenia      GI prophylaxis  Started on IV protonix with admission to ICU.       Lu Catheter: not present                  Image Results From This Hospital Stay (For Non-EPIC Providers):        Results for orders placed or performed during the hospital encounter of 04/04/21   CT Abdomen Pelvis w Contrast    Narrative    CT ABDOMEN AND PELVIS WITH CONTRAST  4/4/2021 3:06 PM    CLINICAL HISTORY: Abdominal pain, acute, nonlocalized.    TECHNIQUE: CT scan of the abdomen and pelvis was performed following  injection of IV contrast. Multiplanar reformats were obtained. Dose  reduction techniques were used.  CONTRAST: 67 mL Isovue-370    COMPARISON: CT abdomen and pelvis 12/21/2020.    FINDINGS:   LOWER CHEST: Normal.    HEPATOBILIARY: No acute abnormality. No focal lesion identified.  Status post cholecystectomy.    PANCREAS: Normal.    SPLEEN: Normal.    ADRENAL GLANDS: Normal.    KIDNEYS/BLADDER: Atrophy of the upper right kidney again identified. A  few renal cysts not requiring specific imaging follow-up. However,  there is a stable hypodense rounded lesion that is complex measuring 1  cm right mid kidney, series 5 image 87. There are a few nonobstructing  intrarenal stones bilaterally appearing stable. No hydronephrosis. No  acute bladder abnormality.    BOWEL: Some wall thickening of the colon diffusely appears mild. Small  fluid distends a few distal small bowel loops. No abscess or free air.  Anal canal mass towards the right is  smaller in size measuring 2.8 x  1.6, previously 3 x 2.7 cm, image 200.    PELVIC ORGANS: No acute abnormality. Unremarkable right ovary. Small  left ovarian cyst is stable measuring 1.4 cm, image 125.    ADDITIONAL FINDINGS: Vascular calcifications. Stable infrarenal  abdominal aortic aneurysm that is 3.7 cm, image 93. No adjacent  hematoma. No new enlarged lymph nodes identified.    MUSCULOSKELETAL: No aggressive bony lesion. L5-S1 degenerative disc  disease.      Impression    IMPRESSION:   1.  Some wall thickening of the colon may be a mild colitis. No  abscess or free air.  2.  Smaller size of the right anal canal mass.  3.  Stable infrarenal abdominal aortic aneurysm that currently  measures 3.7 cm.  4.  Stable but indeterminate hypodense rounded nodule at the right  kidney that could be a complex cyst versus solid lesion. Recommend  nonurgent renal MRI for further characterization.  5.  Bilateral nonobstructing intrarenal stones.    BEN BRO MD   CT Chest (PE) Abdomen Pelvis w Contrast    Narrative    CT CHEST PE, ABDOMEN AND PELVIS WITH CONTRAST  4/9/2021 1:40 PM    CLINICAL HISTORY: PE suspected, high probability. Acute hypoxia,  neutropenic fever. Rule out PE vs pneumonia vs other. Abdominal pain,  acute, nonlocalized.    TECHNIQUE: CT angiogram chest and routine CT abdomen pelvis with IV  contrast. Arterial phase through the chest and venous phase through  the abdomen and pelvis. 2D and 3D MIP reconstructions were preformed  by the CT technologist. Dose reduction techniques were used.     CONTRAST: 92 mL Isovue-370    COMPARISON: 4/4/2021.    FINDINGS:  ANGIOGRAM CHEST: Pulmonary arteries are normal caliber and negative  for pulmonary emboli. Thoracic aorta is negative for dissection. No CT  evidence of right heart strain.     LUNGS AND PLEURA: Volume loss is noted along the posterior aspect of  the right middle lobe. Mild background emphysematous changes are  noted. No  effusion.    MEDIASTINUM/AXILLAE: Right chest port is present. No axillary  adenopathy. No significant coronary artery calcification.    HEPATOBILIARY: Extensive portal venous gas is seen at the periphery of  both lobes of the liver. No focal lesions. The portal vein is patent.  The gallbladder is absent.    PANCREAS: Normal.    SPLEEN: Normal.    ADRENAL GLANDS: Normal.    KIDNEYS/BLADDER: Multiple small nonobstructing stones are seen through  both kidneys. A few cortical cysts are present as well. No follow-up  necessary for these. No evidence for enhancing mass. The bladder is  unremarkable.    BOWEL: The stomach is distended. The majority of the esophagus is  mildly dilated and fluid-filled. Numerous loops of small bowel show  diffuse wall thickening. This extends from the mid to distal small  bowel, and more proximal loops of small bowel are moderately dilated.  Some mild pneumatosis is noted through multiple loops of small bowel  in the left abdomen. Small amount of gas is seen within venous  structures in the central mesentery. Sigmoid wall thickening is noted.  There are multiple diverticula through the descending and sigmoid  colon.    LYMPH NODES: Normal.    PELVIC ORGANS: Trace free fluid is present within the pelvis.    OTHER: Mild mesenteric edema is present. Infrarenal AAA just above the  bifurcation is unchanged at 3.7 cm.    MUSCULOSKELETAL: Normal.      Impression    IMPRESSION:  1.  No evidence for pulmonary embolism.  2.  Diffuse small bowel wall thickening and dilation with some areas  of pneumatosis. There is also some venous gas through the central  mesentery and diffuse portal venous gas in the liver. This may be due  to an infectious or inflammatory process.  3.  Emphysematous changes through both lungs.  4.  Distended stomach and esophagus filled with fluid.  5.  Trace free fluid in the pelvis.  6.  Multiple nonobstructing stones in both kidneys.  7.  Stable AAA just above the bifurcation  measuring up to 3.7 cm.    These findings were discussed with Dr. Gagnon at approximately 1410  hours on 4/9/2021.    KULWINDER TARIQ MD           Most Recent Lab Results In EPIC (For Non-EPIC Providers):    Most Recent 3 CBC's:  Recent Labs   Lab Test 04/09/21  0515 04/08/21  0515 04/07/21  0645   WBC 0.1* 0.1* 0.4*   HGB 11.0* 11.1* 11.6*   MCV 90 91 91   PLT 27* 41* 63*      Most Recent 3 BMP's:  Recent Labs   Lab Test 04/09/21  0515 04/08/21  0515 04/07/21  2140 04/07/21  0645 04/07/21  0645    142  --   --  143   POTASSIUM 3.4 3.4 3.7   < > 3.3*   CHLORIDE 108 109  --   --  110*   CO2 30 29  --   --  29   BUN 22 16  --   --  12   CR 1.00 0.82  --   --  0.75   ANIONGAP 4 4  --   --  4   ELY 7.4* 7.3*  --   --  7.2*   * 129*  --   --  128*    < > = values in this interval not displayed.     Most Recent 3 Troponin's:  Recent Labs   Lab Test 06/30/17  2350   TROPI <0.015  The 99th percentile for upper reference range is 0.045 ug/L.  Troponin values in   the range of 0.045 - 0.120 ug/L may be associated with risks of adverse   clinical events.       Most Recent 3 INR's:  Recent Labs   Lab Test 04/11/17  1448   INR 0.92     Most Recent 2 LFT's:  Recent Labs   Lab Test 04/09/21  0515 04/08/21  0515   AST 5 12   ALT 38 61*   ALKPHOS 60 59   BILITOTAL 0.8 0.7     Most Recent Cholesterol Panel:  Recent Labs   Lab Test 02/04/20  0911   CHOL 209*   *   HDL 55   TRIG 144     Most Recent 6 Bacteria Isolates From Any Culture (See EPIC Reports for Culture Details):  Recent Labs   Lab Test 04/09/21  1237 04/09/21  1230 04/04/21  1621 03/02/20  1437 07/01/17  0300 02/12/14  1344   CULT No growth after 15 hours Cultured on the 1st day of incubation:  Gram positive cocci in pairs and chains  *  Critical Value/Significant Value, preliminary result only, called to and read back by  Opal Tom RN on 4/10/21 at 0528. LSA    (Note)  POSITIVE for STREPTOCOCCUS SPECIES OTHER THAN pneumococcus,  anginosus  group, S. pyogenes and S. agalactiae. Performed using [employer]  multiplex nucleic acid test. Final identification and antimicrobial  susceptibility testing will be verified by standard methods.    Specimen tested with Verigene multiplex, gram-positive blood culture  nucleic acid test for the following targets: Staph aureus, Staph  epidermidis, Staph lugdunensis, other Staph species, Enterococcus  faecalis, Enterococcus faecium, Streptococcus species, S. agalactiae,  S. anginosus grp., S. pneumoniae, S. pyogenes, Listeria sp., mecA  (methicillin resistance) and Fausto/B (vancomycin resistance).    Critical Value/Significant Value called to and read back by iCci Vieyra RN at [facility] at 8:15am 4/10/21 mc   >100,000 colonies/mL  Citrobacter koseri  *  50,000 to 100,000 colonies/mL  Escherichia coli  * >100,000 colonies/mL  Escherichia coli  *  10,000 to 50,000 colonies/mL  Citrobacter koseri  * >100,000 colonies/mL Citrobacter koseri* >100,000 colonies/mL Citrobacter koseri*     Most Recent TSH, T4 and HgbA1c:   Recent Labs   Lab Test 07/17/20  1142   TSH 2.18   A1C 6.1*

## 2021-04-10 NOTE — PROGRESS NOTES
MD DEATH PRONOUNCEMENT    Called to pronounce Aminata hahn.    Physical Exam: Unresponsive to noxious stimuli, Spontaneous respirations absent, Breath sounds absent, Carotid pulse absent, Heart sounds absent, Pupillary light reflex absent and Corneal blink reflex absent    Patient was pronounced dead at 12:50 PM, April 10, 2021.    Neutropenic fever with sepsis and bacteremia suspect due to small bowel wall erosion into portal venous system likely secondary to radiation/chemo    No data filed        Principal Problem:    Generalized muscle weakness  Active Problems:    Systemic lupus erythematosus (H)    Esophageal reflux    Essential hypertension, benign    Allergic rhinitis    Hyperlipidemia LDL goal <130    Moderate persistent asthma    Chronic pain    Centrilobular emphysema (H)    Type 2 diabetes mellitus with hyperglycemia, without long-term current use of insulin (H)    Nonocclusive coronary atherosclerosis of native coronary artery    Diastolic dysfunction    Anal squamous cell carcinoma (H)    Malignant neoplasm of anal canal (H)    Radiation skin ulcer (H)    Diarrhea, unspecified type    Colitis       Infectious disease present?: YES    Communicable disease present? (examples: HIV, chicken pox, TB, Ebola, CJD) :  NO    Multi-drug resistant organism present? (example: MRSA): NO    Please consider an autopsy if any of the following exist:  NO Unexpected or unexplained death during or following any dental, medical, or surgical diagnostic treatment procedures.   NO Death of mother at or up to seven days after delivery.     NO All  and pediatric deaths.     NO Death where the cause is sufficiently obscure to delay completion of the death certificate.   NO Deaths in which autopsy would confirm a suspected illness/condition that would affect surviving family members or recipients of transplanted organs.     The following deaths must be reported to the 's Office:  NO A death that may be due  entirely or in part to any factors other than natural disease (recent surgery, recent trauma, suspected abuse/neglect).   NO A death that may be an accident, suicide, or homicide.     NO Any sudden, unexpected death in which there is no prior history of significant heart disease or any other condition associated with sudden death.   NO A death under suspicious, unusual, or unexpected circumstances.    NO Any death which is apparently due to natural causes but in which the  does not have a personal physician familiar with the patient s medical history, social, or environmental situation or the circumstances of the terminal event.   NO Any death apparently due to Sudden Infant Death Syndrome.     NO Deaths that occur during, in association with, or as consequences of a diagnostic, therapeutic, or anesthetic procedure.   NO Any death in which a fracture of a major bone has occurred within the past (6) six months.   NO A death of persons note seen by their physician within 120 days of demise.     NO Any death in which the  was an inmate of a public institution or was in the custody of Law Enforcement personnel.   NO  All unexpected deaths of children   NO Solid organ donors   NO Unidentified bodies   NO Deaths of persons whose bodies are to be cremated or otherwise disposed of so that the bodies will later be unavailable for examination;   NO Deaths unattended by a physician outside of a licensed healthcare facility or licensed residential hospice program   NO Deaths occurring within 24 hours of arrival to a health care facility if death is unexpected.    NO Deaths associated with the decedent s employment.   NO Deaths attributed to acts of terrorism.   NO Any death in which there is uncertainty as to whether it is a medical examiner s care should be discussed with the medical investigator.        Body disposition: Body released to the  home.

## 2021-04-10 NOTE — PROGRESS NOTES
[facility] Hospitalist Progress Note           Assessment & Plan      Aminata Gale is a 70 year old female admitted on 4/4/2021. She presented to the emergency department for evaluation of diarrhea and in the setting of known generalized weakness anal cancer on chemo and radiation for which she is being admitted for further evaluation and treatment.     Comfort cares  4/10/2021 -- patient now obviously dying from sepsis as below, decision overnight to moderate cares but continue pressors with goal to help patient survive until daughter arrived this AM.  Daughter here now and has been able to see her mom.  Family now wants to transition to comfort cares which I agree is appropriate.  Blood pressures now down to 40-50's systolic even on pressors.  Patient non-responsive and hypoxic despite oxygen at 15L.  Stopping pressors and antibiotics (already held this AM per family request) and started comfort care order set.  No labs today given comfort goals and clear clinical picture.  Anticipate death will be eminent, likely less than 24 hours.  Appreciate family involvement/support for patient.      Generalized muscle weakness  4/4/21-4/7/21 -- Secondary to recent chemo and radiation, poor oral intake, and losses from diarrhea. No focal deficits. Difficulty managing at home due to frequent bathroom needs and weakness.   4/9/2021 -- physical therapy following - goal of return home, but weak, continue to assess.  May need TCU on discharge.       Neutropenic fever with sepsis and bacteremia suspect due to small bowel wall erosion into portal venous system likely secondary to radiation/chemo  Hypotensive shock due to above   4/9/2021 -- patient had a brief fever a couple of days ago but wasn't neutropenic at the time.  Yesterday was neutropenic but clinically stable and afebrile with expectation per oncology that we were at her nessa so no neupogen wasn't given.  Today, has persistent neutropenia and now a true fever to  101.9 this AM.  Unclear source.  Will check blood cultures, urine culture, CT chest/abd/pelvis.  Starting on empiric vanco and cefepime - patient has listed penicillin and cephalosporin allergies but reviewed with pharmacy and patient has tolerated cephalosporins multiple times since listed allergy, making cefepime a reasonable option.   Started on neupogen 4/9.  UPDATE: CT shows diffuse small bowel wall thickening with pneumatosis into mesenteric and hepatic portal veins - per discussion with radiology looks like diffuse erosion of bowel wall but no apparent complete perforation.  Suspect this is her infectious source and discussed with patient and niece that this unfortunately is a very serious problem with a poor prognosis.  Added flagyl to antibiotics.  On repeat evaluation patient has increased abdominal tenderness although says her abdomen feels the same - repeating abdominal x-ray now to rule out clear acute perforation.  Surgery consulted and following, but of course want to avoid surgery if at all possible and as above no clear evidence of perforation at this point.  Checking lactic and will follow this.   Patient how hypotensive and tachycardic, lactic 4.5 - giving 30 ml/kg bolus LR (2L) and transferring to ICU.  Blood pressure briefly down to 65 systolic, placed in [place] and second IV started with second liter LR initiated.  Blood pressure improved but MAP Still mid-50's, starting norepinephrine.  Rechecking lactic at 18:30.  Will discuss with Tele-ICU.     UPDATE: patient evaluated by surgery and surgeon and I discussed options with patient and son/daughter in the room - prognosis is unfortunately poor either way, after discussion with surgeon patient and family elected to hold off on surgery and try to just manage this medically knowing that there is a poor prognosis.  Blood pressure still 70-80's systolic, heart rate 110's, giving another 1LLR now.  titrating up levophed.  Will add vasopressin  if this is still inadequate.      Pancytopenia-chemo related  Wbc 2.3--0.7--0.4  hgb 12.2--12  platelets 100 k--70  ANC 0.5--currently too few wbc to determine (04/07/21)  Discussed with  Dr Sherry Devlin (primary oncologist) she says this should be the nessa in terms of counts. Neupogen not indicated. If we need to communicate with her-call her on her cell: Cell [phone].  4/9/2021 -- neutropenia as above, thrombocytopenia worsened but no active bleeding.  Attempting to contact patient's oncologist to confirm plan for possible Neupogen and for cut-off for platelet transfusion, likely at 10k.        Diarrhea, likely chemo / radiation induced  Radiation induced colitis  4/4/21-4/7/21 -- Onset 3 days prior to admission, progressively worsening. Estimated 20+ diarrhea episodes daily, non-bloody. CT abdomen & pelvis shows evidence of mild colitis, is likely chemo / radiation induced but cannot rule out infectious source (although less likely).   Clostridia difficile and enteric negative   On immodium   4/8/2021 -- still 10 stools yesterday, but less frequent and smaller than on admission.  Encourage orals, following strict I/O's.   4/9/2021 -- diarrhea and abdominal pain unchanged.       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 Follows with [facility] and recently completed her chemo course, but receives radiation with Dr. Soto at [facility] (2 doses left on 4/5 and 4/6). Patient has rectal skin sloughing and maceration that is very painful, made worse by diarrhea. Has been unable to swallow her pills recently, leading to poor pain control. Managed prior to admission with prn MS contin 15 mg q 8 hours prn, oxycodone 5-7.5 mg q 4 hours prn, topical morphine, and barrier cream (Pro Shield).  - Continue prior to admission MS contin and oxycodone prn, with IV  dilaudid available for breakthrough pain or when unable to take oral  - Prn topical lidocaine gel available  - Continue with barrier cream -silvadine bid  - Wound cares each shift  Discussed with Dr Christensen 4/6--- sounds like plan is to resume radiation on Monday 4/12     Thrush  Possible chemo inducted mucositis  4/4/21-4/7/21 -- New in the past few days prior to admission, patient having a lot of pain with oral intake and some difficulty swallowing. Has been using H2O2 rinses at home with some relief.  - Nystatin swish & swallow qid  - May continue H2O2 swish & spit at patient's request  - Encourage swish & spit after inhaler use  4/9/2021 -- improving.      Severe protein calorie malnutrition  Nutrition consult placed - see their note for details and recommendations.        E coli and citrobacter  uti  4/4/21-4/7/21 -- UA positive for nitrites and trace leukocyte esterase, 11 WBCs. Afebrile, no leukocytosis. Patient with many antibiotics intolerances / allergies.  UC with e coli and citrobacter. Both pan sensitive. Leave on gent for now (problematic allergy profile). Ultimately might be able to transition to nitrofurantoin.  4/8/2021 -- with neutropenia, resumed nitrofurantoin - likely plan for 7 days total antibiotics or can stop if neutropenia resolve prior to that.  4/9/2021 -- doubt this is the source of her neutropenic fever, but continue nitrofurantoin for now - on day 4/7.  Repeating UA/UCx as above.        Renal nodule, right kidney  Noted on CT abdomen & pelvis - hypodense nodule of right kidney - likely complex cyst vs solid lesion. Patient was aware of this abnormality.  - Radiology recommending renal MRI for further characterization  - Defer further work-up to PCP        Low phosporus- nutritionally deficient  Protocol     AAA   Noted on CT abdomen & pelvis - stable infrarenal AAA measuring 3.7 cm. Patient was aware of this and is aware it is stable on admission CT.  - Defer to outpatient  surveillance      Type 2 diabetes mellitus with hyperglycemia, without long-term current use of insulin  Noted per problem list. Most recent A1c = 6.1. Appears to be diet controlled, is not on diabetic medications.  - Patient will need consistent carbohydrate diet when diet is advanced  - No scheduled accuchecks unless persistently elevated on BMP     Hypokalemia due to decreased p.o. intake  Replacing per protocol.       Diastolic dysfunction  Essential hypertension, benign  Nonocclusive coronary atherosclerosis of native coronary artery  Hyperlipidemia LDL goal <130  4/4/21-4/7/21 -- Lexiscan stress test June 2019 with apical hypokinesis, EF 62%. Appears compensated on admission. Managed prior to admission with aspirin 81 mg daily, amlodipine 10 mg daily, losartan 25 mg daily, torsemide 5 mg daily, and Zetia 10 mg daily.  - Continue aspirin, amlodipine, losartan, torsemide, and Zetia  4/8/2021 -- weight up some from admission but consistent with being dehydrated prior to admission - follow.    4/9/2021 -- weight up slightly more but not obviously volume up on exam - continue torsemide, giving an additional 40mg IV lasix x 1, awaiting CT chest results.  UPDATE: with hypotension this afternoon holding torsemide.       Centrilobular emphysema  Moderate persistent asthma  4/4/21-4/7/21 -- Stable respiratory status, no wheezing or abnormalities noted on admission exam. Managed prior to admission with Breo, Spiriva, prn albuterol, and prn DuoNebs.  - Continue Breo and Spiriva - if patient has her home inhalers may use if verified by pharmacy  - Continue prn albuterol and DuoNebs  - Ensure patient swishes & spits her mouth out after inhaler use due to thrush  4/8/2021 -- on 1/2LNC which patient has needed intermittently at baseline in the past per her report  4/9/2021 -- now up to 4LNC.  Checking CT chest as above.  Not in clear exacerbation, but will schedule albuterol nebs every 4 hours while awake.  Consider  prednisone if not improving and no other etiology found in the next 24h.       Esophageal reflux  Noted on problem list, does not appear to be on PPI or H2 blocker. Not significantly symptomatic on admission.  - No treatment unless patient is symptomatic     Systemic lupus erythematosus  Noted per problem list. Formerly treated with methotrexate, currently not on active treatment.  - Continue with outpatient management     Allergic rhinitis  Managed prior to admission with prn Zyrtec or Claritin.  - Continue Zyrtec     COVID status - negative  Tested as asymptomatic COVID screen based on hospital admission criteria. No concern for active COVID infection on admission.  - COVID PCR negative 4/4  - No indication for COVID precautions or repeat testing      Diet: Advance Diet as Tolerated: Clear Liquid Diet     DVT Prophylaxis: mechanical given pancytopenia      GI prophylaxis  Started on IV protonix with admission to ICU.       Lu Catheter: not present     Code Status: Full Code  - discussed with patient on admission, she is not sure what she would like her CODE STATUS to be.  Will defer to full code for now.     Diet  Orders Placed This Encounter      Advance Diet as Tolerated: Full Liquid Diet           Disposition  Terminal, anticipate death in next 24 hours.              Interval History:   Patient continued to worsen overnight.  Made DNR/DNI, see Dr. Tian's note.  Non-responsive now, blood pressures 40-60's systolic despite pressors, deferred labs and antibiotics this AM due to comfort goals, but wanted to keep pressors on until daughter arrived which she was able to do this morning.  Patient in no distress or apparent pain.              Review of Systems:   Unable to assess due to non-responsive           Medications:   Current active medications and PTA medications reviewed, see medication list for details.            Physical Exam:   Vitals were reviewed  Patient Vitals for the past 24 hrs:   BP Temp Temp  src Pulse Resp SpO2 Height Weight   04/10/21 1100 (!) 48/34 -- -- 141 21 -- -- --   04/10/21 1000 (!) 72/21 -- -- 144 12 93 % -- --   04/10/21 0900 (!) 89/41 -- -- 147 18 (!) 89 % -- --   04/10/21 0800 (!) 85/55 -- -- 146 (!) 56 (!) 83 % -- --   04/10/21 0715 (!) 72/59 -- -- 146 15 (!) 87 % -- --   04/10/21 0700 (!) 68/47 -- -- 146 27 (!) 88 % -- --   04/10/21 0645 103/74 -- -- 146 18 (!) 87 % -- --   04/10/21 0630 114/43 -- -- 146 15 (!) 84 % -- --   04/10/21 0615 (!) 90/39 -- -- 142 14 (!) 78 % -- --   04/10/21 0600 (!) 77/59 -- -- 141 21 (!) 75 % -- --   04/10/21 0545 (!) 81/43 -- -- 140 14 (!) 74 % -- --   04/10/21 0530 (!) 72/23 -- -- 138 29 (!) 75 % -- --   04/10/21 0515 -- -- -- 135 16 (!) 85 % -- --   04/10/21 0502 102/65 -- -- 135 19 (!) 84 % -- --   04/10/21 0500 (!) 61/40 -- -- 135 15 (!) 88 % -- --   04/10/21 0445 (!) 87/61 -- -- 135 21 (!) 85 % -- --   04/10/21 0430 110/70 -- -- 135 29 (!) 85 % -- --   04/10/21 0422 (!) 86/70 98.2  F (36.8  C) Axillary 137 10 (!) 80 % -- --   04/10/21 0415 (!) 86/70 -- -- 135 12 (!) 82 % -- --   04/10/21 0400 94/64 -- -- 135 19 (!) 79 % -- --   04/10/21 0345 (!) 86/57 -- -- 140 19 (!) 81 % -- --   04/10/21 0330 (!) 85/67 -- -- 151 11 (!) 79 % -- --   04/10/21 0323 (!) 110/98 -- -- 151 23 (!) 80 % -- --   04/10/21 0317 (!) 66/43 -- -- 151 30 (!) 86 % -- --   04/10/21 0315 (!) 88/46 -- -- 147 17 96 % -- --   04/10/21 0300 100/51 -- -- 149 27 98 % -- --   04/10/21 0245 92/63 -- -- 151 29 95 % -- --   04/10/21 0230 96/52 -- -- 149 24 96 % -- --   04/10/21 0220 94/41 -- -- 151 25 94 % -- --   04/10/21 0215 (!) 69/28 -- -- 151 17 94 % -- --   04/10/21 0200 (!) 129/122 -- -- 149 16 90 % -- --   04/10/21 0145 109/78 -- -- 146 11 (!) 83 % -- --   04/10/21 0130 (!) 103/93 -- -- 144 20 (!) 82 % -- --   04/10/21 0115 94/70 -- -- 141 24 (!) 89 % -- --   04/10/21 0110 -- -- -- 140 29 (!) 85 % -- --   04/10/21 0105 -- -- -- 140 20 (!) 81 % -- --   04/10/21 0104 -- -- -- 140 21 (!) 81  % -- --   04/10/21 0100 (!) 78/68 -- -- 137 30 (!) 86 % -- --   04/10/21 0055 -- -- -- 133 12 (!) 86 % -- --   04/10/21 0053 -- -- -- 135 16 (!) 88 % -- --   04/10/21 0050 -- -- -- 133 25 (!) 87 % -- --   04/10/21 0045 (!) 103/97 -- -- 128 14 (!) 78 % -- --   04/10/21 0042 -- -- -- 125 18 (!) 86 % -- --   04/10/21 0040 -- -- -- 129 15 (!) 85 % -- --   04/10/21 0039 -- -- -- 126 16 (!) 85 % -- --   04/10/21 0035 -- -- -- 126 15 (!) 86 % -- --   04/10/21 0032 -- -- -- 123 11 (!) 87 % -- --   04/10/21 0030 98/71 -- -- 125 21 (!) 88 % -- --   04/10/21 0015 102/69 -- -- 123 18 91 % -- --   04/10/21 0000 (!) 131/117 -- -- 124 19 90 % -- --   04/09/21 2345 (!) 106/93 -- -- 125 18 91 % -- --   04/09/21 2330 92/74 -- -- 131 (!) 34 (!) 88 % -- --   04/09/21 2315 122/76 -- -- 125 17 90 % -- --   04/09/21 2300 (!) 73/56 -- -- 118 16 93 % -- --   04/09/21 2245 105/66 -- -- 118 17 93 % -- --   04/09/21 2230 103/71 -- -- 118 16 93 % -- --   04/09/21 2215 (!) 89/56 -- -- 122 17 91 % -- --   04/09/21 2200 99/75 -- -- 124 15 92 % -- --   04/09/21 2145 (!) 58/53 -- -- 124 26 92 % -- --   04/09/21 2130 109/78 -- -- 125 19 92 % -- --   04/09/21 2125 104/82 -- -- 118 8 92 % -- --   04/09/21 2122 -- 98  F (36.7  C) Axillary -- -- -- -- --   04/09/21 2120 110/76 -- -- 122 19 91 % -- --   04/09/21 2115 96/68 -- -- 123 23 (!) 87 % -- --   04/09/21 2105 98/83 -- -- 120 20 90 % -- --   04/09/21 2100 99/50 -- -- 122 21 93 % -- --   04/09/21 2045 92/76 -- -- 122 20 93 % -- --   04/09/21 2040 90/63 -- -- 116 20 94 % -- --   04/09/21 2036 (!) 86/54 -- -- 120 17 93 % -- --   04/09/21 2034 (!) 78/56 -- -- 121 19 92 % -- --   04/09/21 2030 (!) 81/57 -- -- 122 25 92 % -- --   04/09/21 2015 (!) 77/66 -- -- 120 24 92 % -- --   04/09/21 2000 101/61 -- -- 122 -- -- -- --   04/09/21 1945 99/59 -- -- 123 23 92 % -- --   04/09/21 1930 102/61 -- -- 115 17 94 % -- --   04/09/21 1915 90/58 -- -- 117 23 94 % -- --   04/09/21 1900 104/66 -- -- 113 16 97 % -- --  "  21 1850 (!) 88/71 -- -- 112 18 93 % -- --   21 1841 -- -- -- -- 22 -- -- --   21 1830 (!) 75/50 -- -- 123 25 92 % -- --   21 1820 100/79 -- -- 120 24 93 % -- --   21 1810 100/79 -- -- 116 19 92 % -- --   21 1800 (!) 77/57 -- -- 117 20 92 % -- --   21 1755 (!) 77/57 -- -- 118 25 92 % -- --   21 1750 (!) 81/54 -- -- 118 20 92 % -- --   21 1745 (!) 89/53 -- -- 117 23 95 % -- --   21 1740 (!) 67/43 -- -- 117 20 95 % -- --   21 173 (!) 76/58 -- -- 118 17 94 % -- --   21 1730 (!) 83/37 -- -- 117 18 94 % -- --   21 1725 103/48 -- -- 122 22 94 % -- --   21 1720 (!) 79/47 -- -- 115 19 95 % -- --   21 171 (!) 76/51 -- -- 116 15 95 % -- --   21 1710 (!) 75/66 -- -- 117 19 95 % -- --   21 170 (!) 65/44 98.7  F (37.1  C) Oral 113 19 96 % -- --   21 1700 -- -- -- -- -- -- 1.651 m (5' 5\") 65 kg (143 lb 4.8 oz)   21 1645 -- -- -- 116 12 98 % -- --   21 1640 (!) 80/37 -- -- 116 18 90 % -- --   21 1623 (!) 70/41 -- -- 121 -- -- -- --   21 1620 (!) 65/45 -- -- 116 -- -- -- --   21 1606 (!) 72/40 98  F (36.7  C) Oral 128 24 92 % -- --   21 1553 (!) 125/97 -- -- 112 -- -- -- --   21 1540 (!) 107/96 -- -- 156 -- -- -- --   21 1529 (!) 77/43 97.7  F (36.5  C) Oral 140 26 94 % -- --   21 1204 -- -- -- -- -- 92 % -- --   21 1141 101/55 101.9  F (38.8  C) Oral 142 18 (!) 88 % -- --       Temperatures:  Current - Temp: 98.2  F (36.8  C); Max - Temp  Av.8  F (37.1  C)  Min: 97.7  F (36.5  C)  Max: 101.9  F (38.8  C)  Respiration range: Resp  Av.9  Min: 8  Max: 56  Pulse range: Pulse  Av  Min: 112  Max: 156  Blood pressure range: Systolic (24hrs), Av , Min:48 , Max:131   ; Diastolic (24hrs), Av, Min:21, Max:122    Pulse oximetry range: SpO2  Av %  Min: 74 %  Max: 98 %  I/O last 3 completed shifts:  In: 3750 [P.O.:350; I.V.:400; IV " Piggyback:3000]  Out: 750 [Urine:750]    Intake/Output Summary (Last 24 hours) at 4/10/2021 1134  Last data filed at 4/10/2021 1000  Gross per 24 hour   Intake 5411.2 ml   Output 650 ml   Net 4761.2 ml     EXAM:  General: non-responsive as above  Head: normocephalic  Neck: unremarkable, no lymphadenopathy   HEENT: oropharynx pink and moist    Heart: Regular rate and rhythm, no murmurs, rubs, or gallops  Lungs: coarse crackles throughout   Abdomen: soft, non-tender, no masses or organomegaly  Extremities: trace edema in lower extremities   Skin unremarkable.               Data:     Results for orders placed or performed during the hospital encounter of 04/04/21 (from the past 24 hour(s))   Blood culture    Specimen: Blood    Left Arm   Result Value Ref Range    Specimen Description Blood Left Arm     Culture Micro (A)      Cultured on the 1st day of incubation:  Gram positive cocci in pairs and chains      Culture Micro       Critical Value/Significant Value, preliminary result only, called to and read back by  Opal Tom RN on 4/10/21 at 0528. LSA      Culture Micro       (Note)  POSITIVE for STREPTOCOCCUS SPECIES OTHER THAN pneumococcus, anginosus  group, S. pyogenes and S. agalactiae. Performed using Docphin  multiplex nucleic acid test. Final identification and antimicrobial  susceptibility testing will be verified by standard methods.    Specimen tested with Verigene multiplex, gram-positive blood culture  nucleic acid test for the following targets: Staph aureus, Staph  epidermidis, Staph lugdunensis, other Staph species, Enterococcus  faecalis, Enterococcus faecium, Streptococcus species, S. agalactiae,  S. anginosus grp., S. pneumoniae, S. pyogenes, Listeria sp., mecA  (methicillin resistance) and Fausto/B (vancomycin resistance).    Critical Value/Significant Value called to and read back by Cici Vieyra RN at [facility] at 8:15am 4/10/21      Blood culture    Specimen: Blood    Port   Result Value  Ref Range    Specimen Description Blood Port     Culture Micro No growth after 15 hours    CT Chest (PE) Abdomen Pelvis w Contrast    Narrative    CT CHEST PE, ABDOMEN AND PELVIS WITH CONTRAST  4/9/2021 1:40 PM    CLINICAL HISTORY: PE suspected, high probability. Acute hypoxia,  neutropenic fever. Rule out PE vs pneumonia vs other. Abdominal pain,  acute, nonlocalized.    TECHNIQUE: CT angiogram chest and routine CT abdomen pelvis with IV  contrast. Arterial phase through the chest and venous phase through  the abdomen and pelvis. 2D and 3D MIP reconstructions were preformed  by the CT technologist. Dose reduction techniques were used.     CONTRAST: 92 mL Isovue-[id number]    COMPARISON: 4/4/2021.    FINDINGS:  ANGIOGRAM CHEST: Pulmonary arteries are normal caliber and negative  for pulmonary emboli. Thoracic aorta is negative for dissection. No CT  evidence of right heart strain.     LUNGS AND PLEURA: Volume loss is noted along the posterior aspect of  the right middle lobe. Mild background emphysematous changes are  noted. No effusion.    MEDIASTINUM/AXILLAE: Right chest port is present. No axillary  adenopathy. No significant coronary artery calcification.    HEPATOBILIARY: Extensive portal venous gas is seen at the periphery of  both lobes of the liver. No focal lesions. The portal vein is patent.  The gallbladder is absent.    PANCREAS: Normal.    SPLEEN: Normal.    ADRENAL GLANDS: Normal.    KIDNEYS/BLADDER: Multiple small nonobstructing stones are seen through  both kidneys. A few cortical cysts are present as well. No follow-up  necessary for these. No evidence for enhancing mass. The bladder is  unremarkable.    BOWEL: The stomach is distended. The majority of the esophagus is  mildly dilated and fluid-filled. Numerous loops of small bowel show  diffuse wall thickening. This extends from the mid to distal small  bowel, and more proximal loops of small bowel are moderately dilated.  Some mild pneumatosis is noted  through multiple loops of small bowel  in the left abdomen. Small amount of gas is seen within venous  structures in the central mesentery. Sigmoid wall thickening is noted.  There are multiple diverticula through the descending and sigmoid  colon.    LYMPH NODES: Normal.    PELVIC ORGANS: Trace free fluid is present within the pelvis.    OTHER: Mild mesenteric edema is present. Infrarenal AAA just above the  bifurcation is unchanged at 3.7 cm.    MUSCULOSKELETAL: Normal.      Impression    IMPRESSION:  1.  No evidence for pulmonary embolism.  2.  Diffuse small bowel wall thickening and dilation with some areas  of pneumatosis. There is also some venous gas through the central  mesentery and diffuse portal venous gas in the liver. This may be due  to an infectious or inflammatory process.  3.  Emphysematous changes through both lungs.  4.  Distended stomach and esophagus filled with fluid.  5.  Trace free fluid in the pelvis.  6.  Multiple nonobstructing stones in both kidneys.  7.  Stable AAA just above the bifurcation measuring up to 3.7 cm.    These findings were discussed with Dr. Gagnon at approximately 1410  hours on 4/9/2021.    KULWINDER TARIQ MD   Surgery General IP Consult: Patient to be seen: Routine within 24 hrs; pneumatosis, neutropenic fever; Consultant may enter orders: Yes; Requesting provider? Hospitalist (if different from attending physician)    Narrative    Javier Fernandez MD     4/9/2021  6:42 PM  70-year-old female undergoing chemo and radiation treatments for   anal cancer.  Admitted a few days ago with diarrhea.  Initial CT   scan showed diffuse colitis and thickening of the colon wall   along with some small bowel thickening.  Patient has been   tachycardic since admission.  Today she spiked a fever above   100.1 and a CT scan of her abdomen pelvis was done showing   pneumatosis intestinalis along with air in the mesenteric vessels   and throughout the liver.  Patient reports increasing  abdominal   pain and began becoming hypotensive requiring transfer to the ICU   and placement on pressors.  Patient's lactate was normal   yesterday but today is 4.5.    Patient Active Problem List   Diagnosis     Systemic lupus erythematosus (H)     Esophageal reflux     Essential hypertension, benign     Allergic rhinitis     Polycythemia, secondary     Female stress incontinence     Other nonspecific abnormal result of function study of brain   and central nervous system     Smoker     Hyperlipidemia LDL goal <130     Osteoporosis     Hiatal hernia     Advanced directives, counseling/discussion     Acne     SK (seborrheic keratosis)     Lentigo     Angioma     Moderate persistent asthma     Chronic pain     HTN, goal below 140/90     Anxiety     Refused influenza vaccine     Grief reaction     Centrilobular emphysema (H)     Acute cholecystitis     TMJ (temporomandibular joint syndrome)     Type 2 diabetes mellitus with hyperglycemia, without long-term   current use of insulin (H)     Type 2 diabetes mellitus with microalbuminuria, without   long-term current use of insulin (H)     Nonocclusive coronary atherosclerosis of native coronary artery       Diastolic dysfunction     Tubular adenoma of colon     Colon polyp     Anal squamous cell carcinoma (H)     Dehydration     Chemotherapy induced nausea and vomiting     Malignant neoplasm of anal canal (H)     Generalized muscle weakness     Radiation skin ulcer (H)     Diarrhea, unspecified type     Colitis       Past Medical History:   Diagnosis Date     Acute pericarditis, unspecified      Cancer (H)      Chronic airway obstruction 12/13/2005    PFTs - 1/02 - mild COPD Problem list name updated by automated   process. Provider to review     Dysuria      Esophageal reflux      GERD (gastroesophageal reflux disease) 9/20/2013     Hiatal hernia 5/4/2011     HTN, goal below 140/90 9/20/2013     Hyperlipidemia LDL goal <130 9/22/2010    Recent Labs  Lab Test 9/22/10  0908 10/12/06 0939    CHOL 214*   256*    HDL 31* 42*    * 186*    TRIG 149 143      CHOLHDLRATIO 7.0* 6.0*   11 - dobutamine echo showed NO   infarct or ischemia LV 60-65%, normal exercise response.        Lupus (H)      Osteoporosis 10/18/2010    10/10 - severe osteopenia in femoral necks, mild osteopenia in   spine  (Problem list name updated by automated process. Provider   to review and confirm.)     Pure hypercholesterolemia      SECONDARY POLYCYTHEMIA 10/17/2006    Due to smoking     Smoker 2008     Systemic lupus erythematosus 2005    Diagnosed in ; history of pericarditis; was previously   treated with mtx; not on active treatment; no hx of renal dz from   the lupus.       Tobacco use disorder      Type 2 diabetes mellitus with hyperglycemia, without long-term   current use of insulin (H) 2019    NEW DIAGNOSIS 2019     Unspecified essential hypertension        Past Surgical History:   Procedure Laterality Date     C APPENDECTOMY       C REPAIR GUM      Cathy Dontal surgery     C/SECTION, LOW TRANSVERSE      , Low Transverse     COLONOSCOPY  2009     D & C      X 5     HC RECONSTR NOSE+DAVE SEPTAL REPAIR       HYSTERECTOMY, BRI      Hx of dysplasia     LAPAROSCOPIC CHOLECYSTECTOMY N/A 2017    Procedure: LAPAROSCOPIC CHOLECYSTECTOMY;  Laparoscopic   Cholecystectomy;  Surgeon: Tami Barney MD;    Location: [facility]     SINUS SURGERY      Reconstruction       Family History   Problem Relation Age of Onset     Diabetes Mother      Gastrointestinal Disease Mother         Gallbladder disease     Heart Disease Mother      Cancer Father         lung     Alcohol/Drug Father      Allergies Father      Heart Disease Father      Gastrointestinal Disease Father         Liver disease     Skin Cancer Father      Heart Disease Maternal Grandfather      Arthritis Sister      Osteoporosis Sister      Thyroid Disease Sister      Allergies Son      Alcohol/Drug  Sister      Alcohol/Drug Sister      Melanoma No family hx of        Social History     Tobacco Use     Smoking status: Current Every Day Smoker     Packs/day: 0.01     Years: 42.00     Pack years: 0.42     Types: Cigarettes     Smokeless tobacco: Never Used     Tobacco comment: Has chantix at home, but hasn't started yet.   5/9/19.   Substance Use Topics     Alcohol use: Yes     Alcohol/week: 0.0 standard drinks     Comment: Occ. wine        History   Drug Use No       No current outpatient medications on file.       Allergies   Allergen Reactions     Amoxicillin Difficulty breathing and Rash     See Beta-Lactam Allergy Assessment noted on 4/7/21.     Ampicillin Difficulty breathing and Rash     See Beta-Lactam Allergy Assessment noted on 4/7/21.     Cipro [Ciprofloxacin] Difficulty breathing and Rash     Keflex [Cephalexin Monohydrate] Difficulty breathing and Rash     See Beta-Lactam Allergy Assessment noted on 4/7/21.     Penicillin [Penicillins] Difficulty breathing and Rash     See Beta-Lactam Allergy Assessment noted on 4/7/21.     Sulfa Drugs Difficulty breathing and Rash     Tetracycline Difficulty breathing and Rash     Biaxin [Clarithromycin] Rash     Ceclor [Cefaclor] Hives and Swelling     See Beta-Lactam Allergy Assessment noted on 4/7/21.     Advair Diskus Hives     Hives on face,neck and chest     Egg White      Fish Shortness Of Breath     Mustard [Allyl Isothiocyanate]      Throat swelling      CBC  Recent Labs   Lab Test 04/09/21  0515   WBC 0.1*   RBC 3.61*   HGB 11.0*   HCT 32.5*   MCV 90   MCH 30.5   MCHC 33.8   RDW 15.8*   PLT 27*       BMP  Recent Labs   Lab Test 04/09/21  0515      POTASSIUM 3.4   ELY 7.4*   CHLORIDE 108   CO2 30   BUN 22   CR 1.00   *       LFTs  Recent Labs   Lab Test 04/09/21  0515   PROTTOTAL 4.8*   ALBUMIN 1.6*   BILITOTAL 0.8   ALKPHOS 60   AST 5   ALT 38     Results for orders placed or performed during the hospital   encounter of 04/04/21   CT Abdomen  Pelvis w Contrast    Narrative    CT ABDOMEN AND PELVIS WITH CONTRAST  4/4/2021 3:06 PM    CLINICAL HISTORY: Abdominal pain, acute, nonlocalized.    TECHNIQUE: CT scan of the abdomen and pelvis was performed   following  injection of IV contrast. Multiplanar reformats were obtained.   Dose  reduction techniques were used.  CONTRAST: 67 mL Isovue-370    COMPARISON: CT abdomen and pelvis 12/21/2020.    FINDINGS:   LOWER CHEST: Normal.    HEPATOBILIARY: No acute abnormality. No focal lesion identified.  Status post cholecystectomy.    PANCREAS: Normal.    SPLEEN: Normal.    ADRENAL GLANDS: Normal.    KIDNEYS/BLADDER: Atrophy of the upper right kidney again   identified. A  few renal cysts not requiring specific imaging follow-up.   However,  there is a stable hypodense rounded lesion that is complex   measuring 1  cm right mid kidney, series 5 image 87. There are a few   nonobstructing  intrarenal stones bilaterally appearing stable. No   hydronephrosis. No  acute bladder abnormality.    BOWEL: Some wall thickening of the colon diffusely appears mild.   Small  fluid distends a few distal small bowel loops. No abscess or free   air.  Anal canal mass towards the right is smaller in size measuring   2.8 x  1.6, previously 3 x 2.7 cm, image 200.    PELVIC ORGANS: No acute abnormality. Unremarkable right ovary.   Small  left ovarian cyst is stable measuring 1.4 cm, image 125.    ADDITIONAL FINDINGS: Vascular calcifications. Stable infrarenal  abdominal aortic aneurysm that is 3.7 cm, image 93. No adjacent  hematoma. No new enlarged lymph nodes identified.    MUSCULOSKELETAL: No aggressive bony lesion. L5-S1 degenerative   disc  disease.      Impression    IMPRESSION:   1.  Some wall thickening of the colon may be a mild colitis. No  abscess or free air.  2.  Smaller size of the right anal canal mass.  3.  Stable infrarenal abdominal aortic aneurysm that currently  measures 3.7 cm.  4.  Stable but indeterminate hypodense  rounded nodule at the   right  kidney that could be a complex cyst versus solid lesion.   Recommend  nonurgent renal MRI for further characterization.  5.  Bilateral nonobstructing intrarenal stones.    BEN BRO MD   CT Chest (PE) Abdomen Pelvis w Contrast    Narrative    CT CHEST PE, ABDOMEN AND PELVIS WITH CONTRAST  4/9/2021 1:40 PM    CLINICAL HISTORY: PE suspected, high probability. Acute hypoxia,  neutropenic fever. Rule out PE vs pneumonia vs other. Abdominal   pain,  acute, nonlocalized.    TECHNIQUE: CT angiogram chest and routine CT abdomen pelvis with   IV  contrast. Arterial phase through the chest and venous phase   through  the abdomen and pelvis. 2D and 3D MIP reconstructions were   preformed  by the CT technologist. Dose reduction techniques were used.     CONTRAST: 92 mL Isovue-370    COMPARISON: 4/4/2021.    FINDINGS:  ANGIOGRAM CHEST: Pulmonary arteries are normal caliber and   negative  for pulmonary emboli. Thoracic aorta is negative for dissection.   No CT  evidence of right heart strain.     LUNGS AND PLEURA: Volume loss is noted along the posterior aspect   of  the right middle lobe. Mild background emphysematous changes are  noted. No effusion.    MEDIASTINUM/AXILLAE: Right chest port is present. No axillary  adenopathy. No significant coronary artery calcification.    HEPATOBILIARY: Extensive portal venous gas is seen at the   periphery of  both lobes of the liver. No focal lesions. The portal vein is   patent.  The gallbladder is absent.    PANCREAS: Normal.    SPLEEN: Normal.    ADRENAL GLANDS: Normal.    KIDNEYS/BLADDER: Multiple small nonobstructing stones are seen   through  both kidneys. A few cortical cysts are present as well. No   follow-up  necessary for these. No evidence for enhancing mass. The bladder   is  unremarkable.    BOWEL: The stomach is distended. The majority of the esophagus is  mildly dilated and fluid-filled. Numerous loops of small bowel   show  diffuse wall  "thickening. This extends from the mid to distal   small  bowel, and more proximal loops of small bowel are moderately   dilated.  Some mild pneumatosis is noted through multiple loops of small   bowel  in the left abdomen. Small amount of gas is seen within venous  structures in the central mesentery. Sigmoid wall thickening is   noted.  There are multiple diverticula through the descending and sigmoid  colon.    LYMPH NODES: Normal.    PELVIC ORGANS: Trace free fluid is present within the pelvis.    OTHER: Mild mesenteric edema is present. Infrarenal AAA just   above the  bifurcation is unchanged at 3.7 cm.    MUSCULOSKELETAL: Normal.      Impression    IMPRESSION:  1.  No evidence for pulmonary embolism.  2.  Diffuse small bowel wall thickening and dilation with some   areas  of pneumatosis. There is also some venous gas through the central  mesentery and diffuse portal venous gas in the liver. This may be   due  to an infectious or inflammatory process.  3.  Emphysematous changes through both lungs.  4.  Distended stomach and esophagus filled with fluid.  5.  Trace free fluid in the pelvis.  6.  Multiple nonobstructing stones in both kidneys.  7.  Stable AAA just above the bifurcation measuring up to 3.7 cm.    These findings were discussed with Dr. Gagnon at approximately   1410  hours on 4/9/2021.    KULWINDER TARIQ MD     ROS  Constitutional -positive for fever chills malaise and lethargy  Neuro - Denies tremors or seizures  Pulmon -occasional dyspnea  CV - Denies CP, SOB, lower extremity edema, difficulty w/ stairs,   has never used NTG  GI - Denies hematemesis, BRBPR, melena, positive for chronic   diarrhea   - Denies hematuria, difficulty voiding, h/o STDs  Hematology - Denies blood clotting disorders, chronic anemias  Rheumatology - No h/o RA  Pysch - Denies depression, bipolar d/o or schizophrenia    Exam:BP (!) 75/50   Pulse 123   Temp 98.7  F (37.1  C) (Oral)     Resp 25   Ht 1.651 m (5' 5\")  "  Wt 65 kg (143 lb 4.8 oz)     SpO2 92%   BMI 23.85 kg/m      General - Alert and Oriented X4, mild distress  HEENT - Normocephalic, atraumatic, PERRLA, Nose midline  Lungs -bilateral mild rhonchi  CV -tachycardic and regular.  Palpable pulses throughout  Abdomen -distended and tympanic.  Tender to palpation all 4   quadrants.  No rebound tenderness but positive guarding   throughout  Groins - 2+ pulses bilaterally  Neuro -moves all extremities  Extremities - No cyanosis, clubbing or edema    Assessment and plan: 70-year-old female with pneumatosis likely   secondary to infectious etiology.  Unfortunately, patient cannot   mount a response to infection due to almost nonexistent white   count.  Infection seems to be reaching throughout the colon and   small bowel.  Air is seen in the portal system.  Patient is   tachycardic hypotensive and septic.  Broad-spectrum antibiotics   have been started.  Surgery is a possibility although not   recommended.  Will likely be unable to find one source in the   small bowel that is resectable and patient will likely not wake   up from surgery.  Discussed this with the patient and she is   elected to forego surgery and just accept medical treatment with   pressors and antibiotics.  Patient and her family understood the   grave nature of the situation.  Will continue to follow   peripherally.    Javier Fernandez MD    Lactic acid level STAT   Result Value Ref Range    Lactate for Sepsis Protocol 4.5 (HH) 0.7 - 2.0 mmol/L   Glucose by meter   Result Value Ref Range    Glucose 145 (H) 70 - 99 mg/dL   Lactic acid whole blood   Result Value Ref Range    Lactic Acid 2.4 (H) 0.7 - 2.0 mmol/L           Attestation:  I have reviewed today's vital signs, notes, medications, labs and imaging.  Amount of time spent in direct patient care: 45 minutes.     Bowen Gagnon MD, MD

## 2021-04-10 NOTE — PROGRESS NOTES
Daughter arrived, with pt. Blood pressure 48/34, family requesting to shut off pressors, web paged Dr. Gagnon

## 2021-04-10 NOTE — PROGRESS NOTES
Family member came out of room notifying nurse that pt's respirations had ceased around 1220, web paged Dr. Gagnon to pronounce

## 2021-04-10 NOTE — PROGRESS NOTES
Chart was accessed by recorder to show the bedside RNAngela how to access the RRT flowsheet. No other areas of the chart were accessed.

## 2021-04-12 LAB
BACTERIA SPEC CULT: ABNORMAL
SPECIMEN SOURCE: ABNORMAL

## 2021-04-15 LAB
BACTERIA SPEC CULT: NO GROWTH
SPECIMEN SOURCE: NORMAL

## 2021-08-29 PROBLEM — C21.0 ANAL SQUAMOUS CELL CARCINOMA (H): Status: ACTIVE | Noted: 2021-01-01

## 2022-05-20 NOTE — ED NOTES
Pt presents the ED for complaint of diarrhea. Pt has anal cancer and is receiving chemo and radiation. Last chemo was 2 days ago. She has had persistent, liquid diarrhea, and intermittent vomiting since this last chemo infusion. She denies abd pain,fevers of there significant changes. She has not been able to have much oral intake over these past few days. Diarrhea come on very suddenly and she often is incontinent of the stool. One episode like this while writer at bedside. Assisted to clean self up. Some nausea currently.   
Pt reports improvement of pain after applying topical lidocaine gel.   
[Care Plan reviewed and provided to patient/caregiver] : Care plan reviewed and provided to patient/caregiver

## 2022-07-08 NOTE — PLAN OF CARE
Patient alert and oriented x4. SBA. Using bedside commode. Having frequent loose stools. Patient reported pain on buttocks/radha area. Pain treated with lidocaine cream, IV dilaudid, and oxy. Was on 1 L O2 at start of shift. Now on 0.5 L via NC with sats between 91-93%. Given tylenol x1 for temp of 101.2. Tylenol effective. LR @ 50 ml/hr.     Toribio Philip RN on 4/8/2021 at 7:33 AM     98
